# Patient Record
Sex: MALE | Race: WHITE | NOT HISPANIC OR LATINO | Employment: OTHER | URBAN - METROPOLITAN AREA
[De-identification: names, ages, dates, MRNs, and addresses within clinical notes are randomized per-mention and may not be internally consistent; named-entity substitution may affect disease eponyms.]

---

## 2018-10-03 ENCOUNTER — OFFICE VISIT (OUTPATIENT)
Dept: MEDICAL GROUP | Facility: MEDICAL CENTER | Age: 68
End: 2018-10-03
Payer: MEDICARE

## 2018-10-03 VITALS
HEIGHT: 73 IN | DIASTOLIC BLOOD PRESSURE: 64 MMHG | OXYGEN SATURATION: 99 % | RESPIRATION RATE: 14 BRPM | TEMPERATURE: 96.9 F | WEIGHT: 242.95 LBS | BODY MASS INDEX: 32.2 KG/M2 | HEART RATE: 77 BPM | SYSTOLIC BLOOD PRESSURE: 130 MMHG

## 2018-10-03 DIAGNOSIS — E78.5 DYSLIPIDEMIA: ICD-10-CM

## 2018-10-03 DIAGNOSIS — E66.9 OBESITY (BMI 30.0-34.9): ICD-10-CM

## 2018-10-03 DIAGNOSIS — M25.50 ARTHRALGIA, UNSPECIFIED JOINT: ICD-10-CM

## 2018-10-03 DIAGNOSIS — K21.9 GASTROESOPHAGEAL REFLUX DISEASE, ESOPHAGITIS PRESENCE NOT SPECIFIED: ICD-10-CM

## 2018-10-03 DIAGNOSIS — Z79.899 CONTROLLED SUBSTANCE AGREEMENT SIGNED: ICD-10-CM

## 2018-10-03 DIAGNOSIS — M15.9 PRIMARY OSTEOARTHRITIS INVOLVING MULTIPLE JOINTS: ICD-10-CM

## 2018-10-03 DIAGNOSIS — B00.9 HSV INFECTION: ICD-10-CM

## 2018-10-03 DIAGNOSIS — Z00.00 HEALTH CARE MAINTENANCE: ICD-10-CM

## 2018-10-03 DIAGNOSIS — G47.00 INSOMNIA, UNSPECIFIED TYPE: ICD-10-CM

## 2018-10-03 DIAGNOSIS — J30.2 SEASONAL ALLERGIC RHINITIS, UNSPECIFIED TRIGGER: ICD-10-CM

## 2018-10-03 DIAGNOSIS — H91.91 DECREASED HEARING, RIGHT: ICD-10-CM

## 2018-10-03 DIAGNOSIS — Z76.89 ENCOUNTER TO ESTABLISH CARE: ICD-10-CM

## 2018-10-03 PROBLEM — E66.811 OBESITY (BMI 30.0-34.9): Status: ACTIVE | Noted: 2018-10-03

## 2018-10-03 PROBLEM — M15.0 PRIMARY OSTEOARTHRITIS INVOLVING MULTIPLE JOINTS: Status: ACTIVE | Noted: 2018-10-03

## 2018-10-03 PROCEDURE — 99205 OFFICE O/P NEW HI 60 MIN: CPT | Performed by: INTERNAL MEDICINE

## 2018-10-03 RX ORDER — MONTELUKAST SODIUM 10 MG/1
TABLET ORAL
COMMUNITY
Start: 2018-08-30 | End: 2020-04-28

## 2018-10-03 RX ORDER — DICLOFENAC SODIUM 75 MG/1
75 TABLET, DELAYED RELEASE ORAL 2 TIMES DAILY
Qty: 180 TAB | Refills: 0 | Status: SHIPPED | OUTPATIENT
Start: 2018-10-03 | End: 2018-11-27 | Stop reason: SDUPTHER

## 2018-10-03 RX ORDER — FUROSEMIDE 20 MG/1
TABLET ORAL
COMMUNITY
Start: 2018-08-31 | End: 2020-04-28

## 2018-10-03 RX ORDER — INFLUENZA A VIRUS A/MICHIGAN/45/2015 X-275 (H1N1) ANTIGEN (FORMALDEHYDE INACTIVATED), INFLUENZA A VIRUS A/SINGAPORE/INFIMH-16-0019/2016 IVR-186 (H3N2) ANTIGEN (FORMALDEHYDE INACTIVATED), AND INFLUENZA B VIRUS B/MARYLAND/15/2016 BX-69A (A B/COLORADO/6/2017-LIKE VIRUS) ANTIGEN (FORMALDEHYDE INACTIVATED) 60; 60; 60 UG/.5ML; UG/.5ML; UG/.5ML
INJECTION, SUSPENSION INTRAMUSCULAR
Refills: 0 | COMMUNITY
Start: 2018-09-29 | End: 2019-04-08

## 2018-10-03 RX ORDER — ASPIRIN 81 MG/1
81 TABLET, CHEWABLE ORAL DAILY
COMMUNITY
End: 2020-08-17

## 2018-10-03 RX ORDER — VALACYCLOVIR HYDROCHLORIDE 500 MG/1
500 TABLET, FILM COATED ORAL DAILY
Qty: 90 TAB | Refills: 0 | Status: SHIPPED | OUTPATIENT
Start: 2018-10-03 | End: 2018-11-26 | Stop reason: SDUPTHER

## 2018-10-03 RX ORDER — DICLOFENAC SODIUM 75 MG/1
TABLET, DELAYED RELEASE ORAL
COMMUNITY
Start: 2018-08-30 | End: 2018-10-03 | Stop reason: SDUPTHER

## 2018-10-03 RX ORDER — ZOLPIDEM TARTRATE 10 MG/1
TABLET ORAL
COMMUNITY
Start: 2018-08-30 | End: 2018-11-26 | Stop reason: SDUPTHER

## 2018-10-03 RX ORDER — PANTOPRAZOLE SODIUM 20 MG/1
20 TABLET, DELAYED RELEASE ORAL DAILY
Qty: 90 TAB | Refills: 0 | Status: SHIPPED | OUTPATIENT
Start: 2018-10-03 | End: 2018-11-26 | Stop reason: SDUPTHER

## 2018-10-03 RX ORDER — VALACYCLOVIR HYDROCHLORIDE 500 MG/1
TABLET, FILM COATED ORAL
COMMUNITY
Start: 2018-08-29 | End: 2018-10-03 | Stop reason: SDUPTHER

## 2018-10-03 RX ORDER — ATORVASTATIN CALCIUM 40 MG/1
40 TABLET, FILM COATED ORAL DAILY
Qty: 90 TAB | Refills: 0 | Status: SHIPPED | OUTPATIENT
Start: 2018-10-03 | End: 2018-11-26 | Stop reason: SDUPTHER

## 2018-10-03 RX ORDER — ATORVASTATIN CALCIUM 40 MG/1
TABLET, FILM COATED ORAL
COMMUNITY
Start: 2018-08-30 | End: 2018-10-03 | Stop reason: SDUPTHER

## 2018-10-03 RX ORDER — ELECTROLYTES/DEXTROSE
1 SOLUTION, ORAL ORAL DAILY
COMMUNITY
End: 2022-07-20

## 2018-10-03 RX ORDER — FLUOROURACIL 50 MG/G
CREAM TOPICAL 2 TIMES DAILY
COMMUNITY
End: 2020-04-29

## 2018-10-03 RX ORDER — PANTOPRAZOLE SODIUM 40 MG/1
TABLET, DELAYED RELEASE ORAL
COMMUNITY
Start: 2018-08-30 | End: 2018-10-03 | Stop reason: SDUPTHER

## 2018-10-03 RX ORDER — SODIUM FLUORIDE 5 MG/G
GEL, DENTIFRICE DENTAL
COMMUNITY
End: 2020-04-28

## 2018-10-03 ASSESSMENT — PATIENT HEALTH QUESTIONNAIRE - PHQ9
5. POOR APPETITE OR OVEREATING: 2 - MORE THAN HALF THE DAYS
CLINICAL INTERPRETATION OF PHQ2 SCORE: 2
SUM OF ALL RESPONSES TO PHQ QUESTIONS 1-9: 9

## 2018-10-03 NOTE — PROGRESS NOTES
CHIEF COMPLAINT  Chief Complaint   Patient presents with   • Establish Care   Dyslipidemia    HPI  Patient is a 68 y.o. male patient who presents today for the following     DYSLIPIDEMIA  The patient is on atorvastatin 40 mg, taking daily, as prescribed. No myalgias, muscle cramps or pain.   Diet: Regular  Exercise: Limited  BMI: 32  FH:     GERD  On omeprazle, 20 mg QD; takes medication as prescribed, that controls sx.   Denies:   - heartburn, epigastric pain.   -  nausea, vomiting, or diarrhea  - dysphagia  - abnormal cough  - blood in the stool or dark tarry stools.  - early satiety  - tobacco use.    Insomnia  The patient has a trouble to go and stay asleep.   Used Ambien, 10 mg at bedtime.   Discussed sleep hygiene:   - Go to bed and wake up at consistent times whether work/school day or not.   - Keep room dark, quiet, and comfortable.   - Don't have naps.  - Increase exposure to sunlight during awake times and avoid bright lights before going to sleep.   - Avoid stimulant or caffeine use more than 4 hours after wake time.   - Avoid meal or exercise 2-3 hours prior to going to bed.  Handout provided.     Seasonal allergies  The patient has h/o seasonal allergies.   No current sx.  He used OTC antihistamine and Singulair as needed.    OA, multiple joints /arthralgia  The patient has history of osteoarthritis, arthralgia or multiple joints.  St post R shoulder replacement.    He is using Voltaren, 75 mg twice daily     OBESITY, Body mass index is 32.05 kg/m².  Diet: Regular  Exercise: Limited  No temperature intolerance. No change in hair/skin quality, BMs.   No HTN, buffalo hump, purple striae, flushing.  FH of obesity: neg    HSV infection  The patient has h/o HSV infection, using Valtrex 500 mg daily.  No current skin lesion.    Decreased hearing in the right ear  The patient was diagnosed with decreased hearing in the right ear, sensorineural loss.   He does not have hearing aid.      Reviewed/edited PMH,  PSH, FH, SH, ALL, HCM/IMM  Reviewed/edited MEDS    Patient Active Problem List    Diagnosis Date Noted   • Gastroesophageal reflux disease 10/03/2018   • Insomnia 10/03/2018   • Seasonal allergic rhinitis 10/03/2018   • Dyslipidemia 10/03/2018   • Primary osteoarthritis involving multiple joints 10/03/2018   • Encounter to establish care 10/03/2018   • Obesity (BMI 30.0-34.9) 10/03/2018     CURRENT MEDICATIONS  Current Outpatient Prescriptions   Medication Sig Dispense Refill   • Cholecalciferol (VITAMIN D3) 2000 UNIT Cap Take  by mouth 2 Times a Day.     • Multiple Vitamins-Minerals (MULTIVITAMIN ADULT) Tab Take  by mouth.     • atorvastatin (LIPITOR) 40 MG Tab      • diclofenac EC (VOLTAREN) 75 MG Tablet Delayed Response      • furosemide (LASIX) 20 MG Tab      • pantoprazole (PROTONIX) 40 MG Tablet Delayed Response      • valACYclovir (VALTREX) 500 MG Tab      • aspirin (ASA) 81 MG Chew Tab chewable tablet Take 81 mg by mouth every day.     • SODIUM FLUORIDE, DENTAL GEL, (DENTAGEL) 1.1 % Gel by dental route.     • fluocinonide (LIDEX) 0.05 % Cream Apply  to affected area(s) 2 times a day.     • fluorouracil (EFUDEX) 5 % cream Apply  to affected area(s) 2 times a day.     • FLUZONE HIGH-DOSE 0.5 ML Suspension Prefilled Syringe injection TO BE ADMINISTERED BY PHARMACIST FOR IMMUNIZATION  0   • montelukast (SINGULAIR) 10 MG Tab      • zolpidem (AMBIEN) 10 MG Tab        No current facility-administered medications for this visit.      ALLERGIES  Allergies: Patient has no allergy information on record.  PAST MEDICAL HISTORY  Past Medical History:   Diagnosis Date   • Dyslipidemia    • GERD (gastroesophageal reflux disease)    • Insomnia      SURGICAL HISTORY  He  has a past surgical history that includes appendectomy and tonsillectomy.  SOCIAL HISTORY  Social History   Substance Use Topics   • Smoking status: Never Smoker   • Smokeless tobacco: Never Used   • Alcohol use Yes     Social History     Social History  "Narrative   • No narrative on file     FAMILY HISTORY  Family History   Problem Relation Age of Onset   • Cancer Mother    • Diabetes Mother    • Heart Disease Father    • Hyperlipidemia Neg Hx      Family Status   Relation Status   • Mo    • Fa    • Neg Hx (Not Specified)     ROS   Constitutional: Negative for fever, chills.  HENT: Negative for congestion, sore throat. And per HPI.  Eyes: Negative for blurred vision.   Respiratory: Negative for cough, shortness of breath.  Cardiovascular: Negative for chest pain, palpitations.   Gastrointestinal: Negative for nausea, abdominal pain. And per HPI.  Genitourinary: Negative for dysuria.  Musculoskeletal: As above.    Skin: Negative for rash and itching.   Neuro: Negative for dizziness, weakness and headaches.   Endo/Heme/Allergies: Does not bruise/bleed easily.   Psychiatric/Behavioral: Negative for depression, anxiety. And per HPI.    PHYSICAL EXAM   Blood pressure 130/64, pulse 77, temperature 36.1 °C (96.9 °F), temperature source Temporal, resp. rate 14, height 1.854 m (6' 1\"), weight 110.2 kg (242 lb 15.2 oz), SpO2 99 %. Body mass index is 32.05 kg/m².  General:  NAD, well appearing  HEENT:   NC/AT, PERRLA, EOMI, TMs are clear. Oropharyngeal mucosa is pink,  without lesions;  no cervical / supraclavicular  lymphadenopathy, no thyromegaly.    Cardiovascular: RRR.   No m/r/g. No carotid bruits .      Lungs:   CTAB, no w/r/r, no respiratory distress.  Abdomen: Soft, NT/ND + BS; no suprapubic tenderness; no masses or hepatosplenomegaly.  Extremities:  2+ DP and radial pulses bilaterally.  No c/c/e.   Skin:  Warm, dry.  No erythema. No rash.   Neurologic: Alert & oriented x 3.  No focal deficits.  Psychiatric:  Affect normal, mood normal, judgment normal.    LABS     Pending    IMAGING     None    ASSESMENT AND PLAN        1. Dyslipidemia  Pending labs, continue current treatment  - atorvastatin (LIPITOR) 40 MG Tab; Take 1 Tab by mouth every day.  " Dispense: 90 Tab; Refill: 0  - COMP METABOLIC PANEL; Future  - LIPID PROFILE; Future    2. Gastroesophageal reflux disease, esophagitis presence not specified  Controlled, continue current treatment  - pantoprazole (PROTONIX) 20 MG tablet; Take 1 Tab by mouth every day.  Dispense: 90 Tab; Refill: 0    3. Insomnia, unspecified type  - Controlled Substance Treatment Agreement  4. Controlled substance agreement signed  - Controlled Substance Treatment Agreement  Controlled sleep problem, continue current treatment    5. Seasonal allergic rhinitis, unspecified trigger  No current symptoms, continue current treatment as needed    6. Primary osteoarthritis involving multiple joints  Controlled, refill  - diclofenac EC (VOLTAREN) 75 MG Tablet Delayed Response; Take 1 Tab by mouth 2 times a day.  Dispense: 180 Tab; Refill: 0  7. Arthralgia, unspecified joint  - CBC WITH DIFFERENTIAL; Future    8. Obesity (BMI 30.0-34.9)  - Patient identified as having weight management issue.  Appropriate orders and counseling given.    9. HSV infection  Refill:  - valACYclovir (VALTREX) 500 MG Tab; Take 1 Tab by mouth every day.  Dispense: 90 Tab; Refill: 0    10.  Decreased hearing, right  No hearing aid    11. Health care maintenance  Pending records    12. Encounter to establish care  Reviewed PMH, PSH, FH, SH, ALL, MEDS, HCM/IMM.   Advised healthy habits, diet, exercise.  Release form for old records: signed    Time spent 60 minutes face to face, with > 50% spent counseling and coordinating care.    Counseling:   - Smoking:  Nonsmoker    Followup: in 3 months    All questions are answered.    Please note that this dictation was created using voice recognition software, and that there might be errors of elisabeth and possibly content.

## 2018-10-03 NOTE — LETTER
[facility]  Lynette Payne M.D.  [address]  [place] [address]  Fax: [phone]   Authorization for Release/Disclosure of   Protected Health Information   Name: ABIEL PINTO : 1950 SSN: [id number]   Address: [facility] [address]  [place] [address] Phone:    [phone] (home)    I authorize the entity listed below to release/disclose the PHI below to:   [facility]/Lynette Payne M.D. and Lynette Payne M.D.   Provider or Entity Name:     Address   City, State, Zip   Phone:      Fax:     Reason for request: continuity of care   Information to be released:    [  ] LAST COLONOSCOPY,  including any PATH REPORT and follow-up  [  ] LAST FIT/COLOGUARD RESULT [  ] LAST DEXA  [  ] LAST MAMMOGRAM  [  ] LAST PAP  [  ] LAST LABS [  ] RETINA EXAM REPORT  [  ] IMMUNIZATION RECORDS  [  ] Release all info      [  ] Check here and initial the line next to each item to release ALL health information INCLUDING  _____ Care and treatment for drug and / or alcohol abuse  _____ HIV testing, infection status, or AIDS  _____ Genetic Testing    DATES OF SERVICE OR TIME PERIOD TO BE DISCLOSED: _____________  I understand and acknowledge that:  * This Authorization may be revoked at any time by you in writing, except if your health information has already been used or disclosed.  * Your health information that will be used or disclosed as a result of you signing this authorization could be re-disclosed by the recipient. If this occurs, your re-disclosed health information may no longer be protected by State or Federal laws.  * You may refuse to sign this Authorization. Your refusal will not affect your ability to obtain treatment.  * This Authorization becomes effective upon signing and will  on (date) __________.      If no date is indicated, this Authorization will  one (1) year from the signature date.    Name: Abiel Pinto    Signature:   Date:     10/3/2018          PLEASE FAX REQUESTED RECORDS BACK TO: [phone]

## 2018-10-29 ENCOUNTER — OFFICE VISIT (OUTPATIENT)
Dept: CARDIOLOGY | Facility: MEDICAL CENTER | Age: 68
End: 2018-10-29
Payer: MEDICARE

## 2018-10-29 VITALS
BODY MASS INDEX: 31.94 KG/M2 | HEIGHT: 73 IN | WEIGHT: 241 LBS | SYSTOLIC BLOOD PRESSURE: 112 MMHG | DIASTOLIC BLOOD PRESSURE: 68 MMHG

## 2018-10-29 DIAGNOSIS — E78.5 DYSLIPIDEMIA: ICD-10-CM

## 2018-10-29 DIAGNOSIS — I35.9 AORTIC VALVE DISORDER: ICD-10-CM

## 2018-10-29 PROCEDURE — 99203 OFFICE O/P NEW LOW 30 MIN: CPT | Performed by: INTERNAL MEDICINE

## 2018-10-29 RX ORDER — NAPROXEN SODIUM 220 MG
220 TABLET ORAL 2 TIMES DAILY WITH MEALS
COMMUNITY
End: 2019-01-31

## 2018-10-29 ASSESSMENT — ENCOUNTER SYMPTOMS
CARDIOVASCULAR NEGATIVE: 1
MUSCULOSKELETAL NEGATIVE: 1
GASTROINTESTINAL NEGATIVE: 1
RESPIRATORY NEGATIVE: 1
NEUROLOGICAL NEGATIVE: 1
CONSTITUTIONAL NEGATIVE: 1
DEPRESSION: 0
BLURRED VISION: 0

## 2018-10-29 NOTE — PROGRESS NOTES
"Chief Complaint   Patient presents with   • Aortic Stenosis       Subjective:   Abiel Vaughn is a 68 y.o. male who is self-referred for evaluation of aortic valve disease and hyperlipidemia.  He recently moved here from [place].  Patient has history of mild aortic insufficiency and aortic sclerosis.  His last echo was in , this revealed an EF of 65%, aortic sclerosis and mild aortic insufficiency.  The peak aortic velocity was 1.5 m/s.  He also has a history of hyperlipidemia for which he takes statins.  Recent lab from  of this year reveals LDL of 80, total cholesterol 151, and non-HDL cholesterol of 117.  The patient exercises moderately by walking up and down stairs.  He has had no chest pain, exertional dyspnea or edema.  Family history: Father  at 89 of \"cardiovascular disease\".  Mother  of endometrial cancer at age 83.    Surgeries: Bilateral knee arthroscopies: Tonsillectomy and appendectomy as a child.  Right shoulder replacement, lumbar surgery.  Past Medical History:   Diagnosis Date   • Dyslipidemia    • GERD (gastroesophageal reflux disease)    • Insomnia      Past Surgical History:   Procedure Laterality Date   • APPENDECTOMY     • TONSILLECTOMY       Family History   Problem Relation Age of Onset   • Cancer Mother    • Diabetes Mother    • Heart Disease Father    • Hyperlipidemia Neg Hx      Social History     Social History   • Marital status: Single     Spouse name: N/A   • Number of children: N/A   • Years of education: N/A     Occupational History   • Not on file.     Social History Main Topics   • Smoking status: Never Smoker   • Smokeless tobacco: Never Used   • Alcohol use Yes   • Drug use: No   • Sexual activity: Not on file     Other Topics Concern   • Not on file     Social History Narrative   • No narrative on file     No Known Allergies  Outpatient Encounter Prescriptions as of 10/29/2018   Medication Sig Dispense Refill   • naproxen (ALEVE) 220 MG tablet Take 220 mg " "by mouth 2 times a day, with meals.     • Pseudoephedrine HCl (SUDAFED PO) Take 10 mg by mouth as needed.     • PSYLLIUM PO Take  by mouth.     • Cholecalciferol (VITAMIN D3) 2000 UNIT Cap Take  by mouth 2 Times a Day.     • Multiple Vitamins-Minerals (MULTIVITAMIN ADULT) Tab Take  by mouth.     • furosemide (LASIX) 20 MG Tab      • montelukast (SINGULAIR) 10 MG Tab      • zolpidem (AMBIEN) 10 MG Tab      • aspirin (ASA) 81 MG Chew Tab chewable tablet Take 81 mg by mouth every day.     • SODIUM FLUORIDE, DENTAL GEL, (DENTAGEL) 1.1 % Gel by dental route.     • fluocinonide (LIDEX) 0.05 % Cream Apply  to affected area(s) 2 times a day.     • fluorouracil (EFUDEX) 5 % cream Apply  to affected area(s) 2 times a day.     • atorvastatin (LIPITOR) 40 MG Tab Take 1 Tab by mouth every day. 90 Tab 0   • diclofenac EC (VOLTAREN) 75 MG Tablet Delayed Response Take 1 Tab by mouth 2 times a day. 180 Tab 0   • pantoprazole (PROTONIX) 20 MG tablet Take 1 Tab by mouth every day. (Patient taking differently: Take 40 mg by mouth every day.) 90 Tab 0   • valACYclovir (VALTREX) 500 MG Tab Take 1 Tab by mouth every day. 90 Tab 0   • FLUZONE HIGH-DOSE 0.5 ML Suspension Prefilled Syringe injection TO BE ADMINISTERED BY PHARMACIST FOR IMMUNIZATION  0     No facility-administered encounter medications on file as of 10/29/2018.      Review of Systems   Constitutional: Negative.    HENT: Positive for hearing loss.    Eyes: Negative for blurred vision.   Respiratory: Negative.    Cardiovascular: Negative.    Gastrointestinal: Negative.    Musculoskeletal: Negative.    Skin: Negative.    Neurological: Negative.    Endo/Heme/Allergies: Negative.    Psychiatric/Behavioral: Negative for depression.        Objective:   /68 (BP Location: Left arm, Patient Position: Sitting, BP Cuff Size: Adult)   Ht 1.854 m (6' 1\")   Wt 109.3 kg (241 lb)   BMI 31.80 kg/m²     Physical Exam   Constitutional: He is oriented to person, place, and time. He " appears well-nourished. No distress.   HENT:   Head: Normocephalic and atraumatic.   Eyes: Pupils are equal, round, and reactive to light. No scleral icterus.   Neck: No JVD present. No tracheal deviation present.   Cardiovascular: Normal rate and regular rhythm.    Murmur heard.   Systolic murmur is present with a grade of 2/6   There is no delay in carotid upstroke.   Pulmonary/Chest: Breath sounds normal. No respiratory distress. He has no wheezes.   Abdominal: Soft. Bowel sounds are normal. He exhibits no distension. There is no tenderness.   Musculoskeletal: He exhibits no edema.   Neurological: He is alert and oriented to person, place, and time.   Skin: Skin is warm and dry.   Psychiatric: He has a normal mood and affect.       Assessment:     1. Dyslipidemia  EC-ECHOCARDIOGRAM COMPLETE W/O CONT   2. Aortic valve disorder         Medical Decision Making:  Today's Assessment / Status / Plan:   Aortic valve disease:  He has a murmur of aortic stenosis/sclerosis without in associated delay in carotid upstroke..  His last echo was over 2 years ago which showed no significant aortic valve gradient.  He was also found to have mild aortic insufficiency.  An echo be obtained to evaluate his LV function and the severity of his aortic valve disease.    Hyperlipidemia.  The patient is on statin for primary prevention.  We discussed the pluses and minuses of this.  He has been on statin therapy for many years and would like to continue it.  We will therefore keep on the same dose of statin.    He will be notified of the results of the echo.  Return in about 2 years for reevaluation of his valve.

## 2018-11-15 ENCOUNTER — HOSPITAL ENCOUNTER (OUTPATIENT)
Dept: LAB | Facility: MEDICAL CENTER | Age: 68
End: 2018-11-15
Attending: INTERNAL MEDICINE
Payer: MEDICARE

## 2018-11-15 DIAGNOSIS — M25.50 ARTHRALGIA, UNSPECIFIED JOINT: ICD-10-CM

## 2018-11-15 DIAGNOSIS — E78.5 DYSLIPIDEMIA: ICD-10-CM

## 2018-11-15 LAB
ALBUMIN SERPL BCP-MCNC: 4.3 G/DL (ref 3.2–4.9)
ALBUMIN/GLOB SERPL: 2 G/DL
ALP SERPL-CCNC: 63 U/L (ref 30–99)
ALT SERPL-CCNC: 37 U/L (ref 2–50)
ANION GAP SERPL CALC-SCNC: 7 MMOL/L (ref 0–11.9)
AST SERPL-CCNC: 31 U/L (ref 12–45)
BASOPHILS # BLD AUTO: 0.9 % (ref 0–1.8)
BASOPHILS # BLD: 0.07 K/UL (ref 0–0.12)
BILIRUB SERPL-MCNC: 0.5 MG/DL (ref 0.1–1.5)
BUN SERPL-MCNC: 17 MG/DL (ref 8–22)
CALCIUM SERPL-MCNC: 9.4 MG/DL (ref 8.5–10.5)
CHLORIDE SERPL-SCNC: 106 MMOL/L (ref 96–112)
CHOLEST SERPL-MCNC: 171 MG/DL (ref 100–199)
CO2 SERPL-SCNC: 24 MMOL/L (ref 20–33)
CREAT SERPL-MCNC: 0.91 MG/DL (ref 0.5–1.4)
EOSINOPHIL # BLD AUTO: 0.13 K/UL (ref 0–0.51)
EOSINOPHIL NFR BLD: 1.7 % (ref 0–6.9)
ERYTHROCYTE [DISTWIDTH] IN BLOOD BY AUTOMATED COUNT: 42 FL (ref 35.9–50)
FASTING STATUS PATIENT QL REPORTED: NORMAL
GLOBULIN SER CALC-MCNC: 2.2 G/DL (ref 1.9–3.5)
GLUCOSE SERPL-MCNC: 112 MG/DL (ref 65–99)
HCT VFR BLD AUTO: 42.1 % (ref 42–52)
HDLC SERPL-MCNC: 29 MG/DL
HGB BLD-MCNC: 14.1 G/DL (ref 14–18)
IMM GRANULOCYTES # BLD AUTO: 0.02 K/UL (ref 0–0.11)
IMM GRANULOCYTES NFR BLD AUTO: 0.3 % (ref 0–0.9)
LDLC SERPL CALC-MCNC: 101 MG/DL
LYMPHOCYTES # BLD AUTO: 2.31 K/UL (ref 1–4.8)
LYMPHOCYTES NFR BLD: 30.5 % (ref 22–41)
MCH RBC QN AUTO: 30.5 PG (ref 27–33)
MCHC RBC AUTO-ENTMCNC: 33.5 G/DL (ref 33.7–35.3)
MCV RBC AUTO: 91.1 FL (ref 81.4–97.8)
MONOCYTES # BLD AUTO: 0.6 K/UL (ref 0–0.85)
MONOCYTES NFR BLD AUTO: 7.9 % (ref 0–13.4)
NEUTROPHILS # BLD AUTO: 4.45 K/UL (ref 1.82–7.42)
NEUTROPHILS NFR BLD: 58.7 % (ref 44–72)
NRBC # BLD AUTO: 0 K/UL
NRBC BLD-RTO: 0 /100 WBC
PLATELET # BLD AUTO: 182 K/UL (ref 164–446)
PMV BLD AUTO: 11.6 FL (ref 9–12.9)
POTASSIUM SERPL-SCNC: 3.8 MMOL/L (ref 3.6–5.5)
PROT SERPL-MCNC: 6.5 G/DL (ref 6–8.2)
RBC # BLD AUTO: 4.62 M/UL (ref 4.7–6.1)
SODIUM SERPL-SCNC: 137 MMOL/L (ref 135–145)
TRIGL SERPL-MCNC: 204 MG/DL (ref 0–149)
WBC # BLD AUTO: 7.6 K/UL (ref 4.8–10.8)

## 2018-11-15 PROCEDURE — 80053 COMPREHEN METABOLIC PANEL: CPT

## 2018-11-15 PROCEDURE — 85025 COMPLETE CBC W/AUTO DIFF WBC: CPT

## 2018-11-15 PROCEDURE — 80061 LIPID PANEL: CPT

## 2018-11-15 PROCEDURE — 36415 COLL VENOUS BLD VENIPUNCTURE: CPT

## 2018-11-26 ENCOUNTER — OFFICE VISIT (OUTPATIENT)
Dept: MEDICAL GROUP | Facility: MEDICAL CENTER | Age: 68
End: 2018-11-26
Payer: MEDICARE

## 2018-11-26 VITALS
WEIGHT: 245.81 LBS | RESPIRATION RATE: 14 BRPM | OXYGEN SATURATION: 96 % | BODY MASS INDEX: 32.58 KG/M2 | DIASTOLIC BLOOD PRESSURE: 70 MMHG | HEIGHT: 73 IN | SYSTOLIC BLOOD PRESSURE: 132 MMHG | HEART RATE: 78 BPM | TEMPERATURE: 96.5 F

## 2018-11-26 DIAGNOSIS — G47.9 SLEEP DISORDER: ICD-10-CM

## 2018-11-26 DIAGNOSIS — K21.9 GASTROESOPHAGEAL REFLUX DISEASE, ESOPHAGITIS PRESENCE NOT SPECIFIED: ICD-10-CM

## 2018-11-26 DIAGNOSIS — E55.9 HYPOVITAMINOSIS D: ICD-10-CM

## 2018-11-26 DIAGNOSIS — E78.5 DYSLIPIDEMIA: ICD-10-CM

## 2018-11-26 DIAGNOSIS — R73.01 IFG (IMPAIRED FASTING GLUCOSE): ICD-10-CM

## 2018-11-26 DIAGNOSIS — Z23 NEED FOR TDAP VACCINATION: ICD-10-CM

## 2018-11-26 DIAGNOSIS — Z98.890 H/O LAMINECTOMY: ICD-10-CM

## 2018-11-26 DIAGNOSIS — M54.50 CHRONIC LUMBOSACRAL PAIN: ICD-10-CM

## 2018-11-26 DIAGNOSIS — B00.9 HSV INFECTION: ICD-10-CM

## 2018-11-26 DIAGNOSIS — Z98.890 H/O DISCECTOMY: ICD-10-CM

## 2018-11-26 DIAGNOSIS — G89.29 CHRONIC LUMBOSACRAL PAIN: ICD-10-CM

## 2018-11-26 PROCEDURE — 90715 TDAP VACCINE 7 YRS/> IM: CPT | Performed by: INTERNAL MEDICINE

## 2018-11-26 PROCEDURE — 90471 IMMUNIZATION ADMIN: CPT | Performed by: INTERNAL MEDICINE

## 2018-11-26 PROCEDURE — 99214 OFFICE O/P EST MOD 30 MIN: CPT | Mod: 25 | Performed by: INTERNAL MEDICINE

## 2018-11-26 RX ORDER — PANTOPRAZOLE SODIUM 20 MG/1
20 TABLET, DELAYED RELEASE ORAL DAILY
Qty: 90 TAB | Refills: 3 | Status: SHIPPED | OUTPATIENT
Start: 2018-11-26 | End: 2019-08-26 | Stop reason: SDUPTHER

## 2018-11-26 RX ORDER — ZOLPIDEM TARTRATE 10 MG/1
10 TABLET ORAL NIGHTLY PRN
Qty: 30 TAB | Refills: 2 | Status: SHIPPED
Start: 2018-11-26 | End: 2019-02-24

## 2018-11-26 RX ORDER — ATORVASTATIN CALCIUM 40 MG/1
40 TABLET, FILM COATED ORAL DAILY
Qty: 90 TAB | Refills: 3 | Status: SHIPPED | OUTPATIENT
Start: 2018-11-26 | End: 2019-02-25 | Stop reason: SDUPTHER

## 2018-11-26 RX ORDER — VALACYCLOVIR HYDROCHLORIDE 500 MG/1
500 TABLET, FILM COATED ORAL DAILY
Qty: 90 TAB | Refills: 3 | Status: SHIPPED | OUTPATIENT
Start: 2018-11-26 | End: 2020-04-29

## 2018-11-26 RX ORDER — PENICILLIN V POTASSIUM 500 MG/1
500 TABLET ORAL 4 TIMES DAILY
COMMUNITY
End: 2019-04-08

## 2018-11-26 NOTE — PROGRESS NOTES
CHIEF COMPLAINT  Chief Complaint   Patient presents with   • Follow-Up     Labs   • Medication Refill   • Immunizations     tdap   Insomnia    HPI  Patient is a 68 y.o. male patient who presents today for the following     Dyslipidemia  The patient is on atorvastatin 40 mg, taking daily, as prescribed. No myalgias, muscle cramps or pain.   Diet: Regular  Exercise: Limited  BMI: 32  FH: neg    Hypovitaminosis D  The patient had low vitamin D level.   He has been on Vitamin D supplement, OTC.      GERD  On omeprazle, 20 mg QD; takes medication as prescribed, that controls sx.   Denies:   - heartburn, epigastric pain.   - nausea, vomiting, or diarrhea  - dysphagia  - abnormal cough  - blood in the stool or dark tarry stools.  - early satiety  - tobacco use.     Insomnia  The patient has a trouble to go and stay asleep.   Used Ambien, 10 mg at bedtime.   Discussed sleep hygiene:   - Go to bed and wake up at consistent times whether work/school day or not.   - Keep room dark, quiet, and comfortable.   - Don't have naps.  - Increase exposure to sunlight during awake times and avoid bright lights before going to sleep.   - Avoid stimulant or caffeine use more than 4 hours after wake time.   - Avoid meal or exercise 2-3 hours prior to going to bed.  Handout provided.      Lower back pain, st post laminectomy/discectomy  - Onset: remote  - located in: lower back  - intensity:  mild to moderate  - quality:  dull and sharp  - radiation:  no  - alleviating factors are:  rest  -  exacerbating factors are:  activity  - accompanied:    - no numbness, weakness, tingling, fever, chills  - course: persistents  - therapy: as above    No reported history of:  - chronic immune suppression, alcoholism, IV drug abuse, indwelling catheter, diabetes.    - No history of recent spinal surgery or injection.    Denies:  - numbness/saddle anesthesia.  - bowel/bladder changes, fever.   - trauma  - nausea/vomiting  - chest pain, shortness of  breath, abdominal pain.    Requested PMR referral.     HSV infection  The patient has h/o HSV infection, using Valtrex 500 mg daily.  No current skin lesion.    Reviewed PMH, PSH, FH, SH, ALL, HCM/IMM, no changes  Reviewed MEDS, no changes    Patient Active Problem List    Diagnosis Date Noted   • Aortic valve disorder 10/29/2018   • Gastroesophageal reflux disease 10/03/2018   • Insomnia 10/03/2018   • Seasonal allergic rhinitis 10/03/2018   • Dyslipidemia 10/03/2018   • Primary osteoarthritis involving multiple joints 10/03/2018   • Encounter to establish care 10/03/2018   • Obesity (BMI 30.0-34.9) 10/03/2018   • Decreased hearing, right 10/03/2018     CURRENT MEDICATIONS  Current Outpatient Prescriptions   Medication Sig Dispense Refill   • penicillin v potassium (VEETID) 500 MG Tab Take 500 mg by mouth 4 times a day.     • zolpidem (AMBIEN) 10 MG Tab Take 1 Tab by mouth at bedtime as needed for Sleep for up to 90 days. 30 Tab 2   • atorvastatin (LIPITOR) 40 MG Tab Take 1 Tab by mouth every day. 90 Tab 3   • pantoprazole (PROTONIX) 20 MG tablet Take 1 Tab by mouth every day. 90 Tab 3   • valACYclovir (VALTREX) 500 MG Tab Take 1 Tab by mouth every day. 90 Tab 3   • naproxen (ALEVE) 220 MG tablet Take 220 mg by mouth 2 times a day, with meals.     • Pseudoephedrine HCl (SUDAFED PO) Take 10 mg by mouth as needed.     • PSYLLIUM PO Take  by mouth.     • Cholecalciferol (VITAMIN D3) 2000 UNIT Cap Take  by mouth 2 Times a Day.     • Multiple Vitamins-Minerals (MULTIVITAMIN ADULT) Tab Take  by mouth.     • furosemide (LASIX) 20 MG Tab      • FLUZONE HIGH-DOSE 0.5 ML Suspension Prefilled Syringe injection TO BE ADMINISTERED BY PHARMACIST FOR IMMUNIZATION  0   • montelukast (SINGULAIR) 10 MG Tab      • aspirin (ASA) 81 MG Chew Tab chewable tablet Take 81 mg by mouth every day.     • SODIUM FLUORIDE, DENTAL GEL, (DENTAGEL) 1.1 % Gel by dental route.     • fluocinonide (LIDEX) 0.05 % Cream Apply  to affected area(s) 2 times  "a day.     • fluorouracil (EFUDEX) 5 % cream Apply  to affected area(s) 2 times a day.     • diclofenac EC (VOLTAREN) 75 MG Tablet Delayed Response Take 1 Tab by mouth 2 times a day. 180 Tab 0     No current facility-administered medications for this visit.      ALLERGIES  Allergies: Patient has no known allergies.  PAST MEDICAL HISTORY  Past Medical History:   Diagnosis Date   • Dyslipidemia    • GERD (gastroesophageal reflux disease)    • Insomnia      SURGICAL HISTORY  He  has a past surgical history that includes appendectomy and tonsillectomy.  SOCIAL HISTORY  Social History   Substance Use Topics   • Smoking status: Never Smoker   • Smokeless tobacco: Never Used   • Alcohol use Yes     Social History     Social History Narrative   • No narrative on file     FAMILY HISTORY  Family History   Problem Relation Age of Onset   • Cancer Mother    • Diabetes Mother    • Heart Disease Father    • Hyperlipidemia Neg Hx      Family Status   Relation Status   • Mo    • Fa    • Neg Hx (Not Specified)     ROS   Constitutional: Negative for fever, chills.  HENT: Negative for congestion, sore throat.  Eyes: Negative for blurred vision.   Respiratory: Negative for cough, shortness of breath.  Cardiovascular: Negative for chest pain, palpitations.   Gastrointestinal: Negative for nausea, abdominal pain. And per HPI.  Musculoskeletal: as above.   Skin: Negative for rash and itching.   Neuro: Negative for dizziness, weakness and headaches.   Endo/Heme/Allergies: Does not bruise/bleed easily.   Psychiatric/Behavioral: Negative for depression. And per HPI.    PHYSICAL EXAM   Blood pressure 132/70, pulse 78, temperature 35.8 °C (96.5 °F), temperature source Temporal, resp. rate 14, height 1.854 m (6' 0.99\"), weight 111.5 kg (245 lb 13 oz), SpO2 96 %. Body mass index is 32.44 kg/m².  General:  NAD, well appearing  HEENT:   NC/AT, PERRLA, EOMI, TMs are clear. Oropharyngeal mucosa is pink,  without lesions;  no cervical " / supraclavicular  lymphadenopathy, no thyromegaly.    Cardiovascular: RRR.   No m/r/g. No carotid bruits .      Lungs:   CTAB, no w/r/r, no respiratory distress.  Abdomen: Soft, NT/ND.  Extremities:  2+ DP and radial pulses bilaterally.  No c/c/e.   Skin:  Warm, dry.  No erythema. No rash.   Neurologic: Alert & oriented x 3. No focal deficits.  Psychiatric:  Affect normal, mood normal, judgment normal.    LABS     Labs are reviewed and discussed with a patient  Lab Results   Component Value Date/Time    CHOLSTRLTOT 171 11/15/2018 12:54 PM     (H) 11/15/2018 12:54 PM    HDL 29 (A) 11/15/2018 12:54 PM    TRIGLYCERIDE 204 (H) 11/15/2018 12:54 PM       Lab Results   Component Value Date/Time    SODIUM 137 11/15/2018 12:54 PM    POTASSIUM 3.8 11/15/2018 12:54 PM    CHLORIDE 106 11/15/2018 12:54 PM    CO2 24 11/15/2018 12:54 PM    GLUCOSE 112 (H) 11/15/2018 12:54 PM    BUN 17 11/15/2018 12:54 PM    CREATININE 0.91 11/15/2018 12:54 PM     Lab Results   Component Value Date/Time    ALKPHOSPHAT 63 11/15/2018 12:54 PM    ASTSGOT 31 11/15/2018 12:54 PM    ALTSGPT 37 11/15/2018 12:54 PM    TBILIRUBIN 0.5 11/15/2018 12:54 PM      Lab Results   Component Value Date/Time    WBC 7.6 11/15/2018 12:54 PM    RBC 4.62 (L) 11/15/2018 12:54 PM    HEMOGLOBIN 14.1 11/15/2018 12:54 PM    HEMATOCRIT 42.1 11/15/2018 12:54 PM    MCV 91.1 11/15/2018 12:54 PM    MCH 30.5 11/15/2018 12:54 PM    MCHC 33.5 (L) 11/15/2018 12:54 PM    MPV 11.6 11/15/2018 12:54 PM    NEUTSPOLYS 58.70 11/15/2018 12:54 PM    LYMPHOCYTES 30.50 11/15/2018 12:54 PM    MONOCYTES 7.90 11/15/2018 12:54 PM    EOSINOPHILS 1.70 11/15/2018 12:54 PM    BASOPHILS 0.90 11/15/2018 12:54 PM      IMAGING     None    ASSESMENT AND PLAN        1. Dyslipidemia  Controlled, continue with current meds, f/u labs  - COMP METABOLIC PANEL; Future  - Lipid Profile; Future  - atorvastatin (LIPITOR) 40 MG Tab; Take 1 Tab by mouth every day.  Dispense: 90 Tab; Refill: 3    2. IFG (impaired  fasting glucose)  Discussed about risk to develop DM.   Advised low carb diet, exercise, watch for WT.   - COMP METABOLIC PANEL; Future  - HEMOGLOBIN A1C; Future    3. Hypovitaminosis D  Continue current supplement, pending labs  - VITAMIN D,25 HYDROXY; Future    4. Gastroesophageal reflux disease, esophagitis presence not specified  Had remote EGD, need f/u  - REFERRAL TO GASTROENTEROLOGY  - pantoprazole (PROTONIX) 20 MG tablet; Take 1 Tab by mouth every day.  Dispense: 90 Tab; Refill: 3    5. Sleep disorder  Refill:  - zolpidem (AMBIEN) 10 MG Tab; Take 1 Tab by mouth at bedtime as needed for Sleep for up to 90 days.  Dispense: 30 Tab; Refill: 2  Obtained and reviewed patient utilization report from [facility] pharmacy database on 11/26/2018 7:12 PM  prior to writing prescription for controlled substance II, III or IV per [place] bill . Based on assessment of the report, the prescription is medically necessary. No records.    review.    6. Chronic lumbosacral pain  Continue activity as toilerated  - REFERRAL TO PHYSIATRY (PMR)  7. H/O laminectomy  - REFERRAL TO PHYSIATRY (PMR)  8. H/O discectomy  - REFERRAL TO PHYSIATRY (PMR)    9. HSV infection  Refill:  - valACYclovir (VALTREX) 500 MG Tab; Take 1 Tab by mouth every day.  Dispense: 90 Tab; Refill: 3    10. Need for Tdap vaccination  - Tdap =>6yo IM  Information was provided to the patient regarding the vaccine, including side effects. Vaccine was given by my medical assistant under my supervision.    Counseling:   - Smoking:  Nonsmoker    Followup: Return in about 3 months (around 2/26/2019), or if symptoms worsen or fail to improve.    All questions are answered.    Please note that this dictation was created using voice recognition software, and that there might be errors of elisabeth and possibly content.

## 2018-11-27 DIAGNOSIS — M15.9 PRIMARY OSTEOARTHRITIS INVOLVING MULTIPLE JOINTS: ICD-10-CM

## 2018-11-27 RX ORDER — DICLOFENAC SODIUM 75 MG/1
75 TABLET, DELAYED RELEASE ORAL 2 TIMES DAILY
Qty: 180 TAB | Refills: 0 | Status: SHIPPED | OUTPATIENT
Start: 2018-11-27 | End: 2019-01-31

## 2018-11-27 NOTE — TELEPHONE ENCOUNTER
From: Abiel Vaughn  Sent: 11/27/2018 12:08 PM PST  Subject: Medication Renewal Request    Abiel Vaughn would like a refill of the following medications:     diclofenac EC (VOLTAREN) 75 MG Tablet Delayed Response [Lynette Payne M.D.]   Patient Comment: Edelmira, we left this med off the list yesterday for a new script. Thank you.    Preferred pharmacy: [facility] - [address]

## 2019-01-09 ENCOUNTER — HOSPITAL ENCOUNTER (OUTPATIENT)
Dept: CARDIOLOGY | Facility: MEDICAL CENTER | Age: 69
End: 2019-01-09
Attending: INTERNAL MEDICINE
Payer: MEDICARE

## 2019-01-09 DIAGNOSIS — E78.5 DYSLIPIDEMIA: ICD-10-CM

## 2019-01-09 PROCEDURE — 93306 TTE W/DOPPLER COMPLETE: CPT

## 2019-01-10 LAB
LV EJECT FRACT  99904: 60
LV EJECT FRACT MOD 2C 99903: 68.01
LV EJECT FRACT MOD 4C 99902: 64.74
LV EJECT FRACT MOD BP 99901: 65.94

## 2019-01-10 PROCEDURE — 93306 TTE W/DOPPLER COMPLETE: CPT | Mod: 26 | Performed by: INTERNAL MEDICINE

## 2019-01-31 ENCOUNTER — OFFICE VISIT (OUTPATIENT)
Dept: PHYSICAL MEDICINE AND REHAB | Facility: MEDICAL CENTER | Age: 69
End: 2019-01-31
Payer: MEDICARE

## 2019-01-31 VITALS
DIASTOLIC BLOOD PRESSURE: 64 MMHG | OXYGEN SATURATION: 94 % | WEIGHT: 244.93 LBS | HEIGHT: 74 IN | SYSTOLIC BLOOD PRESSURE: 108 MMHG | HEART RATE: 68 BPM | BODY MASS INDEX: 31.43 KG/M2 | TEMPERATURE: 96.8 F

## 2019-01-31 DIAGNOSIS — Z85.828 HISTORY OF SKIN CANCER: ICD-10-CM

## 2019-01-31 DIAGNOSIS — M54.41 CHRONIC BILATERAL LOW BACK PAIN WITH BILATERAL SCIATICA: ICD-10-CM

## 2019-01-31 DIAGNOSIS — R10.31 BILATERAL GROIN PAIN: ICD-10-CM

## 2019-01-31 DIAGNOSIS — R20.0 RIGHT LEG NUMBNESS: ICD-10-CM

## 2019-01-31 DIAGNOSIS — M25.652 DECREASED RANGE OF MOTION OF BOTH HIPS: ICD-10-CM

## 2019-01-31 DIAGNOSIS — M25.651 DECREASED RANGE OF MOTION OF BOTH HIPS: ICD-10-CM

## 2019-01-31 DIAGNOSIS — M54.42 CHRONIC BILATERAL LOW BACK PAIN WITH BILATERAL SCIATICA: ICD-10-CM

## 2019-01-31 DIAGNOSIS — K21.9 GASTROESOPHAGEAL REFLUX DISEASE, ESOPHAGITIS PRESENCE NOT SPECIFIED: ICD-10-CM

## 2019-01-31 DIAGNOSIS — M54.16 LUMBAR RADICULOPATHY: ICD-10-CM

## 2019-01-31 DIAGNOSIS — R10.32 BILATERAL GROIN PAIN: ICD-10-CM

## 2019-01-31 DIAGNOSIS — G89.29 CHRONIC BILATERAL LOW BACK PAIN WITH BILATERAL SCIATICA: ICD-10-CM

## 2019-01-31 PROCEDURE — 99215 OFFICE O/P EST HI 40 MIN: CPT | Mod: 25 | Performed by: PHYSICAL MEDICINE & REHABILITATION

## 2019-01-31 PROCEDURE — 96127 BRIEF EMOTIONAL/BEHAV ASSMT: CPT | Performed by: PHYSICAL MEDICINE & REHABILITATION

## 2019-01-31 RX ORDER — ACETAMINOPHEN 500 MG
1000 TABLET ORAL 3 TIMES DAILY PRN
Qty: 180 TAB | Refills: 6 | Status: SHIPPED | OUTPATIENT
Start: 2019-01-31 | End: 2020-08-17

## 2019-01-31 ASSESSMENT — PATIENT HEALTH QUESTIONNAIRE - PHQ9
5. POOR APPETITE OR OVEREATING: 3 - NEARLY EVERY DAY
CLINICAL INTERPRETATION OF PHQ2 SCORE: 2
SUM OF ALL RESPONSES TO PHQ QUESTIONS 1-9: 10

## 2019-01-31 ASSESSMENT — PAIN SCALES - GENERAL: PAINLEVEL: 4=SLIGHT-MODERATE PAIN

## 2019-01-31 NOTE — PROGRESS NOTES
New patient note    Physiatry (physical medicine and  Rehabilitation), interventional spine and sports medicine    Date of Service: 1/31/2019    Chief complaint:   Chief Complaint   Patient presents with   • New Patient     back pain        HISTORY    HPI: Abiel Vaughn 68 y.o. male who presents today with Diagnoses of Chronic bilateral low back pain with bilateral sciatica, Lumbar radiculopathy, Right leg numbness, Gastroesophageal reflux disease, esophagitis presence not specified.  Avoid chronic NSAIDs if possible, Bilateral groin pain, and Decreased range of motion of both hips were pertinent to this visit.       Chronic low back pain radiating down the bilateral posterior legs, also affecting the anterior right lower leg, aching in quality, moderate intensity 5/10 in intensity, worse with prolonged sitting, standing, walking. Worse with lumbar extension. Chronic burning and numbness in the right lower leg.     Had an epidural 20 years ago x 3. Tried physical therapy for greater than 6 weeks.       The patient notes a history of discectomies and laminectomy 2004, 2005, 2009, 2016.    Psychological testing for pain. 15 minutes    Opioid Risk Score: 0    Interpretation of Opioid Risk Score   Score 0-3 = Low risk of abuse. Do UDS at least once per year.  Score 4-7 = Moderate risk of abuse. Do UDS 1-4 times per year.  Score 8+ = High risk of abuse. Refer to specialist.    PHQ  Depression Screen (PHQ-2/PHQ-9) 10/3/2018 1/31/2019   PHQ-2 Total Score 2 2   PHQ-9 Total Score 9 10       Interpretation of PHQ-9 Total Score   Score Severity   1-4 No Depression   5-9 Mild Depression   10-14 Moderate Depression   15-19 Moderately Severe Depression   20-27 Severe Depression       ROS:   *Right here diminished hearing with tinnitus due to nerve damage.  Rarely gets palpitations none currently.  Red Flags ROS:   Fever, Chills, Sweats: Denies  Involuntary Weight Loss: Denies  Bladder Incontinence: Denies  Bowel  Incontinence: denies  Saddle Anesthesia: Denies    All other systems reviewed and negative.       PMHx:   Past Medical History:   Diagnosis Date   • Dyslipidemia    • GERD (gastroesophageal reflux disease)    • Insomnia        PSHx:   Past Surgical History:   Procedure Laterality Date   • APPENDECTOMY     • TONSILLECTOMY         Family history   Family History   Problem Relation Age of Onset   • Cancer Mother    • Diabetes Mother    • Heart Disease Father    • Hyperlipidemia Neg Hx          Medications: reviewed on epic.   Outpatient Prescriptions Marked as Taking for the 1/31/19 encounter (Office Visit) with Julito Dacosta M.D.   Medication Sig Dispense Refill   • acetaminophen (TYLENOL) 500 MG Tab Take 2 Tabs by mouth 3 times a day as needed (pain.  Do not exceed 3000 mg of total acetaminophen per day). 180 Tab 6   • zolpidem (AMBIEN) 10 MG Tab Take 1 Tab by mouth at bedtime as needed for Sleep for up to 90 days. 30 Tab 2   • atorvastatin (LIPITOR) 40 MG Tab Take 1 Tab by mouth every day. 90 Tab 3   • pantoprazole (PROTONIX) 20 MG tablet Take 1 Tab by mouth every day. 90 Tab 3   • valACYclovir (VALTREX) 500 MG Tab Take 1 Tab by mouth every day. 90 Tab 3   • Pseudoephedrine HCl (SUDAFED PO) Take 10 mg by mouth as needed.     • PSYLLIUM PO Take  by mouth.     • Cholecalciferol (VITAMIN D3) 2000 UNIT Cap Take  by mouth 2 Times a Day.     • Multiple Vitamins-Minerals (MULTIVITAMIN ADULT) Tab Take  by mouth.     • furosemide (LASIX) 20 MG Tab      • montelukast (SINGULAIR) 10 MG Tab      • aspirin (ASA) 81 MG Chew Tab chewable tablet Take 81 mg by mouth every day.     • fluocinonide (LIDEX) 0.05 % Cream Apply  to affected area(s) 2 times a day.          Allergies:   No Known Allergies    Social Hx:   Social History     Social History   • Marital status: Single     Spouse name: N/A   • Number of children: N/A   • Years of education: N/A     Occupational History   • Not on file.     Social History Main Topics   •  "Smoking status: Never Smoker   • Smokeless tobacco: Never Used   • Alcohol use Yes   • Drug use: No   • Sexual activity: Not on file     Other Topics Concern   •  Service Yes   • Blood Transfusions No   • Caffeine Concern No   • Occupational Exposure No   • Hobby Hazards No   • Sleep Concern Yes   • Stress Concern No   • Weight Concern Yes   • Special Diet No   • Back Care No   • Exercise Yes   • Bike Helmet No   • Seat Belt Yes   • Self-Exams Yes     Social History Narrative   • No narrative on file         EXAMINATION     Physical Exam:   Vitals: Blood pressure 108/64, pulse 68, temperature 36 °C (96.8 °F), temperature source Temporal, height 1.88 m (6' 2\"), weight 111.1 kg (244 lb 14.9 oz), SpO2 94 %.    Constitutional:   Body Habitus: Body mass index is 31.45 kg/m².  Cooperation: Fully cooperates with exam  Appearance: Well-groomed, well-nourished, not disheveled     Eyes: No scleral icterus to suggest severe liver disease, no proptosis to suggest severe hyperthyroid    ENT -no obvious auditory deficits, no obvious tongue lesions, tongue midline, no facial droop     Skin -no rashes or lesions noted     Respiratory-  breathing comfortable on room air, no audible wheezing    Cardiovascular- capillary refills less than 2 seconds. No lower extremity edema is noted.     Gastrointestinal - no obvious abdominal masses, No tenderness to palpation in the abdomen    Psychiatric- alert and oriented ×3. Normal affect.     Gait - normal gait, no use of ambulatory device, nonantalgic. the patient can toe walk with ease. the patient can heel walk with ease..     Musculoskeletal -     Thoracic/Lumbar Spine/Sacral Spine/Hips   Inspection: No evidence of atrophy in bilateral lower extremities throughout     ROM: full  AROM with flexion, extension, lateral flexion, and rotation bilaterally.  There is pain with lumbar extension extension rotation.    Palpation:   No tenderness to palpation in midline at T1-T12 levels. No " tenderness to palpation in the left and right of the midline T1-L5, NEGATIVE for tenderness to palpation to the para-midline region in the lower lumbar levels.  palpation over SI joint: negative bilaterally    palpation over buttock: negative bilaterally    palpation in hip or over the greater trochanters: negative bilaterally      Lumbar spine Special tests  Neuro tension  Straight leg test negative bilaterally    Slump test negative bilaterally      HIP  FAIR test positive bilaterally    Range of motion in the hips is within limited in the bilateral hips in flexion, internal rotation, external rotation    SI joint tests  Observation patient sits on one buttocks: Negative  Thigh thrust test negative bilaterally    DERRELL test negative bilaterally       Neuro       Key points for the international standards for neurological classification of spinal cord injury (ISNCSCI) to light touch.     Dermatome R L                                      L2 2 2   L3 2 2   L4 2 2   L5 2 2   S1 2 2   S2 2 2           Motor Exam Lower Extremities    ? Myotome R L   Hip flexion L2 5 5   Knee extension L3 5 5   Ankle dorsiflexion L4 5 5   Toe extension L5 5 5   Ankle plantarflexion S1 5 5         Fam’s sign negative bilaterally   Babinski sign negative bilaterally   Clonus of the ankle positive right, negative left     Reflexes  ?  R L               Patella  0 1+   Achilles   0 2+           MEDICAL DECISION MAKING    Medical records review: see under HPI section.     DATA    Labs:   Lab Results   Component Value Date/Time    SODIUM 137 11/15/2018 12:54 PM    POTASSIUM 3.8 11/15/2018 12:54 PM    CHLORIDE 106 11/15/2018 12:54 PM    CO2 24 11/15/2018 12:54 PM    ANION 7.0 11/15/2018 12:54 PM    GLUCOSE 112 (H) 11/15/2018 12:54 PM    BUN 17 11/15/2018 12:54 PM    CREATININE 0.91 11/15/2018 12:54 PM    CALCIUM 9.4 11/15/2018 12:54 PM    ASTSGOT 31 11/15/2018 12:54 PM    ALTSGPT 37 11/15/2018 12:54 PM    TBILIRUBIN 0.5 11/15/2018 12:54 PM     ALBUMIN 4.3 11/15/2018 12:54 PM    TOTPROTEIN 6.5 11/15/2018 12:54 PM    GLOBULIN 2.2 11/15/2018 12:54 PM    AGRATIO 2.0 11/15/2018 12:54 PM   ]    No results found for: PROTHROMBTM, INR     Lab Results   Component Value Date/Time    WBC 7.6 11/15/2018 12:54 PM    RBC 4.62 (L) 11/15/2018 12:54 PM    HEMOGLOBIN 14.1 11/15/2018 12:54 PM    HEMATOCRIT 42.1 11/15/2018 12:54 PM    MCV 91.1 11/15/2018 12:54 PM    MCH 30.5 11/15/2018 12:54 PM    MCHC 33.5 (L) 11/15/2018 12:54 PM    MPV 11.6 11/15/2018 12:54 PM    NEUTSPOLYS 58.70 11/15/2018 12:54 PM    LYMPHOCYTES 30.50 11/15/2018 12:54 PM    MONOCYTES 7.90 11/15/2018 12:54 PM    EOSINOPHILS 1.70 11/15/2018 12:54 PM    BASOPHILS 0.90 11/15/2018 12:54 PM        No results found for: HBA1C       Diagnosis   Visit Diagnoses     ICD-10-CM   1. Chronic bilateral low back pain with bilateral sciatica M54.42    M54.41    G89.29   2. Lumbar radiculopathy M54.16   3. Right leg numbness R20.0   4. Gastroesophageal reflux disease, esophagitis presence not specified.  Avoid chronic NSAIDs if possible K21.9   5. Bilateral groin pain R10.30   6. Decreased range of motion of both hips M25.651    M25.652           ASSESSMENT AND PLAN:  Abiel Mason Vaughn 68 y.o. male      Abiel was seen today for new patient.    Diagnoses and all orders for this visit:    Chronic bilateral low back pain with bilateral sciatica  -     acetaminophen (TYLENOL) 500 MG Tab; Take 2 Tabs by mouth 3 times a day as needed (pain.  Do not exceed 3000 mg of total acetaminophen per day).  -     MR-LUMBAR SPINE-W/O; Future  -     DX-LUMBAR SPINE-2 OR 3 VIEWS; Future    Lumbar radiculopathy  -     acetaminophen (TYLENOL) 500 MG Tab; Take 2 Tabs by mouth 3 times a day as needed (pain.  Do not exceed 3000 mg of total acetaminophen per day).  -     MR-LUMBAR SPINE-W/O; Future  -     DX-LUMBAR SPINE-2 OR 3 VIEWS; Future    Right leg numbness  -     acetaminophen (TYLENOL) 500 MG Tab; Take 2 Tabs by mouth 3 times a  day as needed (pain.  Do not exceed 3000 mg of total acetaminophen per day).  -     MR-LUMBAR SPINE-W/O; Future  -     DX-LUMBAR SPINE-2 OR 3 VIEWS; Future    Gastroesophageal reflux disease, esophagitis presence not specified.  Avoid chronic NSAIDs if possible  -     acetaminophen (TYLENOL) 500 MG Tab; Take 2 Tabs by mouth 3 times a day as needed (pain.  Do not exceed 3000 mg of total acetaminophen per day).    Bilateral groin pain  -     acetaminophen (TYLENOL) 500 MG Tab; Take 2 Tabs by mouth 3 times a day as needed (pain.  Do not exceed 3000 mg of total acetaminophen per day).  -     DX-HIP-BILATERAL-WITH PELVIS-3/4 VIEWS; Future    Decreased range of motion of both hips  -     acetaminophen (TYLENOL) 500 MG Tab; Take 2 Tabs by mouth 3 times a day as needed (pain.  Do not exceed 3000 mg of total acetaminophen per day).  -     DX-HIP-BILATERAL-WITH PELVIS-3/4 VIEWS; Future        -I recommend avoiding chronic NSAIDs, we discussed many of the side effects including gastroesophageal reflux disease and potential ulcers as well as renal failure.  I am titrating the patient off of his diclofenac with taking 1 pill/day for the next week and then off.  Start Tylenol 1000 mg 3 times daily.    -We will consider a referral to our chronic pain physical therapy or chronic pain behavioral group after I can discuss the results of the MRI with patient.    Outside records requested:  The patient signed outside records request form for his outside records including outside images.      Follow-up: After the above diagnostic imaging        Please note that this dictation was created using voice recognition software. I have made every reasonable attempt to correct obvious errors but there may be errors of grammar and content that I may have overlooked prior to finalization of this note.      Julito Dacosta MD  Physical Medicine and Rehabilitation  Interventional Spine and Sports Physiatry  [facility]  Kerry, Lynette QUIÑONES, *

## 2019-02-01 NOTE — PATIENT INSTRUCTIONS
Take diclofenac 1 tab per day for the next week.  Then do not take.  Take Tylenol 1000 mg 3 times daily.

## 2019-02-06 ENCOUNTER — HOSPITAL ENCOUNTER (OUTPATIENT)
Dept: RADIOLOGY | Facility: MEDICAL CENTER | Age: 69
End: 2019-02-06
Attending: PHYSICAL MEDICINE & REHABILITATION
Payer: MEDICARE

## 2019-02-06 DIAGNOSIS — M54.41 CHRONIC BILATERAL LOW BACK PAIN WITH BILATERAL SCIATICA: ICD-10-CM

## 2019-02-06 DIAGNOSIS — M54.16 LUMBAR RADICULOPATHY: ICD-10-CM

## 2019-02-06 DIAGNOSIS — G89.29 CHRONIC BILATERAL LOW BACK PAIN WITH BILATERAL SCIATICA: ICD-10-CM

## 2019-02-06 DIAGNOSIS — M54.42 CHRONIC BILATERAL LOW BACK PAIN WITH BILATERAL SCIATICA: ICD-10-CM

## 2019-02-06 DIAGNOSIS — R10.31 BILATERAL GROIN PAIN: ICD-10-CM

## 2019-02-06 DIAGNOSIS — R10.32 BILATERAL GROIN PAIN: ICD-10-CM

## 2019-02-06 DIAGNOSIS — R20.0 RIGHT LEG NUMBNESS: ICD-10-CM

## 2019-02-06 DIAGNOSIS — M25.651 DECREASED RANGE OF MOTION OF BOTH HIPS: ICD-10-CM

## 2019-02-06 DIAGNOSIS — M25.652 DECREASED RANGE OF MOTION OF BOTH HIPS: ICD-10-CM

## 2019-02-06 PROCEDURE — 72148 MRI LUMBAR SPINE W/O DYE: CPT

## 2019-02-06 PROCEDURE — 73522 X-RAY EXAM HIPS BI 3-4 VIEWS: CPT

## 2019-02-06 PROCEDURE — 72100 X-RAY EXAM L-S SPINE 2/3 VWS: CPT

## 2019-02-14 ENCOUNTER — OFFICE VISIT (OUTPATIENT)
Dept: PHYSICAL MEDICINE AND REHAB | Facility: MEDICAL CENTER | Age: 69
End: 2019-02-14
Payer: MEDICARE

## 2019-02-14 VITALS
HEIGHT: 74 IN | WEIGHT: 246.69 LBS | SYSTOLIC BLOOD PRESSURE: 128 MMHG | BODY MASS INDEX: 31.66 KG/M2 | TEMPERATURE: 97.8 F | OXYGEN SATURATION: 96 % | DIASTOLIC BLOOD PRESSURE: 72 MMHG | HEART RATE: 75 BPM

## 2019-02-14 DIAGNOSIS — Z85.828 HISTORY OF SKIN CANCER: ICD-10-CM

## 2019-02-14 DIAGNOSIS — R20.0 RIGHT LEG NUMBNESS: ICD-10-CM

## 2019-02-14 DIAGNOSIS — M25.652 DECREASED RANGE OF MOTION OF BOTH HIPS: ICD-10-CM

## 2019-02-14 DIAGNOSIS — K21.9 GASTROESOPHAGEAL REFLUX DISEASE, ESOPHAGITIS PRESENCE NOT SPECIFIED: ICD-10-CM

## 2019-02-14 DIAGNOSIS — M16.0 BILATERAL HIP JOINT ARTHRITIS: ICD-10-CM

## 2019-02-14 DIAGNOSIS — M54.42 CHRONIC BILATERAL LOW BACK PAIN WITH BILATERAL SCIATICA: ICD-10-CM

## 2019-02-14 DIAGNOSIS — M25.852 FEMOROACETABULAR IMPINGEMENT OF BOTH HIPS: ICD-10-CM

## 2019-02-14 DIAGNOSIS — M54.16 LUMBAR RADICULOPATHY: ICD-10-CM

## 2019-02-14 DIAGNOSIS — M25.851 FEMOROACETABULAR IMPINGEMENT OF BOTH HIPS: ICD-10-CM

## 2019-02-14 DIAGNOSIS — R10.32 BILATERAL GROIN PAIN: ICD-10-CM

## 2019-02-14 DIAGNOSIS — M25.651 DECREASED RANGE OF MOTION OF BOTH HIPS: ICD-10-CM

## 2019-02-14 DIAGNOSIS — G89.29 CHRONIC BILATERAL LOW BACK PAIN WITH BILATERAL SCIATICA: ICD-10-CM

## 2019-02-14 DIAGNOSIS — R10.31 BILATERAL GROIN PAIN: ICD-10-CM

## 2019-02-14 DIAGNOSIS — M54.41 CHRONIC BILATERAL LOW BACK PAIN WITH BILATERAL SCIATICA: ICD-10-CM

## 2019-02-14 PROCEDURE — 99214 OFFICE O/P EST MOD 30 MIN: CPT | Performed by: PHYSICAL MEDICINE & REHABILITATION

## 2019-02-15 NOTE — PATIENT INSTRUCTIONS
Your procedure will be at the [facility] special procedure suite.    [facility] [place] [address]       PRE-PROCEDURE INSTRUCTIONS  You may take your regular medications except:   · No Anti-inflammatories 5 days prior to your procedure. Anti-inflammatories include medicines such as  ibuprofen (Motrin, Advil), Excedrin, Naproxen (Aleve, Anaprox, Naprelan, Naprosyn), Celecoxib (Celebrex), Diclofenac (Voltaren-XR tab), and Meloxicam (Mobic).   · No Glucophage or Metformin 24 hours before your procedure. You may resume next day after your procedure.  · Call the physiatry office if you are taking or prescribed anti-biotics within five days of procedure.  · Please ask provider if you are taking any new diabetes medication.  · Pain medications will not be prescribed on the procedure day. Procedural pain medication may be used by your provider   · Call your doctor's office performing the procedure if you have a fever, chills, rash or new illness prior to your procedure    Anticoagulation/antiplatelet medications  No Blood thinning medications such as Coumadin or Plavix 5 days prior to procedure unless your doctor said to continue these medications. Call your doctor if a new medication is prescribed in this class.     Restrictions for eating before procedure:   · If you are getting procedural sedation, then do not eat to for 8 hours prior to procedure appointment time. Do not drink fluids for four hours prior to your procedure time.   · If you are not having procedural sedation, then Skip the meal prior to your procedure. If you have a morning procedure then skip breakfast. If you have an afternoon procedure then skip lunch.   · You may drink clear liquids up to 2 hours prior to your procedure  · You must have a  the day of procedure to accompany you home.      POST PROCEDURE INSTRUCTIONS   · No heavy lifting, strenuous bending or strenuous exercise for 3 days after your procedure.  · No hot tubs,  baths, swimming for 3 days after your procedure  · You can remove the bandage the day after the procedure.  · If you received a steroid injection  · please note that there may be a delay for the injection to start working, the delay may be up to two weeks.   · If you experience severe pain or prolonged weakness longer than one day, bowel or bladder incontinence then please call the physiatry office.   · If you have diabetes, please note that your sugar levels may be elevated for 1-2 days after a steroid injection.   · Your leg may feel heavy, weak and numb for up to 1-2 days. Be very careful walking.   ·  You may resume normal activities 3 days after procedure.

## 2019-02-15 NOTE — PROGRESS NOTES
Follow up patient note  Interventional spine and sports physiatry, Physical medicine rehabilitation      Chief complaint:   Chief Complaint   Patient presents with   • Follow-Up     Chronic bilateral low back pain with bilateral sciatica           HISTORY    Please see new patient note dated  by Dr Dacosta,  for more details.     HPI  Patient identification: Abiel Vaughn 68 y.o. male  With Diagnoses of Lumbar radiculopathy, Chronic bilateral low back pain with bilateral sciatica, Right leg numbness, Gastroesophageal reflux disease, esophagitis presence not specified.  Avoid chronic NSAIDs if possible, Bilateral groin pain, Decreased range of motion of both hips, History of skin cancer, Femoroacetabular impingement of both hips, and Bilateral hip joint arthritis Right worse than left were pertinent to this visit.       -Chronic pain in the bilateral hips and anterior groin, moderate in intensity, worse with hip movements.  Nonradiating.  The patient is able to get through his daily workouts including stairs.  He does get pain with stairs.    Chronic bilateral low back pain right worse than left with pain down the bilateral legs right worse than left with associated right calf pain, shooting in quality, intermittent flares.       ROS Red Flags :   -Fever, Chills, Sweats: Denies  Involuntary Weight Loss: Denies  Bowel/Bladder Incontinence: Denies  Saddle Anesthesia: Denies        PMHx:   Past Medical History:   Diagnosis Date   • Dyslipidemia    • GERD (gastroesophageal reflux disease)    • Insomnia        PSHx:   Past Surgical History:   Procedure Laterality Date   • APPENDECTOMY     • TONSILLECTOMY         Family history   Family History   Problem Relation Age of Onset   • Cancer Mother    • Diabetes Mother    • Heart Disease Father    • Hyperlipidemia Neg Hx          Medications:   Outpatient Prescriptions Marked as Taking for the 2/14/19 encounter (Office Visit) with Julito Dacosta M.D.   Medication Sig  Dispense Refill   • acetaminophen (TYLENOL) 500 MG Tab Take 2 Tabs by mouth 3 times a day as needed (pain.  Do not exceed 3000 mg of total acetaminophen per day). 180 Tab 6   • penicillin v potassium (VEETID) 500 MG Tab Take 500 mg by mouth 4 times a day.     • zolpidem (AMBIEN) 10 MG Tab Take 1 Tab by mouth at bedtime as needed for Sleep for up to 90 days. 30 Tab 2   • atorvastatin (LIPITOR) 40 MG Tab Take 1 Tab by mouth every day. 90 Tab 3   • pantoprazole (PROTONIX) 20 MG tablet Take 1 Tab by mouth every day. 90 Tab 3   • valACYclovir (VALTREX) 500 MG Tab Take 1 Tab by mouth every day. 90 Tab 3   • Pseudoephedrine HCl (SUDAFED PO) Take 10 mg by mouth as needed.     • PSYLLIUM PO Take  by mouth.     • Cholecalciferol (VITAMIN D3) 2000 UNIT Cap Take  by mouth 2 Times a Day.     • Multiple Vitamins-Minerals (MULTIVITAMIN ADULT) Tab Take  by mouth.     • furosemide (LASIX) 20 MG Tab      • FLUZONE HIGH-DOSE 0.5 ML Suspension Prefilled Syringe injection TO BE ADMINISTERED BY PHARMACIST FOR IMMUNIZATION  0   • montelukast (SINGULAIR) 10 MG Tab      • aspirin (ASA) 81 MG Chew Tab chewable tablet Take 81 mg by mouth every day.     • SODIUM FLUORIDE, DENTAL GEL, (DENTAGEL) 1.1 % Gel by dental route.     • fluocinonide (LIDEX) 0.05 % Cream Apply  to affected area(s) 2 times a day.     • fluorouracil (EFUDEX) 5 % cream Apply  to affected area(s) 2 times a day.          Current Outpatient Prescriptions on File Prior to Visit   Medication Sig Dispense Refill   • acetaminophen (TYLENOL) 500 MG Tab Take 2 Tabs by mouth 3 times a day as needed (pain.  Do not exceed 3000 mg of total acetaminophen per day). 180 Tab 6   • penicillin v potassium (VEETID) 500 MG Tab Take 500 mg by mouth 4 times a day.     • zolpidem (AMBIEN) 10 MG Tab Take 1 Tab by mouth at bedtime as needed for Sleep for up to 90 days. 30 Tab 2   • atorvastatin (LIPITOR) 40 MG Tab Take 1 Tab by mouth every day. 90 Tab 3   • pantoprazole (PROTONIX) 20 MG tablet Take  "1 Tab by mouth every day. 90 Tab 3   • valACYclovir (VALTREX) 500 MG Tab Take 1 Tab by mouth every day. 90 Tab 3   • Pseudoephedrine HCl (SUDAFED PO) Take 10 mg by mouth as needed.     • PSYLLIUM PO Take  by mouth.     • Cholecalciferol (VITAMIN D3) 2000 UNIT Cap Take  by mouth 2 Times a Day.     • Multiple Vitamins-Minerals (MULTIVITAMIN ADULT) Tab Take  by mouth.     • furosemide (LASIX) 20 MG Tab      • FLUZONE HIGH-DOSE 0.5 ML Suspension Prefilled Syringe injection TO BE ADMINISTERED BY PHARMACIST FOR IMMUNIZATION  0   • montelukast (SINGULAIR) 10 MG Tab      • aspirin (ASA) 81 MG Chew Tab chewable tablet Take 81 mg by mouth every day.     • SODIUM FLUORIDE, DENTAL GEL, (DENTAGEL) 1.1 % Gel by dental route.     • fluocinonide (LIDEX) 0.05 % Cream Apply  to affected area(s) 2 times a day.     • fluorouracil (EFUDEX) 5 % cream Apply  to affected area(s) 2 times a day.       No current facility-administered medications on file prior to visit.          Allergies:   No Known Allergies    Social Hx:   Social History     Social History   • Marital status: Single     Spouse name: N/A   • Number of children: N/A   • Years of education: N/A     Occupational History   • Not on file.     Social History Main Topics   • Smoking status: Never Smoker   • Smokeless tobacco: Never Used   • Alcohol use Yes   • Drug use: No   • Sexual activity: Not on file     Other Topics Concern   •  Service Yes   • Blood Transfusions No   • Caffeine Concern No   • Occupational Exposure No   • Hobby Hazards No   • Sleep Concern Yes   • Stress Concern No   • Weight Concern Yes   • Special Diet No   • Back Care No   • Exercise Yes   • Bike Helmet No   • Seat Belt Yes   • Self-Exams Yes     Social History Narrative   • No narrative on file         EXAMINATION     Physical Exam:   Vitals: Blood pressure 128/72, pulse 75, temperature 36.6 °C (97.8 °F), temperature source Temporal, height 1.88 m (6' 2\"), weight 111.9 kg (246 lb 11.1 oz), SpO2 " 96 %.    Constitutional:   Body Habitus: Body mass index is 31.67 kg/m².  Cooperation: Fully cooperates with exam  Appearance: Well-groomed no disheveled    Respiratory-  breathing comfortable on room air, no audible wheezing  Cardiovascular- capillary refills less than 2 seconds. No lower extremity edema is noted.   Psychiatric- alert and oriented ×3. Normal affect.          MEDICAL DECISION MAKING    DATA    Labs:   Lab Results   Component Value Date/Time    SODIUM 137 11/15/2018 12:54 PM    POTASSIUM 3.8 11/15/2018 12:54 PM    CHLORIDE 106 11/15/2018 12:54 PM    CO2 24 11/15/2018 12:54 PM    GLUCOSE 112 (H) 11/15/2018 12:54 PM    BUN 17 11/15/2018 12:54 PM    CREATININE 0.91 11/15/2018 12:54 PM        No results found for: PROTHROMBTM, INR     Lab Results   Component Value Date/Time    WBC 7.6 11/15/2018 12:54 PM    RBC 4.62 (L) 11/15/2018 12:54 PM    HEMOGLOBIN 14.1 11/15/2018 12:54 PM    HEMATOCRIT 42.1 11/15/2018 12:54 PM    MCV 91.1 11/15/2018 12:54 PM    MCH 30.5 11/15/2018 12:54 PM    MCHC 33.5 (L) 11/15/2018 12:54 PM    MPV 11.6 11/15/2018 12:54 PM    NEUTSPOLYS 58.70 11/15/2018 12:54 PM    LYMPHOCYTES 30.50 11/15/2018 12:54 PM    MONOCYTES 7.90 11/15/2018 12:54 PM    EOSINOPHILS 1.70 11/15/2018 12:54 PM    BASOPHILS 0.90 11/15/2018 12:54 PM        No results found for: HBA1C       Imaging:   I personally reviewed following images    X-ray bilateral hips 2/6/2019.  Cam deformity of the bilateral femoral heads, arthritis present the bilateral hips.  This is consistent with hip impingement syndrome bilaterally.    MRI lumbar spine 2/6/2019  History of laminectomies.  Bilateral facet arthropathy L3-4, L4-5, L5-S1.  Worst L5-S1.  No significant central canal stenosis.  Moderate neuroforaminal stenosis L4-5, mild neuroforaminal stenosis L5-S1, mild to moderate left L5-S1 neuroforaminal stenosis, mild to moderate left L2-3 neuroforaminal stenosis.    I reviewed the following radiology reports                                         Results for orders placed during the hospital encounter of 02/06/19   MR-LUMBAR SPINE-W/O    Impression Multilevel degenerative disc disease, facet arthropathy and ligamentum flavum redundancy resulting in at most moderate foraminal and mild lateral recess stenoses.    Left hemilaminotomies                                X-ray hips 2/6/2019  Impression       1.  Convexity bilaterally at the femoral head/neck junction which can be associated with the clinical syndrome of femoroacetabular impingement    2.  Lumbar spine facet arthropathy and dextroconvex scoliosis                                                             Results for orders placed during the hospital encounter of 02/06/19   DX-LUMBAR SPINE-2 OR 3 VIEWS    Impression 1.  Dextroconvex scoliosis    2.  Multilevel facet arthropathy                                           DIAGNOSIS   Visit Diagnoses     ICD-10-CM   1. Lumbar radiculopathy M54.16   2. Chronic bilateral low back pain with bilateral sciatica M54.42    M54.41    G89.29   3. Right leg numbness R20.0   4. Gastroesophageal reflux disease, esophagitis presence not specified.  Avoid chronic NSAIDs if possible K21.9   5. Bilateral groin pain R10.30   6. Decreased range of motion of both hips M25.651    M25.652   7. History of skin cancer Z85.828   8. Femoroacetabular impingement of both hips M25.851    M25.852   9. Bilateral hip joint arthritis Right worse than left M16.0         ASSESSMENT and PLAN:     Abiel Cuevas Roderick 68 y.o. male      Abiel was seen today for follow-up.    Diagnoses and all orders for this visit:    Lumbar radiculopathy  Comments:  Failed conservative measures.  Avoid chronic NSAIDs if possible.  Scheduled for right L4-5 and L5-S1 transforaminal epidural steroid injection.  Orders:  -     REFERRAL TO PHYSICIAL MEDICINE REHAB    Chronic bilateral low back pain with bilateral sciatica    Right leg numbness    Gastroesophageal reflux disease, esophagitis  presence not specified.  Avoid chronic NSAIDs if possible    Bilateral groin pain    Decreased range of motion of both hips    History of skin cancer    Femoroacetabular impingement of both hips    Bilateral hip joint arthritis Right worse than left  Comments:  Consider intra-articular right hip injection        -The patient states that his overall pain control is better with diclofenac instead of Tylenol however he has been on diclofenac for the past 13 years and is being followed by gastroenterology for GERD.  He states he has a scope upcoming.    Follow up: 2 weeks after the procedure    Thank you for allowing me to participate in the care of this patient. If you have any questions please not hesitate to contact me.          Please note that this dictation was created using voice recognition software. I have made every reasonable attempt to correct obvious errors but there may be errors of grammar and content that I may have overlooked prior to finalization of this note.      Julito Dacosta MD  Interventional Spine and Sports Physiatry  Physical Medicine and Rehabilitation  [facility]  2/14/2019  5:24 PM

## 2019-02-18 ENCOUNTER — HOSPITAL ENCOUNTER (OUTPATIENT)
Dept: LAB | Facility: MEDICAL CENTER | Age: 69
End: 2019-02-18
Attending: INTERNAL MEDICINE
Payer: MEDICARE

## 2019-02-18 DIAGNOSIS — E55.9 HYPOVITAMINOSIS D: ICD-10-CM

## 2019-02-18 DIAGNOSIS — R73.01 IFG (IMPAIRED FASTING GLUCOSE): ICD-10-CM

## 2019-02-18 DIAGNOSIS — E78.5 DYSLIPIDEMIA: ICD-10-CM

## 2019-02-18 LAB
25(OH)D3 SERPL-MCNC: 37 NG/ML (ref 30–100)
ALBUMIN SERPL BCP-MCNC: 4.3 G/DL (ref 3.2–4.9)
ALBUMIN/GLOB SERPL: 1.9 G/DL
ALP SERPL-CCNC: 62 U/L (ref 30–99)
ALT SERPL-CCNC: 29 U/L (ref 2–50)
ANION GAP SERPL CALC-SCNC: 8 MMOL/L (ref 0–11.9)
AST SERPL-CCNC: 29 U/L (ref 12–45)
BILIRUB SERPL-MCNC: 0.6 MG/DL (ref 0.1–1.5)
BUN SERPL-MCNC: 19 MG/DL (ref 8–22)
CALCIUM SERPL-MCNC: 9.7 MG/DL (ref 8.5–10.5)
CHLORIDE SERPL-SCNC: 107 MMOL/L (ref 96–112)
CHOLEST SERPL-MCNC: 146 MG/DL (ref 100–199)
CO2 SERPL-SCNC: 25 MMOL/L (ref 20–33)
CREAT SERPL-MCNC: 0.93 MG/DL (ref 0.5–1.4)
EST. AVERAGE GLUCOSE BLD GHB EST-MCNC: 137 MG/DL
FASTING STATUS PATIENT QL REPORTED: NORMAL
FOLATE SERPL-MCNC: >23.6 NG/ML
GLOBULIN SER CALC-MCNC: 2.3 G/DL (ref 1.9–3.5)
GLUCOSE SERPL-MCNC: 127 MG/DL (ref 65–99)
HBA1C MFR BLD: 6.4 % (ref 0–5.6)
HCV AB SER QL: NEGATIVE
HDLC SERPL-MCNC: 31 MG/DL
LDLC SERPL CALC-MCNC: 81 MG/DL
MAGNESIUM SERPL-MCNC: 2.1 MG/DL (ref 1.5–2.5)
POTASSIUM SERPL-SCNC: 3.9 MMOL/L (ref 3.6–5.5)
PROT SERPL-MCNC: 6.6 G/DL (ref 6–8.2)
SODIUM SERPL-SCNC: 140 MMOL/L (ref 135–145)
TRIGL SERPL-MCNC: 171 MG/DL (ref 0–149)
VIT B12 SERPL-MCNC: 983 PG/ML (ref 211–911)

## 2019-02-18 PROCEDURE — 82306 VITAMIN D 25 HYDROXY: CPT

## 2019-02-18 PROCEDURE — 82746 ASSAY OF FOLIC ACID SERUM: CPT

## 2019-02-18 PROCEDURE — 80053 COMPREHEN METABOLIC PANEL: CPT

## 2019-02-18 PROCEDURE — 83735 ASSAY OF MAGNESIUM: CPT

## 2019-02-18 PROCEDURE — 86803 HEPATITIS C AB TEST: CPT

## 2019-02-18 PROCEDURE — 82607 VITAMIN B-12: CPT

## 2019-02-18 PROCEDURE — 36415 COLL VENOUS BLD VENIPUNCTURE: CPT

## 2019-02-18 PROCEDURE — 83036 HEMOGLOBIN GLYCOSYLATED A1C: CPT | Mod: GZ

## 2019-02-18 PROCEDURE — 80061 LIPID PANEL: CPT

## 2019-02-25 ENCOUNTER — OFFICE VISIT (OUTPATIENT)
Dept: MEDICAL GROUP | Facility: MEDICAL CENTER | Age: 69
End: 2019-02-25
Payer: MEDICARE

## 2019-02-25 VITALS
DIASTOLIC BLOOD PRESSURE: 78 MMHG | HEART RATE: 74 BPM | OXYGEN SATURATION: 95 % | TEMPERATURE: 97.7 F | WEIGHT: 243.17 LBS | BODY MASS INDEX: 31.21 KG/M2 | HEIGHT: 74 IN | SYSTOLIC BLOOD PRESSURE: 124 MMHG

## 2019-02-25 DIAGNOSIS — M54.16 LUMBAR RADICULOPATHY: ICD-10-CM

## 2019-02-25 DIAGNOSIS — E78.5 DYSLIPIDEMIA: ICD-10-CM

## 2019-02-25 DIAGNOSIS — R73.01 IFG (IMPAIRED FASTING GLUCOSE): ICD-10-CM

## 2019-02-25 DIAGNOSIS — E66.9 OBESITY (BMI 30.0-34.9): ICD-10-CM

## 2019-02-25 DIAGNOSIS — K21.9 GASTROESOPHAGEAL REFLUX DISEASE, ESOPHAGITIS PRESENCE NOT SPECIFIED: ICD-10-CM

## 2019-02-25 DIAGNOSIS — Z12.5 SCREENING FOR MALIGNANT NEOPLASM OF PROSTATE: ICD-10-CM

## 2019-02-25 DIAGNOSIS — F51.01 PRIMARY INSOMNIA: ICD-10-CM

## 2019-02-25 DIAGNOSIS — M15.9 PRIMARY OSTEOARTHRITIS INVOLVING MULTIPLE JOINTS: ICD-10-CM

## 2019-02-25 PROBLEM — Z76.89 ENCOUNTER TO ESTABLISH CARE: Status: RESOLVED | Noted: 2018-10-03 | Resolved: 2019-02-25

## 2019-02-25 PROCEDURE — 99214 OFFICE O/P EST MOD 30 MIN: CPT | Performed by: INTERNAL MEDICINE

## 2019-02-25 RX ORDER — ZOLPIDEM TARTRATE 10 MG/1
10 TABLET ORAL NIGHTLY PRN
Qty: 30 TAB | Refills: 2 | Status: SHIPPED
Start: 2019-02-25 | End: 2019-06-28 | Stop reason: SDUPTHER

## 2019-02-25 RX ORDER — ATORVASTATIN CALCIUM 40 MG/1
60 TABLET, FILM COATED ORAL DAILY
Qty: 135 TAB | Refills: 3 | Status: SHIPPED | OUTPATIENT
Start: 2019-02-25 | End: 2019-08-26

## 2019-02-26 NOTE — PROGRESS NOTES
CHIEF COMPLAINT  Chief Complaint   Patient presents with   • Results   Dyslipidemia    HPI  Patient is a 68 y.o. male patient who presents today for the following     Dyslipidemia  On atorvastatin 40 mg, taking daily, as prescribed. No myalgias, muscle cramps or pain.   Diet: Regular  Exercise: Limited  BMI: 31  FH: neg    IFG  The patient had elevated FBG, and A1c.  No polydipsia, polyphagia, polyuria.  No abdominal pain, weight loss, fatigue.  Diet/exercise/BMI: As above  FH of DM:     GERD  On omeprazle, 20 mg QD; takes medication as prescribed, that controls sx.   Denies:   - heartburn, epigastric pain.   - nausea, vomiting, or diarrhea  - dysphagia  - abnormal cough  - blood in the stool or dark tarry stools.  - early satiety  - tobacco use.     Insomnia  The patient has a trouble to go and stay asleep.   Used Ambien, 10 mg at bedtime.   Discussed sleep hygiene:   - Go to bed and wake up at consistent times whether work/school day or not.   - Keep room dark, quiet, and comfortable.   - Don't have naps.  - Increase exposure to sunlight during awake times and avoid bright lights before going to sleep.   - Avoid stimulant or caffeine use more than 4 hours after wake time.   - Avoid meal or exercise 2-3 hours prior to going to bed.  Handout provided.      Lumbar radiculopathy, OA multiple joints  (St post laminectomy/discectomy)  - Onset: remote  - located in: lower back  - intensity: mild to moderate  - quality: dull and sharp  - radiation: no  - alleviating factors are: rest  - exacerbating factors are: activity  - accompanied:               - no numbness, weakness, tingling, fever, chills  - course: persistents  - therapy: as above   - evaluated by PMR, advised epidural local injections.     No reported history of:  - chronic immune suppression, alcoholism, IV drug abuse, indwelling catheter, diabetes.   - No history of recent spinal surgery or injection.     Denies:  - numbness/saddle anesthesia.  -  bowel/bladder changes, fever.   - trauma  - nausea/vomiting  - chest pain, shortness of breath, abdominal pain.       Reviewed PMH, PSH, FH, SH, ALL, HCM/IMM, no changes  Reviewed MEDS, no changes    Patient Active Problem List    Diagnosis Date Noted   • IFG (impaired fasting glucose) 02/25/2019   • Aortic valve disorder 10/29/2018   • Gastroesophageal reflux disease 10/03/2018   • Primary insomnia 10/03/2018   • Seasonal allergic rhinitis 10/03/2018   • Dyslipidemia 10/03/2018   • Primary osteoarthritis involving multiple joints 10/03/2018   • Obesity (BMI 30.0-34.9) 10/03/2018   • Decreased hearing, right 10/03/2018     CURRENT MEDICATIONS  Current Outpatient Prescriptions   Medication Sig Dispense Refill   • atorvastatin (LIPITOR) 40 MG Tab Take 1.5 Tabs by mouth every day. 135 Tab 3   • zolpidem (AMBIEN) 10 MG Tab Take 1 Tab by mouth at bedtime as needed for Sleep for up to 90 days. 30 Tab 2   • acetaminophen (TYLENOL) 500 MG Tab Take 2 Tabs by mouth 3 times a day as needed (pain.  Do not exceed 3000 mg of total acetaminophen per day). 180 Tab 6   • penicillin v potassium (VEETID) 500 MG Tab Take 500 mg by mouth 4 times a day.     • pantoprazole (PROTONIX) 20 MG tablet Take 1 Tab by mouth every day. 90 Tab 3   • valACYclovir (VALTREX) 500 MG Tab Take 1 Tab by mouth every day. 90 Tab 3   • Pseudoephedrine HCl (SUDAFED PO) Take 10 mg by mouth as needed.     • PSYLLIUM PO Take  by mouth.     • Cholecalciferol (VITAMIN D3) 2000 UNIT Cap Take  by mouth 2 Times a Day.     • Multiple Vitamins-Minerals (MULTIVITAMIN ADULT) Tab Take  by mouth.     • furosemide (LASIX) 20 MG Tab      • FLUZONE HIGH-DOSE 0.5 ML Suspension Prefilled Syringe injection TO BE ADMINISTERED BY PHARMACIST FOR IMMUNIZATION  0   • montelukast (SINGULAIR) 10 MG Tab      • aspirin (ASA) 81 MG Chew Tab chewable tablet Take 81 mg by mouth every day.     • SODIUM FLUORIDE, DENTAL GEL, (DENTAGEL) 1.1 % Gel by dental route.     • fluocinonide (LIDEX)  "0.05 % Cream Apply  to affected area(s) 2 times a day.     • fluorouracil (EFUDEX) 5 % cream Apply  to affected area(s) 2 times a day.       No current facility-administered medications for this visit.      ALLERGIES  Allergies: Patient has no known allergies.  PAST MEDICAL HISTORY  Past Medical History:   Diagnosis Date   • Dyslipidemia    • GERD (gastroesophageal reflux disease)    • Insomnia      SURGICAL HISTORY  He  has a past surgical history that includes appendectomy and tonsillectomy.  SOCIAL HISTORY  Social History   Substance Use Topics   • Smoking status: Never Smoker   • Smokeless tobacco: Never Used   • Alcohol use Yes     Social History     Social History Narrative   • No narrative on file     FAMILY HISTORY  Family History   Problem Relation Age of Onset   • Cancer Mother    • Diabetes Mother    • Heart Disease Father    • Hyperlipidemia Neg Hx      Family Status   Relation Status   • Mo    • Fa    • Neg Hx (Not Specified)     ROS   Constitutional: Negative for fever, chills.  HENT: Negative for congestion, sore throat.  Eyes: Negative for blurred vision.   Respiratory: Negative for cough, shortness of breath.  Cardiovascular: Negative for chest pain, palpitations.   Gastrointestinal: Negative for abdominal pain. And per HPI.  Genitourinary: Negative for dysuria.  Musculoskeletal: as above.   Skin: Negative for rash and itching.   Neuro: Negative for dizziness, weakness and headaches.   Endo/Heme/Allergies: Does not bruise/bleed easily. And per HPI.  Psychiatric/Behavioral: Negative for depression. And per HPI.    PHYSICAL EXAM   Blood pressure 124/78, pulse 74, temperature 36.5 °C (97.7 °F), temperature source Temporal, height 1.88 m (6' 2\"), weight 110.3 kg (243 lb 2.7 oz), SpO2 95 %. Body mass index is 31.22 kg/m².  General:  NAD, well appearing  HEENT:   NC/AT, PERRLA, EOMI, TMs are clear. Oropharyngeal mucosa is pink,  without lesions;  no cervical / supraclavicular  " lymphadenopathy, no thyromegaly.    Cardiovascular: RRR.   No m/r/g. No carotid bruits .      Lungs:   CTAB, no w/r/r, no respiratory distress.  Abdomen: Soft, NT/ND + BS; no suprapubic tenderness; no masses or hepatosplenomegaly.  Extremities:  2+ DP and radial pulses bilaterally.  No c/c/e.   Skin:  Warm, dry.  No erythema. No rash.   Neurologic: Alert & oriented x 3. CN II-XII grossly intact..  No focal deficits.  Psychiatric:  Affect normal, mood normal, judgment normal.    LABS     Labs are reviewed and discussed with a patient  Lab Results   Component Value Date/Time    CHOLSTRLTOT 146 02/18/2019 10:00 AM    LDL 81 02/18/2019 10:00 AM    HDL 31 (A) 02/18/2019 10:00 AM    TRIGLYCERIDE 171 (H) 02/18/2019 10:00 AM       Lab Results   Component Value Date/Time    SODIUM 140 02/18/2019 10:00 AM    POTASSIUM 3.9 02/18/2019 10:00 AM    CHLORIDE 107 02/18/2019 10:00 AM    CO2 25 02/18/2019 10:00 AM    GLUCOSE 127 (H) 02/18/2019 10:00 AM    BUN 19 02/18/2019 10:00 AM    CREATININE 0.93 02/18/2019 10:00 AM     Lab Results   Component Value Date/Time    ALKPHOSPHAT 62 02/18/2019 10:00 AM    ASTSGOT 29 02/18/2019 10:00 AM    ALTSGPT 29 02/18/2019 10:00 AM    TBILIRUBIN 0.6 02/18/2019 10:00 AM      Lab Results   Component Value Date/Time    HBA1C 6.4 (H) 02/18/2019 10:00 AM     Lab Results   Component Value Date/Time    WBC 7.6 11/15/2018 12:54 PM    RBC 4.62 (L) 11/15/2018 12:54 PM    HEMOGLOBIN 14.1 11/15/2018 12:54 PM    HEMATOCRIT 42.1 11/15/2018 12:54 PM    MCV 91.1 11/15/2018 12:54 PM    MCH 30.5 11/15/2018 12:54 PM    MCHC 33.5 (L) 11/15/2018 12:54 PM    MPV 11.6 11/15/2018 12:54 PM    NEUTSPOLYS 58.70 11/15/2018 12:54 PM    LYMPHOCYTES 30.50 11/15/2018 12:54 PM    MONOCYTES 7.90 11/15/2018 12:54 PM    EOSINOPHILS 1.70 11/15/2018 12:54 PM    BASOPHILS 0.90 11/15/2018 12:54 PM      IMAGING     None    ASSESMENT AND PLAN     1. Dyslipidemia  Controlled, continue current treatment  - atorvastatin (LIPITOR) 40 MG Tab;  Take 1.5 Tabs by mouth every day.  Dispense: 135 Tab; Refill: 3  - Lipid Profile; Future  - Comp Metabolic Panel; Future    2. IFG (impaired fasting glucose)  Discussed about risk to develop DM.   Advised low carb diet, exercise, watch for WT.   - Comp Metabolic Panel; Future  - HEMOGLOBIN A1C; Future    3. Gastroesophageal reflux disease, esophagitis presence not specified  Controlled, continue current treatment    4. Primary insomnia  Controlled,  continue current treatment  - zolpidem (AMBIEN) 10 MG Tab; Take 1 Tab by mouth at bedtime as needed for Sleep for up to 90 days.  Dispense: 30 Tab; Refill: 2    Obtained and reviewed patient utilization report from [facility] pharmacy database on 2/25/2019 7:29 PM  prior to writing prescription for controlled substance II, III or IV per [place] bill . Based on assessment of the report, the prescription is medically necessary.     5. Lumbar radiculopathy  6. Primary osteoarthritis involving multiple joints  Continue PMR follow-up in their recommendations    7. Obesity (BMI 30.0-34.9)  - OBESITY COUNSELING (No Charge): Patient identified as having weight management issue.  Appropriate orders and counseling given.    8. Screening for malignant neoplasm of prostate  - PROSTATE SPECIFIC AG SCREENING; Future    Counseling:   - Smoking:  Nonsmoker    Followup: in 4 months    All questions are answered.    Please note that this dictation was created using voice recognition software, and that there might be errors of elisabeth and possibly content.

## 2019-02-27 ENCOUNTER — HOSPITAL ENCOUNTER (OUTPATIENT)
Dept: PAIN MANAGEMENT | Facility: REHABILITATION | Age: 69
End: 2019-02-27
Attending: PHYSICAL MEDICINE & REHABILITATION
Payer: MEDICARE

## 2019-02-27 ENCOUNTER — HOSPITAL ENCOUNTER (OUTPATIENT)
Dept: RADIOLOGY | Facility: REHABILITATION | Age: 69
End: 2019-02-27
Attending: PHYSICAL MEDICINE & REHABILITATION

## 2019-02-27 VITALS
BODY MASS INDEX: 31.15 KG/M2 | OXYGEN SATURATION: 98 % | TEMPERATURE: 96.4 F | WEIGHT: 242.73 LBS | RESPIRATION RATE: 16 BRPM | SYSTOLIC BLOOD PRESSURE: 151 MMHG | HEIGHT: 74 IN | DIASTOLIC BLOOD PRESSURE: 62 MMHG | HEART RATE: 56 BPM

## 2019-02-27 PROCEDURE — 700111 HCHG RX REV CODE 636 W/ 250 OVERRIDE (IP)

## 2019-02-27 PROCEDURE — 64484 NJX AA&/STRD TFRM EPI L/S EA: CPT

## 2019-02-27 PROCEDURE — 64483 NJX AA&/STRD TFRM EPI L/S 1: CPT

## 2019-02-27 PROCEDURE — 700117 HCHG RX CONTRAST REV CODE 255

## 2019-02-27 RX ORDER — LIDOCAINE HYDROCHLORIDE 10 MG/ML
INJECTION, SOLUTION EPIDURAL; INFILTRATION; INTRACAUDAL; PERINEURAL
Status: COMPLETED
Start: 2019-02-27 | End: 2019-02-27

## 2019-02-27 RX ORDER — DEXAMETHASONE SODIUM PHOSPHATE 10 MG/ML
INJECTION, SOLUTION INTRAMUSCULAR; INTRAVENOUS
Status: COMPLETED
Start: 2019-02-27 | End: 2019-02-27

## 2019-02-27 RX ADMIN — DEXAMETHASONE SODIUM PHOSPHATE 20 MG: 10 INJECTION, SOLUTION INTRAMUSCULAR; INTRAVENOUS at 15:01

## 2019-02-27 RX ADMIN — LIDOCAINE HYDROCHLORIDE 10 ML: 10 INJECTION, SOLUTION EPIDURAL; INFILTRATION; INTRACAUDAL; PERINEURAL at 14:58

## 2019-02-27 RX ADMIN — IOHEXOL 2 ML: 240 INJECTION, SOLUTION INTRATHECAL; INTRAVASCULAR; INTRAVENOUS; ORAL at 15:01

## 2019-02-27 NOTE — NON-PROVIDER
Med reconciliation completed. Pt has . Patient denies taking any blood thinners, antibiotics. Last dose ASA 81 mg 1 week ago. Discharge instructions reviewed & copy given to patient.

## 2019-02-27 NOTE — PROCEDURES
Date of Service: 2/27/2019     Patient: Abiel Vaughn 68 y.o. male     MRN: [id number]     Physician/s: Julito Dacosta MD    Pre-operative Diagnosis: Lumbar radiculopathy    Post-operative Diagnosis: Lumbar radiculopathy    Procedure: right Lumbar Transforaminal Epidural Steroid  at the L4-5 and L5-S1 levels.     Description of procedure:    The risks, benefits, and alternatives of the procedure were reviewed and discussed with the patient.  Written informed consent was freely obtained. A pre-procedural time-out was conducted by the physician verifying patient’s identity, procedure to be performed, procedure site and side, and allergy verification. Appropriate equipment was determined to be in place for the procedure.       The patient's vital signs were carefully monitored before, throughout, and after the procedure.     In the fluoroscopy suite the patient was placed in a prone position, a pillow placed underneath their umbilicus. The skin was prepped and draped in the usual sterile fashion. The fluoroscope was placed over the lumbar spine and adjusted into the proper AP/Oblique view to enter the transforaminal space at the levels below. The targets for injection were then marked at the right L4-5 and L5-S1. A 27g 1.5 inch needle was placed into the marked site, and approx 2cc of 1% Lidocaine was injected subcutaneously into the epidermal and dermal layers. The needle was removed. } A 25g 3.5 inch spinal needle was then placed and advanced under fluoroscopic guidance in an oblique view towards the subpedicular epidural space of the levels noted below. The needle position was confirmed to not be past the 6 o'clock position in the AP view and it was in the neural foramen and the lateral view. Under live fluoroscopic guidance in the AP view, contrast dye was used to highlight the epidural space spread.  Following negative aspiration, approx 1mL of 1% lidocaine preservative free with 10 mg of dexamethasone was  then injected at each level, and the needles were removed intact after restyleted. The patient's back was covered with a 4x4 gauze, the area was cleansed with sterile normal saline, and a dressing was applied. There were no complications noted.     The patient was then evaluated post-procedure, and was hemodynamically stable prior to leaving the post-operative care unit.     A follow-up visit in clinic as scheduled for 3/13/2019     Julito Dacosta MD  Physical Medicine and Rehabilitation  Interventional Spine and Sports Physiatry  [facility]          CPT codes  Transforaminal epidural injection- lumbar or sacral (first level):  28445  Transforaminal epidural injection- lumbar or sacral (each additional level):  61358

## 2019-02-27 NOTE — NON-PROVIDER
Patient positioned pre-procedure by RN,CST andxray tech. Pillow placed under lower legs and feet for support.

## 2019-03-04 ENCOUNTER — PATIENT MESSAGE (OUTPATIENT)
Dept: MEDICAL GROUP | Facility: MEDICAL CENTER | Age: 69
End: 2019-03-04

## 2019-03-05 NOTE — PATIENT COMMUNICATION
DOCUMENTATION OF PAR STATUS:    1. Name of Medication & Dose: Atorvastatin 40 mg     2. Name of Prescription Coverage Company & phone #: [employer]    3. Date Prior Auth Submitted: 03/04/2019    4. What information was given to obtain insurance decision? CPT codes, previous mediation tried, diagnosis etc.     5. Prior Auth Status? Pending    6. Patient Notified: yes

## 2019-03-08 NOTE — PATIENT COMMUNICATION
FINAL PRIOR AUTHORIZATION STATUS:    1.  Name of Medication & Dose: Atorvastatin Calcium 40mg      2. Prior Auth Status: Approved through 03/06/2020     3. Action Taken: Pharmacy Notified: yes Patient Notified: yes

## 2019-03-12 ENCOUNTER — APPOINTMENT (OUTPATIENT)
Dept: PHYSICAL MEDICINE AND REHAB | Facility: MEDICAL CENTER | Age: 69
End: 2019-03-12
Payer: MEDICARE

## 2019-03-21 ENCOUNTER — OFFICE VISIT (OUTPATIENT)
Dept: PHYSICAL MEDICINE AND REHAB | Facility: MEDICAL CENTER | Age: 69
End: 2019-03-21
Payer: MEDICARE

## 2019-03-21 VITALS
HEART RATE: 65 BPM | HEIGHT: 74 IN | SYSTOLIC BLOOD PRESSURE: 126 MMHG | OXYGEN SATURATION: 96 % | WEIGHT: 242.29 LBS | TEMPERATURE: 97.7 F | DIASTOLIC BLOOD PRESSURE: 64 MMHG | BODY MASS INDEX: 31.09 KG/M2

## 2019-03-21 DIAGNOSIS — R10.31 BILATERAL GROIN PAIN: ICD-10-CM

## 2019-03-21 DIAGNOSIS — K21.9 GASTROESOPHAGEAL REFLUX DISEASE, ESOPHAGITIS PRESENCE NOT SPECIFIED: ICD-10-CM

## 2019-03-21 DIAGNOSIS — G89.29 CHRONIC BILATERAL LOW BACK PAIN WITH BILATERAL SCIATICA: ICD-10-CM

## 2019-03-21 DIAGNOSIS — M25.652 DECREASED RANGE OF MOTION OF BOTH HIPS: ICD-10-CM

## 2019-03-21 DIAGNOSIS — M16.0 BILATERAL HIP JOINT ARTHRITIS: ICD-10-CM

## 2019-03-21 DIAGNOSIS — M54.41 CHRONIC BILATERAL LOW BACK PAIN WITH BILATERAL SCIATICA: ICD-10-CM

## 2019-03-21 DIAGNOSIS — R10.32 BILATERAL GROIN PAIN: ICD-10-CM

## 2019-03-21 DIAGNOSIS — M54.42 CHRONIC BILATERAL LOW BACK PAIN WITH BILATERAL SCIATICA: ICD-10-CM

## 2019-03-21 DIAGNOSIS — M54.16 LUMBAR RADICULOPATHY: ICD-10-CM

## 2019-03-21 DIAGNOSIS — M25.851 FEMOROACETABULAR IMPINGEMENT OF BOTH HIPS: ICD-10-CM

## 2019-03-21 DIAGNOSIS — M47.816 LUMBAR SPONDYLOSIS: ICD-10-CM

## 2019-03-21 DIAGNOSIS — M25.651 DECREASED RANGE OF MOTION OF BOTH HIPS: ICD-10-CM

## 2019-03-21 DIAGNOSIS — M25.852 FEMOROACETABULAR IMPINGEMENT OF BOTH HIPS: ICD-10-CM

## 2019-03-21 DIAGNOSIS — R20.0 RIGHT LEG NUMBNESS: ICD-10-CM

## 2019-03-21 PROCEDURE — 99214 OFFICE O/P EST MOD 30 MIN: CPT | Performed by: PHYSICAL MEDICINE & REHABILITATION

## 2019-03-21 NOTE — PATIENT INSTRUCTIONS
Your procedure will be at the [facility] special procedure suite.    [facility] [place] [address]       PRE-PROCEDURE INSTRUCTIONS  You may take your regular medications except:   · No Anti-inflammatories 5 days prior to your procedure. Anti-inflammatories include medicines such as  ibuprofen (Motrin, Advil), Excedrin, Naproxen (Aleve, Anaprox, Naprelan, Naprosyn), Celecoxib (Celebrex), Diclofenac (Voltaren-XR tab), and Meloxicam (Mobic).   · No Glucophage or Metformin 24 hours before your procedure. You may resume next day after your procedure.  · Call the physiatry office if you are taking or prescribed anti-biotics within five days of procedure.  · Please ask provider if you are taking any new diabetes medication.  · CONTINUE TAKING BLOOD PRESSURE MEDICATIONS AS PRESCRIBED.  · Pain medications will not be prescribed on the procedure day. Procedural pain medication may be used by your provider   · Call your doctor's office performing the procedure if you have a fever, chills, rash or new illness prior to your procedure    Anticoagulation/antiplatelet medications  No Blood thinning medications such as Coumadin or Plavix 5 days prior to procedure unless your doctor said to continue these medications. Call your doctor if a new medication is prescribed in this class.     Restrictions for eating before procedure:   · If you are getting procedural sedation, then do not eat to for 8 hours prior to procedure appointment time. Do not drink fluids for four hours prior to your procedure time.   · If you are not having procedural sedation, then Skip the meal prior to your procedure. If you have a morning procedure then skip breakfast. If you have an afternoon procedure then skip lunch.   · You may drink clear liquids up to 2 hours prior to your procedure  · You must have a  the day of procedure to accompany you home.      POST PROCEDURE INSTRUCTIONS   · No heavy lifting, strenuous bending or  strenuous exercise for 3 days after your procedure.  · No hot tubs, baths, swimming for 3 days after your procedure  · You can remove the bandage the day after the procedure.  · If you received a steroid injection  · please note that there may be a delay for the injection to start working, the delay may be up to two weeks.   · If you experience severe pain or prolonged weakness longer than one day, bowel or bladder incontinence then please call the physiatry office.   · If you have diabetes, please note that your sugar levels may be elevated for 1-2 days after a steroid injection.   · Your leg may feel heavy, weak and numb for up to 1-2 days. Be very careful walking.   ·  You may resume normal activities 3 days after procedure.

## 2019-03-23 NOTE — PROGRESS NOTES
Follow up patient note  Interventional spine and sports physiatry, Physical medicine rehabilitation      Chief complaint:   Chief Complaint   Patient presents with   • Follow-Up     Lumbar radiculopathy           HISTORY    Please see new patient note dated  by Dr Dacosta,  for more details.     HPI  Patient identification: Abiel Vaughn 68 y.o. male  With Diagnoses of Lumbar radiculopathy, Chronic bilateral low back pain with bilateral sciatica, Right leg numbness, Gastroesophageal reflux disease, esophagitis presence not specified.  Avoid chronic NSAIDs if possible, Bilateral groin pain, Decreased range of motion of both hips, Femoroacetabular impingement of both hips, Bilateral hip joint arthritis Right worse than left, and Lumbar spondylosis were pertinent to this visit.       Chronic pain in the right side radiating down the leg with associated numbness in the right lower leg, 70% improved when compared to preprocedure.  Patient rates his pain is 5/10 in intensity currently.  Functional improvements as well.  He still however is to get significant amounts of pain and this is interrupting his ability to do his full exercise program.    Hip pain stable.  Groin pain stable.     ROS Red Flags :   -Fever, Chills, Sweats: Denies  Involuntary Weight Loss: Denies  Bowel/Bladder Incontinence: Denies  Saddle Anesthesia: Denies        PMHx:   Past Medical History:   Diagnosis Date   • Dyslipidemia    • GERD (gastroesophageal reflux disease)    • Insomnia        PSHx:   Past Surgical History:   Procedure Laterality Date   • APPENDECTOMY     • TONSILLECTOMY         Family history   Family History   Problem Relation Age of Onset   • Cancer Mother    • Diabetes Mother    • Heart Disease Father    • Hyperlipidemia Neg Hx          Medications:   Outpatient Prescriptions Marked as Taking for the 3/21/19 encounter (Office Visit) with Julito Dacosta M.D.   Medication Sig Dispense Refill   • atorvastatin (LIPITOR) 40 MG  Tab Take 1.5 Tabs by mouth every day. 135 Tab 3   • zolpidem (AMBIEN) 10 MG Tab Take 1 Tab by mouth at bedtime as needed for Sleep for up to 90 days. 30 Tab 2   • acetaminophen (TYLENOL) 500 MG Tab Take 2 Tabs by mouth 3 times a day as needed (pain.  Do not exceed 3000 mg of total acetaminophen per day). 180 Tab 6   • penicillin v potassium (VEETID) 500 MG Tab Take 500 mg by mouth 4 times a day.     • pantoprazole (PROTONIX) 20 MG tablet Take 1 Tab by mouth every day. 90 Tab 3   • valACYclovir (VALTREX) 500 MG Tab Take 1 Tab by mouth every day. 90 Tab 3   • Pseudoephedrine HCl (SUDAFED PO) Take 10 mg by mouth as needed.     • PSYLLIUM PO Take  by mouth.     • Cholecalciferol (VITAMIN D3) 2000 UNIT Cap Take  by mouth 2 Times a Day.     • Multiple Vitamins-Minerals (MULTIVITAMIN ADULT) Tab Take  by mouth.     • furosemide (LASIX) 20 MG Tab      • FLUZONE HIGH-DOSE 0.5 ML Suspension Prefilled Syringe injection TO BE ADMINISTERED BY PHARMACIST FOR IMMUNIZATION  0   • montelukast (SINGULAIR) 10 MG Tab      • aspirin (ASA) 81 MG Chew Tab chewable tablet Take 81 mg by mouth every day.     • SODIUM FLUORIDE, DENTAL GEL, (DENTAGEL) 1.1 % Gel by dental route.          Current Outpatient Prescriptions on File Prior to Visit   Medication Sig Dispense Refill   • atorvastatin (LIPITOR) 40 MG Tab Take 1.5 Tabs by mouth every day. 135 Tab 3   • zolpidem (AMBIEN) 10 MG Tab Take 1 Tab by mouth at bedtime as needed for Sleep for up to 90 days. 30 Tab 2   • acetaminophen (TYLENOL) 500 MG Tab Take 2 Tabs by mouth 3 times a day as needed (pain.  Do not exceed 3000 mg of total acetaminophen per day). 180 Tab 6   • penicillin v potassium (VEETID) 500 MG Tab Take 500 mg by mouth 4 times a day.     • pantoprazole (PROTONIX) 20 MG tablet Take 1 Tab by mouth every day. 90 Tab 3   • valACYclovir (VALTREX) 500 MG Tab Take 1 Tab by mouth every day. 90 Tab 3   • Pseudoephedrine HCl (SUDAFED PO) Take 10 mg by mouth as needed.     • PSYLLIUM PO Take  " by mouth.     • Cholecalciferol (VITAMIN D3) 2000 UNIT Cap Take  by mouth 2 Times a Day.     • Multiple Vitamins-Minerals (MULTIVITAMIN ADULT) Tab Take  by mouth.     • furosemide (LASIX) 20 MG Tab      • FLUZONE HIGH-DOSE 0.5 ML Suspension Prefilled Syringe injection TO BE ADMINISTERED BY PHARMACIST FOR IMMUNIZATION  0   • montelukast (SINGULAIR) 10 MG Tab      • aspirin (ASA) 81 MG Chew Tab chewable tablet Take 81 mg by mouth every day.     • SODIUM FLUORIDE, DENTAL GEL, (DENTAGEL) 1.1 % Gel by dental route.     • fluocinonide (LIDEX) 0.05 % Cream Apply  to affected area(s) 2 times a day.     • fluorouracil (EFUDEX) 5 % cream Apply  to affected area(s) 2 times a day.       No current facility-administered medications on file prior to visit.          Allergies:   No Known Allergies    Social Hx:   Social History     Social History   • Marital status: Single     Spouse name: N/A   • Number of children: N/A   • Years of education: N/A     Occupational History   • Not on file.     Social History Main Topics   • Smoking status: Never Smoker   • Smokeless tobacco: Never Used   • Alcohol use Yes   • Drug use: No   • Sexual activity: Not on file     Other Topics Concern   •  Service Yes   • Blood Transfusions No   • Caffeine Concern No   • Occupational Exposure No   • Hobby Hazards No   • Sleep Concern Yes   • Stress Concern No   • Weight Concern Yes   • Special Diet No   • Back Care No   • Exercise Yes   • Bike Helmet No   • Seat Belt Yes   • Self-Exams Yes     Social History Narrative   • No narrative on file         EXAMINATION     Physical Exam:   Vitals: Blood pressure 126/64, pulse 65, temperature 36.5 °C (97.7 °F), temperature source Temporal, height 1.88 m (6' 2\"), weight 109.9 kg (242 lb 4.6 oz), SpO2 96 %.    Constitutional:   Body Habitus: Body mass index is 31.11 kg/m².  Cooperation: Fully cooperates with exam  Appearance: Well-groomed no disheveled    Respiratory-  breathing comfortable on room air, " no audible wheezing  Cardiovascular- capillary refills less than 2 seconds. No lower extremity edema is noted.   Psychiatric- alert and oriented ×3. Normal affect.    Strength is 5/5 in the bilateral lower extremities.  There are no signs of infection over the injection sites.      MEDICAL DECISION MAKING    DATA    Labs:   Lab Results   Component Value Date/Time    SODIUM 140 02/18/2019 10:00 AM    POTASSIUM 3.9 02/18/2019 10:00 AM    CHLORIDE 107 02/18/2019 10:00 AM    CO2 25 02/18/2019 10:00 AM    GLUCOSE 127 (H) 02/18/2019 10:00 AM    BUN 19 02/18/2019 10:00 AM    CREATININE 0.93 02/18/2019 10:00 AM        No results found for: PROTHROMBTM, INR     Lab Results   Component Value Date/Time    WBC 7.6 11/15/2018 12:54 PM    RBC 4.62 (L) 11/15/2018 12:54 PM    HEMOGLOBIN 14.1 11/15/2018 12:54 PM    HEMATOCRIT 42.1 11/15/2018 12:54 PM    MCV 91.1 11/15/2018 12:54 PM    MCH 30.5 11/15/2018 12:54 PM    MCHC 33.5 (L) 11/15/2018 12:54 PM    MPV 11.6 11/15/2018 12:54 PM    NEUTSPOLYS 58.70 11/15/2018 12:54 PM    LYMPHOCYTES 30.50 11/15/2018 12:54 PM    MONOCYTES 7.90 11/15/2018 12:54 PM    EOSINOPHILS 1.70 11/15/2018 12:54 PM    BASOPHILS 0.90 11/15/2018 12:54 PM        Lab Results   Component Value Date/Time    HBA1C 6.4 (H) 02/18/2019 10:00 AM          Imaging:   I personally reviewed following images    X-ray bilateral hips 2/6/2019.  Cam deformity of the bilateral femoral heads, arthritis present the bilateral hips.  This is consistent with hip impingement syndrome bilaterally.    MRI lumbar spine 2/6/2019  History of laminectomies.  Bilateral facet arthropathy L3-4, L4-5, L5-S1.  Worst L5-S1.  No significant central canal stenosis.  Moderate neuroforaminal stenosis L4-5, mild neuroforaminal stenosis L5-S1, mild to moderate left L5-S1 neuroforaminal stenosis, mild to moderate left L2-3 neuroforaminal stenosis.    I reviewed the following radiology reports                                        Results for orders  placed during the hospital encounter of 02/06/19   MR-LUMBAR SPINE-W/O    Impression Multilevel degenerative disc disease, facet arthropathy and ligamentum flavum redundancy resulting in at most moderate foraminal and mild lateral recess stenoses.    Left hemilaminotomies                                X-ray hips 2/6/2019  Impression       1.  Convexity bilaterally at the femoral head/neck junction which can be associated with the clinical syndrome of femoroacetabular impingement    2.  Lumbar spine facet arthropathy and dextroconvex scoliosis                                                             Results for orders placed during the hospital encounter of 02/06/19   DX-LUMBAR SPINE-2 OR 3 VIEWS    Impression 1.  Dextroconvex scoliosis    2.  Multilevel facet arthropathy                                           DIAGNOSIS   Visit Diagnoses     ICD-10-CM   1. Lumbar radiculopathy M54.16   2. Chronic bilateral low back pain with bilateral sciatica M54.42    M54.41    G89.29   3. Right leg numbness R20.0   4. Gastroesophageal reflux disease, esophagitis presence not specified.  Avoid chronic NSAIDs if possible K21.9   5. Bilateral groin pain R10.30   6. Decreased range of motion of both hips M25.651    M25.652   7. Femoroacetabular impingement of both hips M25.851    M25.852   8. Bilateral hip joint arthritis Right worse than left M16.0   9. Lumbar spondylosis M47.816         ASSESSMENT and PLAN:     Abiel Vaughn 68 y.o. male      Abiel was seen today for follow-up.    Diagnoses and all orders for this visit:    Lumbar radiculopathy  -     REFERRAL TO PHYSICIAL MEDICINE REHAB    Chronic bilateral low back pain with bilateral sciatica    Right leg numbness    Gastroesophageal reflux disease, esophagitis presence not specified.  Avoid chronic NSAIDs if possible    Bilateral groin pain    Decreased range of motion of both hips    Femoroacetabular impingement of both hips    Bilateral hip joint arthritis  Right worse than left    Lumbar spondylosis      The patient had a partial response to the last epidural with greater than 70% pain relief but he still significantly symptomatic.  I scheduled the patient for a repeat right L4-5 and L5-S1 transfemoral epidural steroid injection with fluoroscopic guidance.      We have also successfully titrated the patient off of diclofenac which she has been taking high-dose NSAIDs for over 10 years.  We discussed this is not optimal for his kidney function, stomach and esophagus.  He also has esophagitis.  Follows up with gastroenterology for this.  Continue Tylenol as needed 1000 mg 3 times daily not to exceed 3000 mg daily.      Bilateral hip pain is stable.    Lumbar spondylosis is stable.        Follow up: 2 weeks after the procedure    Thank you for allowing me to participate in the care of this patient. If you have any questions please not hesitate to contact me.          Please note that this dictation was created using voice recognition software. I have made every reasonable attempt to correct obvious errors but there may be errors of grammar and content that I may have overlooked prior to finalization of this note.      Julito Dacosta MD  Interventional Spine and Sports Physiatry  Physical Medicine and Rehabilitation  [facility]  3/23/2019  4:11 PM

## 2019-04-08 ENCOUNTER — HOSPITAL ENCOUNTER (OUTPATIENT)
Dept: PAIN MANAGEMENT | Facility: REHABILITATION | Age: 69
End: 2019-04-08
Attending: PHYSICAL MEDICINE & REHABILITATION
Payer: MEDICARE

## 2019-04-08 ENCOUNTER — HOSPITAL ENCOUNTER (OUTPATIENT)
Dept: RADIOLOGY | Facility: REHABILITATION | Age: 69
End: 2019-04-08
Attending: PHYSICAL MEDICINE & REHABILITATION

## 2019-04-08 VITALS
HEART RATE: 68 BPM | HEIGHT: 74 IN | BODY MASS INDEX: 30.42 KG/M2 | WEIGHT: 236.99 LBS | TEMPERATURE: 98 F | DIASTOLIC BLOOD PRESSURE: 105 MMHG | SYSTOLIC BLOOD PRESSURE: 139 MMHG | RESPIRATION RATE: 16 BRPM | OXYGEN SATURATION: 97 %

## 2019-04-08 PROCEDURE — 64483 NJX AA&/STRD TFRM EPI L/S 1: CPT

## 2019-04-08 PROCEDURE — 700117 HCHG RX CONTRAST REV CODE 255

## 2019-04-08 PROCEDURE — 700111 HCHG RX REV CODE 636 W/ 250 OVERRIDE (IP)

## 2019-04-08 PROCEDURE — 64484 NJX AA&/STRD TFRM EPI L/S EA: CPT

## 2019-04-08 RX ORDER — DEXAMETHASONE SODIUM PHOSPHATE 10 MG/ML
INJECTION, SOLUTION INTRAMUSCULAR; INTRAVENOUS
Status: COMPLETED
Start: 2019-04-08 | End: 2019-04-08

## 2019-04-08 RX ORDER — LIDOCAINE HYDROCHLORIDE 10 MG/ML
INJECTION, SOLUTION EPIDURAL; INFILTRATION; INTRACAUDAL; PERINEURAL
Status: COMPLETED
Start: 2019-04-08 | End: 2019-04-08

## 2019-04-08 RX ADMIN — IOHEXOL 8 ML: 240 INJECTION, SOLUTION INTRATHECAL; INTRAVASCULAR; INTRAVENOUS; ORAL at 13:31

## 2019-04-08 RX ADMIN — LIDOCAINE HYDROCHLORIDE 10 ML: 10 INJECTION, SOLUTION EPIDURAL; INFILTRATION; INTRACAUDAL; PERINEURAL at 13:31

## 2019-04-08 RX ADMIN — DEXAMETHASONE SODIUM PHOSPHATE 20 MG: 10 INJECTION, SOLUTION INTRAMUSCULAR; INTRAVENOUS at 13:32

## 2019-04-08 NOTE — PROCEDURES
Date of Service: 4/8/2019     Patient: Abiel Vaughn 68 y.o. male     MRN: [id number]     Physician/s: Julito Dacosta MD    Pre-operative Diagnosis: Lumbar radiculopathy    Post-operative Diagnosis: Lumbar radiculopathy    Procedure: right Lumbar Transforaminal Epidural Steroid  at the L4-5 and L5-S1 levels.     Description of procedure:    The risks, benefits, and alternatives of the procedure were reviewed and discussed with the patient.  Written informed consent was freely obtained. A pre-procedural time-out was conducted by the physician verifying patient’s identity, procedure to be performed, procedure site and side, and allergy verification. Appropriate equipment was determined to be in place for the procedure.     The patient's vital signs were carefully monitored before, throughout, and after the procedure.     In the fluoroscopy suite the patient was placed in a prone position, a pillow placed underneath their umbilicus. The skin was prepped and draped in the usual sterile fashion. The fluoroscope was placed over the lumbar spine and adjusted into the proper AP/Oblique view to enter the transforaminal space at the levels below. The targets for injection were then marked at the right L4-5 and L5-S1. A 27g 1.5 inch needle was placed into the marked site, and approx 2cc of 1% Lidocaine was injected subcutaneously into the epidermal and dermal layers. The needle was removed. } A 25g 3.5 inch spinal needle was then placed and advanced under fluoroscopic guidance in an oblique view towards the subpedicular epidural space of the levels noted below. The needle position was confirmed to not be past the 6 o'clock position in the AP view and it was in the neural foramen and the lateral view. Under live fluoroscopic guidance in the AP view, contrast dye was used at both the L4-5 and L5-S1 levels needles were initially not in the epidural space, they were adjusted and contrast was used to to highlight the epidural  space spread.  Following negative aspiration, approx 1mL of 1% lidocaine preservative free with 10 mg of dexamethasone was then injected at each level, and the needles were removed intact after restyleted. The patient's back was covered with a 4x4 gauze, the area was cleansed with sterile normal saline, and a dressing was applied. There were no complications noted.     The patient was then evaluated post-procedure, and was hemodynamically stable prior to leaving the post-operative care unit.     A follow-up visit in clinic as scheduled for 4/18/2019     Julito Dacosta MD  Physical Medicine and Rehabilitation  Interventional Spine and Sports Physiatry  [facility]          CPT codes  Transforaminal epidural injection- lumbar or sacral (first level):  52386  Transforaminal epidural injection- lumbar or sacral (each additional level):  30393

## 2019-04-08 NOTE — NON-PROVIDER
Pt given verbal and written d/c instructions and verbalizes understanding. denies nsaid use in last 3 days, denies blood thinners use in last 5 days, denies current infection or abx use. Med rec complete. Pt has post procedural ride home with friend Ting.

## 2019-04-08 NOTE — NON-PROVIDER
Patient assisted onto procedure table and helped into position with adequate padding to protect joints;connected to necessary monitoring equipment, all with assistance from Patient Care Tech and Radiology Tech and RN.

## 2019-04-18 ENCOUNTER — OFFICE VISIT (OUTPATIENT)
Dept: PHYSICAL MEDICINE AND REHAB | Facility: MEDICAL CENTER | Age: 69
End: 2019-04-18
Payer: MEDICARE

## 2019-04-18 VITALS
DIASTOLIC BLOOD PRESSURE: 64 MMHG | HEART RATE: 72 BPM | SYSTOLIC BLOOD PRESSURE: 116 MMHG | TEMPERATURE: 97.8 F | BODY MASS INDEX: 30.13 KG/M2 | OXYGEN SATURATION: 95 % | HEIGHT: 74 IN | WEIGHT: 234.79 LBS

## 2019-04-18 DIAGNOSIS — M54.42 CHRONIC BILATERAL LOW BACK PAIN WITH BILATERAL SCIATICA: ICD-10-CM

## 2019-04-18 DIAGNOSIS — M47.816 LUMBAR SPONDYLOSIS: ICD-10-CM

## 2019-04-18 DIAGNOSIS — R10.31 BILATERAL GROIN PAIN: ICD-10-CM

## 2019-04-18 DIAGNOSIS — M54.41 CHRONIC BILATERAL LOW BACK PAIN WITH BILATERAL SCIATICA: ICD-10-CM

## 2019-04-18 DIAGNOSIS — M16.0 BILATERAL HIP JOINT ARTHRITIS: ICD-10-CM

## 2019-04-18 DIAGNOSIS — G89.29 CHRONIC BILATERAL LOW BACK PAIN WITH BILATERAL SCIATICA: ICD-10-CM

## 2019-04-18 DIAGNOSIS — M25.851 FEMOROACETABULAR IMPINGEMENT OF BOTH HIPS: ICD-10-CM

## 2019-04-18 DIAGNOSIS — R20.0 RIGHT LEG NUMBNESS: ICD-10-CM

## 2019-04-18 DIAGNOSIS — M25.651 DECREASED RANGE OF MOTION OF BOTH HIPS: ICD-10-CM

## 2019-04-18 DIAGNOSIS — Z85.828 HISTORY OF SKIN CANCER: ICD-10-CM

## 2019-04-18 DIAGNOSIS — M25.852 FEMOROACETABULAR IMPINGEMENT OF BOTH HIPS: ICD-10-CM

## 2019-04-18 DIAGNOSIS — M25.652 DECREASED RANGE OF MOTION OF BOTH HIPS: ICD-10-CM

## 2019-04-18 DIAGNOSIS — K21.9 GASTROESOPHAGEAL REFLUX DISEASE, ESOPHAGITIS PRESENCE NOT SPECIFIED: ICD-10-CM

## 2019-04-18 DIAGNOSIS — M54.16 LUMBAR RADICULOPATHY: ICD-10-CM

## 2019-04-18 DIAGNOSIS — R10.32 BILATERAL GROIN PAIN: ICD-10-CM

## 2019-04-18 PROCEDURE — 99214 OFFICE O/P EST MOD 30 MIN: CPT | Performed by: PHYSICAL MEDICINE & REHABILITATION

## 2019-04-18 ASSESSMENT — PAIN SCALES - GENERAL: PAINLEVEL: 1=MINIMAL PAIN

## 2019-04-18 NOTE — PROGRESS NOTES
Follow up patient note  Interventional spine and sports physiatry, Physical medicine rehabilitation      Chief complaint:   Chief Complaint   Patient presents with   • Follow-Up     Post SP/Lumbar radiculopathy           HISTORY    Please see new patient note dated  by Dr Dacosta,  for more details.     HPI  Patient identification: Abiel Vaughn 68 y.o. male  With Diagnoses of Lumbar radiculopathy, Chronic bilateral low back pain with bilateral sciatica, Right leg numbness, Gastroesophageal reflux disease, esophagitis presence not specified.  Avoid chronic NSAIDs if possible, Bilateral groin pain, Decreased range of motion of both hips, Femoroacetabular impingement of both hips, Bilateral hip joint arthritis Right worse than left, Lumbar spondylosis, and History of skin cancer were pertinent to this visit.     Right lumbar radiculopathy significantly improved. His right low back and leg pain significantly improved. Now his pain is nearly resolved and he has no pain at rest. He has also stopped taking his pain meds. Pain gets up to 2/10 intensity when he was lifting furniture. He was able to walk for several miles. Still with numbness in the right leg but no pain.     His pain in his hips and groin is stable.      ROS Red Flags :   -Fever, Chills, Sweats: Denies  Involuntary Weight Loss: Denies  Bowel/Bladder Incontinence: Denies  Saddle Anesthesia: Denies        PMHx:   Past Medical History:   Diagnosis Date   • Dyslipidemia    • GERD (gastroesophageal reflux disease)    • Insomnia        PSHx:   Past Surgical History:   Procedure Laterality Date   • APPENDECTOMY     • TONSILLECTOMY         Family history   Family History   Problem Relation Age of Onset   • Cancer Mother    • Diabetes Mother    • Heart Disease Father    • Hyperlipidemia Neg Hx          Medications:   Outpatient Prescriptions Marked as Taking for the 4/18/19 encounter (Office Visit) with Julito Dacosta M.D.   Medication Sig Dispense Refill    • atorvastatin (LIPITOR) 40 MG Tab Take 1.5 Tabs by mouth every day. 135 Tab 3   • zolpidem (AMBIEN) 10 MG Tab Take 1 Tab by mouth at bedtime as needed for Sleep for up to 90 days. 30 Tab 2   • acetaminophen (TYLENOL) 500 MG Tab Take 2 Tabs by mouth 3 times a day as needed (pain.  Do not exceed 3000 mg of total acetaminophen per day). 180 Tab 6   • pantoprazole (PROTONIX) 20 MG tablet Take 1 Tab by mouth every day. (Patient taking differently: Take 40 mg by mouth every day.) 90 Tab 3   • valACYclovir (VALTREX) 500 MG Tab Take 1 Tab by mouth every day. 90 Tab 3   • Pseudoephedrine HCl (SUDAFED PO) Take 10 mg by mouth as needed.     • PSYLLIUM PO Take  by mouth.     • Cholecalciferol (VITAMIN D3) 2000 UNIT Cap Take  by mouth 2 Times a Day.     • Multiple Vitamins-Minerals (MULTIVITAMIN ADULT) Tab Take  by mouth.     • montelukast (SINGULAIR) 10 MG Tab      • aspirin (ASA) 81 MG Chew Tab chewable tablet Take 81 mg by mouth every day.     • SODIUM FLUORIDE, DENTAL GEL, (DENTAGEL) 1.1 % Gel by dental route.     • fluorouracil (EFUDEX) 5 % cream Apply  to affected area(s) 2 times a day.          Current Outpatient Prescriptions on File Prior to Visit   Medication Sig Dispense Refill   • atorvastatin (LIPITOR) 40 MG Tab Take 1.5 Tabs by mouth every day. 135 Tab 3   • zolpidem (AMBIEN) 10 MG Tab Take 1 Tab by mouth at bedtime as needed for Sleep for up to 90 days. 30 Tab 2   • acetaminophen (TYLENOL) 500 MG Tab Take 2 Tabs by mouth 3 times a day as needed (pain.  Do not exceed 3000 mg of total acetaminophen per day). 180 Tab 6   • pantoprazole (PROTONIX) 20 MG tablet Take 1 Tab by mouth every day. (Patient taking differently: Take 40 mg by mouth every day.) 90 Tab 3   • valACYclovir (VALTREX) 500 MG Tab Take 1 Tab by mouth every day. 90 Tab 3   • Pseudoephedrine HCl (SUDAFED PO) Take 10 mg by mouth as needed.     • PSYLLIUM PO Take  by mouth.     • Cholecalciferol (VITAMIN D3) 2000 UNIT Cap Take  by mouth 2 Times a Day.    "  • Multiple Vitamins-Minerals (MULTIVITAMIN ADULT) Tab Take  by mouth.     • montelukast (SINGULAIR) 10 MG Tab      • aspirin (ASA) 81 MG Chew Tab chewable tablet Take 81 mg by mouth every day.     • SODIUM FLUORIDE, DENTAL GEL, (DENTAGEL) 1.1 % Gel by dental route.     • fluorouracil (EFUDEX) 5 % cream Apply  to affected area(s) 2 times a day.     • furosemide (LASIX) 20 MG Tab      • fluocinonide (LIDEX) 0.05 % Cream Apply  to affected area(s) 2 times a day.       No current facility-administered medications on file prior to visit.          Allergies:   No Known Allergies    Social Hx:   Social History     Social History   • Marital status: Single     Spouse name: N/A   • Number of children: N/A   • Years of education: N/A     Occupational History   • Not on file.     Social History Main Topics   • Smoking status: Never Smoker   • Smokeless tobacco: Never Used   • Alcohol use Yes   • Drug use: No   • Sexual activity: Not on file     Other Topics Concern   •  Service Yes   • Blood Transfusions No   • Caffeine Concern No   • Occupational Exposure No   • Hobby Hazards No   • Sleep Concern Yes   • Stress Concern No   • Weight Concern Yes   • Special Diet No   • Back Care No   • Exercise Yes   • Bike Helmet No   • Seat Belt Yes   • Self-Exams Yes     Social History Narrative   • No narrative on file         EXAMINATION     Physical Exam:   Vitals: /64 (BP Location: Right arm, Patient Position: Sitting, BP Cuff Size: Adult)   Pulse 72   Temp 36.6 °C (97.8 °F) (Temporal)   Ht 1.88 m (6' 2\")   Wt 106.5 kg (234 lb 12.6 oz)   SpO2 95%     Constitutional:   Body Habitus: Body mass index is 30.15 kg/m².  Cooperation: Fully cooperates with exam  Appearance: Well-groomed no disheveled    Respiratory-  breathing comfortable on room air, no audible wheezing  Cardiovascular- capillary refills less than 2 seconds. No lower extremity edema is noted.   Psychiatric- alert and oriented ×3. Normal affect.      MSK "   Strength is 5/5 in the bilateral lower extremities.  Reflexes 2+ in the left patella and Achilles, 0 in the right patella, 1+ in the right Achilles.  No clonus is seen on today's exam.  There are no signs of infection over the injection site    MEDICAL DECISION MAKING    DATA    Labs:   Lab Results   Component Value Date/Time    SODIUM 140 02/18/2019 10:00 AM    POTASSIUM 3.9 02/18/2019 10:00 AM    CHLORIDE 107 02/18/2019 10:00 AM    CO2 25 02/18/2019 10:00 AM    GLUCOSE 127 (H) 02/18/2019 10:00 AM    BUN 19 02/18/2019 10:00 AM    CREATININE 0.93 02/18/2019 10:00 AM        No results found for: PROTHROMBTM, INR     Lab Results   Component Value Date/Time    WBC 7.6 11/15/2018 12:54 PM    RBC 4.62 (L) 11/15/2018 12:54 PM    HEMOGLOBIN 14.1 11/15/2018 12:54 PM    HEMATOCRIT 42.1 11/15/2018 12:54 PM    MCV 91.1 11/15/2018 12:54 PM    MCH 30.5 11/15/2018 12:54 PM    MCHC 33.5 (L) 11/15/2018 12:54 PM    MPV 11.6 11/15/2018 12:54 PM    NEUTSPOLYS 58.70 11/15/2018 12:54 PM    LYMPHOCYTES 30.50 11/15/2018 12:54 PM    MONOCYTES 7.90 11/15/2018 12:54 PM    EOSINOPHILS 1.70 11/15/2018 12:54 PM    BASOPHILS 0.90 11/15/2018 12:54 PM        Lab Results   Component Value Date/Time    HBA1C 6.4 (H) 02/18/2019 10:00 AM          Imaging:   I personally reviewed following images    X-ray bilateral hips 2/6/2019.  Cam deformity of the bilateral femoral heads, arthritis present the bilateral hips.  This is consistent with hip impingement syndrome bilaterally.    MRI lumbar spine 2/6/2019  History of laminectomies.  Bilateral facet arthropathy L3-4, L4-5, L5-S1.  Worst L5-S1.  No significant central canal stenosis.  Moderate neuroforaminal stenosis L4-5, mild neuroforaminal stenosis L5-S1, mild to moderate left L5-S1 neuroforaminal stenosis, mild to moderate left L2-3 neuroforaminal stenosis.    I reviewed the following radiology reports                                        Results for orders placed during the hospital encounter of  02/06/19   MR-LUMBAR SPINE-W/O    Impression Multilevel degenerative disc disease, facet arthropathy and ligamentum flavum redundancy resulting in at most moderate foraminal and mild lateral recess stenoses.    Left hemilaminotomies                                X-ray hips 2/6/2019  Impression       1.  Convexity bilaterally at the femoral head/neck junction which can be associated with the clinical syndrome of femoroacetabular impingement    2.  Lumbar spine facet arthropathy and dextroconvex scoliosis                                                             Results for orders placed during the hospital encounter of 02/06/19   DX-LUMBAR SPINE-2 OR 3 VIEWS    Impression 1.  Dextroconvex scoliosis    2.  Multilevel facet arthropathy                                           DIAGNOSIS   Visit Diagnoses     ICD-10-CM   1. Lumbar radiculopathy M54.16   2. Chronic bilateral low back pain with bilateral sciatica M54.42    M54.41    G89.29   3. Right leg numbness R20.0   4. Gastroesophageal reflux disease, esophagitis presence not specified.  Avoid chronic NSAIDs if possible K21.9   5. Bilateral groin pain R10.30   6. Decreased range of motion of both hips M25.651    M25.652   7. Femoroacetabular impingement of both hips M25.851    M25.852   8. Bilateral hip joint arthritis Right worse than left M16.0   9. Lumbar spondylosis M47.816   10. History of skin cancer Z85.828         ASSESSMENT and PLAN:     Abiel Vaughn 68 y.o. male      Abiel was seen today for follow-up.    Diagnoses and all orders for this visit:    Lumbar radiculopathy  -     REFERRAL TO PHYSICAL THERAPY Reason for Therapy: Eval/Treat/Report    Chronic bilateral low back pain with bilateral sciatica  -     REFERRAL TO PHYSICAL THERAPY Reason for Therapy: Eval/Treat/Report    Right leg numbness  -     REFERRAL TO PHYSICAL THERAPY Reason for Therapy: Eval/Treat/Report    Gastroesophageal reflux disease, esophagitis presence not specified.  Avoid  chronic NSAIDs if possible  -     REFERRAL TO PHYSICAL THERAPY Reason for Therapy: Eval/Treat/Report    Bilateral groin pain  Comments:  stable now  Orders:  -     REFERRAL TO PHYSICAL THERAPY Reason for Therapy: Eval/Treat/Report    Decreased range of motion of both hips  -     REFERRAL TO PHYSICAL THERAPY Reason for Therapy: Eval/Treat/Report    Femoroacetabular impingement of both hips  -     REFERRAL TO PHYSICAL THERAPY Reason for Therapy: Eval/Treat/Report    Bilateral hip joint arthritis Right worse than left  Comments:  stable symptomatically  Orders:  -     REFERRAL TO PHYSICAL THERAPY Reason for Therapy: Eval/Treat/Report    Lumbar spondylosis  Comments:  symptomatic but relatively well controlled. leans forward with posture because of pain with extension.   Orders:  -     REFERRAL TO PHYSICAL THERAPY Reason for Therapy: Eval/Treat/Report    History of skin cancer       Near complete resolution of radicular pain after the epidural. We discussed improving exercise. Should the patient have a worsening of axial low back pain then consider MBB.      Take tylenol 1000mg TID PRN      Follow up: after PT    Thank you for allowing me to participate in the care of this patient. If you have any questions please not hesitate to contact me.          Please note that this dictation was created using voice recognition software. I have made every reasonable attempt to correct obvious errors but there may be errors of grammar and content that I may have overlooked prior to finalization of this note.      Julito Dacosta MD  Interventional Spine and Sports Physiatry  Physical Medicine and Rehabilitation  [facility]  4/18/2019  3:48 PM

## 2019-05-13 ENCOUNTER — PHYSICAL THERAPY (OUTPATIENT)
Dept: PHYSICAL THERAPY | Facility: MEDICAL CENTER | Age: 69
End: 2019-05-13
Attending: PHYSICAL MEDICINE & REHABILITATION
Payer: MEDICARE

## 2019-05-13 DIAGNOSIS — M25.852 FEMOROACETABULAR IMPINGEMENT OF BOTH HIPS: ICD-10-CM

## 2019-05-13 DIAGNOSIS — G89.29 CHRONIC BILATERAL LOW BACK PAIN WITH BILATERAL SCIATICA: ICD-10-CM

## 2019-05-13 DIAGNOSIS — M25.651 DECREASED RANGE OF MOTION OF BOTH HIPS: ICD-10-CM

## 2019-05-13 DIAGNOSIS — M25.652 DECREASED RANGE OF MOTION OF BOTH HIPS: ICD-10-CM

## 2019-05-13 DIAGNOSIS — M54.42 CHRONIC BILATERAL LOW BACK PAIN WITH BILATERAL SCIATICA: ICD-10-CM

## 2019-05-13 DIAGNOSIS — R10.32 BILATERAL GROIN PAIN: ICD-10-CM

## 2019-05-13 DIAGNOSIS — M54.16 LUMBAR RADICULOPATHY: ICD-10-CM

## 2019-05-13 DIAGNOSIS — R10.31 BILATERAL GROIN PAIN: ICD-10-CM

## 2019-05-13 DIAGNOSIS — M47.816 LUMBAR SPONDYLOSIS: ICD-10-CM

## 2019-05-13 DIAGNOSIS — R20.0 RIGHT LEG NUMBNESS: ICD-10-CM

## 2019-05-13 DIAGNOSIS — M25.851 FEMOROACETABULAR IMPINGEMENT OF BOTH HIPS: ICD-10-CM

## 2019-05-13 DIAGNOSIS — M54.41 CHRONIC BILATERAL LOW BACK PAIN WITH BILATERAL SCIATICA: ICD-10-CM

## 2019-05-13 DIAGNOSIS — M16.0 BILATERAL HIP JOINT ARTHRITIS: ICD-10-CM

## 2019-05-13 PROCEDURE — 97162 PT EVAL MOD COMPLEX 30 MIN: CPT

## 2019-05-13 NOTE — OP THERAPY EVALUATION
Outpatient Physical Therapy  INITIAL EVALUATION    [facility]  [address]  [place] [address]  Phone:  [phone]  Fax:  [phone]    Date of Evaluation: 05/13/2019    Patient: Abiel Vaughn  YOB: 1950  MRN: [id number]     Referring Provider: Julito Dacosta M.D.  [address]  [address]  [place] [address]   Referring Diagnosis Lumbar radiculopathy [M54.16];Chronic bilateral low back pain with bilateral sciatica [M54.42, M54.41, G89.29];Right leg numbness [R20.0];Gastroesophageal reflux disease, esophagitis presence not specified [K21.9];Bilateral groin pain [R10.30];Decreased range of motion of both hips [M25.651, M25.652];Femoroacetabular impingement of both hips [M25.851, M25.852];Bilateral hip joint arthritis [M16.0];Lumbar spondylosis [M47.816]     Time Calculation  Start time: 0300  Stop time: 0400 Time Calculation (min): 60 minutes     Physical Therapy Occurrence Codes    Date of onset of impairment:  5/13/18   Date physical therapy care plan established or reviewed:  5/13/19   Date physical therapy treatment started:  5/13/19          Chief Complaint: Back Problem    Visit Diagnoses     ICD-10-CM   1. Lumbar radiculopathy M54.16   2. Chronic bilateral low back pain with bilateral sciatica M54.42    M54.41    G89.29   3. Right leg numbness R20.0   4. Bilateral groin pain R10.30   5. Decreased range of motion of both hips M25.651    M25.652   6. Femoroacetabular impingement of both hips M25.851    M25.852   7. Bilateral hip joint arthritis M16.0   8. Lumbar spondylosis M47.816         Subjective  68 year old male with a chronic history of lumbar pain going back 20 years  Pt has had numerous epidurals,discectomys and laminectomys  Pt is now c/o pain across lumbar spine and R leg pain  Pain increased with standing >10 mins  Walking > 2mins  Pt takes ambien for sleep  Pt is retired and does not have a regular exc  program  Goals To decrease pain and be more flexable  Past Medical History:   Diagnosis Date   • Dyslipidemia    • GERD (gastroesophageal reflux disease)    • Insomnia      Past Surgical History:   Procedure Laterality Date   • APPENDECTOMY     • TONSILLECTOMY       Social History   Substance Use Topics   • Smoking status: Never Smoker   • Smokeless tobacco: Never Used   • Alcohol use Yes     Family and Occupational History     Social History   • Marital status: Single     Spouse name: N/A   • Number of children: N/A   • Years of education: N/A       Objective    Exercises/Treatment  Time-based treatments/modalities:   Poor standing posture L pelvis elevated 3 cm  Pt is forward bent at the hips with a R side bend  Lumbar range is significantly decreased in all directions  Bi lat hips lack 20% range in all directions  Bi lat knees lack 20* of extension,flexion to 115   5/5 muscle strength all groups  On palpation tightness noted paraspinals and gluteals  Poor stabilisation  Assessment, Response and Plan:   Prognosis: fair    Goals:   Short Term Goals:   Pt to be I with HEP and able to demonstrate  Short term goal time span:  1-2 weeks      Long Term Goals:    Pt to be able to stand > 30 mins  Pt to be able to ambulate > 30 mins  Long term goal time span:  4-6 weeks    Plan:   Planned therapy interventions:  Manual Therapy (CPT 30964), Therapeutic Activities (CPT 70349) and Therapeutic Exercise (CPT 49339)  Frequency:  2x week  Duration in weeks:  6  Duration in visits:  12      Functional Limitations and Severity Modifiers      Current:     Goal:       Referring provider co-signature:  I have reviewed this plan of care and my co-signature certifies the need for services.  Certification Dates:   From 5/13/19    To 6/30/19    Physician Signature: ________________________________ Date: ______________

## 2019-05-14 ENCOUNTER — HOSPITAL ENCOUNTER (OUTPATIENT)
Dept: LAB | Facility: MEDICAL CENTER | Age: 69
End: 2019-05-14
Attending: INTERNAL MEDICINE
Payer: MEDICARE

## 2019-05-14 DIAGNOSIS — E78.5 DYSLIPIDEMIA: ICD-10-CM

## 2019-05-14 DIAGNOSIS — Z12.5 SCREENING FOR MALIGNANT NEOPLASM OF PROSTATE: ICD-10-CM

## 2019-05-14 DIAGNOSIS — R73.01 IFG (IMPAIRED FASTING GLUCOSE): ICD-10-CM

## 2019-05-14 LAB
ALBUMIN SERPL BCP-MCNC: 4 G/DL (ref 3.2–4.9)
ALBUMIN/GLOB SERPL: 1.7 G/DL
ALP SERPL-CCNC: 71 U/L (ref 30–99)
ALT SERPL-CCNC: 25 U/L (ref 2–50)
ANION GAP SERPL CALC-SCNC: 6 MMOL/L (ref 0–11.9)
AST SERPL-CCNC: 26 U/L (ref 12–45)
BILIRUB SERPL-MCNC: 0.8 MG/DL (ref 0.1–1.5)
BUN SERPL-MCNC: 15 MG/DL (ref 8–22)
CALCIUM SERPL-MCNC: 9.2 MG/DL (ref 8.5–10.5)
CHLORIDE SERPL-SCNC: 106 MMOL/L (ref 96–112)
CHOLEST SERPL-MCNC: 133 MG/DL (ref 100–199)
CO2 SERPL-SCNC: 26 MMOL/L (ref 20–33)
CREAT SERPL-MCNC: 0.87 MG/DL (ref 0.5–1.4)
EST. AVERAGE GLUCOSE BLD GHB EST-MCNC: 146 MG/DL
FASTING STATUS PATIENT QL REPORTED: NORMAL
GLOBULIN SER CALC-MCNC: 2.4 G/DL (ref 1.9–3.5)
GLUCOSE SERPL-MCNC: 127 MG/DL (ref 65–99)
HBA1C MFR BLD: 6.7 % (ref 0–5.6)
HDLC SERPL-MCNC: 31 MG/DL
LDLC SERPL CALC-MCNC: 80 MG/DL
POTASSIUM SERPL-SCNC: 4 MMOL/L (ref 3.6–5.5)
PROT SERPL-MCNC: 6.4 G/DL (ref 6–8.2)
PSA SERPL-MCNC: 2.48 NG/ML (ref 0–4)
SODIUM SERPL-SCNC: 138 MMOL/L (ref 135–145)
TRIGL SERPL-MCNC: 109 MG/DL (ref 0–149)

## 2019-05-14 PROCEDURE — 84153 ASSAY OF PSA TOTAL: CPT

## 2019-05-14 PROCEDURE — 80061 LIPID PANEL: CPT

## 2019-05-14 PROCEDURE — 80053 COMPREHEN METABOLIC PANEL: CPT

## 2019-05-14 PROCEDURE — 36415 COLL VENOUS BLD VENIPUNCTURE: CPT

## 2019-05-14 PROCEDURE — 83036 HEMOGLOBIN GLYCOSYLATED A1C: CPT

## 2019-05-15 ENCOUNTER — PHYSICAL THERAPY (OUTPATIENT)
Dept: PHYSICAL THERAPY | Facility: MEDICAL CENTER | Age: 69
End: 2019-05-15
Attending: PHYSICAL MEDICINE & REHABILITATION
Payer: MEDICARE

## 2019-05-15 DIAGNOSIS — M54.16 LUMBAR RADICULOPATHY: ICD-10-CM

## 2019-05-15 PROCEDURE — 97110 THERAPEUTIC EXERCISES: CPT

## 2019-05-15 PROCEDURE — 97140 MANUAL THERAPY 1/> REGIONS: CPT

## 2019-05-15 NOTE — OP THERAPY DAILY TREATMENT
Outpatient Physical Therapy  DAILY TREATMENT     [facility] Outpatient Physical Therapy  [address]  [place] [address]  Phone:  [phone]  Fax:  [phone]    Date: 05/15/2019    Patient: Abiel Vaughn  YOB: 1950  MRN: [id number]     Time Calculation  Start time: 0330  Stop time: 0400 Time Calculation (min): 30 minutes     Chief Complaint: No chief complaint on file.    Visit #: 2    SUBJECTIVE:  Moderate soreness following last treatment    OBJECTIVE:  Current objective measures:     alignment is improved L pelvis still higher    Exercises/Treatment  Time-based treatments/modalities:   Joint mobs bi lat knee ext  Joint mobs bi lat hips prone and supine  stabilisation exc     ASSESSMENT:   Response to treatment:   Range improving in knees and hips.Alignment improving    PLAN/RECOMMENDATIONS:   Plan for treatment: therapy treatment to continue next visit.  Planned interventions for next visit: continue with current treatment.

## 2019-05-20 ENCOUNTER — PHYSICAL THERAPY (OUTPATIENT)
Dept: PHYSICAL THERAPY | Facility: MEDICAL CENTER | Age: 69
End: 2019-05-20
Attending: PHYSICAL MEDICINE & REHABILITATION
Payer: MEDICARE

## 2019-05-20 ENCOUNTER — OFFICE VISIT (OUTPATIENT)
Dept: MEDICAL GROUP | Facility: MEDICAL CENTER | Age: 69
End: 2019-05-20
Payer: MEDICARE

## 2019-05-20 VITALS
DIASTOLIC BLOOD PRESSURE: 74 MMHG | WEIGHT: 236.99 LBS | BODY MASS INDEX: 30.42 KG/M2 | HEIGHT: 74 IN | TEMPERATURE: 98.4 F | HEART RATE: 70 BPM | OXYGEN SATURATION: 96 % | SYSTOLIC BLOOD PRESSURE: 118 MMHG

## 2019-05-20 DIAGNOSIS — Z00.00 MEDICARE ANNUAL WELLNESS VISIT, INITIAL: ICD-10-CM

## 2019-05-20 DIAGNOSIS — Z00.00 HEALTH CARE MAINTENANCE: ICD-10-CM

## 2019-05-20 DIAGNOSIS — M15.9 PRIMARY OSTEOARTHRITIS INVOLVING MULTIPLE JOINTS: ICD-10-CM

## 2019-05-20 DIAGNOSIS — E11.9 NEW ONSET TYPE 2 DIABETES MELLITUS (HCC): ICD-10-CM

## 2019-05-20 DIAGNOSIS — M54.50 CHRONIC LUMBOSACRAL PAIN: ICD-10-CM

## 2019-05-20 DIAGNOSIS — I35.9 AORTIC VALVE DISORDER: ICD-10-CM

## 2019-05-20 DIAGNOSIS — G89.29 CHRONIC LUMBOSACRAL PAIN: ICD-10-CM

## 2019-05-20 DIAGNOSIS — K21.9 GASTROESOPHAGEAL REFLUX DISEASE, ESOPHAGITIS PRESENCE NOT SPECIFIED: ICD-10-CM

## 2019-05-20 DIAGNOSIS — F51.01 PRIMARY INSOMNIA: ICD-10-CM

## 2019-05-20 DIAGNOSIS — E78.5 DYSLIPIDEMIA: ICD-10-CM

## 2019-05-20 DIAGNOSIS — M54.16 LUMBAR RADICULOPATHY: ICD-10-CM

## 2019-05-20 DIAGNOSIS — H91.91 DECREASED HEARING, RIGHT: ICD-10-CM

## 2019-05-20 DIAGNOSIS — J30.2 SEASONAL ALLERGIC RHINITIS, UNSPECIFIED TRIGGER: ICD-10-CM

## 2019-05-20 DIAGNOSIS — E66.9 OBESITY (BMI 30.0-34.9): ICD-10-CM

## 2019-05-20 PROBLEM — R73.01 IFG (IMPAIRED FASTING GLUCOSE): Status: RESOLVED | Noted: 2019-02-25 | Resolved: 2019-05-20

## 2019-05-20 PROCEDURE — 99213 OFFICE O/P EST LOW 20 MIN: CPT | Mod: 25 | Performed by: INTERNAL MEDICINE

## 2019-05-20 PROCEDURE — 97140 MANUAL THERAPY 1/> REGIONS: CPT

## 2019-05-20 PROCEDURE — G0439 PPPS, SUBSEQ VISIT: HCPCS | Performed by: INTERNAL MEDICINE

## 2019-05-20 PROCEDURE — 99999 INITIAL ANNUAL WELLNESS VISIT-INCLUDES PPPS (G0438): CPT | Performed by: INTERNAL MEDICINE

## 2019-05-20 RX ORDER — PANTOPRAZOLE SODIUM 40 MG/1
TABLET, DELAYED RELEASE ORAL
COMMUNITY
Start: 2019-05-18 | End: 2019-05-20

## 2019-05-20 RX ORDER — METFORMIN HYDROCHLORIDE 750 MG/1
750 TABLET, EXTENDED RELEASE ORAL DAILY
Qty: 90 TAB | Refills: 0 | Status: SHIPPED | OUTPATIENT
Start: 2019-05-20 | End: 2019-05-20 | Stop reason: SDUPTHER

## 2019-05-20 RX ORDER — DICLOFENAC SODIUM 75 MG/1
75 TABLET, DELAYED RELEASE ORAL 2 TIMES DAILY
Qty: 180 TAB | Refills: 0 | Status: SHIPPED | OUTPATIENT
Start: 2019-05-20 | End: 2020-04-29

## 2019-05-20 RX ORDER — METFORMIN HYDROCHLORIDE 750 MG/1
750 TABLET, EXTENDED RELEASE ORAL DAILY
Qty: 90 TAB | Refills: 1 | Status: SHIPPED | OUTPATIENT
Start: 2019-05-20 | End: 2019-08-26 | Stop reason: SDUPTHER

## 2019-05-20 RX ORDER — DICLOFENAC SODIUM 75 MG/1
75 TABLET, DELAYED RELEASE ORAL 2 TIMES DAILY
COMMUNITY
End: 2019-05-20 | Stop reason: SDUPTHER

## 2019-05-20 ASSESSMENT — ENCOUNTER SYMPTOMS: GENERAL WELL-BEING: GOOD

## 2019-05-20 ASSESSMENT — ACTIVITIES OF DAILY LIVING (ADL): BATHING_REQUIRES_ASSISTANCE: 0

## 2019-05-20 ASSESSMENT — PATIENT HEALTH QUESTIONNAIRE - PHQ9: CLINICAL INTERPRETATION OF PHQ2 SCORE: 0

## 2019-05-20 NOTE — PROGRESS NOTES
CC:   had concerns including Results. and Health Risk Assessment Exam  Labs showing new DM2    HPI:  Abiel is a 68 y.o. here for Health Risk Assessment and Results    New onset DM2  Onset/  Diabetes education:  ordered    Medications:   · Metformin: 750 mg daily  · Discussed about effects and side effects of medication.   · ACE/ARB: no  · Statin: atorvstatin  · ASA: yes  Compliant with medications: yes  Checking feet daily/wear soft socks/shoes: advised    Diabetes ABCDE TARGETS  · A1c, last:   6.7  · Blood Pressure, goal < 140/90: yes  · Cholesterol-Lipid Panel: Controlled  · Dysalbuminuria: Pending    Diet: Regular  Exercise: Insufficient  @BMI@: 30.43    DM complications:  · Peripheral neuropathy: No numbness or tingling in feet.  · Retinopathy:      Last eye exam: . No retinopathy.    · Nephropathy:     No  · CVS:     No CAD.  · GI:     No gastropathy.    FH of DM: mother, ended up with insulin    Blood pressure  Controlled, no medications    Dyslipidemia  Statin: Atorvastatin, 60 mg daily, taking as prescribed.   - No muscle weakness, cramps, nausea,abdominal discomfort.   Diet / exercise / BMI: as above.   FH: neg    PCP: Lynette Payne M.D.  Other Care Team Members: PCP  Patient Care Team:  Lynette Payne M.D. as PCP - General (Family Medicine)  Anthony Reid, PT as Physical Therapy (Physical Therapy)    Patient Active Problem List    Diagnosis Date Noted   • Aortic valve disorder 10/29/2018   • Gastroesophageal reflux disease 10/03/2018   • Primary insomnia 10/03/2018   • Seasonal allergic rhinitis 10/03/2018   • Dyslipidemia 10/03/2018   • Primary osteoarthritis involving multiple joints 10/03/2018   • Obesity (BMI 30.0-34.9) 10/03/2018   • Decreased hearing, right 10/03/2018     Current Outpatient Prescriptions   Medication Sig Dispense Refill   • diclofenac EC (VOLTAREN) 75 MG Tablet Delayed Response Take 75 mg by mouth 2 times a day.     • atorvastatin (LIPITOR) 40 MG Tab  Take 1.5 Tabs by mouth every day. 135 Tab 3   • zolpidem (AMBIEN) 10 MG Tab Take 1 Tab by mouth at bedtime as needed for Sleep for up to 90 days. 30 Tab 2   • acetaminophen (TYLENOL) 500 MG Tab Take 2 Tabs by mouth 3 times a day as needed (pain.  Do not exceed 3000 mg of total acetaminophen per day). 180 Tab 6   • pantoprazole (PROTONIX) 20 MG tablet Take 1 Tab by mouth every day. (Patient taking differently: Take 40 mg by mouth every day.) 90 Tab 3   • valACYclovir (VALTREX) 500 MG Tab Take 1 Tab by mouth every day. 90 Tab 3   • Cholecalciferol (VITAMIN D3) 2000 UNIT Cap Take  by mouth 2 Times a Day.     • Multiple Vitamins-Minerals (MULTIVITAMIN ADULT) Tab Take  by mouth.     • aspirin (ASA) 81 MG Chew Tab chewable tablet Take 81 mg by mouth every day.     • Pseudoephedrine HCl (SUDAFED PO) Take 10 mg by mouth as needed.     • PSYLLIUM PO Take  by mouth.     • furosemide (LASIX) 20 MG Tab      • montelukast (SINGULAIR) 10 MG Tab      • SODIUM FLUORIDE, DENTAL GEL, (DENTAGEL) 1.1 % Gel by dental route.     • fluocinonide (LIDEX) 0.05 % Cream Apply  to affected area(s) 2 times a day.     • fluorouracil (EFUDEX) 5 % cream Apply  to affected area(s) 2 times a day.       No current facility-administered medications for this visit.       Current supplements: as above.  Chronic narcotic pain medicines: no.  Allergies: Patient has no known allergies.  Current social contact/activities: family, friends    Screening:  Depression Screening  Little interest or pleasure in doing things?  0 - not at all  Feeling down, depressed , or hopeless? 0 - not at all  Patient Health Questionnaire Score: 0     Screening for Cognitive Impairment  Three Minute Recall (village, kitchen, baby) 3/3    Mo clock face with all 12 numbers and set the hands to show 10 past 10.  Yes    Cognitive concerns identified deferred for follow up unless specifically addressed in assessment and plan.    Fall Risk Assessment  Has the patient had two or more  falls in the last year or any fall with injury in the last year?       Safety Assessment  Throw rugs on floor.  No  Handrails on all stairs.  No  Good lighting in all hallways.  Yes  Difficulty hearing.  Yes  Patient counseled about all safety risks that were identified.    Functional Assessment ADLs    Are there any barriers preventing you from cooking for yourself or meeting nutritional needs?  No.    Are there any barriers preventing you from driving safely or obtaining transportation?  No.    Are there any barriers preventing you from using a telephone or calling for help?  No.    Are there any barriers preventing you from shopping?  No.    Are there any barriers preventing you from taking care of your own finances?  No.    Are there any barriers preventing you from managing your medications?  No.    Are there any barriers preventing you from showering, bathing or dressing yourself?  No.    Are you currently engaging in any exercise or physical activity?  Yes.     What is your perception of your health?  Good.    Health Maintenance Summary                IMM PNEUMOCOCCAL 65+ (ADULT) LOW/MEDIUM RISK SERIES Overdue 7/23/2013      Done 9/21/2011 Imm Admin: Pneumococcal polysaccharide vaccine (PPSV-23)    IMM ZOSTER VACCINES Overdue 8/15/2018      Done 6/20/2018 Imm Admin: Recombinant Zoster Vaccine (RZV) (Shingrix)    COLONOSCOPY Next Due 3/9/2026      Done 3/9/2016 REFERRAL TO GI FOR COLONOSCOPY    IMM DTaP/Tdap/Td Vaccine Next Due 11/26/2028      Done 11/26/2018 Imm Admin: Tdap Vaccine        Patient Care Team:  Lynette Payne M.D. as PCP - General (Family Medicine)  Anthony Reid PT as Physical Therapy (Physical Therapy)    Allergies: Patient has no known allergies.  Current Outpatient Prescriptions Ordered in [facility]   Medication Sig Dispense Refill   • diclofenac EC (VOLTAREN) 75 MG Tablet Delayed Response Take 75 mg by mouth 2 times a day.     • atorvastatin (LIPITOR) 40 MG Tab Take 1.5 Tabs by mouth  every day. 135 Tab 3   • zolpidem (AMBIEN) 10 MG Tab Take 1 Tab by mouth at bedtime as needed for Sleep for up to 90 days. 30 Tab 2   • acetaminophen (TYLENOL) 500 MG Tab Take 2 Tabs by mouth 3 times a day as needed (pain.  Do not exceed 3000 mg of total acetaminophen per day). 180 Tab 6   • pantoprazole (PROTONIX) 20 MG tablet Take 1 Tab by mouth every day. (Patient taking differently: Take 40 mg by mouth every day.) 90 Tab 3   • valACYclovir (VALTREX) 500 MG Tab Take 1 Tab by mouth every day. 90 Tab 3   • Cholecalciferol (VITAMIN D3) 2000 UNIT Cap Take  by mouth 2 Times a Day.     • Multiple Vitamins-Minerals (MULTIVITAMIN ADULT) Tab Take  by mouth.     • aspirin (ASA) 81 MG Chew Tab chewable tablet Take 81 mg by mouth every day.     • Pseudoephedrine HCl (SUDAFED PO) Take 10 mg by mouth as needed.     • PSYLLIUM PO Take  by mouth.     • furosemide (LASIX) 20 MG Tab      • montelukast (SINGULAIR) 10 MG Tab      • SODIUM FLUORIDE, DENTAL GEL, (DENTAGEL) 1.1 % Gel by dental route.     • fluocinonide (LIDEX) 0.05 % Cream Apply  to affected area(s) 2 times a day.     • fluorouracil (EFUDEX) 5 % cream Apply  to affected area(s) 2 times a day.       No current [facility]-ordered facility-administered medications on file.      Past Medical History:   Diagnosis Date   • Dyslipidemia    • GERD (gastroesophageal reflux disease)    • Insomnia      Past Surgical History:   Procedure Laterality Date   • APPENDECTOMY     • TONSILLECTOMY       Social History   Substance Use Topics   • Smoking status: Never Smoker   • Smokeless tobacco: Never Used   • Alcohol use Yes     Family History   Problem Relation Age of Onset   • Cancer Mother    • Diabetes Mother    • Heart Disease Father    • Hyperlipidemia Neg Hx      ROS:    Ostomy or other tubes or amputations: no  Chronic oxygen use no  Last eye exam 2018  : Denies incontinence.  Gait: Uses no assistive device   Hearing good.    Dentition fair  Constitutional: Negative for fever,  "chills/sweats   Respiratory: Negative for shortness of breath, TURNER  Cardiovascular: Negative for chest pain or pressure  GI/: Negative for diarrhea/constipation / urinary difficulty  All other systems reviewed and are negative.     Exam: /74 (BP Location: Left arm, Patient Position: Sitting, BP Cuff Size: Adult)   Pulse 70   Temp 36.9 °C (98.4 °F) (Temporal)   Ht 1.88 m (6' 2\")   Wt 107.5 kg (236 lb 15.9 oz)   SpO2 96%  Body mass index is 30.43 kg/m².  Alert, oriented in no acute distress.  Eye contact is good, speech goal directed, affect calm  HEENT: conjunctiva non-injected, sclera non-icteric.  Pinna normal. No concerning lesions.   Oral mucous membranes pink and moist with no lesions.  Neck supple with no cervical lymphadenopathy  Lungs: clear to auscultation bilaterally with good excursion.  CV: regular rate and rhythm.  Abdomen No CVAT  Ext: no edema.     Labs  Lab Results   Component Value Date/Time    CHOLSTRLTOT 133 05/14/2019 11:59 AM    LDL 80 05/14/2019 11:59 AM    HDL 31 (A) 05/14/2019 11:59 AM    TRIGLYCERIDE 109 05/14/2019 11:59 AM       Lab Results   Component Value Date/Time    SODIUM 138 05/14/2019 11:59 AM    POTASSIUM 4.0 05/14/2019 11:59 AM    CHLORIDE 106 05/14/2019 11:59 AM    CO2 26 05/14/2019 11:59 AM    GLUCOSE 127 (H) 05/14/2019 11:59 AM    BUN 15 05/14/2019 11:59 AM    CREATININE 0.87 05/14/2019 11:59 AM     Lab Results   Component Value Date/Time    ALKPHOSPHAT 71 05/14/2019 11:59 AM    ASTSGOT 26 05/14/2019 11:59 AM    ALTSGPT 25 05/14/2019 11:59 AM    TBILIRUBIN 0.8 05/14/2019 11:59 AM      Lab Results   Component Value Date/Time    HBA1C 6.7 (H) 05/14/2019 11:59 AM    HBA1C 6.4 (H) 02/18/2019 10:00 AM     No results found for: TSH  No results found for: FREET4  Lab Results   Component Value Date/Time    WBC 7.6 11/15/2018 12:54 PM    RBC 4.62 (L) 11/15/2018 12:54 PM    HEMOGLOBIN 14.1 11/15/2018 12:54 PM    HEMATOCRIT 42.1 11/15/2018 12:54 PM    MCV 91.1 11/15/2018 12:54 " PM    MCH 30.5 11/15/2018 12:54 PM    MCHC 33.5 (L) 11/15/2018 12:54 PM    MPV 11.6 11/15/2018 12:54 PM    NEUTSPOLYS 58.70 11/15/2018 12:54 PM    LYMPHOCYTES 30.50 11/15/2018 12:54 PM    MONOCYTES 7.90 11/15/2018 12:54 PM    EOSINOPHILS 1.70 11/15/2018 12:54 PM    BASOPHILS 0.90 11/15/2018 12:54 PM      Assessment and Plan    1. Medicare annual wellness visit, initial  - Initial Annual Wellness Visit - Includes PPPS ()    2. Health care maintenance  - Initial Annual Wellness Visit - Includes PPPS ()    3. New onset type 2 diabetes mellitus (HCC)  Start Metformin, 750 mg once daily    - Discussed about effects and side effects of medication.     Discussed about:    - importance of appropriate diet, exercise, WT control.   - hypoglycemic symptoms and precautions.    - long-term complications of uncontrolled DM, (increased risk of CAD, strokes, retinopathy, nephropathy, /can lead to kidney failure, dialysis/, peripheral neuropathy, (can lead to amputation)   - Good DM control can prevent / delay many of these complications.    - Recommended appropriate foot care   - Initial Annual Wellness Visit - Includes PPPS ()  - REFERRAL TO DIABETIC EDUCATION Diabetes Self Management Education / Training (DSME/T) and Medical Nutrition Therapy (MNT): Initial Group DSME/MNT as authorized by payor, Follow-Up DSME/MNT as authorized by payor; DSME/T Content: Monitoring Diabete...  - REFERRAL TO NUTRITION SERVICES  - metFORMIN ER (GLUCOPHAGE XR) 750 MG TABLET SR 24 HR; Take 1 Tab by mouth every day.  Dispense: 90 Tab; Refill: 1  - Comp Metabolic Panel; Future  - HEMOGLOBIN A1C; Future  - MICROALBUMIN CREAT RATIO URINE; Future  - Lipid Profile; Future    4. Dyslipidemia  Controlled, continue current treatment  - Initial Annual Wellness Visit - Includes PPPS ()  - Comp Metabolic Panel; Future  - Lipid Profile; Future    5. Aortic valve disorder  Stable, asymptomatic, continue cardiology follow-up  - Initial Annual  Wellness Visit - Includes PPPS ()    6. Gastroesophageal reflux disease, esophagitis presence not specified  Controlled, continue current treatment, Protonix 40 mg daily recommended by GI  - Initial Annual Wellness Visit - Includes PPPS ()    7. Primary insomnia  Controlled, continue Ambien as needed  - Initial Annual Wellness Visit - Includes PPPS ()    8. Seasonal allergic rhinitis, unspecified trigger  No current symptoms, continue Singulair, OTC antihistamine as needed  - Initial Annual Wellness Visit - Includes PPPS ()    9. Primary osteoarthritis involving multiple joints  - Initial Annual Wellness Visit - Includes PPPS ()  10. Chronic lumbosacral pain  - Initial Annual Wellness Visit - Includes PPPS ()  Improved with local injections, continue current treatment and pain management follow-up    11. Decreased hearing, right  Advised right hearing aid  - Initial Annual Wellness Visit - Includes PPPS ()    12. Obesity (BMI 30.0-34.9)  Appropriate counseling given  - Initial Annual Wellness Visit - Includes PPPS ()    Health Care Screening recommendations reviewed with patient today:    - Depression: no issues   - ADL/IADL: independent   - Feeding/nutrition: advised healthy diet.    - Hearing: as above    Referrals for PT/OT/Nutrition counseling/Behavioral Health/Smoking cessation as per orders.  Discussion today about general wellness and lifestyle habits:    · Prevent falls and reduce trip hazards;   · Have a working fire alarm and carbon monoxide detector;   · Engage in regular physical activity and social activities.    Next office visit is due in 3 months with labs

## 2019-05-20 NOTE — OP THERAPY DAILY TREATMENT
Outpatient Physical Therapy  DAILY TREATMENT     [facility] Outpatient Physical Therapy  [address]  [place] [address]  Phone:  [phone]  Fax:  [phone]    Date: 05/20/2019    Patient: Abiel Vaughn  YOB: 1950  MRN: [id number]     Time Calculation  Start time: 0330  Stop time: 0400 Time Calculation (min): 30 minutes     Chief Complaint: No chief complaint on file.    Visit #: 3    SUBJECTIVE:  Has not noticed decrease in back pain so far    OBJECTIVE:  Current objective measures:     Pelvic alignment looks good  Side bend R     Exercises/Treatment  Time-based treatments/modalities:   Bi lat knee ext mobs  Hip mobs bi lat  STM paraspinals   stabilisation exc       ASSESSMENT:   Response to treatment:   flexability is slowly improving  PLAN/RECOMMENDATIONS:   Plan for treatment: therapy treatment to continue next visit.  Planned interventions for next visit: continue with current treatment.

## 2019-05-22 ENCOUNTER — PHYSICAL THERAPY (OUTPATIENT)
Dept: PHYSICAL THERAPY | Facility: MEDICAL CENTER | Age: 69
End: 2019-05-22
Attending: PHYSICAL MEDICINE & REHABILITATION
Payer: MEDICARE

## 2019-05-22 DIAGNOSIS — M54.16 LUMBAR RADICULOPATHY: ICD-10-CM

## 2019-05-22 PROCEDURE — 97140 MANUAL THERAPY 1/> REGIONS: CPT

## 2019-05-22 NOTE — OP THERAPY DAILY TREATMENT
Outpatient Physical Therapy  DAILY TREATMENT     [facility] Outpatient Physical Therapy  [address]  [place] [address]  Phone:  [phone]  Fax:  [phone]    Date: 05/22/2019    Patient: Abiel Vaughn  YOB: 1950  MRN: [id number]     Time Calculation  Start time: 0330  Stop time: 0400 Time Calculation (min): 30 minutes     Chief Complaint: No chief complaint on file.    Visit #: 4    SUBJECTIVE:  Mod soreness following last treatment    OBJECTIVE:  Current objective measures:         Exercises/Treatment  Time-based treatments/modalities:   Ext mobs bi lat knees  Hip mobs ext/flexion  Bi lat psoas stretch  Sacral release   mechanical traction 100 lbs /80 with heat x 15 mins      ASSESSMENT:   Response to treatment:   improvemnet in knee and hip ROM  PLAN/RECOMMENDATIONS:   Plan for treatment: therapy treatment to continue next visit.  Planned interventions for next visit: continue with current treatment.

## 2019-05-29 ENCOUNTER — PHYSICAL THERAPY (OUTPATIENT)
Dept: PHYSICAL THERAPY | Facility: MEDICAL CENTER | Age: 69
End: 2019-05-29
Attending: PHYSICAL MEDICINE & REHABILITATION
Payer: MEDICARE

## 2019-05-29 ENCOUNTER — NON-PROVIDER VISIT (OUTPATIENT)
Dept: HEALTH INFORMATION MANAGEMENT | Facility: MEDICAL CENTER | Age: 69
End: 2019-05-29
Payer: MEDICARE

## 2019-05-29 DIAGNOSIS — E11.9 NEW ONSET TYPE 2 DIABETES MELLITUS (HCC): ICD-10-CM

## 2019-05-29 DIAGNOSIS — M54.16 LUMBAR RADICULOPATHY: ICD-10-CM

## 2019-05-29 PROCEDURE — 97802 MEDICAL NUTRITION INDIV IN: CPT | Performed by: DIETITIAN, REGISTERED

## 2019-05-29 PROCEDURE — 97012 MECHANICAL TRACTION THERAPY: CPT

## 2019-05-29 NOTE — OP THERAPY DAILY TREATMENT
Outpatient Physical Therapy  DAILY TREATMENT     [facility] Outpatient Physical Therapy  [address]  [place] [address]  Phone:  [phone]  Fax:  [phone]    Date: 05/29/2019    Patient: Abiel Vaughn  YOB: 1950  MRN: [id number]     Time Calculation  Start time: 0330  Stop time: 0415 Time Calculation (min): 45 minutes     Chief Complaint: No chief complaint on file.    Visit #: 5    SUBJECTIVE:  Good and bad days does feel looser    OBJECTIVE:  Current objective measures:         Exercises/Treatment  Time-based treatments/modalities:   Bi lat knee ext mobs  Bi lat hip mobs  Quads and gluteal stretch  P/A L 1-5  Mechanical traction 120 lbs/80 with heat x 15 mins       ASSESSMENT:   Response to treatment:   Improved knee and hip ROM bi lat    PLAN/RECOMMENDATIONS:   Plan for treatment: therapy treatment to continue next visit.  Planned interventions for next visit: continue with current treatment.

## 2019-05-29 NOTE — PROGRESS NOTES
"5/29/2019    Lynette Payne M.D.  68 y.o.   Time in/out: 2:35-3:25 pm    Anthropometrics/Objective  There were no vitals filed for this visit.    There is no height or weight on file to calculate BMI.    Stated Goal Weight: 210 lbs  Estimated Caloric needs 1900 Kcals based on MSJ no activity factor using stated weight and height of 6'2\"   See comprehensive patient history form for further information     Subjective:  - recently diagnosed with T2DM, started on Metformin  - actively trying to lose weight to help as he has heard that this can improve blood sugar control  - eating low fat  - roommate is vegetarian, sometimes eats with her   - difficulty with avoiding snacking, especially after dinner in the evenings and feeling hungry at this time   - cut out juice, having tea with Splenda and water    Diet Recall:  B - cereal (Life, raisin bran, or wheaties) + soy milk, raisin box, + 1 banana   S - peanuts, oatmeal cookies  L - salad with tomatoes , 1 slice cheese, 1-2 slices deli meat   S - 1-2 cookies, peanuts   D - 1 can Cambell's soup (beef or chicken veg) or TVP with spaghetti & sauce   S - 1 bowl of cereal with skim milk, \"light\" ice cream       Nutrition Diagnosis (PES Statement)  · Altered nutrition related lab values related to endocrine dysfunction as evidenced by HgbA1c of 6.7%    Client history:  Condition(s) associated with a diagnosis or treatment (specify) T2DM, dyslipidemia     Biochemical data, medical test and procedures  Lab Results   Component Value Date/Time    HBA1C 6.7 (H) 05/14/2019 11:59 AM   @  No results found for: POCGLUCOSE  Lab Results   Component Value Date/Time    CHOLSTRLTOT 133 05/14/2019 11:59 AM    LDL 80 05/14/2019 11:59 AM    HDL 31 (A) 05/14/2019 11:59 AM    TRIGLYCERIDE 109 05/14/2019 11:59 AM         Nutrition Intervention  Nutrition Prescription  Carb choices/grams: 45-60g per meal, </= 15 grams at snacks      Meal and Snack  Recommend a general/healthful diet, carb " controlled     Comprehensive Nutrition education Instruction or training leading to in-depth nutrition related knowledge about:  Combine carb, protein and fat at each meal, Meal timing and spacing, Menu Planning, Metabolism of carb, protein, fat, Physical activity/exercise, Portion control, Sweets and alcohol in moderation, Heart-healthy guidelines, Label Reading, Handouts provided regarding topics discussed and Theraputic diet for T2DM   - Nutrition Basics   - Plate Planner   - Carb exchange list     Monitoring & Evaluation Plan    Behavioral-Environmental:  Behavior: consistent carb intake   Physical activity: as tolerated, suggested after meals for better BS control    Food / Nutrient Intake:  Food intake: Follow the plate method for meal balance and portion control   Macronutrients intake: 45-60g per meal </=15g snacks     Physical Signs / Symptoms:  HbA1c profiles within ADA guidelines and Weight change 0.5-2 lbs a week to goal     Assessment Notes:  Discussed with the pt that weight loss may help with blood sugar control, but not the end all be all for improving diabetes. Encouraged weight loss, but also improvement in his food choices which we discussed in detail and exercise as medicine for controlling DM (as tolerated). Also informed pt that there are other factors such as genetics and aging that may affect and individuals risk of developing diabetes, but best to focus on those factors he can control. Began with instruction of nutrition basics and taught Abiel the differences between CHO/protein/fat and their effects on BG’s. This information was related to the plate method to help with meal planning/portions at meals.  He was also taught to use his hands as measuring tools (fist for starch, palm for protein) to help when eating out or on a large plate. Non-starchy vegetables were emphasized as foods that will help satisfy without raising BG’s at meals and snacks. If having a CHO based snack to limit <15  grams and include protein. Showed Abiel how to read the food label to identify CHO content and limit to 45-60g per meal.     Abiel's meals lack balance. He realized that he was cutting out all fat from his diet to lose weight, but would benefit from adding some in as fat with CHO can help better control his blood sugars and keep him feeling full longer. Recommended increasing NS veggies, including appropriate portions of heart healthy fats, and lean protein sources for better rounded meals. This should also help him to feel more satisfied and avoid excess hunger/snacking at night. For breakfast he is going to limit oatmeal to 1 cup and make swaps to utilize almond milk and berries instead of bananas and add protein. He will go home and read the label on his cereal as well. Reviewed other meal swaps he can make such as 1 cup pasta, add turkey meatballs and fill 1/2 his plate with NS veggies. Will review education discussed today and see how he is doing with the recommended changes.     Follow up: 1 month

## 2019-06-03 ENCOUNTER — PHYSICAL THERAPY (OUTPATIENT)
Dept: PHYSICAL THERAPY | Facility: MEDICAL CENTER | Age: 69
End: 2019-06-03
Attending: PHYSICAL MEDICINE & REHABILITATION
Payer: MEDICARE

## 2019-06-03 DIAGNOSIS — M54.16 LUMBAR RADICULOPATHY: ICD-10-CM

## 2019-06-03 PROCEDURE — 97012 MECHANICAL TRACTION THERAPY: CPT

## 2019-06-03 NOTE — OP THERAPY DAILY TREATMENT
Outpatient Physical Therapy  DAILY TREATMENT     [facility] Outpatient Physical Therapy  [address]  [place] [address]  Phone:  [phone]  Fax:  [phone]    Date: 06/03/2019    Patient: Abiel Vaughn  YOB: 1950  MRN: [id number]     Time Calculation  Start time: 0330  Stop time: 0400 Time Calculation (min): 30 minutes     Chief Complaint: No chief complaint on file.    Visit #: 6    SUBJECTIVE:  On and off pain lumbar    OBJECTIVE:  Current objective measures:     much improved knee ext    Exercises/Treatment  Time-based treatments/modalities:   Knee and hip mobs bi lat  Stretches quads,psoas  STM paraspinals       ASSESSMENT:   Response to treatment:   Knee and hip range improving  PLAN/RECOMMENDATIONS:   Plan for treatment: therapy treatment to continue next visit.  Planned interventions for next visit: continue with current treatment.

## 2019-06-05 ENCOUNTER — PHYSICAL THERAPY (OUTPATIENT)
Dept: PHYSICAL THERAPY | Facility: MEDICAL CENTER | Age: 69
End: 2019-06-05
Attending: PHYSICAL MEDICINE & REHABILITATION
Payer: MEDICARE

## 2019-06-05 DIAGNOSIS — M54.16 LUMBAR RADICULOPATHY: ICD-10-CM

## 2019-06-05 PROCEDURE — 97140 MANUAL THERAPY 1/> REGIONS: CPT

## 2019-06-05 NOTE — OP THERAPY DAILY TREATMENT
Outpatient Physical Therapy  DAILY TREATMENT     [facility] Outpatient Physical Therapy  [address]  [place] [address]  Phone:  [phone]  Fax:  [phone]    Date: 06/05/2019    Patient: Abiel Vaughn  YOB: 1950  MRN: [id number]     Time Calculation  Start time: 0330  Stop time: 0400 Time Calculation (min): 30 minutes     Chief Complaint: No chief complaint on file.    Visit #: 7    SUBJECTIVE:  On and off pain feels looser    OBJECTIVE:  Current objective measures:      Exercises/Treatment  Time-based treatments/modalities:        Joint mobs bi lat knees  Joint mobs bi lat hips  Stabilisation exc  Psoas stretch  Gluteal stretch      ASSESSMENT:   Response to treatment:   Knee and hip range slowly improving Pt to try small heal raise R f  foot  PLAN/RECOMMENDATIONS:   Plan for treatment: therapy treatment to continue next visit.  Planned interventions for next visit: continue with current treatment.

## 2019-06-12 ENCOUNTER — PHYSICAL THERAPY (OUTPATIENT)
Dept: PHYSICAL THERAPY | Facility: MEDICAL CENTER | Age: 69
End: 2019-06-12
Attending: PHYSICAL MEDICINE & REHABILITATION
Payer: MEDICARE

## 2019-06-12 DIAGNOSIS — M54.16 LUMBAR RADICULOPATHY: ICD-10-CM

## 2019-06-12 PROCEDURE — 97140 MANUAL THERAPY 1/> REGIONS: CPT

## 2019-06-12 NOTE — OP THERAPY DAILY TREATMENT
Outpatient Physical Therapy  DAILY TREATMENT     [facility] Outpatient Physical Therapy  [address]  [place] [address]  Phone:  [phone]  Fax:  [phone]    Date: 06/12/2019    Patient: Abiel Vaughn  YOB: 1950  MRN: [id number]     Time Calculation  Start time: 0400  Stop time: 0430 Time Calculation (min): 30 minutes     Chief Complaint: No chief complaint on file.    Visit #: 8    SUBJECTIVE:  No radicular pain,back feels ache    OBJECTIVE:  Current objective measures:     alignment looks better with small heal raise on R    Exercises/Treatment  Time-based treatments/modalities:   Bi lat knee and hip mobs  stabilisation exc  STM bi lat paraspinals with tool   Quads and psoas stretch      ASSESSMENT:   Response to treatment:   Improved alignment    PLAN/RECOMMENDATIONS:   Plan for treatment: therapy treatment to continue next visit.  Planned interventions for next visit: continue with current treatment.

## 2019-06-26 ENCOUNTER — PHYSICAL THERAPY (OUTPATIENT)
Dept: PHYSICAL THERAPY | Facility: MEDICAL CENTER | Age: 69
End: 2019-06-26
Attending: PHYSICAL MEDICINE & REHABILITATION
Payer: MEDICARE

## 2019-06-26 DIAGNOSIS — M54.16 LUMBAR RADICULOPATHY: ICD-10-CM

## 2019-06-26 PROCEDURE — 97140 MANUAL THERAPY 1/> REGIONS: CPT

## 2019-06-26 NOTE — OP THERAPY DAILY TREATMENT
Outpatient Physical Therapy  DAILY TREATMENT     [facility] Outpatient Physical Therapy  [address]  [place] [address]  Phone:  [phone]  Fax:  [phone]    Date: 06/26/2019    Patient: Abiel Vaughn  YOB: 1950  MRN: [id number]     Time Calculation  Start time: 0330  Stop time: 0400 Time Calculation (min): 30 minutes     Chief Complaint: No chief complaint on file.    Visit #: 9    SUBJECTIVE:  Good and bad days but overall better    OBJECTIVE:  Current objective measures:     Pt wearing heal lift R pelvis is better aligned    Exercises/Treatment  Time-based treatments/modalities:   Bi lat knee and hip mobs all directions  Psoas stretch L  STM paraspinals  Lumbar rotation mobs ll      ASSESSMENT:   Response to treatment:   LE movement slowly improving  PLAN/RECOMMENDATIONS:   Plan for treatment: therapy treatment to continue next visit.  Planned interventions for next visit: continue with current treatment.

## 2019-06-28 ENCOUNTER — PATIENT MESSAGE (OUTPATIENT)
Dept: MEDICAL GROUP | Facility: MEDICAL CENTER | Age: 69
End: 2019-06-28

## 2019-06-28 DIAGNOSIS — F51.01 PRIMARY INSOMNIA: ICD-10-CM

## 2019-06-28 RX ORDER — ZOLPIDEM TARTRATE 10 MG/1
10 TABLET ORAL NIGHTLY PRN
Qty: 30 TAB | Refills: 1 | Status: SHIPPED | OUTPATIENT
Start: 2019-06-28 | End: 2019-08-26 | Stop reason: SDUPTHER

## 2019-06-28 NOTE — PATIENT COMMUNICATION
Was the patient seen in the last year in this department? Yes    Does patient have an active prescription for medications requested? No     Received Request Via: Patient       Please advise,     Patient was seen last month in clinic.     Thank you    Cinda Boyer  Medical Assistant

## 2019-07-01 NOTE — OP THERAPY PROGRESS SUMMARY
Outpatient Physical Therapy  PROGRESS SUMMARY NOTE      [facility] Outpatient Physical Therapy  [address]  [place] [address]  Phone:  [phone]  Fax:  [phone]    Date of Visit: 06/26/2019    Patient: Abiel Vaughn  YOB: 1950  MRN: [id number]     Referring Provider: Julito Dacosta M.D.  [address]  [address]  [place] [address]   Referring Diagnosis Radiculopathy, lumbar region [M54.16];Lumbago with sciatica, left side [M54.42];Lumbago with sciatica, right side [M54.41];Other chronic pain [G89.29];Bilateral primary osteoarthritis of hip [M16.0]     Visit Diagnoses     ICD-10-CM   1. Lumbar radiculopathy M54.16       Rehab Potential: good    Physical Therapy Occurrence Codes    Date of onset of impairment:  5/13/18   Date physical therapy care plan established or reviewed:  5/13/19   Date physical therapy treatment started:  5/13/19          Cert Period: 6/30/19 to 8/14/19  Progress Report Period:     Functional Limitations and Severity Modifiers      Current status:     Goal status:             Plan:   Frequency:  1x week  Duration in weeks:  6  Duration in visits:  6    Abiel is making good progress He reports some decrease in lower back pain  He has significant improvement in bi lateral knee and hip ROM  Soft tissue mobility in paraspinals also improving  His standing posture is also showing improvement  Continue PT to further improve LE ROM and mobility and to improve lumbar stabilisation    Referring provider co-signature:  I have reviewed this plan of care and my co-signature certifies the need for services.    Physician Signature: ________________________________ Date: ______________

## 2019-07-03 ENCOUNTER — PHYSICAL THERAPY (OUTPATIENT)
Dept: PHYSICAL THERAPY | Facility: MEDICAL CENTER | Age: 69
End: 2019-07-03
Attending: PHYSICAL MEDICINE & REHABILITATION
Payer: MEDICARE

## 2019-07-03 DIAGNOSIS — M54.16 LUMBAR RADICULOPATHY: ICD-10-CM

## 2019-07-03 PROCEDURE — 97140 MANUAL THERAPY 1/> REGIONS: CPT

## 2019-07-03 NOTE — OP THERAPY DAILY TREATMENT
Outpatient Physical Therapy  DAILY TREATMENT     [facility] Outpatient Physical Therapy  [address]  [place] [address]  Phone:  [phone]  Fax:  [phone]    Date: 07/03/2019    Patient: Abiel Vaughn  YOB: 1950  MRN: [id number]     Time Calculation  Start time: 0330  Stop time: 0400 Time Calculation (min): 30 minutes     Chief Complaint: No chief complaint on file.    Visit #: 10    SUBJECTIVE:  Overall better less pain and improved mobility    OBJECTIVE:  Current objective measures:         Exercises/Treatment  Time-based treatments/modalities:   Bi lat hip and knee mobs  Correction of R sideways tilt  stabilisation exc  streches gluteals, psoas    lumbar rotation mobs  Instructed wife in stretches    ASSESSMENT:   Response to treatment:   Improved standing posture   PLAN/RECOMMENDATIONS:   Plan for treatment: therapy treatment to continue next visit.  Planned interventions for next visit: continue with current treatment.

## 2019-07-18 ENCOUNTER — OFFICE VISIT (OUTPATIENT)
Dept: PHYSICAL MEDICINE AND REHAB | Facility: MEDICAL CENTER | Age: 69
End: 2019-07-18
Payer: MEDICARE

## 2019-07-18 VITALS
HEIGHT: 74 IN | HEART RATE: 82 BPM | DIASTOLIC BLOOD PRESSURE: 58 MMHG | SYSTOLIC BLOOD PRESSURE: 102 MMHG | OXYGEN SATURATION: 95 % | BODY MASS INDEX: 29.96 KG/M2 | WEIGHT: 233.47 LBS

## 2019-07-18 DIAGNOSIS — R20.0 RIGHT LEG NUMBNESS: ICD-10-CM

## 2019-07-18 DIAGNOSIS — M16.0 BILATERAL HIP JOINT ARTHRITIS: ICD-10-CM

## 2019-07-18 DIAGNOSIS — M25.852 FEMOROACETABULAR IMPINGEMENT OF BOTH HIPS: ICD-10-CM

## 2019-07-18 DIAGNOSIS — M54.16 LUMBAR RADICULOPATHY: ICD-10-CM

## 2019-07-18 DIAGNOSIS — R10.32 BILATERAL GROIN PAIN: ICD-10-CM

## 2019-07-18 DIAGNOSIS — M54.42 CHRONIC BILATERAL LOW BACK PAIN WITH BILATERAL SCIATICA: ICD-10-CM

## 2019-07-18 DIAGNOSIS — M47.816 LUMBAR SPONDYLOSIS: ICD-10-CM

## 2019-07-18 DIAGNOSIS — R10.31 BILATERAL GROIN PAIN: ICD-10-CM

## 2019-07-18 DIAGNOSIS — M54.41 CHRONIC BILATERAL LOW BACK PAIN WITH BILATERAL SCIATICA: ICD-10-CM

## 2019-07-18 DIAGNOSIS — G89.29 CHRONIC BILATERAL LOW BACK PAIN WITH BILATERAL SCIATICA: ICD-10-CM

## 2019-07-18 DIAGNOSIS — M25.851 FEMOROACETABULAR IMPINGEMENT OF BOTH HIPS: ICD-10-CM

## 2019-07-18 PROCEDURE — 99214 OFFICE O/P EST MOD 30 MIN: CPT | Performed by: PHYSICAL MEDICINE & REHABILITATION

## 2019-07-18 ASSESSMENT — PAIN SCALES - GENERAL: PAINLEVEL: 4=SLIGHT-MODERATE PAIN

## 2019-07-18 ASSESSMENT — PATIENT HEALTH QUESTIONNAIRE - PHQ9: CLINICAL INTERPRETATION OF PHQ2 SCORE: 0

## 2019-07-18 NOTE — PROGRESS NOTES
Follow up patient note  Interventional spine and sports physiatry, Physical medicine rehabilitation      Chief complaint:   Chief Complaint   Patient presents with   • Follow-Up     Back pain          HISTORY    Please see new patient note dated  by Dr Dacosta,  for more details.     HPI  Patient identification: Abiel Vaughn 68 y.o. male  With Diagnoses of Lumbar radiculopathy, Chronic bilateral low back pain with bilateral sciatica, Right leg numbness, Bilateral groin pain, Femoroacetabular impingement of both hips, Bilateral hip joint arthritis Right worse than left, and Lumbar spondylosis were pertinent to this visit.     I reviewed several notes from the PT Anthony Reid. Patient has an exercise program and progressed in his stretching program throughout his PT.     Low back pain has resolved. He does get intermittent bilateral leg pains which are mild in intensity and not interrupting exercise, radiating down the legs. Not associated with his previous back pain.   Now the patient is doing his home exercise program from PT and he is doing this daily. This includes log rolls, hamstring stretching, quad stretching, strengthening exercises for the hips, core and legs. He is also working on his posture which is overall helping with his pain.          ROS Red Flags :   -Fever, Chills, Sweats: Denies  Involuntary Weight Loss: Denies  Bowel/Bladder Incontinence: Denies  Saddle Anesthesia: Denies        PMHx:   Past Medical History:   Diagnosis Date   • Dyslipidemia    • GERD (gastroesophageal reflux disease)    • Insomnia        PSHx:   Past Surgical History:   Procedure Laterality Date   • APPENDECTOMY     • TONSILLECTOMY         Family history   Family History   Problem Relation Age of Onset   • Cancer Mother    • Diabetes Mother    • Heart Disease Father    • Hyperlipidemia Neg Hx          Medications:   Outpatient Prescriptions Marked as Taking for the 7/18/19 encounter (Office Visit) with Julito Dacosta,  M.D.   Medication Sig Dispense Refill   • zolpidem (AMBIEN) 10 MG Tab Take 1 Tab by mouth at bedtime as needed for Sleep for up to 90 days. 30 Tab 1   • metFORMIN ER (GLUCOPHAGE XR) 750 MG TABLET SR 24 HR Take 1 Tab by mouth every day. 90 Tab 1   • diclofenac EC (VOLTAREN) 75 MG Tablet Delayed Response Take 1 Tab by mouth 2 times a day. 180 Tab 0   • atorvastatin (LIPITOR) 40 MG Tab Take 1.5 Tabs by mouth every day. 135 Tab 3   • pantoprazole (PROTONIX) 20 MG tablet Take 1 Tab by mouth every day. (Patient taking differently: Take 40 mg by mouth every day.) 90 Tab 3   • valACYclovir (VALTREX) 500 MG Tab Take 1 Tab by mouth every day. 90 Tab 3   • Pseudoephedrine HCl (SUDAFED PO) Take 10 mg by mouth as needed.     • PSYLLIUM PO Take  by mouth.     • Cholecalciferol (VITAMIN D3) 2000 UNIT Cap Take  by mouth 2 Times a Day.     • Multiple Vitamins-Minerals (MULTIVITAMIN ADULT) Tab Take  by mouth.     • furosemide (LASIX) 20 MG Tab      • montelukast (SINGULAIR) 10 MG Tab      • aspirin (ASA) 81 MG Chew Tab chewable tablet Take 81 mg by mouth every day.     • SODIUM FLUORIDE, DENTAL GEL, (DENTAGEL) 1.1 % Gel by dental route.     • fluocinonide (LIDEX) 0.05 % Cream Apply  to affected area(s) 2 times a day.     • fluorouracil (EFUDEX) 5 % cream Apply  to affected area(s) 2 times a day.          Current Outpatient Prescriptions on File Prior to Visit   Medication Sig Dispense Refill   • zolpidem (AMBIEN) 10 MG Tab Take 1 Tab by mouth at bedtime as needed for Sleep for up to 90 days. 30 Tab 1   • metFORMIN ER (GLUCOPHAGE XR) 750 MG TABLET SR 24 HR Take 1 Tab by mouth every day. 90 Tab 1   • diclofenac EC (VOLTAREN) 75 MG Tablet Delayed Response Take 1 Tab by mouth 2 times a day. 180 Tab 0   • atorvastatin (LIPITOR) 40 MG Tab Take 1.5 Tabs by mouth every day. 135 Tab 3   • pantoprazole (PROTONIX) 20 MG tablet Take 1 Tab by mouth every day. (Patient taking differently: Take 40 mg by mouth every day.) 90 Tab 3   • valACYclovir  "(VALTREX) 500 MG Tab Take 1 Tab by mouth every day. 90 Tab 3   • Pseudoephedrine HCl (SUDAFED PO) Take 10 mg by mouth as needed.     • PSYLLIUM PO Take  by mouth.     • Cholecalciferol (VITAMIN D3) 2000 UNIT Cap Take  by mouth 2 Times a Day.     • Multiple Vitamins-Minerals (MULTIVITAMIN ADULT) Tab Take  by mouth.     • furosemide (LASIX) 20 MG Tab      • montelukast (SINGULAIR) 10 MG Tab      • aspirin (ASA) 81 MG Chew Tab chewable tablet Take 81 mg by mouth every day.     • SODIUM FLUORIDE, DENTAL GEL, (DENTAGEL) 1.1 % Gel by dental route.     • fluocinonide (LIDEX) 0.05 % Cream Apply  to affected area(s) 2 times a day.     • fluorouracil (EFUDEX) 5 % cream Apply  to affected area(s) 2 times a day.     • acetaminophen (TYLENOL) 500 MG Tab Take 2 Tabs by mouth 3 times a day as needed (pain.  Do not exceed 3000 mg of total acetaminophen per day). 180 Tab 6     No current facility-administered medications on file prior to visit.          Allergies:   No Known Allergies    Social Hx:   Social History     Social History   • Marital status: Single     Spouse name: N/A   • Number of children: N/A   • Years of education: N/A     Occupational History   • Not on file.     Social History Main Topics   • Smoking status: Never Smoker   • Smokeless tobacco: Never Used   • Alcohol use Yes   • Drug use: No   • Sexual activity: Not on file     Other Topics Concern   •  Service Yes   • Blood Transfusions No   • Caffeine Concern No   • Occupational Exposure No   • Hobby Hazards No   • Sleep Concern Yes   • Stress Concern No   • Weight Concern Yes   • Special Diet No   • Back Care No   • Exercise Yes   • Bike Helmet No   • Seat Belt Yes   • Self-Exams Yes     Social History Narrative   • No narrative on file         EXAMINATION     Physical Exam:   Vitals: /58 (BP Location: Right arm, Patient Position: Sitting, BP Cuff Size: Adult)   Pulse 82   Ht 1.88 m (6' 2\")   Wt 105.9 kg (233 lb 7.5 oz)   SpO2 95% "     Constitutional:   Body Habitus: Body mass index is 29.98 kg/m².  Cooperation: Fully cooperates with exam  Appearance: Well-groomed no disheveled    Respiratory-  breathing comfortable on room air, no audible wheezing  Cardiovascular- capillary refills less than 2 seconds. No lower extremity edema is noted.   Psychiatric- alert and oriented ×3. Normal affect.      MSK   Strength is 5/5 in the bilateral lower extremities.  Sensation is intact in the bilateral lower extremities.  Straight leg raise and slump test are negative bilaterally.    Significantly limited range of motion in the bilateral hips which is improved when compared to previous visit however these are not eliciting pain at this time.  Fairs and Ramona's are negative.    MEDICAL DECISION MAKING    DATA    Labs:   Lab Results   Component Value Date/Time    SODIUM 138 05/14/2019 11:59 AM    POTASSIUM 4.0 05/14/2019 11:59 AM    CHLORIDE 106 05/14/2019 11:59 AM    CO2 26 05/14/2019 11:59 AM    GLUCOSE 127 (H) 05/14/2019 11:59 AM    BUN 15 05/14/2019 11:59 AM    CREATININE 0.87 05/14/2019 11:59 AM        No results found for: PROTHROMBTM, INR     Lab Results   Component Value Date/Time    WBC 7.6 11/15/2018 12:54 PM    RBC 4.62 (L) 11/15/2018 12:54 PM    HEMOGLOBIN 14.1 11/15/2018 12:54 PM    HEMATOCRIT 42.1 11/15/2018 12:54 PM    MCV 91.1 11/15/2018 12:54 PM    MCH 30.5 11/15/2018 12:54 PM    MCHC 33.5 (L) 11/15/2018 12:54 PM    MPV 11.6 11/15/2018 12:54 PM    NEUTSPOLYS 58.70 11/15/2018 12:54 PM    LYMPHOCYTES 30.50 11/15/2018 12:54 PM    MONOCYTES 7.90 11/15/2018 12:54 PM    EOSINOPHILS 1.70 11/15/2018 12:54 PM    BASOPHILS 0.90 11/15/2018 12:54 PM        Lab Results   Component Value Date/Time    HBA1C 6.7 (H) 05/14/2019 11:59 AM          Imaging:   I personally reviewed following images    X-ray bilateral hips 2/6/2019.  Cam deformity of the bilateral femoral heads, arthritis present the bilateral hips.  This is consistent with hip impingement  syndrome bilaterally.    MRI lumbar spine 2/6/2019  History of laminectomies.  Bilateral facet arthropathy L3-4, L4-5, L5-S1.  Worst L5-S1.  No significant central canal stenosis.  Moderate neuroforaminal stenosis L4-5, mild neuroforaminal stenosis L5-S1, mild to moderate left L5-S1 neuroforaminal stenosis, mild to moderate left L2-3 neuroforaminal stenosis.    I reviewed the following radiology reports                                        Results for orders placed during the hospital encounter of 02/06/19   MR-LUMBAR SPINE-W/O    Impression Multilevel degenerative disc disease, facet arthropathy and ligamentum flavum redundancy resulting in at most moderate foraminal and mild lateral recess stenoses.    Left hemilaminotomies                                X-ray hips 2/6/2019  Impression       1.  Convexity bilaterally at the femoral head/neck junction which can be associated with the clinical syndrome of femoroacetabular impingement    2.  Lumbar spine facet arthropathy and dextroconvex scoliosis                                                             Results for orders placed during the hospital encounter of 02/06/19   DX-LUMBAR SPINE-2 OR 3 VIEWS    Impression 1.  Dextroconvex scoliosis    2.  Multilevel facet arthropathy                                           DIAGNOSIS   Visit Diagnoses     ICD-10-CM   1. Lumbar radiculopathy M54.16   2. Chronic bilateral low back pain with bilateral sciatica M54.42    M54.41    G89.29   3. Right leg numbness R20.0   4. Bilateral groin pain R10.30   5. Femoroacetabular impingement of both hips M25.851    M25.852   6. Bilateral hip joint arthritis Right worse than left M16.0   7. Lumbar spondylosis M47.816         ASSESSMENT and PLAN:     Abiel Vaughn 68 y.o. male      Abiel was seen today for follow-up.    Diagnoses and all orders for this visit:    Lumbar radiculopathy  Comments:  Stable, significantly improved after epidural and physical therapy    Chronic  bilateral low back pain with bilateral sciatica  Comments:  stable    Right leg numbness  Comments:  Still present but significantly improved and tolerable    Bilateral groin pain    Femoroacetabular impingement of both hips    Bilateral hip joint arthritis Right worse than left  Comments:  Significant imaging findings of arthritis with decreased range of motion however there is no pain.  We discussed not pushing past his limits and his stretches    Lumbar spondylosis  Comments:  Decreased range but no significant pain.  Monitor for now.  Continue home exercise program       Discontinue scheduled Tylenol and he can take 1000 mg as needed up to 3 times daily.  Avoid NSAIDs if possible.      Follow up: 1 year    Thank you for allowing me to participate in the care of this patient. If you have any questions please not hesitate to contact me.          Please note that this dictation was created using voice recognition software. I have made every reasonable attempt to correct obvious errors but there may be errors of grammar and content that I may have overlooked prior to finalization of this note.      Julito Dacosta MD  Interventional Spine and Sports Physiatry  Physical Medicine and Rehabilitation  [facility]  7/18/2019  3:52 PM

## 2019-07-18 NOTE — Clinical Note
Dear Lynette Payne M.D. ,   Thank you for the referral of Abiel Vaughn. He is doing fantastic after the epidural and PT. Now pain is minimal.  Please see my note for more details    Should you have any questions or concerns please do not hesitate to contact me.  Julito Dacosta M.D.

## 2019-08-19 ENCOUNTER — HOSPITAL ENCOUNTER (OUTPATIENT)
Dept: LAB | Facility: MEDICAL CENTER | Age: 69
End: 2019-08-19
Attending: INTERNAL MEDICINE
Payer: MEDICARE

## 2019-08-19 DIAGNOSIS — E11.9 NEW ONSET TYPE 2 DIABETES MELLITUS (HCC): ICD-10-CM

## 2019-08-19 DIAGNOSIS — E78.5 DYSLIPIDEMIA: ICD-10-CM

## 2019-08-19 LAB
ALBUMIN SERPL BCP-MCNC: 4.2 G/DL (ref 3.2–4.9)
ALBUMIN/GLOB SERPL: 2 G/DL
ALP SERPL-CCNC: 69 U/L (ref 30–99)
ALT SERPL-CCNC: 25 U/L (ref 2–50)
ANION GAP SERPL CALC-SCNC: 10 MMOL/L (ref 0–11.9)
AST SERPL-CCNC: 28 U/L (ref 12–45)
BILIRUB SERPL-MCNC: 0.8 MG/DL (ref 0.1–1.5)
BUN SERPL-MCNC: 16 MG/DL (ref 8–22)
CALCIUM SERPL-MCNC: 9.7 MG/DL (ref 8.5–10.5)
CHLORIDE SERPL-SCNC: 107 MMOL/L (ref 96–112)
CHOLEST SERPL-MCNC: 123 MG/DL (ref 100–199)
CO2 SERPL-SCNC: 24 MMOL/L (ref 20–33)
CREAT SERPL-MCNC: 0.9 MG/DL (ref 0.5–1.4)
CREAT UR-MCNC: 275.3 MG/DL
EST. AVERAGE GLUCOSE BLD GHB EST-MCNC: 137 MG/DL
FASTING STATUS PATIENT QL REPORTED: NORMAL
GLOBULIN SER CALC-MCNC: 2.1 G/DL (ref 1.9–3.5)
GLUCOSE SERPL-MCNC: 127 MG/DL (ref 65–99)
HBA1C MFR BLD: 6.4 % (ref 0–5.6)
HDLC SERPL-MCNC: 29 MG/DL
LDLC SERPL CALC-MCNC: 73 MG/DL
MICROALBUMIN UR-MCNC: 0.8 MG/DL
MICROALBUMIN/CREAT UR: 3 MG/G (ref 0–30)
POTASSIUM SERPL-SCNC: 4 MMOL/L (ref 3.6–5.5)
PROT SERPL-MCNC: 6.3 G/DL (ref 6–8.2)
SODIUM SERPL-SCNC: 141 MMOL/L (ref 135–145)
TRIGL SERPL-MCNC: 107 MG/DL (ref 0–149)

## 2019-08-19 PROCEDURE — 36415 COLL VENOUS BLD VENIPUNCTURE: CPT

## 2019-08-19 PROCEDURE — 82570 ASSAY OF URINE CREATININE: CPT

## 2019-08-19 PROCEDURE — 82043 UR ALBUMIN QUANTITATIVE: CPT

## 2019-08-19 PROCEDURE — 80053 COMPREHEN METABOLIC PANEL: CPT

## 2019-08-19 PROCEDURE — 80061 LIPID PANEL: CPT

## 2019-08-19 PROCEDURE — 83036 HEMOGLOBIN GLYCOSYLATED A1C: CPT

## 2019-08-26 ENCOUNTER — OFFICE VISIT (OUTPATIENT)
Dept: MEDICAL GROUP | Facility: MEDICAL CENTER | Age: 69
End: 2019-08-26
Payer: MEDICARE

## 2019-08-26 VITALS
HEART RATE: 74 BPM | WEIGHT: 232.14 LBS | TEMPERATURE: 97.4 F | DIASTOLIC BLOOD PRESSURE: 60 MMHG | HEIGHT: 74 IN | BODY MASS INDEX: 29.79 KG/M2 | OXYGEN SATURATION: 95 % | SYSTOLIC BLOOD PRESSURE: 106 MMHG

## 2019-08-26 DIAGNOSIS — D64.9 ANEMIA, UNSPECIFIED TYPE: ICD-10-CM

## 2019-08-26 DIAGNOSIS — F51.01 PRIMARY INSOMNIA: ICD-10-CM

## 2019-08-26 DIAGNOSIS — E78.5 DYSLIPIDEMIA: ICD-10-CM

## 2019-08-26 DIAGNOSIS — E11.9 CONTROLLED TYPE 2 DIABETES MELLITUS WITHOUT COMPLICATION, WITHOUT LONG-TERM CURRENT USE OF INSULIN (HCC): ICD-10-CM

## 2019-08-26 DIAGNOSIS — K21.9 GASTROESOPHAGEAL REFLUX DISEASE, ESOPHAGITIS PRESENCE NOT SPECIFIED: ICD-10-CM

## 2019-08-26 PROBLEM — E66.9 OBESITY (BMI 30.0-34.9): Status: RESOLVED | Noted: 2018-10-03 | Resolved: 2019-08-26

## 2019-08-26 PROBLEM — B00.9 HSV INFECTION: Status: ACTIVE | Noted: 2019-08-26

## 2019-08-26 PROBLEM — J30.2 SEASONAL ALLERGIES: Status: ACTIVE | Noted: 2019-08-26

## 2019-08-26 PROBLEM — E66.811 OBESITY (BMI 30.0-34.9): Status: RESOLVED | Noted: 2018-10-03 | Resolved: 2019-08-26

## 2019-08-26 PROCEDURE — 99214 OFFICE O/P EST MOD 30 MIN: CPT | Performed by: INTERNAL MEDICINE

## 2019-08-26 RX ORDER — ZOLPIDEM TARTRATE 10 MG/1
10 TABLET ORAL NIGHTLY PRN
Qty: 30 TAB | Refills: 2 | Status: SHIPPED | OUTPATIENT
Start: 2019-08-26 | End: 2019-12-23 | Stop reason: SDUPTHER

## 2019-08-26 RX ORDER — METFORMIN HYDROCHLORIDE 750 MG/1
750 TABLET, EXTENDED RELEASE ORAL DAILY
Qty: 90 TAB | Refills: 1 | Status: SHIPPED | OUTPATIENT
Start: 2019-08-26 | End: 2020-04-29 | Stop reason: SDUPTHER

## 2019-08-26 RX ORDER — PANTOPRAZOLE SODIUM 40 MG/1
40 TABLET, DELAYED RELEASE ORAL DAILY
Qty: 90 TAB | Refills: 1 | Status: SHIPPED | OUTPATIENT
Start: 2019-08-26 | End: 2019-12-23 | Stop reason: SDUPTHER

## 2019-08-26 RX ORDER — ROSUVASTATIN CALCIUM 20 MG/1
20 TABLET, COATED ORAL EVERY EVENING
Qty: 90 TAB | Refills: 1 | Status: SHIPPED | OUTPATIENT
Start: 2019-08-26 | End: 2020-02-12

## 2019-08-26 NOTE — PROGRESS NOTES
CHIEF COMPLAINT  Chief Complaint   Patient presents with   • Follow-Up     3 month   • Results     labs  19   DM    HPI  Abiel Vaughn is a 69 y.o. male who presents today for the following     DM2, controlled  Onset/  Diabetes education: ordered     Medications:   • Metformin: 750 mg daily  • ACE/ARB: no  • Statin: atorvstatin  • ASA: yes  Compliant with medications: yes  Checking feet daily/wear soft socks/shoes: advised     Diabetes ABCDE TARGETS  • A1c, last: 6.4, was 6.7  • Blood Pressure, goal < 140/90: yes  • Cholesterol-Lipid Panel: Controlled  • Dysalbuminuria: Pending     Diet: Regular  Exercise: Insufficient  BMI: 29     DM complications:  • Peripheral neuropathy:           No numbness or tingling in feet.  • Retinopathy:                          Last eye exam: . No retinopathy.   • Nephropathy:                         No  • CVS:                                        No CAD.  • GI:                                           No gastropathy.     FH of DM: mother, ended up with insulin     Blood pressure  Controlled, no medications     Dyslipidemia  Statin: Atorvastatin, 60 mg daily, taking as prescribed.   - No muscle weakness, cramps, nausea,abdominal discomfort.   Diet / exercise / BMI: as above.   FH: neg    GERD/anemia  On omeprazole, 40 mg QD; takes medication as prescribed, that controls sx.   St post recent EGD.  Denies:   - heartburn, epigastric pain.   -  nausea, vomiting, or diarrhea  - dysphagia  - abnormal cough  - blood in the stool or dark tarry stools.  - early satiety  - tobacco use.  Labs showed anemia.    Insomnia  The patient has a trouble to go and stay asleep.   Used Ambien, 10 mg at bedtime.   Discussed sleep hygiene:   - Go to bed and wake up at consistent times whether work/school day or not.   - Keep room dark, quiet, and comfortable.   - Don't have naps.  - Increase exposure to sunlight during awake times and avoid bright lights before going to sleep.   -  Avoid stimulant or caffeine use more than 4 hours after wake time.   - Avoid meal or exercise 2-3 hours prior to going to bed.    Reviewed PMH, PSH, FH, SH, ALL, HCM/IMM, no changes  Reviewed MEDS, no changes    Patient Active Problem List    Diagnosis Date Noted   • Controlled type 2 diabetes mellitus without complication, without long-term current use of insulin ([facility]) 08/26/2019   • Aortic valve disorder 10/29/2018   • Gastroesophageal reflux disease 10/03/2018   • Primary insomnia 10/03/2018   • Seasonal allergic rhinitis 10/03/2018   • Dyslipidemia 10/03/2018   • Primary osteoarthritis involving multiple joints 10/03/2018   • Decreased hearing, right 10/03/2018     CURRENT MEDICATIONS  Current Outpatient Medications   Medication Sig Dispense Refill   • zolpidem (AMBIEN) 10 MG Tab Take 1 Tab by mouth at bedtime as needed for Sleep for up to 90 days. 30 Tab 2   • metFORMIN ER (GLUCOPHAGE XR) 750 MG TABLET SR 24 HR Take 1 Tab by mouth every day. 90 Tab 1   • rosuvastatin (CRESTOR) 20 MG Tab Take 1 Tab by mouth every evening. 90 Tab 1   • pantoprazole (PROTONIX) 40 MG Tablet Delayed Response Take 1 Tab by mouth every day. 90 Tab 1   • diclofenac EC (VOLTAREN) 75 MG Tablet Delayed Response Take 1 Tab by mouth 2 times a day. 180 Tab 0   • acetaminophen (TYLENOL) 500 MG Tab Take 2 Tabs by mouth 3 times a day as needed (pain.  Do not exceed 3000 mg of total acetaminophen per day). 180 Tab 6   • valACYclovir (VALTREX) 500 MG Tab Take 1 Tab by mouth every day. 90 Tab 3   • Pseudoephedrine HCl (SUDAFED PO) Take 10 mg by mouth as needed.     • PSYLLIUM PO Take  by mouth.     • Cholecalciferol (VITAMIN D3) 2000 UNIT Cap Take  by mouth 2 Times a Day.     • Multiple Vitamins-Minerals (MULTIVITAMIN ADULT) Tab Take  by mouth.     • montelukast (SINGULAIR) 10 MG Tab      • aspirin (ASA) 81 MG Chew Tab chewable tablet Take 81 mg by mouth every day.     • SODIUM FLUORIDE, DENTAL GEL, (DENTAGEL) 1.1 % Gel by dental route.     •  "fluocinonide (LIDEX) 0.05 % Cream Apply  to affected area(s) 2 times a day.     • fluorouracil (EFUDEX) 5 % cream Apply  to affected area(s) 2 times a day.     • furosemide (LASIX) 20 MG Tab        No current facility-administered medications for this visit.      ALLERGIES  Allergies: Patient has no known allergies.  PAST MEDICAL HISTORY  Past Medical History:   Diagnosis Date   • Dyslipidemia    • GERD (gastroesophageal reflux disease)    • Insomnia      SURGICAL HISTORY  He  has a past surgical history that includes appendectomy and tonsillectomy.  SOCIAL HISTORY  Social History     Tobacco Use   • Smoking status: Never Smoker   • Smokeless tobacco: Never Used   Substance Use Topics   • Alcohol use: Yes   • Drug use: No     Social History     Social History Narrative   • Not on file     FAMILY HISTORY  Family History   Problem Relation Age of Onset   • Cancer Mother    • Diabetes Mother    • Heart Disease Father    • Hyperlipidemia Neg Hx      Family Status   Relation Name Status   • Mo     • Fa     • Neg Hx  (Not Specified)     ROS   Constitutional: Negative for fever, chills, fatigue.  HENT: Negative for congestion, sore throat.  Eyes: Negative for vision problems.   Respiratory: Negative for cough, shortness of breath.  Cardiovascular: Negative for chest pain, palpitations.   Gastrointestinal: Negative for heartburn, nausea, abdominal pain.   Genitourinary: Negative for dysuria.  Musculoskeletal: Negative for significant myalgia, back and joint pain.   Skin: Negative for rash.   Neuro: Negative for dizziness, weakness and headaches.  And as above.  Endo/Heme/Allergies: Does not bruise/bleed easily.   Psychiatric/Behavioral: Negative for depression.    PHYSICAL EXAM   /60   Pulse 74   Temp 36.3 °C (97.4 °F) (Temporal)   Ht 1.88 m (6' 2\")   Wt 105.3 kg (232 lb 2.3 oz)   SpO2 95%  Body mass index is 29.81 kg/m².  General:  NAD, well appearing  HEENT:   NC/AT, PERRLA, EOMI, TMs are clear. " Oropharyngeal mucosa is pink,  without lesions;  no cervical / supraclavicular  lymphadenopathy, no thyromegaly.    Cardiovascular: RRR.   No m/r/g.       Lungs:   CTAB, no w/r/r, no respiratory distress.  Abdomen: Soft, NT/ND; no hepatosplenomegaly.  Extremities:  2+ DP and radial pulses bilaterally.  No c/c/e.   Skin:  Warm, dry.  No erythema. No rash.   Neurologic: Alert & oriented x 3. CN II-XII grossly intact. No focal deficits.  Psychiatric:  Affect normal, mood normal, judgment normal.    Labs     Labs are reviewed and discussed with a patient  Lab Results   Component Value Date/Time    CHOLSTRLTOT 123 08/19/2019 11:17 AM    LDL 73 08/19/2019 11:17 AM    HDL 29 (A) 08/19/2019 11:17 AM    TRIGLYCERIDE 107 08/19/2019 11:17 AM       Lab Results   Component Value Date/Time    SODIUM 141 08/19/2019 11:17 AM    POTASSIUM 4.0 08/19/2019 11:17 AM    CHLORIDE 107 08/19/2019 11:17 AM    CO2 24 08/19/2019 11:17 AM    GLUCOSE 127 (H) 08/19/2019 11:17 AM    BUN 16 08/19/2019 11:17 AM    CREATININE 0.90 08/19/2019 11:17 AM     Lab Results   Component Value Date/Time    ALKPHOSPHAT 69 08/19/2019 11:17 AM    ASTSGOT 28 08/19/2019 11:17 AM    ALTSGPT 25 08/19/2019 11:17 AM    TBILIRUBIN 0.8 08/19/2019 11:17 AM      Lab Results   Component Value Date/Time    HBA1C 6.4 (H) 08/19/2019 11:17 AM    HBA1C 6.7 (H) 05/14/2019 11:59 AM    HBA1C 6.4 (H) 02/18/2019 10:00 AM     Lab Results   Component Value Date/Time    WBC 7.6 11/15/2018 12:54 PM    RBC 4.62 (L) 11/15/2018 12:54 PM    HEMOGLOBIN 14.1 11/15/2018 12:54 PM    HEMATOCRIT 42.1 11/15/2018 12:54 PM    MCV 91.1 11/15/2018 12:54 PM    MCH 30.5 11/15/2018 12:54 PM    MCHC 33.5 (L) 11/15/2018 12:54 PM    MPV 11.6 11/15/2018 12:54 PM    NEUTSPOLYS 58.70 11/15/2018 12:54 PM    LYMPHOCYTES 30.50 11/15/2018 12:54 PM    MONOCYTES 7.90 11/15/2018 12:54 PM    EOSINOPHILS 1.70 11/15/2018 12:54 PM    BASOPHILS 0.90 11/15/2018 12:54 PM      Imaging     None    Assessment and Plan      Abiel Vaughn is a 69 y.o. male    1. Controlled type 2 diabetes mellitus without complication, without long-term current use of insulin (HCC)  Controlled, continue current treatment  - POCT Retinal Eye Exam  - Comp Metabolic Panel; Future  - HEMOGLOBIN A1C; Future    2. Dyslipidemia  Controlled continue current treatment  - Lipid Profile; Future    3. Gastroesophageal reflux disease, esophagitis presence not specified  Controlled, continue GI follow-up  - pantoprazole (PROTONIX) 40 MG Tablet Delayed Response; Take 1 Tab by mouth every day.  Dispense: 90 Tab; Refill: 1    4. Anemia, unspecified type  Follow-up labs  - CBC WITH DIFFERENTIAL; Future  - VIT B12,  FOLIC ACID  - IRON/TOTAL IRON BIND; Future  - OCCULT BLOOD FECES IMMUNOASSAY; Future    5. Primary insomnia  Controlled, continue current treatment  - zolpidem (AMBIEN) 10 MG Tab; Take 1 Tab by mouth at bedtime as needed for Sleep for up to 90 days.  Dispense: 30 Tab; Refill: 2    Obtained and reviewed patient utilization report from [facility] pharmacy database on 8/26/2019 8:14 PM  prior to writing prescription for controlled substance II, III or IV per [place] bill . Based on assessment of the report, the prescription is medically necessary.     Counseling:   - Smoking:  Nonsmoker    Followup: Return in about 4 months (around 12/26/2019).    All questions are answered.    Please note that this dictation was created using voice recognition software, and that there might be errors of elisabeth and possibly content.

## 2019-08-30 ENCOUNTER — APPOINTMENT (OUTPATIENT)
Dept: MEDICAL GROUP | Facility: MEDICAL CENTER | Age: 69
End: 2019-08-30
Payer: MEDICARE

## 2019-12-16 ENCOUNTER — HOSPITAL ENCOUNTER (OUTPATIENT)
Dept: LAB | Facility: MEDICAL CENTER | Age: 69
End: 2019-12-16
Attending: INTERNAL MEDICINE
Payer: MEDICARE

## 2019-12-16 DIAGNOSIS — E78.5 DYSLIPIDEMIA: ICD-10-CM

## 2019-12-16 DIAGNOSIS — D64.9 ANEMIA, UNSPECIFIED TYPE: ICD-10-CM

## 2019-12-16 DIAGNOSIS — E11.9 CONTROLLED TYPE 2 DIABETES MELLITUS WITHOUT COMPLICATION, WITHOUT LONG-TERM CURRENT USE OF INSULIN (HCC): ICD-10-CM

## 2019-12-16 LAB
ALBUMIN SERPL BCP-MCNC: 4.2 G/DL (ref 3.2–4.9)
ALBUMIN/GLOB SERPL: 2.1 G/DL
ALP SERPL-CCNC: 70 U/L (ref 30–99)
ALT SERPL-CCNC: 24 U/L (ref 2–50)
ANION GAP SERPL CALC-SCNC: 8 MMOL/L (ref 0–11.9)
AST SERPL-CCNC: 26 U/L (ref 12–45)
BASOPHILS # BLD AUTO: 0.8 % (ref 0–1.8)
BASOPHILS # BLD: 0.06 K/UL (ref 0–0.12)
BILIRUB SERPL-MCNC: 0.6 MG/DL (ref 0.1–1.5)
BUN SERPL-MCNC: 18 MG/DL (ref 8–22)
CALCIUM SERPL-MCNC: 8.9 MG/DL (ref 8.5–10.5)
CHLORIDE SERPL-SCNC: 108 MMOL/L (ref 96–112)
CHOLEST SERPL-MCNC: 124 MG/DL (ref 100–199)
CO2 SERPL-SCNC: 25 MMOL/L (ref 20–33)
CREAT SERPL-MCNC: 0.85 MG/DL (ref 0.5–1.4)
EOSINOPHIL # BLD AUTO: 0.29 K/UL (ref 0–0.51)
EOSINOPHIL NFR BLD: 3.8 % (ref 0–6.9)
ERYTHROCYTE [DISTWIDTH] IN BLOOD BY AUTOMATED COUNT: 42.3 FL (ref 35.9–50)
FASTING STATUS PATIENT QL REPORTED: NORMAL
GLOBULIN SER CALC-MCNC: 2 G/DL (ref 1.9–3.5)
GLUCOSE SERPL-MCNC: 123 MG/DL (ref 65–99)
HCT VFR BLD AUTO: 41.8 % (ref 42–52)
HDLC SERPL-MCNC: 34 MG/DL
HGB BLD-MCNC: 14 G/DL (ref 14–18)
IMM GRANULOCYTES # BLD AUTO: 0.02 K/UL (ref 0–0.11)
IMM GRANULOCYTES NFR BLD AUTO: 0.3 % (ref 0–0.9)
IRON SATN MFR SERPL: 26 % (ref 15–55)
IRON SERPL-MCNC: 107 UG/DL (ref 50–180)
LDLC SERPL CALC-MCNC: 65 MG/DL
LYMPHOCYTES # BLD AUTO: 2.67 K/UL (ref 1–4.8)
LYMPHOCYTES NFR BLD: 34.7 % (ref 22–41)
MCH RBC QN AUTO: 30.8 PG (ref 27–33)
MCHC RBC AUTO-ENTMCNC: 33.5 G/DL (ref 33.7–35.3)
MCV RBC AUTO: 92.1 FL (ref 81.4–97.8)
MONOCYTES # BLD AUTO: 0.71 K/UL (ref 0–0.85)
MONOCYTES NFR BLD AUTO: 9.2 % (ref 0–13.4)
NEUTROPHILS # BLD AUTO: 3.94 K/UL (ref 1.82–7.42)
NEUTROPHILS NFR BLD: 51.2 % (ref 44–72)
NRBC # BLD AUTO: 0 K/UL
NRBC BLD-RTO: 0 /100 WBC
PLATELET # BLD AUTO: 179 K/UL (ref 164–446)
PMV BLD AUTO: 11.3 FL (ref 9–12.9)
POTASSIUM SERPL-SCNC: 3.8 MMOL/L (ref 3.6–5.5)
PROT SERPL-MCNC: 6.2 G/DL (ref 6–8.2)
RBC # BLD AUTO: 4.54 M/UL (ref 4.7–6.1)
SODIUM SERPL-SCNC: 141 MMOL/L (ref 135–145)
TIBC SERPL-MCNC: 407 UG/DL (ref 250–450)
TRIGL SERPL-MCNC: 124 MG/DL (ref 0–149)
VIT B12 SERPL-MCNC: 814 PG/ML (ref 211–911)
WBC # BLD AUTO: 7.7 K/UL (ref 4.8–10.8)

## 2019-12-16 PROCEDURE — 85025 COMPLETE CBC W/AUTO DIFF WBC: CPT

## 2019-12-16 PROCEDURE — 83036 HEMOGLOBIN GLYCOSYLATED A1C: CPT | Mod: GA

## 2019-12-16 PROCEDURE — 82607 VITAMIN B-12: CPT

## 2019-12-16 PROCEDURE — 83540 ASSAY OF IRON: CPT

## 2019-12-16 PROCEDURE — 82746 ASSAY OF FOLIC ACID SERUM: CPT

## 2019-12-16 PROCEDURE — 80053 COMPREHEN METABOLIC PANEL: CPT

## 2019-12-16 PROCEDURE — 83550 IRON BINDING TEST: CPT

## 2019-12-16 PROCEDURE — 36415 COLL VENOUS BLD VENIPUNCTURE: CPT

## 2019-12-16 PROCEDURE — 80061 LIPID PANEL: CPT

## 2019-12-17 ENCOUNTER — HOSPITAL ENCOUNTER (OUTPATIENT)
Facility: MEDICAL CENTER | Age: 69
End: 2019-12-17
Attending: INTERNAL MEDICINE
Payer: MEDICARE

## 2019-12-17 LAB
EST. AVERAGE GLUCOSE BLD GHB EST-MCNC: 131 MG/DL
FOLATE SERPL-MCNC: >24 NG/ML
HBA1C MFR BLD: 6.2 % (ref 0–5.6)

## 2019-12-17 PROCEDURE — 82274 ASSAY TEST FOR BLOOD FECAL: CPT

## 2019-12-21 DIAGNOSIS — D64.9 ANEMIA, UNSPECIFIED TYPE: ICD-10-CM

## 2019-12-23 ENCOUNTER — OFFICE VISIT (OUTPATIENT)
Dept: MEDICAL GROUP | Facility: MEDICAL CENTER | Age: 69
End: 2019-12-23
Payer: MEDICARE

## 2019-12-23 VITALS
OXYGEN SATURATION: 94 % | BODY MASS INDEX: 30.42 KG/M2 | WEIGHT: 237 LBS | RESPIRATION RATE: 12 BRPM | SYSTOLIC BLOOD PRESSURE: 132 MMHG | HEIGHT: 74 IN | HEART RATE: 82 BPM | DIASTOLIC BLOOD PRESSURE: 66 MMHG | TEMPERATURE: 98.2 F

## 2019-12-23 DIAGNOSIS — E66.9 OBESITY (BMI 30.0-34.9): ICD-10-CM

## 2019-12-23 DIAGNOSIS — E78.5 DYSLIPIDEMIA: ICD-10-CM

## 2019-12-23 DIAGNOSIS — K21.9 GASTROESOPHAGEAL REFLUX DISEASE, ESOPHAGITIS PRESENCE NOT SPECIFIED: ICD-10-CM

## 2019-12-23 DIAGNOSIS — D64.9 ANEMIA, UNSPECIFIED TYPE: ICD-10-CM

## 2019-12-23 DIAGNOSIS — M79.89 LEG SWELLING: ICD-10-CM

## 2019-12-23 DIAGNOSIS — E11.9 CONTROLLED TYPE 2 DIABETES MELLITUS WITHOUT COMPLICATION, WITHOUT LONG-TERM CURRENT USE OF INSULIN (HCC): ICD-10-CM

## 2019-12-23 DIAGNOSIS — F51.01 PRIMARY INSOMNIA: ICD-10-CM

## 2019-12-23 PROCEDURE — 99214 OFFICE O/P EST MOD 30 MIN: CPT | Performed by: INTERNAL MEDICINE

## 2019-12-23 RX ORDER — PANTOPRAZOLE SODIUM 40 MG/1
40 TABLET, DELAYED RELEASE ORAL DAILY
Qty: 90 TAB | Refills: 3 | Status: SHIPPED | OUTPATIENT
Start: 2019-12-23 | End: 2019-12-27 | Stop reason: SDUPTHER

## 2019-12-23 RX ORDER — ZOLPIDEM TARTRATE 10 MG/1
10 TABLET ORAL NIGHTLY PRN
Qty: 30 TAB | Refills: 2 | Status: SHIPPED | OUTPATIENT
Start: 2019-12-23 | End: 2019-12-27 | Stop reason: SDUPTHER

## 2019-12-23 RX ORDER — HYDROCHLOROTHIAZIDE 12.5 MG/1
12.5 CAPSULE, GELATIN COATED ORAL DAILY
Qty: 90 CAP | Refills: 0 | Status: SHIPPED | OUTPATIENT
Start: 2019-12-23 | End: 2020-03-12

## 2019-12-24 NOTE — PROGRESS NOTES
CHIEF COMPLAINT  Chief Complaint   Patient presents with   • Follow-Up   DM, insomnia    HPI  Abiel Vaughn is a 69 y.o. male who presents today for the following     DM2, controlled  Onset/  Diabetes education: ordered     Medications:   • Metformin: 750 mg daily  • ACE/ARB: no  • Statin: atorvstatin  • ASA: yes  Compliant with medications: yes  Checking feet daily/wear soft socks/shoes: advised     Diabetes ABCDE TARGETS  • A1c, last: 6.2, was 6.4  • Blood Pressure, goal < 140/90: yes  • Cholesterol-Lipid Panel: Controlled  • Dysalbuminuria: Pending     Diet: Regular  Exercise: Insufficient  BMI: 29     DM complications:  • Peripheral neuropathy: No numbness or tingling in feet.  • Retinopathy:   Last eye exam: . No retinopathy.   • Nephropathy:   No  • CVS: No CAD.  • GI: No gastropathy.     FH of DM: mother, ended up with insulin     Blood pressure  Controlled, no medications     Dyslipidemia  Statin: Atorvastatin, 60 mg daily, taking as prescribed.   - No muscle weakness, cramps, nausea,abdominal discomfort.   Diet / exercise / BMI: as above.   FH: neg     GERD/anemia  On omeprazole, 40 mg QD; takes medication as prescribed, that controls sx.   He had EGD within the last 6 months, that showed possible mild Britt's and for stomach polyps were removed.    Denies:   - heartburn, epigastric pain.   - nausea, vomiting, or diarrhea  - dysphagia  - abnormal cough  - blood in the stool or dark tarry stools.  - early satiety  - tobacco use.  Labs showed anemia, with normal iron on studies, B12/folate.  Pending FIT.     Insomnia  The patient has a trouble to go and stay asleep.   Used Ambien, 10 mg at bedtime.   Discussed sleep hygiene:   - Go to bed and wake up at consistent times whether work/school day or not.   - Keep room dark, quiet, and comfortable.   - Don't have naps.  - Increase exposure to sunlight during awake times and avoid bright lights before going to sleep.   - Avoid stimulant or  caffeine use more than 4 hours after wake time.   - Avoid meal or exercise 2-3 hours prior to going to bed.    Body mass index is 30.43 kg/m².   LE swelling  Gained WT in the last few months  Diet: regular  Exercise: insufficient  No temperature intolerance. No change in hair/skin quality, BMs.   No HTN, buffalo hump, purple striae, flushing.  FH of obesity: unknown    Reviewed PMH, PSH, FH, SH, ALL, HCM/IMM, no changes  Reviewed MEDS, no changes    Patient Active Problem List    Diagnosis Date Noted   • Controlled type 2 diabetes mellitus without complication, without long-term current use of insulin ([facility]) 08/26/2019   • Seasonal allergies 08/26/2019   • HSV infection 08/26/2019   • Aortic valve disorder 10/29/2018   • Gastroesophageal reflux disease 10/03/2018   • Primary insomnia 10/03/2018   • Seasonal allergic rhinitis 10/03/2018   • Dyslipidemia 10/03/2018   • Primary osteoarthritis involving multiple joints 10/03/2018   • Decreased hearing, right 10/03/2018     CURRENT MEDICATIONS  Current Outpatient Medications   Medication Sig Dispense Refill   • pantoprazole (PROTONIX) 40 MG Tablet Delayed Response Take 1 Tab by mouth every day. 90 Tab 3   • zolpidem (AMBIEN) 10 MG Tab Take 1 Tab by mouth at bedtime as needed for Sleep for up to 90 days. 30 Tab 2   • hydrochlorothiazide (MICROZIDE) 12.5 MG capsule Take 1 Cap by mouth every day. 90 Cap 0   • metFORMIN ER (GLUCOPHAGE XR) 750 MG TABLET SR 24 HR Take 1 Tab by mouth every day. 90 Tab 1   • rosuvastatin (CRESTOR) 20 MG Tab Take 1 Tab by mouth every evening. 90 Tab 1   • diclofenac EC (VOLTAREN) 75 MG Tablet Delayed Response Take 1 Tab by mouth 2 times a day. 180 Tab 0   • acetaminophen (TYLENOL) 500 MG Tab Take 2 Tabs by mouth 3 times a day as needed (pain.  Do not exceed 3000 mg of total acetaminophen per day). 180 Tab 6   • valACYclovir (VALTREX) 500 MG Tab Take 1 Tab by mouth every day. 90 Tab 3   • Pseudoephedrine HCl (SUDAFED PO) Take 10 mg by mouth as  needed.     • PSYLLIUM PO Take  by mouth.     • Cholecalciferol (VITAMIN D3) 2000 UNIT Cap Take  by mouth 2 Times a Day.     • Multiple Vitamins-Minerals (MULTIVITAMIN ADULT) Tab Take  by mouth.     • furosemide (LASIX) 20 MG Tab      • montelukast (SINGULAIR) 10 MG Tab      • aspirin (ASA) 81 MG Chew Tab chewable tablet Take 81 mg by mouth every day.     • SODIUM FLUORIDE, DENTAL GEL, (DENTAGEL) 1.1 % Gel by dental route.     • fluocinonide (LIDEX) 0.05 % Cream Apply  to affected area(s) 2 times a day.     • fluorouracil (EFUDEX) 5 % cream Apply  to affected area(s) 2 times a day.       No current facility-administered medications for this visit.      ALLERGIES  Allergies: Patient has no known allergies.  PAST MEDICAL HISTORY  Past Medical History:   Diagnosis Date   • Dyslipidemia    • GERD (gastroesophageal reflux disease)    • Insomnia      SURGICAL HISTORY  He  has a past surgical history that includes appendectomy and tonsillectomy.  SOCIAL HISTORY  Social History     Tobacco Use   • Smoking status: Never Smoker   • Smokeless tobacco: Never Used   Substance Use Topics   • Alcohol use: Yes   • Drug use: No     Social History     Patient does not qualify to have social determinant information on file (likely too young).   Social History Narrative   • Not on file     FAMILY HISTORY  Family History   Problem Relation Age of Onset   • Cancer Mother    • Diabetes Mother    • Heart Disease Father    • Hyperlipidemia Neg Hx      Family Status   Relation Name Status   • Mo     • Fa     • Neg Hx  (Not Specified)     ROS   Constitutional: Negative for fever, chills, fatigue.  HENT: Negative for congestion, sore throat.  Eyes: Negative for vision problems.   Respiratory: Negative for cough, shortness of breath.  Cardiovascular: Negative for chest pain, palpitations.   Gastrointestinal: Negative for heartburn, nausea, abdominal pain.   Genitourinary: Negative for dysuria.  Musculoskeletal: Negative for  "significant myalgia, back and joint pain.   Skin: Negative for rash.   Neuro: Negative for dizziness, weakness and headaches.   Endo/Heme/Allergies: Does not bruise/bleed easily.   Psychiatric/Behavioral: Negative for depression.    PHYSICAL EXAM   Blood Pressure 132/66   Pulse 82   Temperature 36.8 °C (98.2 °F)   Respiration 12   Height 1.88 m (6' 2\")   Weight 110 kg (242 lb 8.1 oz)   Oxygen Saturation 94%  Body mass index is 31.14 kg/m².  General:  NAD, well appearing  HEENT:   NC/AT, PERRLA, EOMI, TMs are clear. Oropharyngeal mucosa is pink,  without lesions;  no cervical / supraclavicular  lymphadenopathy.    Cardiovascular: RRR.   No m/r/g.       Lungs:   CTAB, no w/r/r, no respiratory distress.  Abdomen: Soft, NT/ND; no hepatosplenomegaly.  Extremities:  2+ DP and radial pulses bilaterally.  Mild feet swelling.   Skin:  Warm, dry.  No erythema. No rash.   Neurologic: Alert & oriented x 3. CN II-XII grossly intact. No focal deficits.  Psychiatric:  Affect normal, mood normal, judgment normal.    Labs     Labs are reviewed and discussed with a patient  Lab Results   Component Value Date/Time    CHOLSTRLTOT 124 12/16/2019 11:28 AM    LDL 65 12/16/2019 11:28 AM    HDL 34 (A) 12/16/2019 11:28 AM    TRIGLYCERIDE 124 12/16/2019 11:28 AM       Lab Results   Component Value Date/Time    SODIUM 141 12/16/2019 11:28 AM    POTASSIUM 3.8 12/16/2019 11:28 AM    CHLORIDE 108 12/16/2019 11:28 AM    CO2 25 12/16/2019 11:28 AM    GLUCOSE 123 (H) 12/16/2019 11:28 AM    BUN 18 12/16/2019 11:28 AM    CREATININE 0.85 12/16/2019 11:28 AM     Lab Results   Component Value Date/Time    ALKPHOSPHAT 70 12/16/2019 11:28 AM    ASTSGOT 26 12/16/2019 11:28 AM    ALTSGPT 24 12/16/2019 11:28 AM    TBILIRUBIN 0.6 12/16/2019 11:28 AM      Lab Results   Component Value Date/Time    HBA1C 6.2 (H) 12/16/2019 11:28 AM    HBA1C 6.4 (H) 08/19/2019 11:17 AM    HBA1C 6.7 (H) 05/14/2019 11:59 AM     Lab Results   Component Value Date/Time    WBC " 7.7 12/16/2019 11:28 AM    RBC 4.54 (L) 12/16/2019 11:28 AM    HEMOGLOBIN 14.0 12/16/2019 11:28 AM    HEMATOCRIT 41.8 (L) 12/16/2019 11:28 AM    MCV 92.1 12/16/2019 11:28 AM    MCH 30.8 12/16/2019 11:28 AM    MCHC 33.5 (L) 12/16/2019 11:28 AM    MPV 11.3 12/16/2019 11:28 AM    NEUTSPOLYS 51.20 12/16/2019 11:28 AM    LYMPHOCYTES 34.70 12/16/2019 11:28 AM    MONOCYTES 9.20 12/16/2019 11:28 AM    EOSINOPHILS 3.80 12/16/2019 11:28 AM    BASOPHILS 0.80 12/16/2019 11:28 AM      Imaging     None    Assessment and Plan     Abiel Vaughn is a 69 y.o. male    1. Controlled type 2 diabetes mellitus without complication, without long-term current use of insulin (HCC)  Controlled, continue current treatment  - Comp Metabolic Panel; Future  - HEMOGLOBIN A1C; Future  - MICROALBUMIN CREAT RATIO URINE; Future    2. Dyslipidemia  Controlled, continue current treatment  - Comp Metabolic Panel; Future  - Lipid Profile; Future    3. Gastroesophageal reflux disease, esophagitis presence not specified  Controlled, continue current treatment  - pantoprazole (PROTONIX) 40 MG Tablet Delayed Response; Take 1 Tab by mouth every day.  Dispense: 90 Tab; Refill: 3    4. Anemia, unspecified type  He had a negative recent studies, EGD/colonoscopy.  Pending FIT,  - CBC WITH DIFFERENTIAL; Future    5. Primary insomnia  Controlled, continue current treatment  - zolpidem (AMBIEN) 10 MG Tab; Take 1 Tab by mouth at bedtime as needed for Sleep for up to 90 days.  Dispense: 30 Tab; Refill: 2    6. Leg swelling  Advised compression stockings, if unsuccessful, may use as needed:  - hydrochlorothiazide (MICROZIDE) 12.5 MG capsule; Take 1 Cap by mouth every day.  Dispense: 90 Cap; Refill: 0    7. Obesity (BMI 30.0-34.9)  - OBESITY COUNSELING (No Charge): Patient identified as having weight management issue.  Appropriate orders and counseling given.    Counseling:   - Smoking:  Nonsmoker    Followup: Return in about 4 months (around 4/23/2020) for DM,  Labs.    All questions are answered.    Please note that this dictation was created using voice recognition software, and that there might be errors of elisabeth and possibly content.

## 2019-12-25 LAB — TEST NAME 95000: NORMAL

## 2019-12-27 DIAGNOSIS — F51.01 PRIMARY INSOMNIA: ICD-10-CM

## 2019-12-27 RX ORDER — ZOLPIDEM TARTRATE 10 MG/1
10 TABLET ORAL NIGHTLY PRN
Qty: 30 TAB | Refills: 2 | Status: SHIPPED | OUTPATIENT
Start: 2019-12-27 | End: 2020-03-26

## 2020-01-14 ENCOUNTER — TELEPHONE (OUTPATIENT)
Dept: MEDICAL GROUP | Facility: MEDICAL CENTER | Age: 70
End: 2020-01-14

## 2020-01-14 NOTE — TELEPHONE ENCOUNTER
Received a voicemail from [employer]. They state that we need to start the process on a Prior Authorization for his Zolpidem. He contacted them, but we need to start it. Thank you!

## 2020-01-20 ENCOUNTER — TELEPHONE (OUTPATIENT)
Dept: MEDICAL GROUP | Facility: MEDICAL CENTER | Age: 70
End: 2020-01-20

## 2020-01-20 NOTE — TELEPHONE ENCOUNTER
Jennifer with [employer] called to advise that pts Zolpidem needs PA. Spoke with [employer] and was able to obtain a 36 mo PRIOR AUTH for this medication.

## 2020-01-21 ENCOUNTER — TELEPHONE (OUTPATIENT)
Dept: MEDICAL GROUP | Facility: MEDICAL CENTER | Age: 70
End: 2020-01-21

## 2020-01-21 NOTE — TELEPHONE ENCOUNTER
Rec'd a call from pt's mail order pharmacy. They just needed to verify the sig on his Ambien Rx. Verified with pharmacist.

## 2020-02-11 DIAGNOSIS — E78.5 DYSLIPIDEMIA: ICD-10-CM

## 2020-02-12 RX ORDER — ROSUVASTATIN CALCIUM 20 MG/1
TABLET, COATED ORAL
Qty: 90 TAB | Refills: 1 | Status: SHIPPED | OUTPATIENT
Start: 2020-02-12 | End: 2020-04-29 | Stop reason: SDUPTHER

## 2020-03-11 DIAGNOSIS — M79.89 LEG SWELLING: ICD-10-CM

## 2020-03-12 RX ORDER — HYDROCHLOROTHIAZIDE 12.5 MG/1
CAPSULE, GELATIN COATED ORAL
Qty: 90 CAP | Refills: 0 | Status: SHIPPED | OUTPATIENT
Start: 2020-03-12 | End: 2020-04-28 | Stop reason: SDUPTHER

## 2020-04-20 ENCOUNTER — HOSPITAL ENCOUNTER (OUTPATIENT)
Dept: LAB | Facility: MEDICAL CENTER | Age: 70
End: 2020-04-20
Attending: INTERNAL MEDICINE
Payer: MEDICARE

## 2020-04-20 DIAGNOSIS — D64.9 ANEMIA, UNSPECIFIED TYPE: ICD-10-CM

## 2020-04-20 DIAGNOSIS — E11.9 CONTROLLED TYPE 2 DIABETES MELLITUS WITHOUT COMPLICATION, WITHOUT LONG-TERM CURRENT USE OF INSULIN (HCC): ICD-10-CM

## 2020-04-20 DIAGNOSIS — E78.5 DYSLIPIDEMIA: ICD-10-CM

## 2020-04-20 LAB
ALBUMIN SERPL BCP-MCNC: 4.2 G/DL (ref 3.2–4.9)
ALBUMIN/GLOB SERPL: 1.8 G/DL
ALP SERPL-CCNC: 63 U/L (ref 30–99)
ALT SERPL-CCNC: 25 U/L (ref 2–50)
ANION GAP SERPL CALC-SCNC: 13 MMOL/L (ref 7–16)
AST SERPL-CCNC: 32 U/L (ref 12–45)
BASOPHILS # BLD AUTO: 0.9 % (ref 0–1.8)
BASOPHILS # BLD: 0.07 K/UL (ref 0–0.12)
BILIRUB SERPL-MCNC: 0.5 MG/DL (ref 0.1–1.5)
BUN SERPL-MCNC: 19 MG/DL (ref 8–22)
CALCIUM SERPL-MCNC: 9.2 MG/DL (ref 8.5–10.5)
CHLORIDE SERPL-SCNC: 102 MMOL/L (ref 96–112)
CHOLEST SERPL-MCNC: 146 MG/DL (ref 100–199)
CO2 SERPL-SCNC: 25 MMOL/L (ref 20–33)
CREAT SERPL-MCNC: 0.87 MG/DL (ref 0.5–1.4)
CREAT UR-MCNC: 135.02 MG/DL
EOSINOPHIL # BLD AUTO: 0.22 K/UL (ref 0–0.51)
EOSINOPHIL NFR BLD: 2.9 % (ref 0–6.9)
ERYTHROCYTE [DISTWIDTH] IN BLOOD BY AUTOMATED COUNT: 41.9 FL (ref 35.9–50)
FASTING STATUS PATIENT QL REPORTED: NORMAL
GLOBULIN SER CALC-MCNC: 2.4 G/DL (ref 1.9–3.5)
GLUCOSE SERPL-MCNC: 129 MG/DL (ref 65–99)
HCT VFR BLD AUTO: 41.8 % (ref 42–52)
HDLC SERPL-MCNC: 37 MG/DL
HGB BLD-MCNC: 14 G/DL (ref 14–18)
IMM GRANULOCYTES # BLD AUTO: 0.01 K/UL (ref 0–0.11)
IMM GRANULOCYTES NFR BLD AUTO: 0.1 % (ref 0–0.9)
LDLC SERPL CALC-MCNC: 79 MG/DL
LYMPHOCYTES # BLD AUTO: 2.64 K/UL (ref 1–4.8)
LYMPHOCYTES NFR BLD: 34.7 % (ref 22–41)
MCH RBC QN AUTO: 30.7 PG (ref 27–33)
MCHC RBC AUTO-ENTMCNC: 33.5 G/DL (ref 33.7–35.3)
MCV RBC AUTO: 91.7 FL (ref 81.4–97.8)
MICROALBUMIN UR-MCNC: <1.2 MG/DL
MICROALBUMIN/CREAT UR: NORMAL MG/G (ref 0–30)
MONOCYTES # BLD AUTO: 0.66 K/UL (ref 0–0.85)
MONOCYTES NFR BLD AUTO: 8.7 % (ref 0–13.4)
NEUTROPHILS # BLD AUTO: 4 K/UL (ref 1.82–7.42)
NEUTROPHILS NFR BLD: 52.7 % (ref 44–72)
NRBC # BLD AUTO: 0 K/UL
NRBC BLD-RTO: 0 /100 WBC
PLATELET # BLD AUTO: 196 K/UL (ref 164–446)
PMV BLD AUTO: 11.7 FL (ref 9–12.9)
POTASSIUM SERPL-SCNC: 3.6 MMOL/L (ref 3.6–5.5)
PROT SERPL-MCNC: 6.6 G/DL (ref 6–8.2)
RBC # BLD AUTO: 4.56 M/UL (ref 4.7–6.1)
SODIUM SERPL-SCNC: 140 MMOL/L (ref 135–145)
TRIGL SERPL-MCNC: 150 MG/DL (ref 0–149)
WBC # BLD AUTO: 7.6 K/UL (ref 4.8–10.8)

## 2020-04-20 PROCEDURE — 80053 COMPREHEN METABOLIC PANEL: CPT

## 2020-04-20 PROCEDURE — 83036 HEMOGLOBIN GLYCOSYLATED A1C: CPT | Mod: GA

## 2020-04-20 PROCEDURE — 85025 COMPLETE CBC W/AUTO DIFF WBC: CPT

## 2020-04-20 PROCEDURE — 36415 COLL VENOUS BLD VENIPUNCTURE: CPT

## 2020-04-20 PROCEDURE — 80061 LIPID PANEL: CPT

## 2020-04-20 PROCEDURE — 82570 ASSAY OF URINE CREATININE: CPT

## 2020-04-20 PROCEDURE — 82043 UR ALBUMIN QUANTITATIVE: CPT

## 2020-04-21 LAB
EST. AVERAGE GLUCOSE BLD GHB EST-MCNC: 143 MG/DL
HBA1C MFR BLD: 6.6 % (ref 0–5.6)

## 2020-04-28 NOTE — PROGRESS NOTES
CHIEF COMPLAINT  DM2, labs    HPI  Abiel Vaughn is a 69 y.o. male who presents today for the following     DM2, controlled  Internal course  HBA1C 6.6 (H) 2020 11:13 AM   HBA1C 6.2 (H) 2019 11:28 AM   HBA1C 6.4 (H) 2019 11:17 AM     Onset/  Diabetes education: ordered     Medications:   · Metformin: 750 mg daily  · ACE/ARB: no  · Statin: atorvstatin  · ASA: yes  Compliant with medications: yes  Checking feet daily/wear soft socks/shoes: advised     Diabetes ABCDE TARGETS  · Blood Pressure, goal < 140/90: yes  · Cholesterol-Lipid Panel: Controlled  · Dysalbuminuria: Pending     Diet: Regular  Exercise: Insufficient  BMI: 30     DM complications:  · Peripheral neuropathy: No numbness or tingling in feet.  · Retinopathy:   Last eye exam: . No retinopathy.   · Nephropathy:   No  · CVS: No CAD.  · GI: No gastropathy.     FH of DM: mother, ended up with insulin     Blood pressure  Controlled, no medications     Dyslipidemia  Statin: Atorvastatin, 60 mg daily, taking as prescribed.   - No muscle weakness, cramps, nausea,abdominal discomfort.   Diet / exercise / BMI: as above.   FH: neg     GERD/anemia  On omeprazole, 40 mg QD; takes medication as prescribed, that controls sx.   B12/folate were normal.  EGD from 2019 showed possible mild Britt's and stomach polyps were removed.     Denies:   - heartburn, epigastric pain.   - nausea, vomiting, or diarrhea  - dysphagia  - abnormal cough  - blood in the stool or dark tarry stools.  - early satiety  - tobacco use.  Labs showed anemia, with normal iron on studies, B12/folate.  Pending FIT.      Hypovitaminosis D  The patient had low vitamin D level.  Vitamin D supplement: 2000 U QD OTC    Insomnia  The patient has a trouble to go and stay asleep.   Used Ambien, 10 mg at bedtime.   Discussed sleep hygiene:   - Go to bed and wake up at consistent times whether work/school day or not.   - Keep room dark, quiet, and comfortable.   - Don't  have naps.  - Increase exposure to sunlight during awake times and avoid bright lights before going to sleep.   - Avoid stimulant or caffeine use more than 4 hours after wake time.   - Avoid meal or exercise 2-3 hours prior to going to bed.     Body mass index is 30, LE swelling  Diet: regular  Exercise: insufficient  No temperature intolerance. No change in hair/skin quality, BMs.   No HTN, buffalo hump, purple striae, flushing.  FH of obesity: unknown  Complains of intermittent LE swelling, used HCTZ occasionally, as needed, not recently.    Reviewed PMH, PSH, FH, SH, ALL, HCM/IMM, no changes  Reviewed MEDS, no changes    Patient Active Problem List    Diagnosis Date Noted   • Controlled type 2 diabetes mellitus without complication, without long-term current use of insulin ([facility]) 08/26/2019   • Seasonal allergies 08/26/2019   • HSV infection 08/26/2019   • Aortic valve disorder 10/29/2018   • Gastroesophageal reflux disease 10/03/2018   • Primary insomnia 10/03/2018   • Seasonal allergic rhinitis 10/03/2018   • Dyslipidemia 10/03/2018   • Primary osteoarthritis involving multiple joints 10/03/2018   • Decreased hearing, right 10/03/2018     CURRENT MEDICATIONS  Current Outpatient Medications   Medication Sig Dispense Refill   • hydrochlorothiazide (MICROZIDE) 12.5 MG capsule TAKE 1 CAPSULE DAILY 90 Cap 0   • rosuvastatin (CRESTOR) 20 MG Tab TAKE 1 TABLET EVERY        EVENING. REPLACES          ATORVASTATIN 90 Tab 1   • pantoprazole (PROTONIX) 40 MG Tablet Delayed Response Take 1 Tab by mouth every day. 90 Tab 3   • metFORMIN ER (GLUCOPHAGE XR) 750 MG TABLET SR 24 HR Take 1 Tab by mouth every day. 90 Tab 1   • diclofenac EC (VOLTAREN) 75 MG Tablet Delayed Response Take 1 Tab by mouth 2 times a day. 180 Tab 0   • acetaminophen (TYLENOL) 500 MG Tab Take 2 Tabs by mouth 3 times a day as needed (pain.  Do not exceed 3000 mg of total acetaminophen per day). 180 Tab 6   • valACYclovir (VALTREX) 500 MG Tab Take 1 Tab by  mouth every day. 90 Tab 3   • Pseudoephedrine HCl (SUDAFED PO) Take 10 mg by mouth as needed.     • PSYLLIUM PO Take  by mouth.     • Cholecalciferol (VITAMIN D3) 2000 UNIT Cap Take  by mouth 2 Times a Day.     • Multiple Vitamins-Minerals (MULTIVITAMIN ADULT) Tab Take  by mouth.     • furosemide (LASIX) 20 MG Tab      • montelukast (SINGULAIR) 10 MG Tab      • aspirin (ASA) 81 MG Chew Tab chewable tablet Take 81 mg by mouth every day.     • SODIUM FLUORIDE, DENTAL GEL, (DENTAGEL) 1.1 % Gel by dental route.     • fluocinonide (LIDEX) 0.05 % Cream Apply  to affected area(s) 2 times a day.     • fluorouracil (EFUDEX) 5 % cream Apply  to affected area(s) 2 times a day.       No current facility-administered medications for this visit.      ALLERGIES  Allergies: Patient has no known allergies.  PAST MEDICAL HISTORY  Past Medical History:   Diagnosis Date   • Dyslipidemia    • GERD (gastroesophageal reflux disease)    • Insomnia      SURGICAL HISTORY  He  has a past surgical history that includes appendectomy and tonsillectomy.  SOCIAL HISTORY  Social History     Tobacco Use   • Smoking status: Never Smoker   • Smokeless tobacco: Never Used   Substance Use Topics   • Alcohol use: Yes   • Drug use: No     Social History     Social History Narrative   • Not on file     FAMILY HISTORY  Family History   Problem Relation Age of Onset   • Cancer Mother    • Diabetes Mother    • Heart Disease Father    • Hyperlipidemia Neg Hx      Family Status   Relation Name Status   • Mo     • Fa     • Neg Hx  (Not Specified)       ROS   Constitutional: Negative for fever, chills, fatigue.  HENT: Negative for congestion, sore throat.  Eyes: Negative for vision problems.   Respiratory: Negative for cough, shortness of breath.  Cardiovascular: Negative for chest pain, palpitations.   Gastrointestinal: Negative for heartburn, nausea, abdominal pain.   Genitourinary: Negative for dysuria.  Musculoskeletal: Negative for  significant myalgia, back and joint pain.   Skin: Negative for rash.   Neuro: Negative for dizziness, weakness and headaches.   Endo/Heme/Allergies: Does not bruise/bleed easily.   Psychiatric/Behavioral: Negative for depression.    PHYSICAL EXAM   There were no vitals taken for this visit. There is no height or weight on file to calculate BMI.  General:  NAD, well appearing  HEENT:   NC/AT, PERRLA, EOMI, TMs are clear. Oropharyngeal mucosa is pink,  without lesions;  no cervical / supraclavicular  lymphadenopathy, no thyromegaly.    Cardiovascular: RRR.   No m/r/g.       Lungs:   CTAB, no w/r/r, no respiratory distress.  Abdomen: Soft, NT/ND; no hepatosplenomegaly.  Extremities:  2+ DP and radial pulses bilaterally.  No c/c/e.   Skin:  Warm, dry.  No erythema. No rash.   Neurologic: Alert & oriented x 3. CN II-XII grossly intact. No focal deficits.  Psychiatric:  Affect normal, mood normal, judgment normal.    Labs     Labs are reviewed and discussed with a patient  Lab Results   Component Value Date/Time    CHOLSTRLTOT 146 04/20/2020 11:13 AM    LDL 79 04/20/2020 11:13 AM    HDL 37 (A) 04/20/2020 11:13 AM    TRIGLYCERIDE 150 (H) 04/20/2020 11:13 AM       Lab Results   Component Value Date/Time    SODIUM 140 04/20/2020 11:13 AM    POTASSIUM 3.6 04/20/2020 11:13 AM    CHLORIDE 102 04/20/2020 11:13 AM    CO2 25 04/20/2020 11:13 AM    GLUCOSE 129 (H) 04/20/2020 11:13 AM    BUN 19 04/20/2020 11:13 AM    CREATININE 0.87 04/20/2020 11:13 AM     Lab Results   Component Value Date/Time    ALKPHOSPHAT 63 04/20/2020 11:13 AM    ASTSGOT 32 04/20/2020 11:13 AM    ALTSGPT 25 04/20/2020 11:13 AM    TBILIRUBIN 0.5 04/20/2020 11:13 AM      Lab Results   Component Value Date/Time    HBA1C 6.6 (H) 04/20/2020 11:13 AM    HBA1C 6.2 (H) 12/16/2019 11:28 AM    HBA1C 6.4 (H) 08/19/2019 11:17 AM     No results found for: TSH  No results found for: FREET4    Lab Results   Component Value Date/Time    WBC 7.6 04/20/2020 11:13 AM    RBC 4.56  (L) 04/20/2020 11:13 AM    HEMOGLOBIN 14.0 04/20/2020 11:13 AM    HEMATOCRIT 41.8 (L) 04/20/2020 11:13 AM    MCV 91.7 04/20/2020 11:13 AM    MCH 30.7 04/20/2020 11:13 AM    MCHC 33.5 (L) 04/20/2020 11:13 AM    MPV 11.7 04/20/2020 11:13 AM    NEUTSPOLYS 52.70 04/20/2020 11:13 AM    LYMPHOCYTES 34.70 04/20/2020 11:13 AM    MONOCYTES 8.70 04/20/2020 11:13 AM    EOSINOPHILS 2.90 04/20/2020 11:13 AM    BASOPHILS 0.90 04/20/2020 11:13 AM      Imaging     None    Assessment and Plan     Abiel Vaughn is a 69 y.o. male    1. Controlled type 2 diabetes mellitus without complication, without long-term current use of insulin ([facility])  Controlled, continue current treatment  - Comp Metabolic Panel; Future  - HEMOGLOBIN A1C; Future  - MICROALBUMIN CREAT RATIO URINE; Future  - metFORMIN ER (GLUCOPHAGE XR) 750 MG TABLET SR 24 HR; Take 1 Tab by mouth every day.  Dispense: 90 Tab; Refill: 1    2. Dyslipidemia  Controlled, continue current treatment  - Comp Metabolic Panel; Future  - Lipid Profile; Future  - rosuvastatin (CRESTOR) 20 MG Tab; TAKE 1 TABLET EVERY EVENING. REPLACES  ATORVASTATIN  Dispense: 90 Tab; Refill: 1    3. Gastroesophageal reflux disease, esophagitis presence not specified  Controlled, continue current treatment    4. Anemia, unspecified type  Stable, follow-up labs  - CBC WITH DIFFERENTIAL; Future  - OCCULT BLOOD FECES IMMUNOASSAY; Future    5. Hypovitaminosis D  Advised to continue current supplement, follow-up labs  - VITAMIN D,25 HYDROXY; Future    6. Primary insomnia  Controlled, continue current treatment  - zolpidem (AMBIEN) 10 MG Tab; Take 1 Tab by mouth at bedtime as needed for Sleep for up to 90 days.  Dispense: 30 Tab; Refill: 2    7. Leg swelling  No current symptoms, continue HCTZ as needed  - hydrochlorothiazide (MICROZIDE) 12.5 MG capsule; TAKE 1 CAPSULE DAILY  Dispense: 90 Cap; Refill: 0    8. Obesity (BMI 30.0-34.9)  - OBESITY COUNSELING (No Charge): Patient identified as having weight  management issue.  Appropriate orders and counseling given.    9. Screening for malignant neoplasm of prostate  - PROSTATE SPECIFIC AG SCREENING; Future    Counseling:   - Smoking:  Nonsmoker    Followup: In 4 months, annual and labs    All questions are answered.    Please note that this dictation was created using voice recognition software, and that there might be errors of elisabeth and possibly content.

## 2020-04-29 ENCOUNTER — OFFICE VISIT (OUTPATIENT)
Dept: MEDICAL GROUP | Age: 70
End: 2020-04-29
Payer: MEDICARE

## 2020-04-29 VITALS
HEIGHT: 75 IN | WEIGHT: 243.4 LBS | SYSTOLIC BLOOD PRESSURE: 110 MMHG | HEART RATE: 68 BPM | BODY MASS INDEX: 30.26 KG/M2 | OXYGEN SATURATION: 96 % | DIASTOLIC BLOOD PRESSURE: 58 MMHG | TEMPERATURE: 97.9 F

## 2020-04-29 DIAGNOSIS — E55.9 HYPOVITAMINOSIS D: ICD-10-CM

## 2020-04-29 DIAGNOSIS — K21.9 GASTROESOPHAGEAL REFLUX DISEASE, ESOPHAGITIS PRESENCE NOT SPECIFIED: ICD-10-CM

## 2020-04-29 DIAGNOSIS — E78.5 DYSLIPIDEMIA: ICD-10-CM

## 2020-04-29 DIAGNOSIS — Z12.5 SCREENING FOR MALIGNANT NEOPLASM OF PROSTATE: ICD-10-CM

## 2020-04-29 DIAGNOSIS — E11.9 CONTROLLED TYPE 2 DIABETES MELLITUS WITHOUT COMPLICATION, WITHOUT LONG-TERM CURRENT USE OF INSULIN (HCC): ICD-10-CM

## 2020-04-29 DIAGNOSIS — E66.9 OBESITY (BMI 30.0-34.9): ICD-10-CM

## 2020-04-29 DIAGNOSIS — D64.9 ANEMIA, UNSPECIFIED TYPE: ICD-10-CM

## 2020-04-29 DIAGNOSIS — F51.01 PRIMARY INSOMNIA: ICD-10-CM

## 2020-04-29 DIAGNOSIS — M79.89 LEG SWELLING: ICD-10-CM

## 2020-04-29 PROCEDURE — 99214 OFFICE O/P EST MOD 30 MIN: CPT | Performed by: INTERNAL MEDICINE

## 2020-04-29 RX ORDER — METFORMIN HYDROCHLORIDE 750 MG/1
750 TABLET, EXTENDED RELEASE ORAL DAILY
Qty: 90 TAB | Refills: 1 | Status: SHIPPED | OUTPATIENT
Start: 2020-04-29 | End: 2020-08-17 | Stop reason: SDUPTHER

## 2020-04-29 RX ORDER — HYDROCHLOROTHIAZIDE 12.5 MG/1
CAPSULE, GELATIN COATED ORAL
Qty: 90 CAP | Refills: 0 | Status: SHIPPED | OUTPATIENT
Start: 2020-04-29 | End: 2020-04-29

## 2020-04-29 RX ORDER — ROSUVASTATIN CALCIUM 20 MG/1
TABLET, COATED ORAL
Qty: 90 TAB | Refills: 1 | Status: SHIPPED | OUTPATIENT
Start: 2020-04-29 | End: 2020-08-17 | Stop reason: SDUPTHER

## 2020-04-29 RX ORDER — ZOLPIDEM TARTRATE 10 MG/1
10 TABLET ORAL NIGHTLY PRN
Qty: 30 TAB | Refills: 2 | Status: SHIPPED
Start: 2020-04-29 | End: 2020-08-17 | Stop reason: SDUPTHER

## 2020-04-29 ASSESSMENT — FIBROSIS 4 INDEX: FIB4 SCORE: 2.32

## 2020-04-29 ASSESSMENT — PATIENT HEALTH QUESTIONNAIRE - PHQ9: CLINICAL INTERPRETATION OF PHQ2 SCORE: 0

## 2020-05-05 ENCOUNTER — HOSPITAL ENCOUNTER (OUTPATIENT)
Facility: MEDICAL CENTER | Age: 70
End: 2020-05-05
Attending: INTERNAL MEDICINE
Payer: MEDICARE

## 2020-05-05 PROCEDURE — 82274 ASSAY TEST FOR BLOOD FECAL: CPT

## 2020-05-12 DIAGNOSIS — D64.9 ANEMIA, UNSPECIFIED TYPE: ICD-10-CM

## 2020-05-14 LAB — HEMOCCULT STL QL IA: NEGATIVE

## 2020-07-23 ENCOUNTER — OFFICE VISIT (OUTPATIENT)
Dept: PHYSICAL MEDICINE AND REHAB | Facility: MEDICAL CENTER | Age: 70
End: 2020-07-23
Payer: MEDICARE

## 2020-07-23 VITALS
HEIGHT: 74 IN | WEIGHT: 240 LBS | HEART RATE: 62 BPM | SYSTOLIC BLOOD PRESSURE: 110 MMHG | BODY MASS INDEX: 30.8 KG/M2 | DIASTOLIC BLOOD PRESSURE: 58 MMHG | TEMPERATURE: 97.6 F | OXYGEN SATURATION: 97 %

## 2020-07-23 DIAGNOSIS — M54.50 CHRONIC BILATERAL LOW BACK PAIN, UNSPECIFIED WHETHER SCIATICA PRESENT: ICD-10-CM

## 2020-07-23 DIAGNOSIS — M47.816 LUMBAR SPONDYLOSIS: ICD-10-CM

## 2020-07-23 DIAGNOSIS — M16.0 BILATERAL HIP JOINT ARTHRITIS: ICD-10-CM

## 2020-07-23 DIAGNOSIS — G89.29 CHRONIC BILATERAL LOW BACK PAIN, UNSPECIFIED WHETHER SCIATICA PRESENT: ICD-10-CM

## 2020-07-23 DIAGNOSIS — M54.16 LUMBAR RADICULOPATHY: ICD-10-CM

## 2020-07-23 PROCEDURE — 99214 OFFICE O/P EST MOD 30 MIN: CPT | Performed by: PHYSICAL MEDICINE & REHABILITATION

## 2020-07-23 ASSESSMENT — PATIENT HEALTH QUESTIONNAIRE - PHQ9
CLINICAL INTERPRETATION OF PHQ2 SCORE: 2
5. POOR APPETITE OR OVEREATING: 0 - NOT AT ALL
SUM OF ALL RESPONSES TO PHQ QUESTIONS 1-9: 8

## 2020-07-23 ASSESSMENT — FIBROSIS 4 INDEX: FIB4 SCORE: 2.35

## 2020-07-23 ASSESSMENT — PAIN SCALES - GENERAL: PAINLEVEL: 6=MODERATE PAIN

## 2020-07-23 NOTE — PATIENT INSTRUCTIONS
Your procedure will be at the [facility] special procedure suite.    [facility] [place] [address]       PRE-PROCEDURE INSTRUCTIONS  You may take your regular medications except:   · No Anti-inflammatories 5 days prior to your procedure. Anti-inflammatories include medicines such as  ibuprofen (Motrin, Advil), Excedrin, Naproxen (Aleve, Anaprox, Naprelan, Naprosyn), Celecoxib (Celebrex), Diclofenac (Voltaren-XR tab), and Meloxicam (Mobic).   · No Glucophage or Metformin 24 hours before your procedure. You may resume next day after your procedure.  · Call the physiatry office if you are taking or prescribed anti-biotics within five days of procedure.  · Please ask provider if you are taking any new diabetes medication.  · CONTINUE TAKING BLOOD PRESSURE MEDICATIONS AS PRESCRIBED.  · Pain medications will not be prescribed on the procedure day. Procedural pain medication may be used by your provider   · Call your doctor's office performing the procedure if you have a fever, chills, rash or new illness prior to your procedure    Anticoagulation/antiplatelet medications  No Blood thinning medications such as Coumadin or Plavix 5 days prior to procedure unless your doctor said to continue these medications. Call your doctor if a new medication is prescribed in this class.     Restrictions for eating before procedure:   · If you are getting procedural sedation, then do not eat to for 8 hours prior to procedure appointment time. Do not drink fluids for four hours prior to your procedure time.   · If you are not having procedural sedation, then Skip the meal prior to your procedure. If you have a morning procedure then skip breakfast. If you have an afternoon procedure then skip lunch.   · You may drink clear liquids up to 2 hours prior to your procedure  · You must have a  the day of procedure to accompany you home.      POST PROCEDURE INSTRUCTIONS   · No heavy lifting, strenuous bending or  strenuous exercise for 3 days after your procedure.  · No hot tubs, baths, swimming for 3 days after your procedure  · You can remove the bandage the day after the procedure.  · IF YOU RECEIVED A DIAGNOSTIC PROCEDURE (SUCH AS A MEDIAL BRANCH BLOCK), PLEASE NOTE THAT WE DO EXPECT THIS INJECTION TO WEAR OFF.  IT IS IMPORTANT TO COMPLETE THE PAIN DIARY AND LIST THE PAIN SCORE ONLY FOR THE REGION WHERE THE PROCEDURE WAS AND BRING THIS TO YOUR FOLLOW UP VISIT.  · IF YOU EXPERIENCE PROLONGED WEAKNESS LONGER THAN ONE DAY, BOWEL OR BLADDER INCONTINENCE THEN PLEASE CALL THE PHYSIATRY OFFICE.  · Your leg may feel heavy, weak and numb for up to 1-2 days. Be very careful walking.   ·  You may resume normal activities 3 days after procedure.

## 2020-07-23 NOTE — PROGRESS NOTES
Follow up patient note  Interventional spine and sports physiatry, Physical medicine rehabilitation      Chief complaint:   Chief Complaint   Patient presents with   • Follow-Up     Back Pain          HISTORY    Please see new patient note dated  by Dr Dacosta,  for more details.     HPI  Patient identification: Abiel Vaughn 70 y.o. male  With Diagnoses of Lumbar spondylosis symptomatically right worse than left, Bilateral hip joint arthritis Right worse than left, Lumbar radiculopathy, previously treated with epidural and no signs of lumbar radiculopathy at this point, and Chronic bilateral low back pain, unspecified whether sciatica present were pertinent to this visit.     Bicycles for about one hour per day on a stationary bike.     He is now taking diclofenac 75mg BID. He understands the risks but this was more effective for his pain.     Pain is worse with lumbar extension and improves with lumbar flexion. Axial low back pain. Non radiating. 6/10 in intensity. Denies numbness. He can only walk for 2-3 aisles in a grocery store. If he has a grocery cart then he has unlimited walking.          ROS Red Flags :   -Fever, Chills, Sweats: Denies  Involuntary Weight Loss: Denies  Bowel/Bladder Incontinence: Denies  Saddle Anesthesia: Denies        PMHx:   Past Medical History:   Diagnosis Date   • Dyslipidemia    • GERD (gastroesophageal reflux disease)    • Insomnia        PSHx:   Past Surgical History:   Procedure Laterality Date   • APPENDECTOMY     • TONSILLECTOMY         Family history   Family History   Problem Relation Age of Onset   • Cancer Mother    • Diabetes Mother    • Heart Disease Father    • Hyperlipidemia Neg Hx          Medications:   Outpatient Medications Marked as Taking for the 7/23/20 encounter (Office Visit) with Julito Dacosta M.D.   Medication Sig Dispense Refill   • DICLOFENAC SODIUM PO Take 75 mg by mouth 2 times a day.     • rosuvastatin (CRESTOR) 20 MG Tab TAKE 1 TABLET EVERY  EVENING. REPLACES  ATORVASTATIN 90 Tab 1   • metFORMIN ER (GLUCOPHAGE XR) 750 MG TABLET SR 24 HR Take 1 Tab by mouth every day. 90 Tab 1   • zolpidem (AMBIEN) 10 MG Tab Take 1 Tab by mouth at bedtime as needed for Sleep for up to 90 days. 30 Tab 2   • pantoprazole (PROTONIX) 40 MG Tablet Delayed Response Take 1 Tab by mouth every day. 90 Tab 3   • Cholecalciferol (VITAMIN D3) 2000 UNIT Cap Take  by mouth 2 Times a Day.     • Multiple Vitamins-Minerals (MULTIVITAMIN ADULT) Tab Take  by mouth.          Current Outpatient Medications on File Prior to Visit   Medication Sig Dispense Refill   • DICLOFENAC SODIUM PO Take 75 mg by mouth 2 times a day.     • rosuvastatin (CRESTOR) 20 MG Tab TAKE 1 TABLET EVERY EVENING. REPLACES  ATORVASTATIN 90 Tab 1   • metFORMIN ER (GLUCOPHAGE XR) 750 MG TABLET SR 24 HR Take 1 Tab by mouth every day. 90 Tab 1   • zolpidem (AMBIEN) 10 MG Tab Take 1 Tab by mouth at bedtime as needed for Sleep for up to 90 days. 30 Tab 2   • pantoprazole (PROTONIX) 40 MG Tablet Delayed Response Take 1 Tab by mouth every day. 90 Tab 3   • Cholecalciferol (VITAMIN D3) 2000 UNIT Cap Take  by mouth 2 Times a Day.     • Multiple Vitamins-Minerals (MULTIVITAMIN ADULT) Tab Take  by mouth.     • acetaminophen (TYLENOL) 500 MG Tab Take 2 Tabs by mouth 3 times a day as needed (pain.  Do not exceed 3000 mg of total acetaminophen per day). 180 Tab 6   • aspirin (ASA) 81 MG Chew Tab chewable tablet Take 81 mg by mouth every day.       No current facility-administered medications on file prior to visit.          Allergies:   No Known Allergies    Social Hx:   Social History     Socioeconomic History   • Marital status: Single     Spouse name: Not on file   • Number of children: Not on file   • Years of education: Not on file   • Highest education level: Not on file   Occupational History   • Not on file   Social Needs   • Financial resource strain: Not on file   • Food insecurity     Worry: Not on file     Inability: Not  "on file   • Transportation needs     Medical: Not on file     Non-medical: Not on file   Tobacco Use   • Smoking status: Never Smoker   • Smokeless tobacco: Never Used   Substance and Sexual Activity   • Alcohol use: Yes   • Drug use: No   • Sexual activity: Not on file   Lifestyle   • Physical activity     Days per week: Not on file     Minutes per session: Not on file   • Stress: Not on file   Relationships   • Social connections     Talks on phone: Not on file     Gets together: Not on file     Attends Holiness service: Not on file     Active member of club or organization: Not on file     Attends meetings of clubs or organizations: Not on file     Relationship status: Not on file   • Intimate partner violence     Fear of current or ex partner: Not on file     Emotionally abused: Not on file     Physically abused: Not on file     Forced sexual activity: Not on file   Other Topics Concern   •  Service Yes   • Blood Transfusions No   • Caffeine Concern No   • Occupational Exposure No   • Hobby Hazards No   • Sleep Concern Yes   • Stress Concern No   • Weight Concern Yes   • Special Diet No   • Back Care No   • Exercise Yes   • Bike Helmet No   • Seat Belt Yes   • Self-Exams Yes   Social History Narrative   • Not on file         EXAMINATION     Physical Exam:   Vitals: /58 (BP Location: Left arm, Patient Position: Sitting, BP Cuff Size: Adult)   Pulse 62   Temp 36.4 °C (97.6 °F) (Temporal)   Ht 1.88 m (6' 2\")   Wt 108.9 kg (240 lb)   SpO2 97%     Constitutional:   Body Habitus: Body mass index is 30.81 kg/m².  Cooperation: Fully cooperates with exam  Appearance: Well-groomed no disheveled    Respiratory-  breathing comfortable on room air, no audible wheezing  Cardiovascular- capillary refills less than 2 seconds. No lower extremity edema is noted.   Psychiatric- alert and oriented ×3. Normal affect.      MSK/NEURO    There are no signs of infection around the injection sites.   full  active " range of motion with flexion, lateral flexion, and rotation bilaterally.   There is decreased active range of motion with lumbar extension.    There is  pain with lumbar extension.   There is pain with facet loading maneuver (extension rotation) with axial low back pain on the BILATERAL side(s), right worse than left    Palpation:   No tenderness to palpation in midline at T1-T12 levels. No tenderness to palpation in the left and right of the midline T1-L5, NEGATIVE for tenderness to palpation to the para-midline region in the lower lumbar levels.  palpation over SI joint: negative bilaterally    palpation in hip or over the greater trochanters: negative bilaterally      Lumbar spine Special tests  Neuro tension  Straight leg test negative bilaterally    Slump test negative bilaterally      Key points for the international standards for neurological classification of spinal cord injury (ISNCSCI) to light touch.     Dermatome R L                                      L2 2 2   L3 2 2   L4 2 2   L5 2 2   S1 2 2   S2 2 2         Motor Exam Lower Extremities    ? Myotome R L   Hip flexion L2 5 5   Knee extension L3 5 5   Ankle dorsiflexion L4 5 5   Toe extension L5 5 5   Ankle plantarflexion S1 5 5           MEDICAL DECISION MAKING    DATA    Labs:   Lab Results   Component Value Date/Time    SODIUM 140 04/20/2020 11:13 AM    POTASSIUM 3.6 04/20/2020 11:13 AM    CHLORIDE 102 04/20/2020 11:13 AM    CO2 25 04/20/2020 11:13 AM    GLUCOSE 129 (H) 04/20/2020 11:13 AM    BUN 19 04/20/2020 11:13 AM    CREATININE 0.87 04/20/2020 11:13 AM        No results found for: PROTHROMBTM, INR     Lab Results   Component Value Date/Time    WBC 7.6 04/20/2020 11:13 AM    RBC 4.56 (L) 04/20/2020 11:13 AM    HEMOGLOBIN 14.0 04/20/2020 11:13 AM    HEMATOCRIT 41.8 (L) 04/20/2020 11:13 AM    MCV 91.7 04/20/2020 11:13 AM    MCH 30.7 04/20/2020 11:13 AM    MCHC 33.5 (L) 04/20/2020 11:13 AM    MPV 11.7 04/20/2020 11:13 AM    NEUTSPOLYS 52.70  04/20/2020 11:13 AM    LYMPHOCYTES 34.70 04/20/2020 11:13 AM    MONOCYTES 8.70 04/20/2020 11:13 AM    EOSINOPHILS 2.90 04/20/2020 11:13 AM    BASOPHILS 0.90 04/20/2020 11:13 AM        Lab Results   Component Value Date/Time    HBA1C 6.6 (H) 04/20/2020 11:13 AM          Imaging:   I personally reviewed following images    X-ray bilateral hips 2/6/2019.  Cam deformity of the bilateral femoral heads, arthritis present the bilateral hips.  This is consistent with hip impingement syndrome bilaterally.    MRI lumbar spine 2/6/2019  History of laminectomies.  Bilateral facet arthropathy L3-4, L4-5, L5-S1.  Worst L5-S1.  No significant central canal stenosis.  Moderate neuroforaminal stenosis L4-5, mild neuroforaminal stenosis L5-S1, mild to moderate left L5-S1 neuroforaminal stenosis, mild to moderate left L2-3 neuroforaminal stenosis.    I reviewed the following radiology reports                                        Results for orders placed during the hospital encounter of 02/06/19   MR-LUMBAR SPINE-W/O    Impression Multilevel degenerative disc disease, facet arthropathy and ligamentum flavum redundancy resulting in at most moderate foraminal and mild lateral recess stenoses.    Left hemilaminotomies                                X-ray hips 2/6/2019  Impression       1.  Convexity bilaterally at the femoral head/neck junction which can be associated with the clinical syndrome of femoroacetabular impingement    2.  Lumbar spine facet arthropathy and dextroconvex scoliosis                                                             Results for orders placed during the hospital encounter of 02/06/19   DX-LUMBAR SPINE-2 OR 3 VIEWS    Impression 1.  Dextroconvex scoliosis    2.  Multilevel facet arthropathy                                           DIAGNOSIS   Visit Diagnoses     ICD-10-CM   1. Lumbar spondylosis symptomatically right worse than left  M47.816   2. Bilateral hip joint arthritis Right worse than left  M16.0    3. Lumbar radiculopathy, previously treated with epidural and no signs of lumbar radiculopathy at this point  M54.16   4. Chronic bilateral low back pain, unspecified whether sciatica present  M54.5    G89.29         ASSESSMENT and PLAN:     Abiel Vaughn 70 y.o. male      Abiel was seen today for follow-up.    Diagnoses and all orders for this visit:    Lumbar spondylosis symptomatically right worse than left  Comments:  Not controlled.  Failed conservative treatments with medication management, home exercise program, physical therapy.  Orders:  -     REFERRAL TO PHYSICAL MEDICINE REHAB  -     REFERRAL TO PHYSICAL MEDICINE REHAB    Bilateral hip joint arthritis Right worse than left  Comments:  Stable    Lumbar radiculopathy, previously treated with epidural and no signs of lumbar radiculopathy at this point  Comments:  Stable    Chronic bilateral low back pain, unspecified whether sciatica present       We had a discussion about the patient's diclofenac.  He tried going off of this medication but his pain worsened significantly.  He did get improvement on diclofenac which is using 75 mg twice daily.  We discussed the risks of GI and renal side effects and he understands these risks and wished to continue on diclofenac which is reasonable for him.    Stop diclofenac 5 days prior to the procedure below and okay to resume the day after.     The patient is failed conservative treatments with medication management, home exercise program from the direction of physical therapy/physician.  I have ordered a RIGHT diagnostic medial branch block targeting the L3-4, L4-5 and L5-S1 facet joints #1 and #2.  Procedure #2 is only to be done if #1 is a positive block.    The risks benefits and alternatives to this procedure were discussed and the patient wishes to proceed with the procedure. Risks include but are not limited to damage to surrounding structures, infection, bleeding, worsening of pain which can be  permanent, weakness which can be permanent. Benefits include pain relief, improved function. Alternatives includes not doing the procedure.   If there are 2 positive blocks I would consider the patient for radiofrequency neurotomy of the previously blocked nerves.        Follow up: After the above diagnostic procedure    Thank you for allowing me to participate in the care of this patient. If you have any questions please not hesitate to contact me.          Please note that this dictation was created using voice recognition software. I have made every reasonable attempt to correct obvious errors but there may be errors of grammar and content that I may have overlooked prior to finalization of this note.      Julito Dacosta MD  Interventional Spine and Sports Physiatry  Physical Medicine and Rehabilitation  [facility]

## 2020-07-29 ENCOUNTER — PATIENT MESSAGE (OUTPATIENT)
Dept: MEDICAL GROUP | Age: 70
End: 2020-07-29

## 2020-07-29 DIAGNOSIS — B00.9 HSV INFECTION: ICD-10-CM

## 2020-07-29 RX ORDER — VALACYCLOVIR HYDROCHLORIDE 500 MG/1
500 TABLET, FILM COATED ORAL DAILY
Qty: 90 TAB | Refills: 0 | Status: SHIPPED | OUTPATIENT
Start: 2020-07-29 | End: 2020-10-15

## 2020-07-29 RX ORDER — DICLOFENAC SODIUM 75 MG/1
75 TABLET, DELAYED RELEASE ORAL 2 TIMES DAILY
Qty: 60 TAB | Refills: 0 | Status: ON HOLD | OUTPATIENT
Start: 2020-07-29 | End: 2020-10-12

## 2020-08-11 ENCOUNTER — HOSPITAL ENCOUNTER (OUTPATIENT)
Dept: LAB | Facility: MEDICAL CENTER | Age: 70
End: 2020-08-11
Attending: INTERNAL MEDICINE
Payer: MEDICARE

## 2020-08-11 DIAGNOSIS — Z12.5 SCREENING FOR MALIGNANT NEOPLASM OF PROSTATE: ICD-10-CM

## 2020-08-11 DIAGNOSIS — E78.5 DYSLIPIDEMIA: ICD-10-CM

## 2020-08-11 DIAGNOSIS — E55.9 HYPOVITAMINOSIS D: ICD-10-CM

## 2020-08-11 DIAGNOSIS — D64.9 ANEMIA, UNSPECIFIED TYPE: ICD-10-CM

## 2020-08-11 DIAGNOSIS — E11.9 CONTROLLED TYPE 2 DIABETES MELLITUS WITHOUT COMPLICATION, WITHOUT LONG-TERM CURRENT USE OF INSULIN (HCC): ICD-10-CM

## 2020-08-11 LAB
25(OH)D3 SERPL-MCNC: 52 NG/ML (ref 30–100)
ALBUMIN SERPL BCP-MCNC: 4.1 G/DL (ref 3.2–4.9)
ALBUMIN/GLOB SERPL: 1.8 G/DL
ALP SERPL-CCNC: 60 U/L (ref 30–99)
ALT SERPL-CCNC: 33 U/L (ref 2–50)
ANION GAP SERPL CALC-SCNC: 9 MMOL/L (ref 7–16)
AST SERPL-CCNC: 31 U/L (ref 12–45)
BASOPHILS # BLD AUTO: 1 % (ref 0–1.8)
BASOPHILS # BLD: 0.06 K/UL (ref 0–0.12)
BILIRUB SERPL-MCNC: 0.4 MG/DL (ref 0.1–1.5)
BUN SERPL-MCNC: 18 MG/DL (ref 8–22)
CALCIUM SERPL-MCNC: 9.1 MG/DL (ref 8.4–10.2)
CHLORIDE SERPL-SCNC: 106 MMOL/L (ref 96–112)
CHOLEST SERPL-MCNC: 139 MG/DL (ref 100–199)
CO2 SERPL-SCNC: 23 MMOL/L (ref 20–33)
CREAT SERPL-MCNC: 0.88 MG/DL (ref 0.5–1.4)
CREAT UR-MCNC: 207.29 MG/DL
EOSINOPHIL # BLD AUTO: 0.17 K/UL (ref 0–0.51)
EOSINOPHIL NFR BLD: 2.7 % (ref 0–6.9)
ERYTHROCYTE [DISTWIDTH] IN BLOOD BY AUTOMATED COUNT: 42.5 FL (ref 35.9–50)
EST. AVERAGE GLUCOSE BLD GHB EST-MCNC: 128 MG/DL
FASTING STATUS PATIENT QL REPORTED: NORMAL
GLOBULIN SER CALC-MCNC: 2.3 G/DL (ref 1.9–3.5)
GLUCOSE SERPL-MCNC: 122 MG/DL (ref 65–99)
HBA1C MFR BLD: 6.1 % (ref 0–5.6)
HCT VFR BLD AUTO: 40.2 % (ref 42–52)
HDLC SERPL-MCNC: 33 MG/DL
HGB BLD-MCNC: 13.7 G/DL (ref 14–18)
IMM GRANULOCYTES # BLD AUTO: 0.01 K/UL (ref 0–0.11)
IMM GRANULOCYTES NFR BLD AUTO: 0.2 % (ref 0–0.9)
LDLC SERPL CALC-MCNC: 84 MG/DL
LYMPHOCYTES # BLD AUTO: 2.2 K/UL (ref 1–4.8)
LYMPHOCYTES NFR BLD: 35.1 % (ref 22–41)
MCH RBC QN AUTO: 30.9 PG (ref 27–33)
MCHC RBC AUTO-ENTMCNC: 34.1 G/DL (ref 33.7–35.3)
MCV RBC AUTO: 90.7 FL (ref 81.4–97.8)
MICROALBUMIN UR-MCNC: <1.2 MG/DL
MICROALBUMIN/CREAT UR: NORMAL MG/G (ref 0–30)
MONOCYTES # BLD AUTO: 0.52 K/UL (ref 0–0.85)
MONOCYTES NFR BLD AUTO: 8.3 % (ref 0–13.4)
NEUTROPHILS # BLD AUTO: 3.31 K/UL (ref 1.82–7.42)
NEUTROPHILS NFR BLD: 52.7 % (ref 44–72)
NRBC # BLD AUTO: 0 K/UL
NRBC BLD-RTO: 0 /100 WBC
PLATELET # BLD AUTO: 162 K/UL (ref 164–446)
PMV BLD AUTO: 11.4 FL (ref 9–12.9)
POTASSIUM SERPL-SCNC: 3.9 MMOL/L (ref 3.6–5.5)
PROT SERPL-MCNC: 6.4 G/DL (ref 6–8.2)
PSA SERPL-MCNC: 2.95 NG/ML (ref 0–4)
RBC # BLD AUTO: 4.43 M/UL (ref 4.7–6.1)
SODIUM SERPL-SCNC: 138 MMOL/L (ref 135–145)
TRIGL SERPL-MCNC: 108 MG/DL (ref 0–149)
WBC # BLD AUTO: 6.3 K/UL (ref 4.8–10.8)

## 2020-08-11 PROCEDURE — 80053 COMPREHEN METABOLIC PANEL: CPT

## 2020-08-11 PROCEDURE — 85025 COMPLETE CBC W/AUTO DIFF WBC: CPT

## 2020-08-11 PROCEDURE — 80061 LIPID PANEL: CPT

## 2020-08-11 PROCEDURE — 83036 HEMOGLOBIN GLYCOSYLATED A1C: CPT | Mod: GA

## 2020-08-11 PROCEDURE — 82306 VITAMIN D 25 HYDROXY: CPT

## 2020-08-11 PROCEDURE — 82043 UR ALBUMIN QUANTITATIVE: CPT

## 2020-08-11 PROCEDURE — 82570 ASSAY OF URINE CREATININE: CPT

## 2020-08-11 PROCEDURE — 36415 COLL VENOUS BLD VENIPUNCTURE: CPT | Mod: GA

## 2020-08-11 PROCEDURE — 84153 ASSAY OF PSA TOTAL: CPT | Mod: GA

## 2020-08-13 ENCOUNTER — HOSPITAL ENCOUNTER (OUTPATIENT)
Facility: REHABILITATION | Age: 70
End: 2020-08-13
Attending: PHYSICAL MEDICINE & REHABILITATION | Admitting: PHYSICAL MEDICINE & REHABILITATION
Payer: MEDICARE

## 2020-08-17 ENCOUNTER — OFFICE VISIT (OUTPATIENT)
Dept: MEDICAL GROUP | Age: 70
End: 2020-08-17
Payer: MEDICARE

## 2020-08-17 VITALS
WEIGHT: 240.4 LBS | TEMPERATURE: 97.2 F | BODY MASS INDEX: 29.89 KG/M2 | OXYGEN SATURATION: 95 % | SYSTOLIC BLOOD PRESSURE: 110 MMHG | HEART RATE: 65 BPM | DIASTOLIC BLOOD PRESSURE: 50 MMHG | HEIGHT: 75 IN

## 2020-08-17 DIAGNOSIS — Z23 NEED FOR VACCINATION: ICD-10-CM

## 2020-08-17 DIAGNOSIS — M15.9 PRIMARY OSTEOARTHRITIS INVOLVING MULTIPLE JOINTS: ICD-10-CM

## 2020-08-17 DIAGNOSIS — J30.2 SEASONAL ALLERGIES: ICD-10-CM

## 2020-08-17 DIAGNOSIS — F51.01 PRIMARY INSOMNIA: ICD-10-CM

## 2020-08-17 DIAGNOSIS — H91.91 DECREASED HEARING, RIGHT: ICD-10-CM

## 2020-08-17 DIAGNOSIS — D64.9 ANEMIA, UNSPECIFIED TYPE: ICD-10-CM

## 2020-08-17 DIAGNOSIS — K21.9 GASTROESOPHAGEAL REFLUX DISEASE, ESOPHAGITIS PRESENCE NOT SPECIFIED: ICD-10-CM

## 2020-08-17 DIAGNOSIS — D69.6 THROMBOCYTOPENIA (HCC): ICD-10-CM

## 2020-08-17 DIAGNOSIS — Z00.00 HEALTH CARE MAINTENANCE: ICD-10-CM

## 2020-08-17 DIAGNOSIS — I35.9 AORTIC VALVE DISORDER: ICD-10-CM

## 2020-08-17 DIAGNOSIS — E78.5 DYSLIPIDEMIA: ICD-10-CM

## 2020-08-17 DIAGNOSIS — M79.89 LEG SWELLING: ICD-10-CM

## 2020-08-17 DIAGNOSIS — E66.9 OBESITY (BMI 30.0-34.9): ICD-10-CM

## 2020-08-17 DIAGNOSIS — B00.9 HSV INFECTION: ICD-10-CM

## 2020-08-17 DIAGNOSIS — E11.9 CONTROLLED TYPE 2 DIABETES MELLITUS WITHOUT COMPLICATION, WITHOUT LONG-TERM CURRENT USE OF INSULIN (HCC): ICD-10-CM

## 2020-08-17 DIAGNOSIS — Z00.00 MEDICARE ANNUAL WELLNESS VISIT, SUBSEQUENT: ICD-10-CM

## 2020-08-17 PROCEDURE — G0439 PPPS, SUBSEQ VISIT: HCPCS | Performed by: INTERNAL MEDICINE

## 2020-08-17 PROCEDURE — 99213 OFFICE O/P EST LOW 20 MIN: CPT | Mod: 25 | Performed by: INTERNAL MEDICINE

## 2020-08-17 PROCEDURE — G0009 ADMIN PNEUMOCOCCAL VACCINE: HCPCS | Performed by: INTERNAL MEDICINE

## 2020-08-17 PROCEDURE — 90732 PPSV23 VACC 2 YRS+ SUBQ/IM: CPT | Performed by: INTERNAL MEDICINE

## 2020-08-17 RX ORDER — METFORMIN HYDROCHLORIDE 750 MG/1
750 TABLET, EXTENDED RELEASE ORAL DAILY
Qty: 90 TAB | Refills: 1 | Status: SHIPPED | OUTPATIENT
Start: 2020-08-17 | End: 2020-10-01

## 2020-08-17 RX ORDER — ZOLPIDEM TARTRATE 10 MG/1
10 TABLET ORAL NIGHTLY PRN
Qty: 30 TAB | Refills: 2 | Status: SHIPPED | OUTPATIENT
Start: 2020-08-17 | End: 2020-11-17 | Stop reason: SDUPTHER

## 2020-08-17 RX ORDER — AMOXICILLIN 500 MG/1
CAPSULE ORAL
COMMUNITY
Start: 2020-05-28 | End: 2020-08-17

## 2020-08-17 RX ORDER — ZOLPIDEM TARTRATE 10 MG/1
TABLET ORAL
COMMUNITY
Start: 2002-01-01 | End: 2020-11-16

## 2020-08-17 RX ORDER — ROSUVASTATIN CALCIUM 20 MG/1
TABLET, COATED ORAL
Qty: 90 TAB | Refills: 1 | Status: SHIPPED | OUTPATIENT
Start: 2020-08-17 | End: 2020-11-16 | Stop reason: SDUPTHER

## 2020-08-17 SDOH — HEALTH STABILITY: MENTAL HEALTH: HOW OFTEN DO YOU HAVE A DRINK CONTAINING ALCOHOL?: MONTHLY OR LESS

## 2020-08-17 SDOH — HEALTH STABILITY: MENTAL HEALTH: HOW OFTEN DO YOU HAVE 6 OR MORE DRINKS ON ONE OCCASION?: NEVER

## 2020-08-17 SDOH — HEALTH STABILITY: MENTAL HEALTH: HOW MANY STANDARD DRINKS CONTAINING ALCOHOL DO YOU HAVE ON A TYPICAL DAY?: 1 OR 2

## 2020-08-17 ASSESSMENT — PATIENT HEALTH QUESTIONNAIRE - PHQ9
5. POOR APPETITE OR OVEREATING: 3 - NEARLY EVERY DAY
SUM OF ALL RESPONSES TO PHQ QUESTIONS 1-9: 7
CLINICAL INTERPRETATION OF PHQ2 SCORE: 1

## 2020-08-17 ASSESSMENT — FIBROSIS 4 INDEX: FIB4 SCORE: 2.4

## 2020-08-17 ASSESSMENT — PAIN SCALES - GENERAL: PAINLEVEL: NO PAIN

## 2020-08-17 ASSESSMENT — ACTIVITIES OF DAILY LIVING (ADL): BATHING_REQUIRES_ASSISTANCE: 0

## 2020-08-17 ASSESSMENT — ENCOUNTER SYMPTOMS: GENERAL WELL-BEING: EXCELLENT

## 2020-08-17 NOTE — PROGRESS NOTES
Chief Complaint   Patient presents with   • Annual Wellness Visit   DM, labs    HPI:  Abiel is a 70 y.o. here for Medicare Annual Wellness Visit    CHIEF COMPLAINT  DM2, labs     HPI  Abiel Vaughn is a 69 y.o. male who presents today for the following      DM2, controlled  Internval course  HBA1C 6.1 (H) 2020 11:38 AM   HBA1C 6.6 (H) 2020 11:13 AM   HBA1C 6.2 (H) 2019 11:28 AM     Onset/  Diabetes education: ordered     Medications:   · Metformin: 750 mg daily  · ACE/ARB: no  · Statin: atorvstatin  · ASA: yes  Compliant with medications: yes  Checking feet daily/wear soft socks/shoes: advised     Diabetes ABCDE TARGETS  · Blood Pressure, goal < 140/90: yes  · Cholesterol-Lipid Panel: Controlled  · Dysalbuminuria: Pending     Diet: Regular  Exercise: Insufficient  BMI: 30     DM complications:  · Peripheral neuropathy: No numbness or tingling in feet.  · Retinopathy:   Last eye exam: . No retinopathy.   · Nephropathy:   No  · CVS: No CAD.  · GI: No gastropathy.     FH of DM: mother, ended up with insulin     Blood pressure/Aortic valve disorder  Controlled, no medications.  He has been evaluated/followed up by cardiology.    Echocardiography: 2019  Left ventricular ejection fraction is visually estimated to be 60%.  Mild concentric left ventricular hypertrophy.  Normal inferior vena cava size and inspiratory collapse.  Mild central aortic insufficiency.  Ascending aorta is dilated with a   diameter of  4.3 cm.     Dyslipidemia  Statin: Atorvastatin, 60 mg daily, taking as prescribed.   - No muscle weakness, cramps, nausea,abdominal discomfort.   Diet / exercise / BMI: as above.   FH: neg     GERD/anemia, thrombocytopenia  On omeprazole, 40 mg QD; takes medication as prescribed, that controls sx.   B12/folate were normal, PLT borderline low.  EGD from 2019 showed possible mild Britt's and stomach polyps were removed.     Denies:   - heartburn, epigastric pain.   -  nausea, vomiting, or diarrhea  - dysphagia  - abnormal cough  - blood in the stool or dark tarry stools.  - early satiety  - tobacco use.  Labs showed anemia, with normal iron on studies, B12/folate.  Pending FIT.     Insomnia  The patient has a trouble to go and stay asleep.   Used Ambien, 10 mg at bedtime.   Discussed sleep hygiene:   - Go to bed and wake up at consistent times whether work/school day or not.   - Keep room dark, quiet, and comfortable.   - Don't have naps.  - Increase exposure to sunlight during awake times and avoid bright lights before going to sleep.   - Avoid stimulant or caffeine use more than 4 hours after wake time.   - Avoid meal or exercise 2-3 hours prior to going to bed.     Body mass index is 30, LE swelling  Diet: regular  Exercise: insufficient  No temperature intolerance. No change in hair/skin quality, BMs.   No HTN, buffalo hump, purple striae, flushing.  FH of obesity: unknown  Complains of intermittent LE swelling, used HCTZ occasionally, did not help.    Patient Active Problem List    Diagnosis Date Noted   • Controlled type 2 diabetes mellitus without complication, without long-term current use of insulin ([facility]) 08/26/2019   • Seasonal allergies 08/26/2019   • HSV infection 08/26/2019   • Aortic valve disorder 10/29/2018   • Gastroesophageal reflux disease 10/03/2018   • Primary insomnia 10/03/2018   • Seasonal allergic rhinitis 10/03/2018   • Dyslipidemia 10/03/2018   • Primary osteoarthritis involving multiple joints 10/03/2018   • Decreased hearing, right 10/03/2018       Current Outpatient Medications   Medication Sig Dispense Refill   • zolpidem (AMBIEN) 10 MG Tab      • CALCIUM PO      • valACYclovir (VALTREX) 500 MG Tab Take 1 Tab by mouth every day. 90 Tab 0   • diclofenac DR (VOLTAREN) 75 MG Tablet Delayed Response Take 1 Tab by mouth 2 times a day. 60 Tab 0   • rosuvastatin (CRESTOR) 20 MG Tab TAKE 1 TABLET EVERY EVENING. REPLACES  ATORVASTATIN 90 Tab 1   • metFORMIN  ER (GLUCOPHAGE XR) 750 MG TABLET SR 24 HR Take 1 Tab by mouth every day. 90 Tab 1   • pantoprazole (PROTONIX) 40 MG Tablet Delayed Response Take 1 Tab by mouth every day. 90 Tab 3   • Cholecalciferol (VITAMIN D3) 2000 UNIT Cap Take  by mouth 2 Times a Day.     • Multiple Vitamins-Minerals (MULTIVITAMIN ADULT) Tab Take  by mouth.     • amoxicillin (AMOXIL) 500 MG Cap TAKE 1 CAPSULE BY MOUTH 3 TIMES A DAY UNTIL FINISHED     • acetaminophen (TYLENOL) 500 MG Tab Take 2 Tabs by mouth 3 times a day as needed (pain.  Do not exceed 3000 mg of total acetaminophen per day). 180 Tab 6   • aspirin (ASA) 81 MG Chew Tab chewable tablet Take 81 mg by mouth every day.       No current facility-administered medications for this visit.         Patient is taking medications as noted in medication list.  Current supplements as per medication list.     Allergies: Seasonal    Current social contact/activities: exercise with resistantance bands, pushups, stationary bike, lives with friend, email/texting, reading articles on the internet.     Is patient current with immunizations? No, due for HEPATITIS B, PNEUMOVAX (PPSV23) and SHINGRIX (Shingles). Patient is interested in receiving PNEUMOVAX (PPSV23) today.    He  reports that he has never smoked. He has never used smokeless tobacco. He reports current alcohol use of about 0.6 oz of alcohol per week. He reports that he does not use drugs.  Counseling given: Not Answered        DPA/Advanced directive: Patient has Living Will and Durable Power of  on file.     ROS:    Gait: Uses no assistive device   Ostomy: No   Other tubes: No   Amputations: No   Chronic oxygen use No   Last eye exam: October 2019  Wears hearing aids: Yes   : Denies any urinary leakage during the last 6 months    Screening:    Depression Screening  Little interest or pleasure in doing things?  0 - not at all  Feeling down, depressed, or hopeless? 1 - several days  Trouble falling or staying asleep, or sleeping  too much?  3 - nearly every day  Feeling tired or having little energy?  0 - not at all  Poor appetite or overeating?  3 - nearly every day  Feeling bad about yourself - or that you are a failure or have let yourself or your family down? 0 - not at all  Trouble concentrating on things, such as reading the newspaper or watching television? 0 - not at all  Moving or speaking so slowly that other people could have noticed.  Or the opposite - being so fidgety or restless that you have been moving around a lot more than usual?  0 - not at all  Thoughts that you would be better off dead, or of hurting yourself?  0 - not at all  Patient Health Questionnaire Score: 7    Screening for Cognitive Impairment  Three Minute Recall (river, nation, finger)  3/3 River, Nation, Finger  Draw clock face with all 12 numbers and set the hands to show 10 past 11.  Yes 5/5  If cognitive concerns identified, deferred for follow up unless specifically addressed in assessment and plan.    Fall Risk Assessment  Has the patient had two or more falls in the last year or any fall with injury in the last year?  No  If fall risk identified, deferred for follow up unless specifically addressed in assessment and plan.    Safety Assessment  Throw rugs on floor.  Yes  Handrails on all stairs.  Yes  Good lighting in all hallways.  Yes  Difficulty hearing.  Yes  Patient counseled about all safety risks that were identified.    Functional Assessment ADLs  Are there any barriers preventing you from cooking for yourself or meeting nutritional needs?  No.    Are there any barriers preventing you from driving safely or obtaining transportation?  No.    Are there any barriers preventing you from using a telephone or calling for help?  No.    Are there any barriers preventing you from shopping?  No.    Are there any barriers preventing you from taking care of your own finances?  No.    Are there any barriers preventing you from managing your medications?    No.     Are there any barriers preventing you from showering, bathing or dressing yourself?  No.    Are you currently engaging in any exercise or physical activity?  Yes.  Push up, resistance bands, stationary bike  What is your perception of your health?  Excellent.    Health Maintenance Summary                RETINAL SCREENING Overdue 7/23/1966     IMM HEP B VACCINE Overdue 7/23/1967     IMM PNEUMOCOCCAL VACCINE: 65+ Years Overdue 9/21/2016      Done 9/18/2019 Ext Imm: PCV-13 (Prevnar 13)     Patient has more history with this topic...    IMM ZOSTER VACCINES Overdue 8/15/2018      Done 6/20/2018 Imm Admin: Recombinant Zoster Vaccine (RZV) (Shingrix)    Annual Wellness Visit Overdue 5/20/2020      Done 5/20/2019      Patient has more history with this topic...    DIABETES MONOFILAMENT / LE EXAM Next Due 8/26/2020      Done 8/26/2019      Patient has more history with this topic...    IMM INFLUENZA Next Due 9/1/2020      Done 9/24/2019 Ext Imm: Influenza, High Dose     Patient has more history with this topic...    A1C SCREENING Next Due 2/11/2021      Done 8/11/2020 HEMOGLOBIN A1C     Patient has more history with this topic...    FASTING LIPID PROFILE Next Due 8/11/2021      Done 8/11/2020 LIPID PROFILE     Patient has more history with this topic...    URINE ACR / MICROALBUMIN Next Due 8/11/2021      Done 8/11/2020 MICROALBUMIN CREAT RATIO URINE     Patient has more history with this topic...    SERUM CREATININE Next Due 8/11/2021      Done 8/11/2020 COMP METABOLIC PANEL     Patient has more history with this topic...    COLONOSCOPY Next Due 3/9/2026      Done 3/9/2016 REFERRAL TO GI FOR COLONOSCOPY    IMM DTaP/Tdap/Td Vaccine Next Due 9/18/2029      Done 9/18/2019 Ext Imm: Tdap     Patient has more history with this topic...        Patient Care Team:  Lynette Payne M.D. as PCP - General (Family Medicine)  Anthony Reid, PT as Physical Therapy (Physical Therapy)    Social History     Tobacco Use   • Smoking  "status: Never Smoker   • Smokeless tobacco: Never Used   Substance Use Topics   • Alcohol use: Yes     Alcohol/week: 0.6 oz     Types: 1 Glasses of wine per week     Frequency: Monthly or less     Drinks per session: 1 or 2     Binge frequency: Never   • Drug use: No     Family History   Problem Relation Age of Onset   • Cancer Mother 83        endometrial   • Diabetes Mother    • Heart Disease Father    • No Known Problems Maternal Grandmother    • No Known Problems Maternal Grandfather    • No Known Problems Paternal Grandmother    • No Known Problems Paternal Grandfather    • Hyperlipidemia Neg Hx      He  has a past medical history of Dyslipidemia, GERD (gastroesophageal reflux disease), and Insomnia.   Past Surgical History:   Procedure Laterality Date   • APPENDECTOMY     • TONSILLECTOMY       Exam:   /50 (BP Location: Right arm, Patient Position: Sitting, BP Cuff Size: Adult long)   Pulse 65   Temp 36.2 °C (97.2 °F) (Temporal)   Ht 1.905 m (6' 3\")   Wt 109 kg (240 lb 6.4 oz)   SpO2 95%  Body mass index is 30.05 kg/m².  Hearing as above.  Dentition fair  Alert, oriented in no acute distress.  Eye contact is good, speech goal directed, affect calm    Labs  Reviewed and discussed  Lab Results   Component Value Date/Time    CHOLSTRLTOT 139 08/11/2020 11:38 AM    LDL 84 08/11/2020 11:38 AM    HDL 33 (A) 08/11/2020 11:38 AM    TRIGLYCERIDE 108 08/11/2020 11:38 AM       Lab Results   Component Value Date/Time    SODIUM 138 08/11/2020 11:38 AM    POTASSIUM 3.9 08/11/2020 11:38 AM    CHLORIDE 106 08/11/2020 11:38 AM    CO2 23 08/11/2020 11:38 AM    GLUCOSE 122 (H) 08/11/2020 11:38 AM    BUN 18 08/11/2020 11:38 AM    CREATININE 0.88 08/11/2020 11:38 AM     Lab Results   Component Value Date/Time    ALKPHOSPHAT 60 08/11/2020 11:38 AM    ASTSGOT 31 08/11/2020 11:38 AM    ALTSGPT 33 08/11/2020 11:38 AM    TBILIRUBIN 0.4 08/11/2020 11:38 AM      Lab Results   Component Value Date/Time    HBA1C 6.1 (H) 08/11/2020 " 11:38 AM    HBA1C 6.6 (H) 04/20/2020 11:13 AM    HBA1C 6.2 (H) 12/16/2019 11:28 AM     No results found for: TSH  No results found for: FREET4  Lab Results   Component Value Date/Time    WBC 6.3 08/11/2020 11:38 AM    RBC 4.43 (L) 08/11/2020 11:38 AM    HEMOGLOBIN 13.7 (L) 08/11/2020 11:38 AM    HEMATOCRIT 40.2 (L) 08/11/2020 11:38 AM    MCV 90.7 08/11/2020 11:38 AM    MCH 30.9 08/11/2020 11:38 AM    MCHC 34.1 08/11/2020 11:38 AM    MPV 11.4 08/11/2020 11:38 AM    NEUTSPOLYS 52.70 08/11/2020 11:38 AM    LYMPHOCYTES 35.10 08/11/2020 11:38 AM    MONOCYTES 8.30 08/11/2020 11:38 AM    EOSINOPHILS 2.70 08/11/2020 11:38 AM    BASOPHILS 1.00 08/11/2020 11:38 AM      Imaging  Reviewed and discussed as per HPI    Assessment and Plan.     1. Medicare annual wellness visit, subsequent  Reviewed PMH, PSH, FH, SH, ALL, MEDS, HCM/IMM.   - Subsequent Annual Wellness Visit - Includes PPPS ()    2. Health care maintenance  Per HPI  - Subsequent Annual Wellness Visit - Includes PPPS ()    3. Need for vaccination  Information was provided to the patient regarding the vaccine, including side effects. Vaccine was given by my medical assistant under my supervision.  - Pneumoccocal Polysaccharide Vaccine 23-Valent =>1yo SQ/IM  - Subsequent Annual Wellness Visit - Includes PPPS ()    4. Controlled type 2 diabetes mellitus without complication, without long-term current use of insulin (HCC)  Controlled, continue with current treatment.  - CBC WITH DIFFERENTIAL; Future  - Comp Metabolic Panel; Future  - HEMOGLOBIN A1C; Future  - metFORMIN ER (GLUCOPHAGE XR) 750 MG TABLET SR 24 HR; Take 1 Tab by mouth every day.  Dispense: 90 Tab; Refill: 1  - Subsequent Annual Wellness Visit - Includes PPPS ()    5. Dyslipidemia  Controlled, continue with current treatment.  - CBC WITH DIFFERENTIAL; Future  - rosuvastatin (CRESTOR) 20 MG Tab; TAKE 1 TABLET EVERY EVENING. REPLACES  ATORVASTATIN  Dispense: 90 Tab; Refill: 1  - Subsequent  Annual Wellness Visit - Includes PPPS ()    6. Aortic valve disorder  Mild, follow-up cardiology  - Subsequent Annual Wellness Visit - Includes PPPS ()    7. Gastroesophageal reflux disease, esophagitis presence not specified  Controlled, continue current treatment and GI follow-up  - Subsequent Annual Wellness Visit - Includes PPPS ()    8. Anemia, unspecified type  FIT negative, EGD negative, B12/folate wnl  - CBC WITH DIFFERENTIAL; Future  - Subsequent Annual Wellness Visit - Includes PPPS ()  9. Thrombocytopenia (HCC)  Borderline, follow-up labs  - CBC WITH DIFFERENTIAL; Future  - Subsequent Annual Wellness Visit - Includes PPPS ()    10. Primary insomnia  Controlled, continue current treatment  - zolpidem (AMBIEN) 10 MG Tab; Take 1 Tab by mouth at bedtime as needed for Sleep for up to 90 days.  Dispense: 30 Tab; Refill: 2  - Subsequent Annual Wellness Visit - Includes PPPS ()    Obtained and reviewed patient utilization report from [facility] pharmacy database on 8/17/2020 7:35 PM  prior to writing prescription for controlled substance II, III or IV per [place] bill . Based on assessment of the report, the prescription is medically necessary.     11. Obesity (BMI 30.0-34.9)  Appropriate counseling given  - Subsequent Annual Wellness Visit - Includes PPPS ()    12. Leg swelling  Advised compression stockings, Ace wraps, leg elevation at least 20 minutes daily  - Subsequent Annual Wellness Visit - Includes PPPS ()    13. Primary osteoarthritis involving multiple joints  He has been followed up by pain management, had nerve block in the past, pending in the future  - Subsequent Annual Wellness Visit - Includes PPPS ()    14. Seasonal allergies  No current symptoms may use OTC antihistamine as needed  - Subsequent Annual Wellness Visit - Includes PPPS ()    15. Decreased hearing, right  He was referred to ENT  - Subsequent Annual Wellness Visit - Includes PPPS  ()    16. HSV infection  No current symptoms, on Valtrex daily  - Subsequent Annual Wellness Visit - Includes PPPS ()    Services suggested: No services needed at this time  Health Care Screening recommendations as per orders if indicated.  Referrals offered: PT/OT/Nutrition counseling/Behavioral Health/Smoking cessation as per orders if indicated.    Discussion today about general wellness and lifestyle habits:    · Prevent falls and reduce trip hazards; Cautioned about securing or removing rugs.  · Have a working fire alarm and carbon monoxide detector;   · Engage in regular physical activity and social activities       Follow-up: in 3 months and prn    I attest that I saw this patient on this date and due to technical issues am not showing as the author of the note.

## 2020-08-20 NOTE — PREPROCEDURE INSTRUCTIONS
PAT call made 8/20/2020 at approx 0938, spoke with pt after confirming 2 pt identifiers. Health hx, medications, allergies  reviewed with pt, as well as pre-procedure/sedation instructions. Respiratory screen completed. Pt denies being sick/symptomatic (fever, cough, SOB, diarrhea) w/in last 72 hrs, or having close contact w/ sick individuals in the past 2 wks. Pt education to notify his MD should he become symptomatic prior to procedure. Pt verbalizes understanding. Pt told to bring mask and the he will be wearing it entire stay. Informed pt it is recommended pt self isolate for 72 hrs or from time of this call until procedure, also the their  needs to remain on site in vehicle until pt is discharged. Pt verbalizes understanding.

## 2020-08-24 ENCOUNTER — APPOINTMENT (OUTPATIENT)
Dept: RADIOLOGY | Facility: REHABILITATION | Age: 70
End: 2020-08-24
Attending: PHYSICAL MEDICINE & REHABILITATION
Payer: MEDICARE

## 2020-08-24 ENCOUNTER — HOSPITAL ENCOUNTER (OUTPATIENT)
Facility: REHABILITATION | Age: 70
End: 2020-08-24
Attending: PHYSICAL MEDICINE & REHABILITATION | Admitting: PHYSICAL MEDICINE & REHABILITATION
Payer: MEDICARE

## 2020-08-24 ENCOUNTER — APPOINTMENT (OUTPATIENT)
Dept: PAIN MANAGEMENT | Facility: REHABILITATION | Age: 70
End: 2020-08-24
Attending: PHYSICAL MEDICINE & REHABILITATION
Payer: MEDICARE

## 2020-08-24 VITALS
SYSTOLIC BLOOD PRESSURE: 143 MMHG | OXYGEN SATURATION: 97 % | TEMPERATURE: 97.1 F | HEIGHT: 75 IN | WEIGHT: 235.89 LBS | HEART RATE: 68 BPM | DIASTOLIC BLOOD PRESSURE: 70 MMHG | RESPIRATION RATE: 16 BRPM | BODY MASS INDEX: 29.33 KG/M2

## 2020-08-24 PROCEDURE — 700101 HCHG RX REV CODE 250

## 2020-08-24 PROCEDURE — 64494 INJ PARAVERT F JNT L/S 2 LEV: CPT

## 2020-08-24 PROCEDURE — 700111 HCHG RX REV CODE 636 W/ 250 OVERRIDE (IP)

## 2020-08-24 PROCEDURE — 700117 HCHG RX CONTRAST REV CODE 255

## 2020-08-24 PROCEDURE — 64495 INJ PARAVERT F JNT L/S 3 LEV: CPT

## 2020-08-24 PROCEDURE — 64493 INJ PARAVERT F JNT L/S 1 LEV: CPT

## 2020-08-24 RX ORDER — LIDOCAINE HYDROCHLORIDE 20 MG/ML
INJECTION, SOLUTION EPIDURAL; INFILTRATION; INTRACAUDAL; PERINEURAL
Status: COMPLETED
Start: 2020-08-24 | End: 2020-08-24

## 2020-08-24 RX ORDER — LIDOCAINE HYDROCHLORIDE 10 MG/ML
INJECTION, SOLUTION EPIDURAL; INFILTRATION; INTRACAUDAL; PERINEURAL
Status: COMPLETED
Start: 2020-08-24 | End: 2020-08-24

## 2020-08-24 RX ADMIN — LIDOCAINE HYDROCHLORIDE 10 ML: 10 INJECTION, SOLUTION EPIDURAL; INFILTRATION; INTRACAUDAL; PERINEURAL at 16:26

## 2020-08-24 RX ADMIN — IOHEXOL 5 ML: 240 INJECTION, SOLUTION INTRATHECAL; INTRAVASCULAR; INTRAVENOUS; ORAL at 16:27

## 2020-08-24 RX ADMIN — LIDOCAINE HYDROCHLORIDE 5 ML: 20 INJECTION, SOLUTION EPIDURAL; INFILTRATION; INTRACAUDAL; PERINEURAL at 16:27

## 2020-08-24 ASSESSMENT — FIBROSIS 4 INDEX: FIB4 SCORE: 2.4

## 2020-08-24 NOTE — NON-PROVIDER
Current meds. See medication reconciliation form. Reviewed with pt. Pt denies taking ASA,other blood thinners or anti-inflammatories. Pre-procedure assessment completed and information  communicated to procedure room RN during handoff (Hughes right ear, hx DM). Pt has a ride post-procedure (friend, Ting is ). Printed and verbal discharge instructions as well as education regarding infection prevention given to pt/pt's family who verbalized understanding.

## 2020-08-24 NOTE — H&P
Physical Medicine & Rehab History & Physical Note    Date  8/24/2020    Primary Care Physician  Lynette Payne M.D.    CC  Pre-Op Diagnosis Codes:     * Lumbar spondylosis [M47.816]    HPI  This is a 70 y.o. male who presented with axial right low back pain nonradiating moderate to severe in intensity constant worse with lumbar extension.  Failed conservative treatment exercise program, medication management continues to have pain here for diagnostic medial branch blocks    Past Medical History:   Diagnosis Date   • Dyslipidemia    • GERD (gastroesophageal reflux disease)    • Insomnia        Past Surgical History:   Procedure Laterality Date   • APPENDECTOMY     • TONSILLECTOMY         No current facility-administered medications for this encounter.        Social History     Socioeconomic History   • Marital status: Single     Spouse name: Not on file   • Number of children: Not on file   • Years of education: Not on file   • Highest education level: Not on file   Occupational History   • Not on file   Social Needs   • Financial resource strain: Not on file   • Food insecurity     Worry: Not on file     Inability: Not on file   • Transportation needs     Medical: Not on file     Non-medical: Not on file   Tobacco Use   • Smoking status: Never Smoker   • Smokeless tobacco: Never Used   Substance and Sexual Activity   • Alcohol use: Yes     Alcohol/week: 0.6 oz     Types: 1 Glasses of wine per week     Frequency: Monthly or less     Drinks per session: 1 or 2     Binge frequency: Never   • Drug use: No   • Sexual activity: Yes   Lifestyle   • Physical activity     Days per week: Not on file     Minutes per session: Not on file   • Stress: Not on file   Relationships   • Social connections     Talks on phone: Not on file     Gets together: Not on file     Attends Christianity service: Not on file     Active member of club or organization: Not on file     Attends meetings of clubs or organizations: Not on file      Relationship status: Not on file   • Intimate partner violence     Fear of current or ex partner: Not on file     Emotionally abused: Not on file     Physically abused: Not on file     Forced sexual activity: Not on file   Other Topics Concern   •  Service Yes   • Blood Transfusions No   • Caffeine Concern No   • Occupational Exposure No   • Hobby Hazards No   • Sleep Concern Yes   • Stress Concern No   • Weight Concern Yes   • Special Diet No   • Back Care No   • Exercise Yes   • Bike Helmet No   • Seat Belt Yes   • Self-Exams Yes   Social History Narrative   • Not on file       Family History   Problem Relation Age of Onset   • Cancer Mother 83        endometrial   • Diabetes Mother    • Heart Disease Father    • No Known Problems Maternal Grandmother    • No Known Problems Maternal Grandfather    • No Known Problems Paternal Grandmother    • No Known Problems Paternal Grandfather    • Hyperlipidemia Neg Hx        Allergies  Seasonal    Review of Systems  All systems reviewed and negative    Physical Exam  Constitutional:       General: He is not in acute distress.     Appearance: Normal appearance. He is well-developed. He is not diaphoretic.   Cardiovascular:      Rate and Rhythm: Normal rate.   Pulmonary:      Effort: Pulmonary effort is normal.      Breath sounds: Normal breath sounds.   Abdominal:      Palpations: Abdomen is soft.   Skin:     General: Skin is warm and dry.      Capillary Refill: Capillary refill takes less than 2 seconds.   Neurological:      Mental Status: He is alert and oriented to person, place, and time.   Psychiatric:         Behavior: Behavior normal.         Thought Content: Thought content normal.         Judgment: Judgment normal.         Vital Signs                          Labs:                    Radiology:  DX-PORTABLE FLUOROSCOPY < 1 HOUR    (Results Pending)         Assessment/Plan:  Pre-Op Diagnosis Codes:     * Lumbar spondylosis [M47.816]  Procedure(s):  BLOCK,  NERVE, SPINAL, LUMBAR, POSTERIOR RAMUS, MEDIAL BRANCH   Diagnostic medial branch blocks targeting the RIGHT L3-4, L4-5 and L5-S1 facet joints with fluoroscopic guidance

## 2020-08-24 NOTE — OP REPORT
Date of Service: 8/24/2020     Patient: Abiel Vaughn 70 y.o. male     MRN: [id number]     Physician/s: Julito Dacosta MD    Pre-operative Diagnosis: Lumbosacral spondylosis, facet arthropathy. The patient was NOT seen for lumbar radiculopathy today.     Post-operative Diagnosis: Lumbosacral spondylosis, facet arthropathy. The patient was NOT seen for lumbar radiculopathy today.     Procedure: Medial Branch Blocks targeting the right L3-4, L4-5 and L5-S1  facet joints     Description of procedure:    The risks, benefits, and alternatives of the procedure were reviewed and discussed with the patient.  Written informed consent was freely obtained. A pre-procedural time-out was conducted by the physician verifying patient’s identity, procedure to be performed, procedure site and side, and allergy verification. Appropriate equipment was determined to be in place for the procedure.     In the fluoroscopy suite the patient was placed in a prone position and the skin was prepped and draped in the usual sterile fashion. The fluoroscope was placed over the lower back at the appropriate angles, and the targets for injection were marked. A 27g needle was placed into each of the markings at the levels below, and approx 1cc of 1% Lidocaine was injected subcutaneously into the epidermal and dermal layers. The needle was removed intact.     Using an oblique view, A 25g 3.5 inch needle was then placed at the intersection of the transverse process and superior articular process at the S1 on the right side. The needle tips were then verified by AP, oblique, and lateral views.     Using an oblique view, A 25g 3.5 inch needle was then placed at the intersection of the transverse process and superior articular process at the L5-S1 on the right side. The needle tips were then verified by AP, oblique, and lateral views.     Using an oblique view, A 25g 3.5 inch needle was then placed at the intersection of the transverse process  and superior articular process at the L4-5 on the right side. The needle tips were then verified by AP, oblique, and lateral views.     Using an oblique view, A 25g 3.5 inch needle was then placed at the intersection of the transverse process and superior articular process at the L3-4 on the right side. The needle tips were then verified by AP, oblique, and lateral views.          In the AP view, less than 1 cc of contrast dye was used to highlight the medial branch while the fluoroscope was running live at the levels above. Following negative aspiration, 0.5cc of 2% lidocaine  was then injected at the above levels, and the needles were removed intact after restyleted. The patient's back was covered with a 4x4 gauze, the area was cleansed with sterile normal saline, and a dressing was applied.     There were no complications noted, the patient was hemodynamically stable, and tolerated the procedure well.     The patient had 100% pain relief immediately post procedure.  His range of motion was significantly improved post procedure when compared to preprocedure especially with lumbar extension.    Follow-up appointment is scheduled for Visit date not found       Julito Dacosta MD  Physical Medicine and Rehabilitation  Interventional Spine and Sports Physiatry  [facility]            CPT  Intraarticular joint or medial branch block (MBB) - lumbar or sacral (1st level):  59985  Intraarticular joint or medial branch block (MBB) - lumbar or sacral (2nd level):  14821  Intraarticular joint or medial branch block (MBB) - lumbar or sacral (3rd level):  82742

## 2020-08-25 ENCOUNTER — TELEPHONE (OUTPATIENT)
Dept: PHYSICAL MEDICINE AND REHAB | Facility: MEDICAL CENTER | Age: 70
End: 2020-08-25

## 2020-08-25 NOTE — TELEPHONE ENCOUNTER
Spoke to Pt in regards to his SP that was done with Dr. Dacosta dated 8/24/2020 for his Diagnostic medial branch blocks targeting the RIGHT L3-4, L4-5 and L5-S1 facet joints with fluoroscopic guidance #1 and he mentioned that when he walk the pain still the same.    Thank you  Mirna

## 2020-08-25 NOTE — PROGRESS NOTES
Preprocedure assessment completed.  Pertinent health information DM type 2, thrombocytopenia communicated to physician and staff during time out.  Patient positioned preprocedure by RN, CST, and XRAY.  Pillow under ankles for support.

## 2020-08-31 ENCOUNTER — APPOINTMENT (OUTPATIENT)
Dept: PAIN MANAGEMENT | Facility: REHABILITATION | Age: 70
End: 2020-08-31
Attending: PHYSICAL MEDICINE & REHABILITATION
Payer: MEDICARE

## 2020-09-16 ENCOUNTER — OFFICE VISIT (OUTPATIENT)
Dept: PHYSICAL MEDICINE AND REHAB | Facility: MEDICAL CENTER | Age: 70
End: 2020-09-16
Payer: MEDICARE

## 2020-09-16 DIAGNOSIS — M54.50 CHRONIC BILATERAL LOW BACK PAIN, UNSPECIFIED WHETHER SCIATICA PRESENT: ICD-10-CM

## 2020-09-16 DIAGNOSIS — M47.816 LUMBAR SPONDYLOSIS: ICD-10-CM

## 2020-09-16 DIAGNOSIS — G89.29 CHRONIC BILATERAL LOW BACK PAIN, UNSPECIFIED WHETHER SCIATICA PRESENT: ICD-10-CM

## 2020-09-16 DIAGNOSIS — M16.0 BILATERAL HIP JOINT ARTHRITIS: ICD-10-CM

## 2020-09-16 PROCEDURE — 99214 OFFICE O/P EST MOD 30 MIN: CPT | Performed by: PHYSICAL MEDICINE & REHABILITATION

## 2020-09-16 ASSESSMENT — PATIENT HEALTH QUESTIONNAIRE - PHQ9: CLINICAL INTERPRETATION OF PHQ2 SCORE: 0

## 2020-09-16 NOTE — PROGRESS NOTES
Follow up patient note  Interventional spine and sports physiatry, Physical medicine rehabilitation      Chief complaint:   Chief Complaint   Patient presents with   • Follow-Up     Back Pain          HISTORY    Please see new patient note dated  by Dr Dacosta,  for more details.     HPI  Patient identification: Abiel Vaughn  1950,   male  With Diagnoses of Lumbar spondylosis symptomatically right worse than left, Bilateral hip joint arthritis Right worse than left, and Chronic bilateral low back pain, unspecified whether sciatica present were pertinent to this visit.     Procedures:  2018 right L4-5 and L5-S1 transforaminal epidural steroid injection with 70% pain relief  2019 right L4-5 and L5-S1 transforaminal epidural steroid injection with greater than 80% pain relief  2020 diagnostic medial branch block starting the right L3-4, L4-5, L5-S1 facet joints with 100% pain relief      The patient decided to stop using diclofenac in an effort to avoid NSAIDs.  He is using Tylenol approximately thousand milligrams 2-3 times daily not exceeding this amount.    He continues to have pain with lumbar extension and with walking.  He can only walk approximately from the parking lot to the office until he has significant pain in his back.  He has no radiating pain.  This pain is 6 out of 10 in intensity.  Aching in quality.  Causes difficulty with lifting objects and rising from a chair.         ROS Red Flags :   -Fever, Chills, Sweats: Denies  Involuntary Weight Loss: Denies  Bowel/Bladder Incontinence: Denies  Saddle Anesthesia: Denies        PMHx:   Past Medical History:   Diagnosis Date   • Dyslipidemia    • GERD (gastroesophageal reflux disease)    • Insomnia        PSHx:   Past Surgical History:   Procedure Laterality Date   • LUMBAR MEDIAL BRANCH BLOCKS Right 2020    Procedure: BLOCK, NERVE, SPINAL, LUMBAR, POSTERIOR RAMUS, MEDIAL BRANCH;  Surgeon: Julito Dacosta M.D.;  [facility];  Service: Pain Management   • APPENDECTOMY     • TONSILLECTOMY         Family history   Family History   Problem Relation Age of Onset   • Cancer Mother 83        endometrial   • Diabetes Mother    • Heart Disease Father    • No Known Problems Maternal Grandmother    • No Known Problems Maternal Grandfather    • No Known Problems Paternal Grandmother    • No Known Problems Paternal Grandfather    • Hyperlipidemia Neg Hx          Medications:   Outpatient Medications Marked as Taking for the 9/16/20 encounter (Office Visit) with Julito Dacosta M.D.   Medication Sig Dispense Refill   • zolpidem (AMBIEN) 10 MG Tab      • CALCIUM PO      • zolpidem (AMBIEN) 10 MG Tab Take 1 Tab by mouth at bedtime as needed for Sleep for up to 90 days. 30 Tab 2   • metFORMIN ER (GLUCOPHAGE XR) 750 MG TABLET SR 24 HR Take 1 Tab by mouth every day. 90 Tab 1   • rosuvastatin (CRESTOR) 20 MG Tab TAKE 1 TABLET EVERY EVENING. REPLACES  ATORVASTATIN 90 Tab 1   • valACYclovir (VALTREX) 500 MG Tab Take 1 Tab by mouth every day. 90 Tab 0   • diclofenac DR (VOLTAREN) 75 MG Tablet Delayed Response Take 1 Tab by mouth 2 times a day. 60 Tab 0   • pantoprazole (PROTONIX) 40 MG Tablet Delayed Response Take 1 Tab by mouth every day. 90 Tab 3   • Cholecalciferol (VITAMIN D3) 2000 UNIT Cap Take  by mouth 2 Times a Day.     • Multiple Vitamins-Minerals (MULTIVITAMIN ADULT) Tab Take  by mouth.          Current Outpatient Medications on File Prior to Visit   Medication Sig Dispense Refill   • zolpidem (AMBIEN) 10 MG Tab      • CALCIUM PO      • zolpidem (AMBIEN) 10 MG Tab Take 1 Tab by mouth at bedtime as needed for Sleep for up to 90 days. 30 Tab 2   • metFORMIN ER (GLUCOPHAGE XR) 750 MG TABLET SR 24 HR Take 1 Tab by mouth every day. 90 Tab 1   • rosuvastatin (CRESTOR) 20 MG Tab TAKE 1 TABLET EVERY EVENING. REPLACES  ATORVASTATIN 90 Tab 1   • valACYclovir (VALTREX) 500 MG Tab Take 1 Tab by mouth every day. 90 Tab 0   •  diclofenac DR (VOLTAREN) 75 MG Tablet Delayed Response Take 1 Tab by mouth 2 times a day. 60 Tab 0   • pantoprazole (PROTONIX) 40 MG Tablet Delayed Response Take 1 Tab by mouth every day. 90 Tab 3   • Cholecalciferol (VITAMIN D3) 2000 UNIT Cap Take  by mouth 2 Times a Day.     • Multiple Vitamins-Minerals (MULTIVITAMIN ADULT) Tab Take  by mouth.       No current facility-administered medications on file prior to visit.          Allergies:   Allergies   Allergen Reactions   • Seasonal Itching and Runny Nose       Social Hx:   Social History     Socioeconomic History   • Marital status: Single     Spouse name: Not on file   • Number of children: Not on file   • Years of education: Not on file   • Highest education level: Not on file   Occupational History   • Not on file   Social Needs   • Financial resource strain: Not on file   • Food insecurity     Worry: Not on file     Inability: Not on file   • Transportation needs     Medical: Not on file     Non-medical: Not on file   Tobacco Use   • Smoking status: Never Smoker   • Smokeless tobacco: Never Used   Substance and Sexual Activity   • Alcohol use: Yes     Alcohol/week: 0.6 oz     Types: 1 Glasses of wine per week     Frequency: Monthly or less     Drinks per session: 1 or 2     Binge frequency: Never   • Drug use: No   • Sexual activity: Yes   Lifestyle   • Physical activity     Days per week: Not on file     Minutes per session: Not on file   • Stress: Not on file   Relationships   • Social connections     Talks on phone: Not on file     Gets together: Not on file     Attends Confucianism service: Not on file     Active member of club or organization: Not on file     Attends meetings of clubs or organizations: Not on file     Relationship status: Not on file   • Intimate partner violence     Fear of current or ex partner: Not on file     Emotionally abused: Not on file     Physically abused: Not on file     Forced sexual activity: Not on file   Other Topics Concern    •  Service Yes   • Blood Transfusions No   • Caffeine Concern No   • Occupational Exposure No   • Hobby Hazards No   • Sleep Concern Yes   • Stress Concern No   • Weight Concern Yes   • Special Diet No   • Back Care No   • Exercise Yes   • Bike Helmet No   • Seat Belt Yes   • Self-Exams Yes   Social History Narrative   • Not on file         EXAMINATION     Physical Exam:   Vitals: There were no vitals taken for this visit.    Constitutional:   Body Habitus: There is no height or weight on file to calculate BMI.  Cooperation: Fully cooperates with exam  Appearance: Well-groomed no disheveled    Respiratory-  breathing comfortable on room air, no audible wheezing  Cardiovascular- capillary refills less than 2 seconds. No lower extremity edema is noted.   Psychiatric- alert and oriented ×3. Normal affect.      MSK/NEURO      There are no signs of infection around the injection sites.   decreased active range of motion with flexion, lateral flexion, and rotation bilaterally.   There is decreased active range of motion with lumbar extension.    There is  pain with lumbar extension.   There is pain with facet loading maneuver (extension rotation) with axial low back pain on the RIGHT side(s)    Palpation:   No tenderness to palpation in midline at T1-T12 levels. No tenderness to palpation in the left and right of the midline T1-L5, NEGATIVE for tenderness to palpation to the para-midline region in the lower lumbar levels.  palpation over SI joint: negative bilaterally    palpation in hip or over the gluteus medius tendon insertion: negative bilaterally      Lumbar spine Special tests  Neuro tension  Straight leg test negative bilaterally    Slump test negative bilaterally      Key points for the international standards for neurological classification of spinal cord injury (ISNCSCI) to light touch.     Dermatome R L                                      L2 2 2   L3 2 2   L4 2 2   L5 2 2   S1 2 2   S2 2 2          Motor Exam Lower Extremities    ? Myotome R L   Hip flexion L2 5 5   Knee extension L3 5 5   Ankle dorsiflexion L4 5 5   Toe extension L5 5 5   Ankle plantarflexion S1 5 5             MEDICAL DECISION MAKING    DATA    Labs:   Lab Results   Component Value Date/Time    SODIUM 138 08/11/2020 11:38 AM    POTASSIUM 3.9 08/11/2020 11:38 AM    CHLORIDE 106 08/11/2020 11:38 AM    CO2 23 08/11/2020 11:38 AM    GLUCOSE 122 (H) 08/11/2020 11:38 AM    BUN 18 08/11/2020 11:38 AM    CREATININE 0.88 08/11/2020 11:38 AM        No results found for: PROTHROMBTM, INR     Lab Results   Component Value Date/Time    WBC 6.3 08/11/2020 11:38 AM    RBC 4.43 (L) 08/11/2020 11:38 AM    HEMOGLOBIN 13.7 (L) 08/11/2020 11:38 AM    HEMATOCRIT 40.2 (L) 08/11/2020 11:38 AM    MCV 90.7 08/11/2020 11:38 AM    MCH 30.9 08/11/2020 11:38 AM    MCHC 34.1 08/11/2020 11:38 AM    MPV 11.4 08/11/2020 11:38 AM    NEUTSPOLYS 52.70 08/11/2020 11:38 AM    LYMPHOCYTES 35.10 08/11/2020 11:38 AM    MONOCYTES 8.30 08/11/2020 11:38 AM    EOSINOPHILS 2.70 08/11/2020 11:38 AM    BASOPHILS 1.00 08/11/2020 11:38 AM        Lab Results   Component Value Date/Time    HBA1C 6.1 (H) 08/11/2020 11:38 AM          Imaging:   I personally reviewed following images    X-ray bilateral hips 2/6/2019.  Cam deformity of the bilateral femoral heads, arthritis present the bilateral hips.  This is consistent with hip impingement syndrome bilaterally.    MRI lumbar spine 2/6/2019  History of laminectomies.  Bilateral facet arthropathy L3-4, L4-5, L5-S1.  Worst L5-S1.  No significant central canal stenosis.  Moderate neuroforaminal stenosis L4-5, mild neuroforaminal stenosis L5-S1, mild to moderate left L5-S1 neuroforaminal stenosis, mild to moderate left L2-3 neuroforaminal stenosis.    I reviewed the following radiology reports                                        Results for orders placed during the hospital encounter of 02/06/19   MR-LUMBAR SPINE-W/O    Impression  Multilevel degenerative disc disease, facet arthropathy and ligamentum flavum redundancy resulting in at most moderate foraminal and mild lateral recess stenoses.    Left hemilaminotomies                                X-ray hips 2019  Impression       1.  Convexity bilaterally at the femoral head/neck junction which can be associated with the clinical syndrome of femoroacetabular impingement    2.  Lumbar spine facet arthropathy and dextroconvex scoliosis                                                             Results for orders placed during the hospital encounter of 19   DX-LUMBAR SPINE-2 OR 3 VIEWS    Impression 1.  Dextroconvex scoliosis    2.  Multilevel facet arthropathy                                           DIAGNOSIS   Visit Diagnoses     ICD-10-CM   1. Lumbar spondylosis symptomatically right worse than left  M47.816   2. Bilateral hip joint arthritis Right worse than left  M16.0   3. Chronic bilateral low back pain, unspecified whether sciatica present  M54.5    G89.29         ASSESSMENT and PLAN:     Abiel Vaughn  1950,   male      Abiel was seen today for follow-up.    Diagnoses and all orders for this visit:    Lumbar spondylosis symptomatically right worse than left  Comments:  not controlled    Bilateral hip joint arthritis Right worse than left  Comments:  stable    Chronic bilateral low back pain, unspecified whether sciatica present  Comments:  stable         The patient had a positive diagnostic medial branch block #1 targeting the right L3-4, 5, 5 S1 facet joints with 100% pain relief and functional improvements during the diagnostic phase.    I scheduled him for diagnostic medial branch block #2 targeting the right L3-4, L4-5, L5-S1 facet joints with fluoroscopic guidance.    The risks benefits and alternatives to this procedure were discussed and the patient wishes to proceed with the procedure. Risks include but are not limited to damage to surrounding  structures, infection, bleeding, worsening of pain which can be permanent, weakness which can be permanent. Benefits include pain relief, improved function. Alternatives includes not doing the procedure.      I advised patient to stop diclofenac but he can use this for acute flares.  Denies use 5 days prior to the procedure above.    Total time: 26 minutes face-to-face time. I spent greater than 50% of the time for patient care and coordination on this date, including with the patient as per assessment and plan above.          Follow up: After the above diagnostic procedure    Thank you for allowing me to participate in the care of this patient. If you have any questions please not hesitate to contact me.          Please note that this dictation was created using voice recognition software. I have made every reasonable attempt to correct obvious errors but there may be errors of grammar and content that I may have overlooked prior to finalization of this note.      Julito Dacosta MD  Interventional Spine and Sports Physiatry  Physical Medicine and Rehabilitation  [facility]

## 2020-09-16 NOTE — H&P (VIEW-ONLY)
Follow up patient note  Interventional spine and sports physiatry, Physical medicine rehabilitation      Chief complaint:   Chief Complaint   Patient presents with   • Follow-Up     Back Pain          HISTORY    Please see new patient note dated  by Dr Dacosta,  for more details.     HPI  Patient identification: Abiel Vaughn  1950,   male  With Diagnoses of Lumbar spondylosis symptomatically right worse than left, Bilateral hip joint arthritis Right worse than left, and Chronic bilateral low back pain, unspecified whether sciatica present were pertinent to this visit.     Procedures:  2018 right L4-5 and L5-S1 transforaminal epidural steroid injection with 70% pain relief  2019 right L4-5 and L5-S1 transforaminal epidural steroid injection with greater than 80% pain relief  2020 diagnostic medial branch block starting the right L3-4, L4-5, L5-S1 facet joints with 100% pain relief      The patient decided to stop using diclofenac in an effort to avoid NSAIDs.  He is using Tylenol approximately thousand milligrams 2-3 times daily not exceeding this amount.    He continues to have pain with lumbar extension and with walking.  He can only walk approximately from the parking lot to the office until he has significant pain in his back.  He has no radiating pain.  This pain is 6 out of 10 in intensity.  Aching in quality.  Causes difficulty with lifting objects and rising from a chair.         ROS Red Flags :   -Fever, Chills, Sweats: Denies  Involuntary Weight Loss: Denies  Bowel/Bladder Incontinence: Denies  Saddle Anesthesia: Denies        PMHx:   Past Medical History:   Diagnosis Date   • Dyslipidemia    • GERD (gastroesophageal reflux disease)    • Insomnia        PSHx:   Past Surgical History:   Procedure Laterality Date   • LUMBAR MEDIAL BRANCH BLOCKS Right 2020    Procedure: BLOCK, NERVE, SPINAL, LUMBAR, POSTERIOR RAMUS, MEDIAL BRANCH;  Surgeon: Julito Dacosta M.D.;  [facility];  Service: Pain Management   • APPENDECTOMY     • TONSILLECTOMY         Family history   Family History   Problem Relation Age of Onset   • Cancer Mother 83        endometrial   • Diabetes Mother    • Heart Disease Father    • No Known Problems Maternal Grandmother    • No Known Problems Maternal Grandfather    • No Known Problems Paternal Grandmother    • No Known Problems Paternal Grandfather    • Hyperlipidemia Neg Hx          Medications:   Outpatient Medications Marked as Taking for the 9/16/20 encounter (Office Visit) with Julito Dacosta M.D.   Medication Sig Dispense Refill   • zolpidem (AMBIEN) 10 MG Tab      • CALCIUM PO      • zolpidem (AMBIEN) 10 MG Tab Take 1 Tab by mouth at bedtime as needed for Sleep for up to 90 days. 30 Tab 2   • metFORMIN ER (GLUCOPHAGE XR) 750 MG TABLET SR 24 HR Take 1 Tab by mouth every day. 90 Tab 1   • rosuvastatin (CRESTOR) 20 MG Tab TAKE 1 TABLET EVERY EVENING. REPLACES  ATORVASTATIN 90 Tab 1   • valACYclovir (VALTREX) 500 MG Tab Take 1 Tab by mouth every day. 90 Tab 0   • diclofenac DR (VOLTAREN) 75 MG Tablet Delayed Response Take 1 Tab by mouth 2 times a day. 60 Tab 0   • pantoprazole (PROTONIX) 40 MG Tablet Delayed Response Take 1 Tab by mouth every day. 90 Tab 3   • Cholecalciferol (VITAMIN D3) 2000 UNIT Cap Take  by mouth 2 Times a Day.     • Multiple Vitamins-Minerals (MULTIVITAMIN ADULT) Tab Take  by mouth.          Current Outpatient Medications on File Prior to Visit   Medication Sig Dispense Refill   • zolpidem (AMBIEN) 10 MG Tab      • CALCIUM PO      • zolpidem (AMBIEN) 10 MG Tab Take 1 Tab by mouth at bedtime as needed for Sleep for up to 90 days. 30 Tab 2   • metFORMIN ER (GLUCOPHAGE XR) 750 MG TABLET SR 24 HR Take 1 Tab by mouth every day. 90 Tab 1   • rosuvastatin (CRESTOR) 20 MG Tab TAKE 1 TABLET EVERY EVENING. REPLACES  ATORVASTATIN 90 Tab 1   • valACYclovir (VALTREX) 500 MG Tab Take 1 Tab by mouth every day. 90 Tab 0   •  diclofenac DR (VOLTAREN) 75 MG Tablet Delayed Response Take 1 Tab by mouth 2 times a day. 60 Tab 0   • pantoprazole (PROTONIX) 40 MG Tablet Delayed Response Take 1 Tab by mouth every day. 90 Tab 3   • Cholecalciferol (VITAMIN D3) 2000 UNIT Cap Take  by mouth 2 Times a Day.     • Multiple Vitamins-Minerals (MULTIVITAMIN ADULT) Tab Take  by mouth.       No current facility-administered medications on file prior to visit.          Allergies:   Allergies   Allergen Reactions   • Seasonal Itching and Runny Nose       Social Hx:   Social History     Socioeconomic History   • Marital status: Single     Spouse name: Not on file   • Number of children: Not on file   • Years of education: Not on file   • Highest education level: Not on file   Occupational History   • Not on file   Social Needs   • Financial resource strain: Not on file   • Food insecurity     Worry: Not on file     Inability: Not on file   • Transportation needs     Medical: Not on file     Non-medical: Not on file   Tobacco Use   • Smoking status: Never Smoker   • Smokeless tobacco: Never Used   Substance and Sexual Activity   • Alcohol use: Yes     Alcohol/week: 0.6 oz     Types: 1 Glasses of wine per week     Frequency: Monthly or less     Drinks per session: 1 or 2     Binge frequency: Never   • Drug use: No   • Sexual activity: Yes   Lifestyle   • Physical activity     Days per week: Not on file     Minutes per session: Not on file   • Stress: Not on file   Relationships   • Social connections     Talks on phone: Not on file     Gets together: Not on file     Attends Jain service: Not on file     Active member of club or organization: Not on file     Attends meetings of clubs or organizations: Not on file     Relationship status: Not on file   • Intimate partner violence     Fear of current or ex partner: Not on file     Emotionally abused: Not on file     Physically abused: Not on file     Forced sexual activity: Not on file   Other Topics Concern    •  Service Yes   • Blood Transfusions No   • Caffeine Concern No   • Occupational Exposure No   • Hobby Hazards No   • Sleep Concern Yes   • Stress Concern No   • Weight Concern Yes   • Special Diet No   • Back Care No   • Exercise Yes   • Bike Helmet No   • Seat Belt Yes   • Self-Exams Yes   Social History Narrative   • Not on file         EXAMINATION     Physical Exam:   Vitals: There were no vitals taken for this visit.    Constitutional:   Body Habitus: There is no height or weight on file to calculate BMI.  Cooperation: Fully cooperates with exam  Appearance: Well-groomed no disheveled    Respiratory-  breathing comfortable on room air, no audible wheezing  Cardiovascular- capillary refills less than 2 seconds. No lower extremity edema is noted.   Psychiatric- alert and oriented ×3. Normal affect.      MSK/NEURO      There are no signs of infection around the injection sites.   decreased active range of motion with flexion, lateral flexion, and rotation bilaterally.   There is decreased active range of motion with lumbar extension.    There is  pain with lumbar extension.   There is pain with facet loading maneuver (extension rotation) with axial low back pain on the RIGHT side(s)    Palpation:   No tenderness to palpation in midline at T1-T12 levels. No tenderness to palpation in the left and right of the midline T1-L5, NEGATIVE for tenderness to palpation to the para-midline region in the lower lumbar levels.  palpation over SI joint: negative bilaterally    palpation in hip or over the gluteus medius tendon insertion: negative bilaterally      Lumbar spine Special tests  Neuro tension  Straight leg test negative bilaterally    Slump test negative bilaterally      Key points for the international standards for neurological classification of spinal cord injury (ISNCSCI) to light touch.     Dermatome R L                                      L2 2 2   L3 2 2   L4 2 2   L5 2 2   S1 2 2   S2 2 2          Motor Exam Lower Extremities    ? Myotome R L   Hip flexion L2 5 5   Knee extension L3 5 5   Ankle dorsiflexion L4 5 5   Toe extension L5 5 5   Ankle plantarflexion S1 5 5             MEDICAL DECISION MAKING    DATA    Labs:   Lab Results   Component Value Date/Time    SODIUM 138 08/11/2020 11:38 AM    POTASSIUM 3.9 08/11/2020 11:38 AM    CHLORIDE 106 08/11/2020 11:38 AM    CO2 23 08/11/2020 11:38 AM    GLUCOSE 122 (H) 08/11/2020 11:38 AM    BUN 18 08/11/2020 11:38 AM    CREATININE 0.88 08/11/2020 11:38 AM        No results found for: PROTHROMBTM, INR     Lab Results   Component Value Date/Time    WBC 6.3 08/11/2020 11:38 AM    RBC 4.43 (L) 08/11/2020 11:38 AM    HEMOGLOBIN 13.7 (L) 08/11/2020 11:38 AM    HEMATOCRIT 40.2 (L) 08/11/2020 11:38 AM    MCV 90.7 08/11/2020 11:38 AM    MCH 30.9 08/11/2020 11:38 AM    MCHC 34.1 08/11/2020 11:38 AM    MPV 11.4 08/11/2020 11:38 AM    NEUTSPOLYS 52.70 08/11/2020 11:38 AM    LYMPHOCYTES 35.10 08/11/2020 11:38 AM    MONOCYTES 8.30 08/11/2020 11:38 AM    EOSINOPHILS 2.70 08/11/2020 11:38 AM    BASOPHILS 1.00 08/11/2020 11:38 AM        Lab Results   Component Value Date/Time    HBA1C 6.1 (H) 08/11/2020 11:38 AM          Imaging:   I personally reviewed following images    X-ray bilateral hips 2/6/2019.  Cam deformity of the bilateral femoral heads, arthritis present the bilateral hips.  This is consistent with hip impingement syndrome bilaterally.    MRI lumbar spine 2/6/2019  History of laminectomies.  Bilateral facet arthropathy L3-4, L4-5, L5-S1.  Worst L5-S1.  No significant central canal stenosis.  Moderate neuroforaminal stenosis L4-5, mild neuroforaminal stenosis L5-S1, mild to moderate left L5-S1 neuroforaminal stenosis, mild to moderate left L2-3 neuroforaminal stenosis.    I reviewed the following radiology reports                                        Results for orders placed during the hospital encounter of 02/06/19   MR-LUMBAR SPINE-W/O    Impression  Multilevel degenerative disc disease, facet arthropathy and ligamentum flavum redundancy resulting in at most moderate foraminal and mild lateral recess stenoses.    Left hemilaminotomies                                X-ray hips 2019  Impression       1.  Convexity bilaterally at the femoral head/neck junction which can be associated with the clinical syndrome of femoroacetabular impingement    2.  Lumbar spine facet arthropathy and dextroconvex scoliosis                                                             Results for orders placed during the hospital encounter of 19   DX-LUMBAR SPINE-2 OR 3 VIEWS    Impression 1.  Dextroconvex scoliosis    2.  Multilevel facet arthropathy                                           DIAGNOSIS   Visit Diagnoses     ICD-10-CM   1. Lumbar spondylosis symptomatically right worse than left  M47.816   2. Bilateral hip joint arthritis Right worse than left  M16.0   3. Chronic bilateral low back pain, unspecified whether sciatica present  M54.5    G89.29         ASSESSMENT and PLAN:     Abiel Vaughn  1950,   male      Abiel was seen today for follow-up.    Diagnoses and all orders for this visit:    Lumbar spondylosis symptomatically right worse than left  Comments:  not controlled    Bilateral hip joint arthritis Right worse than left  Comments:  stable    Chronic bilateral low back pain, unspecified whether sciatica present  Comments:  stable         The patient had a positive diagnostic medial branch block #1 targeting the right L3-4, 5, 5 S1 facet joints with 100% pain relief and functional improvements during the diagnostic phase.    I scheduled him for diagnostic medial branch block #2 targeting the right L3-4, L4-5, L5-S1 facet joints with fluoroscopic guidance.    The risks benefits and alternatives to this procedure were discussed and the patient wishes to proceed with the procedure. Risks include but are not limited to damage to surrounding  structures, infection, bleeding, worsening of pain which can be permanent, weakness which can be permanent. Benefits include pain relief, improved function. Alternatives includes not doing the procedure.      I advised patient to stop diclofenac but he can use this for acute flares.  Denies use 5 days prior to the procedure above.    Total time: 26 minutes face-to-face time. I spent greater than 50% of the time for patient care and coordination on this date, including with the patient as per assessment and plan above.          Follow up: After the above diagnostic procedure    Thank you for allowing me to participate in the care of this patient. If you have any questions please not hesitate to contact me.          Please note that this dictation was created using voice recognition software. I have made every reasonable attempt to correct obvious errors but there may be errors of grammar and content that I may have overlooked prior to finalization of this note.      Julito Dacosta MD  Interventional Spine and Sports Physiatry  Physical Medicine and Rehabilitation  [facility]

## 2020-09-30 DIAGNOSIS — E11.9 CONTROLLED TYPE 2 DIABETES MELLITUS WITHOUT COMPLICATION, WITHOUT LONG-TERM CURRENT USE OF INSULIN (HCC): ICD-10-CM

## 2020-10-01 RX ORDER — METFORMIN HYDROCHLORIDE 750 MG/1
TABLET, EXTENDED RELEASE ORAL
Qty: 90 TAB | Refills: 1 | Status: SHIPPED | OUTPATIENT
Start: 2020-10-01 | End: 2020-11-16 | Stop reason: SDUPTHER

## 2020-10-06 ENCOUNTER — TELEPHONE (OUTPATIENT)
Dept: PHYSICAL MEDICINE AND REHAB | Facility: MEDICAL CENTER | Age: 70
End: 2020-10-06

## 2020-10-07 NOTE — PREPROCEDURE INSTRUCTIONS
PAT call made 10/7/2020 at 13:5, spoke with pt after confirming 2 pt identifiers. Health hx, medications, allergies  reviewed with pt, as well as pre-procedure/sedation instructions. Respiratory screen completed. Pt denies being sick/symptomatic (fever, cough, SOB, diarrhea) w/in last 72 hrs, or having close contact w/ sick individuals in the past 2 wks. Pt education to notify his MD should he become symptomatic prior to procedure. Pt verbalizes understanding. Pt told to bring a form fitting mask and the he will be wearing it entire stay. Informed pt it is recommended pt self isolate for 72 hrs or from time of this call until procedure, also that we request the  to remain on site until pt is discharged. Pt verbalizes understanding.

## 2020-10-12 ENCOUNTER — TELEPHONE (OUTPATIENT)
Dept: PHYSICAL THERAPY | Facility: REHABILITATION | Age: 70
End: 2020-10-12

## 2020-10-12 ENCOUNTER — HOSPITAL ENCOUNTER (OUTPATIENT)
Facility: REHABILITATION | Age: 70
End: 2020-10-12
Attending: PHYSICAL MEDICINE & REHABILITATION | Admitting: PHYSICAL MEDICINE & REHABILITATION
Payer: MEDICARE

## 2020-10-12 ENCOUNTER — APPOINTMENT (OUTPATIENT)
Dept: RADIOLOGY | Facility: REHABILITATION | Age: 70
End: 2020-10-12
Attending: PHYSICAL MEDICINE & REHABILITATION
Payer: MEDICARE

## 2020-10-12 VITALS
HEART RATE: 62 BPM | TEMPERATURE: 98.5 F | HEIGHT: 75 IN | RESPIRATION RATE: 15 BRPM | WEIGHT: 237.88 LBS | BODY MASS INDEX: 29.58 KG/M2 | OXYGEN SATURATION: 96 % | SYSTOLIC BLOOD PRESSURE: 133 MMHG | DIASTOLIC BLOOD PRESSURE: 74 MMHG

## 2020-10-12 PROCEDURE — 700111 HCHG RX REV CODE 636 W/ 250 OVERRIDE (IP)

## 2020-10-12 PROCEDURE — 64493 INJ PARAVERT F JNT L/S 1 LEV: CPT

## 2020-10-12 PROCEDURE — 700117 HCHG RX CONTRAST REV CODE 255

## 2020-10-12 PROCEDURE — 64495 INJ PARAVERT F JNT L/S 3 LEV: CPT

## 2020-10-12 PROCEDURE — 64494 INJ PARAVERT F JNT L/S 2 LEV: CPT

## 2020-10-12 PROCEDURE — 700101 HCHG RX REV CODE 250

## 2020-10-12 RX ORDER — LIDOCAINE HYDROCHLORIDE 10 MG/ML
INJECTION, SOLUTION EPIDURAL; INFILTRATION; INTRACAUDAL; PERINEURAL
Status: COMPLETED
Start: 2020-10-12 | End: 2020-10-12

## 2020-10-12 RX ORDER — LIDOCAINE HYDROCHLORIDE 20 MG/ML
INJECTION, SOLUTION EPIDURAL; INFILTRATION; INTRACAUDAL; PERINEURAL
Status: COMPLETED
Start: 2020-10-12 | End: 2020-10-12

## 2020-10-12 RX ADMIN — LIDOCAINE HYDROCHLORIDE 10 ML: 10 INJECTION, SOLUTION EPIDURAL; INFILTRATION; INTRACAUDAL; PERINEURAL at 15:39

## 2020-10-12 RX ADMIN — IOHEXOL 10 ML: 240 INJECTION, SOLUTION INTRATHECAL; INTRAVASCULAR; INTRAVENOUS; ORAL at 15:41

## 2020-10-12 RX ADMIN — LIDOCAINE HYDROCHLORIDE 5 ML: 20 INJECTION, SOLUTION EPIDURAL; INFILTRATION; INTRACAUDAL; PERINEURAL at 15:41

## 2020-10-12 ASSESSMENT — FIBROSIS 4 INDEX: FIB4 SCORE: 2.4

## 2020-10-12 ASSESSMENT — PAIN DESCRIPTION - PAIN TYPE: TYPE: CHRONIC PAIN

## 2020-10-12 NOTE — OP THERAPY DISCHARGE SUMMARY
Outpatient Physical Therapy  DISCHARGE SUMMARY NOTE      [facility] [address].  [address]  [place] [address]  Phone:  [phone]  Fax:  [phone]    Date of Visit: 10/12/2020    Patient: Abiel Vaughn  YOB: 1950  MRN: [id number]     Referring Provider: No referring provider defined for this encounter.   Referring Diagnosis No admission diagnoses are documented for this encounter.         Functional Assessment Used        Your patient is being discharged from Physical Therapy with the following comments:   · other    Comments:  This patient was seen by this clinic for therapy with Anthony Reid PT. The clinician is no longer with this office and the patient has not been seen in more than 30 days.  They will be discharged on his behalf.       Addie Tanner, PT, DPT    Date: 10/12/2020

## 2020-10-12 NOTE — PROGRESS NOTES
Preprocedure assessment completed.  Pertinent health information DM2, GERD, thrombocytopenia communicated to physician and staff during time out.  Patient positioned preprocedure by RN, CST, and XRAY.  Pillow under ankles for support.

## 2020-10-12 NOTE — OP REPORT
Date of Service: 10/12/2020     Patient: Abiel Vaughn 70 y.o. male     MRN: [id number]     Physician/s: Julito Dacosta MD    Pre-operative Diagnosis: Lumbosacral spondylosis, facet arthropathy. The patient was NOT seen for lumbar radiculopathy today.     Post-operative Diagnosis: Lumbosacral spondylosis, facet arthropathy. The patient was NOT seen for lumbar radiculopathy today.     Procedure: Medial Branch Blocks targeting the right L3-4, L4-5 and L5-S1  facet joints     Description of procedure:    The risks, benefits, and alternatives of the procedure were reviewed and discussed with the patient.  Written informed consent was freely obtained. A pre-procedural time-out was conducted by the physician verifying patient’s identity, procedure to be performed, procedure site and side, and allergy verification. Appropriate equipment was determined to be in place for the procedure.         In the fluoroscopy suite the patient was placed in a prone position and the skin was prepped and draped in the usual sterile fashion. The fluoroscope was placed over the lower back at the appropriate angles, and the targets for injection were marked. A 27g needle was placed into each of the markings at the levels below, and approx 1cc of 1% Lidocaine was injected subcutaneously into the epidermal and dermal layers. The needle was removed intact.     Using an oblique view, A 25g 3.5 inch needle was then placed at the intersection of the transverse process and superior articular process at the S1 on the right side. The needle tips were then verified by AP, oblique, and lateral views.     Using an oblique view, A 25g 3.5 inch needle was then placed at the intersection of the transverse process and superior articular process at the L5-S1 on the right side. The needle tips were then verified by AP, oblique, and lateral views.     Using an oblique view, A 25g 3.5 inch needle was then placed at the intersection of the transverse  process and superior articular process at the L4-5 on the right side. The needle tips were then verified by AP, oblique, and lateral views.     Using an oblique view, A 25g 3.5 inch needle was then placed at the intersection of the transverse process and superior articular process at the L3-4 on the right side. The needle tips were then verified by AP, oblique, and lateral views.       In the AP view, less than 1 cc of contrast dye was used to highlight the medial branch while the fluoroscope was running live at the levels above. Following negative aspiration, 0.5cc of 2% lidocaine  was then injected at the above levels, and the needles were removed intact after restyleted. The patient's back was covered with a 4x4 gauze, the area was cleansed with sterile normal saline, and a dressing was applied.     There were no complications noted, the patient was hemodynamically stable, and tolerated the procedure well.     The patient had 100% pain relief immediately post procedure.  Significant functional improvement with improved walking ability, improved range of motion with lumbar extension.  No pain with lumbar extension rotation maneuver minutes post procedure.    Follow-up as scheduled      Julito Dacosta MD  Physical Medicine and Rehabilitation  Interventional Spine and Sports Physiatry  [facility]            CPT  Intraarticular joint or medial branch block (MBB) - lumbar or sacral (1st level):  82380  Intraarticular joint or medial branch block (MBB) - lumbar or sacral (2nd level):  93098  Intraarticular joint or medial branch block (MBB) - lumbar or sacral (3rd level):  19211

## 2020-10-12 NOTE — NON-PROVIDER
Current meds. See medication reconciliation form. Reviewed with pt. Pt denies taking ASA,other blood thinners or anti-inflammatories. Pre-procedure assessment completed and information  communicated to procedure room RN during handoff. Pt has a ride post-procedure (Ting is ). Printed and verbal discharge instructions as well as education regarding infection prevention given to pt/pt's family who verbalized understanding.

## 2020-10-12 NOTE — INTERVAL H&P NOTE
H&P reviewed. The patient was examined and there are no changes to the H&P     Lungs clear to auscultation bilaterally.  No abdominal tenderness.  Heart regular rate and rhythm.  Vitals reviewed.    Positive diagnostic medial branch block #1 with 100% pain relief and functional improvements during the diagnostic phase    Proceed with the originally scheduled procedure with diagnostic medial branch block #2 targeting the right L3-4, L4-5, L5-S1 facet joints.

## 2020-10-13 ENCOUNTER — TELEPHONE (OUTPATIENT)
Dept: MEDICAL GROUP | Age: 70
End: 2020-10-13

## 2020-10-13 ENCOUNTER — TELEPHONE (OUTPATIENT)
Dept: PHYSICAL MEDICINE AND REHAB | Facility: MEDICAL CENTER | Age: 70
End: 2020-10-13

## 2020-10-13 NOTE — TELEPHONE ENCOUNTER
DOCUMENTATION OF PAR STATUS:    1. Name of Medication & Dose: SUMAtriptan Succinate 50MG tablets     2. Name of Prescription Coverage Company & phone #: [employer]    3. Date Prior Auth Submitted: 10/13/2020    4. What information was given to obtain insurance decision? N/A    5. Prior Auth Status? Sent to Plan    6. Patient Notified: no    KEY: [id number]

## 2020-10-13 NOTE — TELEPHONE ENCOUNTER
EVANM to call us back in regards to his SP that was done with Dr. Dacosta dated 10/12/2020 for his Diagnostic medial branch blocks targeting the RIGHT L3-4, L4-5 and L5-S1 facet joints with fluoroscopic guidance #2.    Thank you  Mirna

## 2020-10-13 NOTE — TELEPHONE ENCOUNTER
Pt called and he mentioned that he is doing well after the SP that was done with Dr. Dacosta yesterday.    Thank you  Mirna

## 2020-10-15 DIAGNOSIS — B00.9 HSV INFECTION: ICD-10-CM

## 2020-10-15 RX ORDER — VALACYCLOVIR HYDROCHLORIDE 500 MG/1
TABLET, FILM COATED ORAL
Qty: 90 TAB | Refills: 1 | Status: SHIPPED | OUTPATIENT
Start: 2020-10-15 | End: 2021-04-06 | Stop reason: SDUPTHER

## 2020-10-16 NOTE — TELEPHONE ENCOUNTER
FINAL PRIOR AUTHORIZATION STATUS:    1.  Name of Medication & Dose: Pantoprazole      2. Prior Auth Status: Approved through 10/15/2021     3. Action Taken: Pharmacy Notified: yes Patient Notified: yes

## 2020-11-02 ENCOUNTER — OFFICE VISIT (OUTPATIENT)
Dept: PHYSICAL MEDICINE AND REHAB | Facility: MEDICAL CENTER | Age: 70
End: 2020-11-02
Payer: MEDICARE

## 2020-11-02 VITALS
HEIGHT: 75 IN | BODY MASS INDEX: 30.15 KG/M2 | DIASTOLIC BLOOD PRESSURE: 78 MMHG | OXYGEN SATURATION: 96 % | SYSTOLIC BLOOD PRESSURE: 128 MMHG | HEART RATE: 78 BPM | WEIGHT: 242.51 LBS | TEMPERATURE: 97.8 F

## 2020-11-02 DIAGNOSIS — M54.16 LUMBAR RADICULOPATHY: ICD-10-CM

## 2020-11-02 DIAGNOSIS — M16.0 BILATERAL HIP JOINT ARTHRITIS: ICD-10-CM

## 2020-11-02 DIAGNOSIS — M47.816 LUMBAR SPONDYLOSIS: ICD-10-CM

## 2020-11-02 PROCEDURE — 99214 OFFICE O/P EST MOD 30 MIN: CPT | Performed by: PHYSICAL MEDICINE & REHABILITATION

## 2020-11-02 ASSESSMENT — PAIN SCALES - GENERAL: PAINLEVEL: 9=SEVERE PAIN

## 2020-11-02 ASSESSMENT — FIBROSIS 4 INDEX: FIB4 SCORE: 2.4

## 2020-11-02 NOTE — PATIENT INSTRUCTIONS
Your procedure will be at the [facility] special procedure suite.    [facility] [place] [address]       PRE-PROCEDURE INSTRUCTIONS  You may take your regular medications except:   · No Anti-inflammatories 5 days prior to your procedure. Anti-inflammatories include medicines such as  ibuprofen (Motrin, Advil), Excedrin, Naproxen (Aleve, Anaprox, Naprelan, Naprosyn), Celecoxib (Celebrex), Diclofenac (Voltaren-XR tab), and Meloxicam (Mobic).   · No Glucophage or Metformin 24 hours before your procedure. You may resume next day after your procedure.  · Call the physiatry office if you are taking or prescribed anti-biotics within five days of procedure.  · Please ask provider if you are taking any new diabetes medication.  · CONTINUE TAKING BLOOD PRESSURE MEDICATIONS AS PRESCRIBED.  · Pain medications will not be prescribed on the procedure day. Procedural pain medication may be used by your provider   · Call your doctor's office performing the procedure if you have a fever, chills, rash or new illness prior to your procedure    Anticoagulation/antiplatelet medications  No Blood thinning medications such as Coumadin or Plavix 5 days prior to procedure unless your doctor said to continue these medications. Call your doctor if a new medication is prescribed in this class.     Restrictions for eating before procedure:   · If you are getting procedural sedation, then do not eat to for 8 hours prior to procedure appointment time. Do not drink fluids for four hours prior to your procedure time.   · If you are not having procedural sedation, then Skip the meal prior to your procedure. If you have a morning procedure then skip breakfast. If you have an afternoon procedure then skip lunch.   · You may drink clear liquids up to 2 hours prior to your procedure  · You must have a  the day of procedure to accompany you home.      POST PROCEDURE INSTRUCTIONS   · No heavy lifting, strenuous bending or  strenuous exercise for 3 days after your procedure.  · No hot tubs, baths, swimming for 3 days after your procedure  · You can remove the bandage the day after the procedure.  · IF YOU RECEIVED A RADIOFREQUENCY PROCEDURE, THERE MAY BE SOME SORENESS AFTER THE PROCEDURE FOR A FEW WEEKS.  THIS IS NORMAL.  · IF YOU EXPERIENCE PROLONGED WEAKNESS LONGER THAN ONE DAY, BOWEL OR BLADDER INCONTINENCE THEN PLEASE CALL THE PHYSIATRY OFFICE.  · Your leg may feel heavy, weak and numb for up to 1-2 days. Be very careful walking.   ·  You may resume normal activities 3 days after procedure.

## 2020-11-02 NOTE — H&P (VIEW-ONLY)
Follow up patient note  Interventional spine and sports physiatry, Physical medicine rehabilitation      Chief complaint:   Chief Complaint   Patient presents with   • Follow-Up     Back pain          HISTORY    Please see new patient note dated  by Dr Dacosta,  for more details.     HPI  Patient identification: Abiel Vaughn  1950,   male  With Diagnoses of Lumbar spondylosis symptomatically right worse than left, Bilateral hip joint arthritis Right worse than left, and Lumbar radiculopathy, previously treated with epidural and no signs of lumbar radiculopathy at this point were pertinent to this visit.     Procedures:  2018 right L4-5 and L5-S1 transforaminal epidural steroid injection with 70% pain relief  2019 right L4-5 and L5-S1 transforaminal epidural steroid injection with greater than 80% pain relief  2020 diagnostic medial branch block starting the right L3-4, L4-5, L5-S1 facet joints with 100% pain relief  10/12/2020 Medial Branch Blocks targeting the right L3-4, L4-5 and L5-S1  facet joints 100% pain relief during the diagnostic phase    The patient did very well after the diagnostic medial branch block as above.  He states that he was able to walk all of the aisles of a store and go on a walk with no pain and no functional deficits.  After the first few hours the pain started to return back towards its baseline.  Now the pain is 8 out of 10 in intensity aching quality right low back nonradiating worse with walking and lumbar extension improves with sitting and lumbar flexion.  He denies trauma or injuries.       ROS Red Flags :   -Fever, Chills, Sweats: Denies  Involuntary Weight Loss: Denies  Bowel/Bladder Incontinence: Denies  Saddle Anesthesia: Denies        PMHx:   Past Medical History:   Diagnosis Date   • Dyslipidemia    • GERD (gastroesophageal reflux disease)    • Insomnia        PSHx:   Past Surgical History:   Procedure Laterality Date   • LUMBAR MEDIAL BRANCH  BLOCKS Right 10/12/2020    Procedure: BLOCK, NERVE, SPINAL, LUMBAR, POSTERIOR RAMUS, MEDIAL BRANCH;  Surgeon: Julito Dacosta M.D.;  [facility];  Service: Pain Management   • LUMBAR MEDIAL BRANCH BLOCKS Right 8/24/2020    Procedure: BLOCK, NERVE, SPINAL, LUMBAR, POSTERIOR RAMUS, MEDIAL BRANCH;  Surgeon: Julito Dacosta M.D.;  Location[facility];  Service: Pain Management   • APPENDECTOMY     • TONSILLECTOMY         Family history   Family History   Problem Relation Age of Onset   • Cancer Mother 83        endometrial   • Diabetes Mother    • Heart Disease Father    • No Known Problems Maternal Grandmother    • No Known Problems Maternal Grandfather    • No Known Problems Paternal Grandmother    • No Known Problems Paternal Grandfather    • Hyperlipidemia Neg Hx          Medications:   Outpatient Medications Marked as Taking for the 11/2/20 encounter (Office Visit) with Julito Dacosta M.D.   Medication Sig Dispense Refill   • valACYclovir (VALTREX) 500 MG Tab TAKE 1 TABLET DAILY 90 Tab 1   • metFORMIN ER (GLUCOPHAGE XR) 750 MG TABLET SR 24 HR TAKE 1 TABLET DAILY 90 Tab 1   • zolpidem (AMBIEN) 10 MG Tab      • CALCIUM PO      • zolpidem (AMBIEN) 10 MG Tab Take 1 Tab by mouth at bedtime as needed for Sleep for up to 90 days. 30 Tab 2   • rosuvastatin (CRESTOR) 20 MG Tab TAKE 1 TABLET EVERY EVENING. REPLACES  ATORVASTATIN 90 Tab 1   • pantoprazole (PROTONIX) 40 MG Tablet Delayed Response Take 1 Tab by mouth every day. 90 Tab 3   • Cholecalciferol (VITAMIN D3) 2000 UNIT Cap Take  by mouth 2 Times a Day.     • Multiple Vitamins-Minerals (MULTIVITAMIN ADULT) Tab Take  by mouth.          Current Outpatient Medications on File Prior to Visit   Medication Sig Dispense Refill   • valACYclovir (VALTREX) 500 MG Tab TAKE 1 TABLET DAILY 90 Tab 1   • metFORMIN ER (GLUCOPHAGE XR) 750 MG TABLET SR 24 HR TAKE 1 TABLET DAILY 90 Tab 1   • zolpidem (AMBIEN) 10 MG Tab      • CALCIUM PO       • zolpidem (AMBIEN) 10 MG Tab Take 1 Tab by mouth at bedtime as needed for Sleep for up to 90 days. 30 Tab 2   • rosuvastatin (CRESTOR) 20 MG Tab TAKE 1 TABLET EVERY EVENING. REPLACES  ATORVASTATIN 90 Tab 1   • pantoprazole (PROTONIX) 40 MG Tablet Delayed Response Take 1 Tab by mouth every day. 90 Tab 3   • Cholecalciferol (VITAMIN D3) 2000 UNIT Cap Take  by mouth 2 Times a Day.     • Multiple Vitamins-Minerals (MULTIVITAMIN ADULT) Tab Take  by mouth.       No current facility-administered medications on file prior to visit.          Allergies:   Allergies   Allergen Reactions   • Seasonal Itching and Runny Nose       Social Hx:   Social History     Socioeconomic History   • Marital status: Single     Spouse name: Not on file   • Number of children: Not on file   • Years of education: Not on file   • Highest education level: Not on file   Occupational History   • Not on file   Social Needs   • Financial resource strain: Not on file   • Food insecurity     Worry: Not on file     Inability: Not on file   • Transportation needs     Medical: Not on file     Non-medical: Not on file   Tobacco Use   • Smoking status: Never Smoker   • Smokeless tobacco: Never Used   Substance and Sexual Activity   • Alcohol use: Yes     Alcohol/week: 0.6 oz     Types: 1 Glasses of wine per week     Frequency: Monthly or less     Drinks per session: 1 or 2     Binge frequency: Never   • Drug use: No   • Sexual activity: Yes   Lifestyle   • Physical activity     Days per week: Not on file     Minutes per session: Not on file   • Stress: Not on file   Relationships   • Social connections     Talks on phone: Not on file     Gets together: Not on file     Attends [employer] service: Not on file     Active member of club or organization: Not on file     Attends meetings of clubs or organizations: Not on file     Relationship status: Not on file   • Intimate partner violence     Fear of current or ex partner: Not on file     Emotionally  "abused: Not on file     Physically abused: Not on file     Forced sexual activity: Not on file   Other Topics Concern   •  Service Yes   • Blood Transfusions No   • Caffeine Concern No   • Occupational Exposure No   • Hobby Hazards No   • Sleep Concern Yes   • Stress Concern No   • Weight Concern Yes   • Special Diet No   • Back Care No   • Exercise Yes   • Bike Helmet No   • Seat Belt Yes   • Self-Exams Yes   Social History Narrative   • Not on file         EXAMINATION     Physical Exam:   Vitals: /78 (BP Location: Right arm, Patient Position: Sitting, BP Cuff Size: Adult)   Pulse 78   Temp 36.6 °C (97.8 °F) (Temporal)   Ht 1.905 m (6' 3\")   Wt 110 kg (242 lb 8.1 oz)   SpO2 96%     Constitutional:   Body Habitus: Body mass index is 30.31 kg/m².  Cooperation: Fully cooperates with exam  Appearance: Well-groomed no disheveled    Respiratory-  breathing comfortable on room air, no audible wheezing  Cardiovascular- capillary refills less than 2 seconds. No lower extremity edema is noted.   Psychiatric- alert and oriented ×3. Normal affect.      MSK/NEURO    There are no signs of infection around the injection sites.   full  active range of motion with flexion, lateral flexion, and rotation bilaterally.   There is decreased active range of motion with lumbar extension.    There is  pain with lumbar extension.   There is pain with facet loading maneuver (extension rotation) with axial low back pain on the RIGHT side(s)    Palpation:   No tenderness to palpation in midline at T1-T12 levels. No tenderness to palpation in the left and right of the midline T1-L5, NEGATIVE for tenderness to palpation to the para-midline region in the lower lumbar levels.  palpation over SI joint: negative bilaterally    palpation in hip or over the gluteus medius tendon insertion: negative bilaterally      Lumbar spine Special tests  Neuro tension  Straight leg test negative bilaterally    Slump test negative bilaterally  "     Key points for the international standards for neurological classification of spinal cord injury (ISNCSCI) to light touch.     Dermatome R L                                      L2 2 2   L3 2 2   L4 2 2   L5 2 2   S1 2 2   S2 2 2         Motor Exam Lower Extremities    ? Myotome R L   Hip flexion L2 5 5   Knee extension L3 5 5   Ankle dorsiflexion L4 5 5   Toe extension L5 5 5   Ankle plantarflexion S1 5 5               MEDICAL DECISION MAKING    DATA    Labs:   Lab Results   Component Value Date/Time    SODIUM 138 08/11/2020 11:38 AM    POTASSIUM 3.9 08/11/2020 11:38 AM    CHLORIDE 106 08/11/2020 11:38 AM    CO2 23 08/11/2020 11:38 AM    GLUCOSE 122 (H) 08/11/2020 11:38 AM    BUN 18 08/11/2020 11:38 AM    CREATININE 0.88 08/11/2020 11:38 AM        No results found for: PROTHROMBTM, INR     Lab Results   Component Value Date/Time    WBC 6.3 08/11/2020 11:38 AM    RBC 4.43 (L) 08/11/2020 11:38 AM    HEMOGLOBIN 13.7 (L) 08/11/2020 11:38 AM    HEMATOCRIT 40.2 (L) 08/11/2020 11:38 AM    MCV 90.7 08/11/2020 11:38 AM    MCH 30.9 08/11/2020 11:38 AM    MCHC 34.1 08/11/2020 11:38 AM    MPV 11.4 08/11/2020 11:38 AM    NEUTSPOLYS 52.70 08/11/2020 11:38 AM    LYMPHOCYTES 35.10 08/11/2020 11:38 AM    MONOCYTES 8.30 08/11/2020 11:38 AM    EOSINOPHILS 2.70 08/11/2020 11:38 AM    BASOPHILS 1.00 08/11/2020 11:38 AM        Lab Results   Component Value Date/Time    HBA1C 6.1 (H) 08/11/2020 11:38 AM          Imaging:   I personally reviewed following images    X-ray bilateral hips 2/6/2019.  Cam deformity of the bilateral femoral heads, arthritis present the bilateral hips.  This is consistent with hip impingement syndrome bilaterally.    MRI lumbar spine 2/6/2019  History of laminectomies.  Bilateral facet arthropathy L3-4, L4-5, L5-S1.  Worst L5-S1.  No significant central canal stenosis.  Moderate neuroforaminal stenosis L4-5, mild neuroforaminal stenosis L5-S1, mild to moderate left L5-S1 neuroforaminal stenosis, mild to  moderate left L2-3 neuroforaminal stenosis.    I reviewed the following radiology reports                                        Results for orders placed during the hospital encounter of 19   MR-LUMBAR SPINE-W/O    Impression Multilevel degenerative disc disease, facet arthropathy and ligamentum flavum redundancy resulting in at most moderate foraminal and mild lateral recess stenoses.    Left hemilaminotomies                                X-ray hips 2019  Impression       1.  Convexity bilaterally at the femoral head/neck junction which can be associated with the clinical syndrome of femoroacetabular impingement    2.  Lumbar spine facet arthropathy and dextroconvex scoliosis                                                             Results for orders placed during the hospital encounter of 19   DX-LUMBAR SPINE-2 OR 3 VIEWS    Impression 1.  Dextroconvex scoliosis    2.  Multilevel facet arthropathy                                           DIAGNOSIS   Visit Diagnoses     ICD-10-CM   1. Lumbar spondylosis symptomatically right worse than left  M47.816   2. Bilateral hip joint arthritis Right worse than left  M16.0   3. Lumbar radiculopathy, previously treated with epidural and no signs of lumbar radiculopathy at this point  M54.16         ASSESSMENT and PLAN:     Abiel Cuevas Roderick  1950,   male      Abiel was seen today for follow-up.    Diagnoses and all orders for this visit:    Lumbar spondylosis symptomatically right worse than left  -     REFERRAL TO [facility]YSICAL MEDICINE REHAB    Bilateral hip joint arthritis Right worse than left    Lumbar radiculopathy, previously treated with epidural and no signs of lumbar radiculopathy at this point         Status post positive diagnostic medial branch blocks x2 with greater than 80% pain relief and greater than 80% functional improvements during the diagnostic phase and then pain returning after the diagnostic phase as expected.  He is  failed other conservative treatments with medication management home exercise program for many months and his pain is greater than 3 months.    We have decided to proceed with a radiofrequency neurotomy targeting the right L3-4, L4-5, L5-S1 facet joints with sedation    The risks benefits and alternatives to this procedure were discussed and the patient wishes to proceed with the procedure. Risks include but are not limited to damage to surrounding structures, infection, bleeding, worsening of pain which can be permanent, weakness which can be permanent. Benefits include pain relief, improved function. Alternatives includes not doing the procedure.      Given the sedation plan for the procedure advised that the patient did not use Ambien on the night of the procedure.  Okay to resume the following night as prescribed by his PCP         Follow up: After the above procedure    Thank you for allowing me to participate in the care of this patient. If you have any questions please not hesitate to contact me.          Please note that this dictation was created using voice recognition software. I have made every reasonable attempt to correct obvious errors but there may be errors of grammar and content that I may have overlooked prior to finalization of this note.      Julito Dacosta MD  Interventional Spine and Sports Physiatry  Physical Medicine and Rehabilitation  [facility]

## 2020-11-02 NOTE — PROGRESS NOTES
Follow up patient note  Interventional spine and sports physiatry, Physical medicine rehabilitation      Chief complaint:   Chief Complaint   Patient presents with   • Follow-Up     Back pain          HISTORY    Please see new patient note dated  by Dr Dacosta,  for more details.     HPI  Patient identification: Abiel Vaughn  1950,   male  With Diagnoses of Lumbar spondylosis symptomatically right worse than left, Bilateral hip joint arthritis Right worse than left, and Lumbar radiculopathy, previously treated with epidural and no signs of lumbar radiculopathy at this point were pertinent to this visit.     Procedures:  2018 right L4-5 and L5-S1 transforaminal epidural steroid injection with 70% pain relief  2019 right L4-5 and L5-S1 transforaminal epidural steroid injection with greater than 80% pain relief  2020 diagnostic medial branch block starting the right L3-4, L4-5, L5-S1 facet joints with 100% pain relief  10/12/2020 Medial Branch Blocks targeting the right L3-4, L4-5 and L5-S1  facet joints 100% pain relief during the diagnostic phase    The patient did very well after the diagnostic medial branch block as above.  He states that he was able to walk all of the aisles of a store and go on a walk with no pain and no functional deficits.  After the first few hours the pain started to return back towards its baseline.  Now the pain is 8 out of 10 in intensity aching quality right low back nonradiating worse with walking and lumbar extension improves with sitting and lumbar flexion.  He denies trauma or injuries.       ROS Red Flags :   -Fever, Chills, Sweats: Denies  Involuntary Weight Loss: Denies  Bowel/Bladder Incontinence: Denies  Saddle Anesthesia: Denies        PMHx:   Past Medical History:   Diagnosis Date   • Dyslipidemia    • GERD (gastroesophageal reflux disease)    • Insomnia        PSHx:   Past Surgical History:   Procedure Laterality Date   • LUMBAR MEDIAL BRANCH  BLOCKS Right 10/12/2020    Procedure: BLOCK, NERVE, SPINAL, LUMBAR, POSTERIOR RAMUS, MEDIAL BRANCH;  Surgeon: Julito Dacosta M.D.;  [facility];  Service: Pain Management   • LUMBAR MEDIAL BRANCH BLOCKS Right 8/24/2020    Procedure: BLOCK, NERVE, SPINAL, LUMBAR, POSTERIOR RAMUS, MEDIAL BRANCH;  Surgeon: Julito Dacosta M.D.;  Location: [facility];  Service: Pain Management   • APPENDECTOMY     • TONSILLECTOMY         Family history   Family History   Problem Relation Age of Onset   • Cancer Mother 83        endometrial   • Diabetes Mother    • Heart Disease Father    • No Known Problems Maternal Grandmother    • No Known Problems Maternal Grandfather    • No Known Problems Paternal Grandmother    • No Known Problems Paternal Grandfather    • Hyperlipidemia Neg Hx          Medications:   Outpatient Medications Marked as Taking for the 11/2/20 encounter (Office Visit) with Julito Dacosta M.D.   Medication Sig Dispense Refill   • valACYclovir (VALTREX) 500 MG Tab TAKE 1 TABLET DAILY 90 Tab 1   • metFORMIN ER (GLUCOPHAGE XR) 750 MG TABLET SR 24 HR TAKE 1 TABLET DAILY 90 Tab 1   • zolpidem (AMBIEN) 10 MG Tab      • CALCIUM PO      • zolpidem (AMBIEN) 10 MG Tab Take 1 Tab by mouth at bedtime as needed for Sleep for up to 90 days. 30 Tab 2   • rosuvastatin (CRESTOR) 20 MG Tab TAKE 1 TABLET EVERY EVENING. REPLACES  ATORVASTATIN 90 Tab 1   • pantoprazole (PROTONIX) 40 MG Tablet Delayed Response Take 1 Tab by mouth every day. 90 Tab 3   • Cholecalciferol (VITAMIN D3) 2000 UNIT Cap Take  by mouth 2 Times a Day.     • Multiple Vitamins-Minerals (MULTIVITAMIN ADULT) Tab Take  by mouth.          Current Outpatient Medications on File Prior to Visit   Medication Sig Dispense Refill   • valACYclovir (VALTREX) 500 MG Tab TAKE 1 TABLET DAILY 90 Tab 1   • metFORMIN ER (GLUCOPHAGE XR) 750 MG TABLET SR 24 HR TAKE 1 TABLET DAILY 90 Tab 1   • zolpidem (AMBIEN) 10 MG Tab      • CALCIUM PO       • zolpidem (AMBIEN) 10 MG Tab Take 1 Tab by mouth at bedtime as needed for Sleep for up to 90 days. 30 Tab 2   • rosuvastatin (CRESTOR) 20 MG Tab TAKE 1 TABLET EVERY EVENING. REPLACES  ATORVASTATIN 90 Tab 1   • pantoprazole (PROTONIX) 40 MG Tablet Delayed Response Take 1 Tab by mouth every day. 90 Tab 3   • Cholecalciferol (VITAMIN D3) 2000 UNIT Cap Take  by mouth 2 Times a Day.     • Multiple Vitamins-Minerals (MULTIVITAMIN ADULT) Tab Take  by mouth.       No current facility-administered medications on file prior to visit.          Allergies:   Allergies   Allergen Reactions   • Seasonal Itching and Runny Nose       Social Hx:   Social History     Socioeconomic History   • Marital status: Single     Spouse name: Not on file   • Number of children: Not on file   • Years of education: Not on file   • Highest education level: Not on file   Occupational History   • Not on file   Social Needs   • Financial resource strain: Not on file   • Food insecurity     Worry: Not on file     Inability: Not on file   • Transportation needs     Medical: Not on file     Non-medical: Not on file   Tobacco Use   • Smoking status: Never Smoker   • Smokeless tobacco: Never Used   Substance and Sexual Activity   • Alcohol use: Yes     Alcohol/week: 0.6 oz     Types: 1 Glasses of wine per week     Frequency: Monthly or less     Drinks per session: 1 or 2     Binge frequency: Never   • Drug use: No   • Sexual activity: Yes   Lifestyle   • Physical activity     Days per week: Not on file     Minutes per session: Not on file   • Stress: Not on file   Relationships   • Social connections     Talks on phone: Not on file     Gets together: Not on file     Attends Zoroastrianism service: Not on file     Active member of club or organization: Not on file     Attends meetings of clubs or organizations: Not on file     Relationship status: Not on file   • Intimate partner violence     Fear of current or ex partner: Not on file     Emotionally  "abused: Not on file     Physically abused: Not on file     Forced sexual activity: Not on file   Other Topics Concern   •  Service Yes   • Blood Transfusions No   • Caffeine Concern No   • Occupational Exposure No   • Hobby Hazards No   • Sleep Concern Yes   • Stress Concern No   • Weight Concern Yes   • Special Diet No   • Back Care No   • Exercise Yes   • Bike Helmet No   • Seat Belt Yes   • Self-Exams Yes   Social History Narrative   • Not on file         EXAMINATION     Physical Exam:   Vitals: /78 (BP Location: Right arm, Patient Position: Sitting, BP Cuff Size: Adult)   Pulse 78   Temp 36.6 °C (97.8 °F) (Temporal)   Ht 1.905 m (6' 3\")   Wt 110 kg (242 lb 8.1 oz)   SpO2 96%     Constitutional:   Body Habitus: Body mass index is 30.31 kg/m².  Cooperation: Fully cooperates with exam  Appearance: Well-groomed no disheveled    Respiratory-  breathing comfortable on room air, no audible wheezing  Cardiovascular- capillary refills less than 2 seconds. No lower extremity edema is noted.   Psychiatric- alert and oriented ×3. Normal affect.      MSK/NEURO    There are no signs of infection around the injection sites.   full  active range of motion with flexion, lateral flexion, and rotation bilaterally.   There is decreased active range of motion with lumbar extension.    There is  pain with lumbar extension.   There is pain with facet loading maneuver (extension rotation) with axial low back pain on the RIGHT side(s)    Palpation:   No tenderness to palpation in midline at T1-T12 levels. No tenderness to palpation in the left and right of the midline T1-L5, NEGATIVE for tenderness to palpation to the para-midline region in the lower lumbar levels.  palpation over SI joint: negative bilaterally    palpation in hip or over the gluteus medius tendon insertion: negative bilaterally      Lumbar spine Special tests  Neuro tension  Straight leg test negative bilaterally    Slump test negative bilaterally  "     Key points for the international standards for neurological classification of spinal cord injury (ISNCSCI) to light touch.     Dermatome R L                                      L2 2 2   L3 2 2   L4 2 2   L5 2 2   S1 2 2   S2 2 2         Motor Exam Lower Extremities    ? Myotome R L   Hip flexion L2 5 5   Knee extension L3 5 5   Ankle dorsiflexion L4 5 5   Toe extension L5 5 5   Ankle plantarflexion S1 5 5               MEDICAL DECISION MAKING    DATA    Labs:   Lab Results   Component Value Date/Time    SODIUM 138 08/11/2020 11:38 AM    POTASSIUM 3.9 08/11/2020 11:38 AM    CHLORIDE 106 08/11/2020 11:38 AM    CO2 23 08/11/2020 11:38 AM    GLUCOSE 122 (H) 08/11/2020 11:38 AM    BUN 18 08/11/2020 11:38 AM    CREATININE 0.88 08/11/2020 11:38 AM        No results found for: PROTHROMBTM, INR     Lab Results   Component Value Date/Time    WBC 6.3 08/11/2020 11:38 AM    RBC 4.43 (L) 08/11/2020 11:38 AM    HEMOGLOBIN 13.7 (L) 08/11/2020 11:38 AM    HEMATOCRIT 40.2 (L) 08/11/2020 11:38 AM    MCV 90.7 08/11/2020 11:38 AM    MCH 30.9 08/11/2020 11:38 AM    MCHC 34.1 08/11/2020 11:38 AM    MPV 11.4 08/11/2020 11:38 AM    NEUTSPOLYS 52.70 08/11/2020 11:38 AM    LYMPHOCYTES 35.10 08/11/2020 11:38 AM    MONOCYTES 8.30 08/11/2020 11:38 AM    EOSINOPHILS 2.70 08/11/2020 11:38 AM    BASOPHILS 1.00 08/11/2020 11:38 AM        Lab Results   Component Value Date/Time    HBA1C 6.1 (H) 08/11/2020 11:38 AM          Imaging:   I personally reviewed following images    X-ray bilateral hips 2/6/2019.  Cam deformity of the bilateral femoral heads, arthritis present the bilateral hips.  This is consistent with hip impingement syndrome bilaterally.    MRI lumbar spine 2/6/2019  History of laminectomies.  Bilateral facet arthropathy L3-4, L4-5, L5-S1.  Worst L5-S1.  No significant central canal stenosis.  Moderate neuroforaminal stenosis L4-5, mild neuroforaminal stenosis L5-S1, mild to moderate left L5-S1 neuroforaminal stenosis, mild to  moderate left L2-3 neuroforaminal stenosis.    I reviewed the following radiology reports                                        Results for orders placed during the hospital encounter of 19   MR-LUMBAR SPINE-W/O    Impression Multilevel degenerative disc disease, facet arthropathy and ligamentum flavum redundancy resulting in at most moderate foraminal and mild lateral recess stenoses.    Left hemilaminotomies                                X-ray hips 2019  Impression       1.  Convexity bilaterally at the femoral head/neck junction which can be associated with the clinical syndrome of femoroacetabular impingement    2.  Lumbar spine facet arthropathy and dextroconvex scoliosis                                                             Results for orders placed during the hospital encounter of 19   DX-LUMBAR SPINE-2 OR 3 VIEWS    Impression 1.  Dextroconvex scoliosis    2.  Multilevel facet arthropathy                                           DIAGNOSIS   Visit Diagnoses     ICD-10-CM   1. Lumbar spondylosis symptomatically right worse than left  M47.816   2. Bilateral hip joint arthritis Right worse than left  M16.0   3. Lumbar radiculopathy, previously treated with epidural and no signs of lumbar radiculopathy at this point  M54.16         ASSESSMENT and PLAN:     Abiel Cuevas Roderick  1950,   male      Abiel was seen today for follow-up.    Diagnoses and all orders for this visit:    Lumbar spondylosis symptomatically right worse than left  -     REFERRAL TO PHYSICAL MEDICINE REHAB    Bilateral hip joint arthritis Right worse than left    Lumbar radiculopathy, previously treated with epidural and no signs of lumbar radiculopathy at this point         Status post positive diagnostic medial branch blocks x2 with greater than 80% pain relief and greater than 80% functional improvements during the diagnostic phase and then pain returning after the diagnostic phase as expected.  He is  failed other conservative treatments with medication management home exercise program for many months and his pain is greater than 3 months.    We have decided to proceed with a radiofrequency neurotomy targeting the right L3-4, L4-5, L5-S1 facet joints with sedation    The risks benefits and alternatives to this procedure were discussed and the patient wishes to proceed with the procedure. Risks include but are not limited to damage to surrounding structures, infection, bleeding, worsening of pain which can be permanent, weakness which can be permanent. Benefits include pain relief, improved function. Alternatives includes not doing the procedure.      Given the sedation plan for the procedure advised that the patient did not use Ambien on the night of the procedure.  Okay to resume the following night as prescribed by his PCP         Follow up: After the above procedure    Thank you for allowing me to participate in the care of this patient. If you have any questions please not hesitate to contact me.          Please note that this dictation was created using voice recognition software. I have made every reasonable attempt to correct obvious errors but there may be errors of grammar and content that I may have overlooked prior to finalization of this note.      Julito Dacosta MD  Interventional Spine and Sports Physiatry  Physical Medicine and Rehabilitation  [facility]

## 2020-11-10 ENCOUNTER — HOSPITAL ENCOUNTER (OUTPATIENT)
Dept: LAB | Facility: MEDICAL CENTER | Age: 70
End: 2020-11-10
Attending: INTERNAL MEDICINE
Payer: MEDICARE

## 2020-11-10 DIAGNOSIS — D64.9 ANEMIA, UNSPECIFIED TYPE: ICD-10-CM

## 2020-11-10 DIAGNOSIS — E78.5 DYSLIPIDEMIA: ICD-10-CM

## 2020-11-10 DIAGNOSIS — E11.9 CONTROLLED TYPE 2 DIABETES MELLITUS WITHOUT COMPLICATION, WITHOUT LONG-TERM CURRENT USE OF INSULIN (HCC): ICD-10-CM

## 2020-11-10 DIAGNOSIS — D69.6 THROMBOCYTOPENIA (HCC): ICD-10-CM

## 2020-11-10 LAB
ALBUMIN SERPL BCP-MCNC: 4.2 G/DL (ref 3.2–4.9)
ALBUMIN/GLOB SERPL: 1.8 G/DL
ALP SERPL-CCNC: 72 U/L (ref 30–99)
ALT SERPL-CCNC: 24 U/L (ref 2–50)
ANION GAP SERPL CALC-SCNC: 12 MMOL/L (ref 7–16)
AST SERPL-CCNC: 31 U/L (ref 12–45)
BASOPHILS # BLD AUTO: 0.8 % (ref 0–1.8)
BASOPHILS # BLD: 0.06 K/UL (ref 0–0.12)
BILIRUB SERPL-MCNC: 0.6 MG/DL (ref 0.1–1.5)
BUN SERPL-MCNC: 16 MG/DL (ref 8–22)
CALCIUM SERPL-MCNC: 9.2 MG/DL (ref 8.4–10.2)
CHLORIDE SERPL-SCNC: 102 MMOL/L (ref 96–112)
CO2 SERPL-SCNC: 25 MMOL/L (ref 20–33)
CREAT SERPL-MCNC: 0.88 MG/DL (ref 0.5–1.4)
EOSINOPHIL # BLD AUTO: 0.23 K/UL (ref 0–0.51)
EOSINOPHIL NFR BLD: 3.1 % (ref 0–6.9)
ERYTHROCYTE [DISTWIDTH] IN BLOOD BY AUTOMATED COUNT: 41.8 FL (ref 35.9–50)
EST. AVERAGE GLUCOSE BLD GHB EST-MCNC: 140 MG/DL
FASTING STATUS PATIENT QL REPORTED: NORMAL
GLOBULIN SER CALC-MCNC: 2.4 G/DL (ref 1.9–3.5)
GLUCOSE SERPL-MCNC: 126 MG/DL (ref 65–99)
HBA1C MFR BLD: 6.5 % (ref 0–5.6)
HCT VFR BLD AUTO: 42.7 % (ref 42–52)
HGB BLD-MCNC: 14.1 G/DL (ref 14–18)
IMM GRANULOCYTES # BLD AUTO: 0.02 K/UL (ref 0–0.11)
IMM GRANULOCYTES NFR BLD AUTO: 0.3 % (ref 0–0.9)
LYMPHOCYTES # BLD AUTO: 2.46 K/UL (ref 1–4.8)
LYMPHOCYTES NFR BLD: 33.4 % (ref 22–41)
MCH RBC QN AUTO: 29.9 PG (ref 27–33)
MCHC RBC AUTO-ENTMCNC: 33 G/DL (ref 33.7–35.3)
MCV RBC AUTO: 90.7 FL (ref 81.4–97.8)
MONOCYTES # BLD AUTO: 0.63 K/UL (ref 0–0.85)
MONOCYTES NFR BLD AUTO: 8.5 % (ref 0–13.4)
NEUTROPHILS # BLD AUTO: 3.97 K/UL (ref 1.82–7.42)
NEUTROPHILS NFR BLD: 53.9 % (ref 44–72)
NRBC # BLD AUTO: 0 K/UL
NRBC BLD-RTO: 0 /100 WBC
PLATELET # BLD AUTO: 171 K/UL (ref 164–446)
PMV BLD AUTO: 10.7 FL (ref 9–12.9)
POTASSIUM SERPL-SCNC: 4 MMOL/L (ref 3.6–5.5)
PROT SERPL-MCNC: 6.6 G/DL (ref 6–8.2)
RBC # BLD AUTO: 4.71 M/UL (ref 4.7–6.1)
SODIUM SERPL-SCNC: 139 MMOL/L (ref 135–145)
WBC # BLD AUTO: 7.4 K/UL (ref 4.8–10.8)

## 2020-11-10 PROCEDURE — 80053 COMPREHEN METABOLIC PANEL: CPT

## 2020-11-10 PROCEDURE — 85025 COMPLETE CBC W/AUTO DIFF WBC: CPT

## 2020-11-10 PROCEDURE — 36415 COLL VENOUS BLD VENIPUNCTURE: CPT

## 2020-11-10 PROCEDURE — 83036 HEMOGLOBIN GLYCOSYLATED A1C: CPT

## 2020-11-16 ENCOUNTER — TELEPHONE (OUTPATIENT)
Dept: PHYSICAL MEDICINE AND REHAB | Facility: MEDICAL CENTER | Age: 70
End: 2020-11-16

## 2020-11-16 ENCOUNTER — TELEMEDICINE (OUTPATIENT)
Dept: MEDICAL GROUP | Age: 70
End: 2020-11-16
Payer: MEDICARE

## 2020-11-16 VITALS
WEIGHT: 236.4 LBS | DIASTOLIC BLOOD PRESSURE: 66 MMHG | HEIGHT: 75 IN | SYSTOLIC BLOOD PRESSURE: 123 MMHG | BODY MASS INDEX: 29.39 KG/M2 | HEART RATE: 67 BPM

## 2020-11-16 DIAGNOSIS — E11.9 CONTROLLED TYPE 2 DIABETES MELLITUS WITHOUT COMPLICATION, WITHOUT LONG-TERM CURRENT USE OF INSULIN (HCC): ICD-10-CM

## 2020-11-16 DIAGNOSIS — I35.9 AORTIC VALVE DISORDER: ICD-10-CM

## 2020-11-16 DIAGNOSIS — K21.9 GASTROESOPHAGEAL REFLUX DISEASE WITHOUT ESOPHAGITIS: ICD-10-CM

## 2020-11-16 DIAGNOSIS — H93.11 TINNITUS OF RIGHT EAR: ICD-10-CM

## 2020-11-16 DIAGNOSIS — D69.6 THROMBOCYTOPENIA (HCC): ICD-10-CM

## 2020-11-16 DIAGNOSIS — E78.5 DYSLIPIDEMIA: ICD-10-CM

## 2020-11-16 PROCEDURE — 99214 OFFICE O/P EST MOD 30 MIN: CPT | Mod: 95,CR | Performed by: INTERNAL MEDICINE

## 2020-11-16 RX ORDER — ROSUVASTATIN CALCIUM 20 MG/1
TABLET, COATED ORAL
Qty: 90 TAB | Refills: 3 | Status: SHIPPED | OUTPATIENT
Start: 2020-11-16 | End: 2021-04-06 | Stop reason: SDUPTHER

## 2020-11-16 RX ORDER — PANTOPRAZOLE SODIUM 40 MG/1
40 TABLET, DELAYED RELEASE ORAL DAILY
Qty: 90 TAB | Refills: 3 | Status: SHIPPED | OUTPATIENT
Start: 2020-11-16 | End: 2021-04-06 | Stop reason: SDUPTHER

## 2020-11-16 RX ORDER — METFORMIN HYDROCHLORIDE 750 MG/1
TABLET, EXTENDED RELEASE ORAL
Qty: 90 TAB | Refills: 3 | Status: SHIPPED | OUTPATIENT
Start: 2020-11-16 | End: 2020-12-15

## 2020-11-16 ASSESSMENT — FIBROSIS 4 INDEX: FIB4 SCORE: 2.66

## 2020-11-16 NOTE — PROGRESS NOTES
Telemedicine Visit: Established Patient     This Remote Face to Face encounter was conducted via Zoom. Given the importance of social distancing and other strategies recommended to reduce the risk of COVID-19 transmission, I am providing medical care to this patient via audio/video visit in place of an in person visit at the request of the patient. Verbal consent to telehealth, risks, benefits, and consequences were discussed. Patient retains the right to withdraw at any time. All existing confidentiality protections apply. The patient has access to all transmitted medical information. No dissemination of any patient images or information to other entities without further written consent.    CHIEF COMPLAINT     Chief Complaint   Patient presents with   • Lab Results   DM    HPI  Abiel Vaughn is a 70 y.o. male who presents today for the following     DM2, controlled  Internval course  HBA1C 6.5 (H) 11/10/2020 11:38 AM   HBA1C 6.6 (H) 2020 11:13 AM   HBA1C 6.2 (H) 2019 11:28 AM      Onset/  Diabetes education: ordered     Medications:   · Metformin: 750 mg daily  · ACE/ARB: no  · Statin: atorvstatin  · ASA: yes  Compliant with medications: yes  Checking feet daily/wear soft socks/shoes: advised     Diabetes ABCDE TARGETS  · Blood Pressure, goal < 140/90: yes  · Cholesterol-Lipid Panel: Controlled  · Dysalbuminuria: Pending     Diet: Regular  Exercise: Insufficient  BMI: 29     DM complications:  · Peripheral neuropathy: No numbness or tingling in feet.  · Retinopathy:   Last eye exam: . No retinopathy.   · Nephropathy:   No  · CVS: No CAD.  · GI: No gastropathy.     FH of DM: mother, ended up with insulin     Blood pressure/Aortic valve disorder  Controlled, no medications.  He has been evaluated/followed up by cardiology.     Echocardiography: 2019  Left ventricular ejection fraction is visually estimated to be 60%.  Mild concentric left ventricular hypertrophy.  Normal inferior  vena cava size and inspiratory collapse.  Mild central aortic insufficiency.  Ascending aorta is dilated with a diameter of  4.3 cm.     Dyslipidemia  Statin: Atorvastatin, 60 mg daily, taking as prescribed.   - No muscle weakness, cramps, nausea,abdominal discomfort.   Diet / exercise / BMI: as above.   FH: neg     GERD / thrombocytopenia  On omeprazole, 40 mg QD; takes medication as prescribed, that controls sx.   B12/folate were normal, PLT borderline low.  EGD from 2019 showed possible mild Britt's and stomach polyps were removed.     Denies:   - heartburn, epigastric pain.   - nausea, vomiting, or diarrhea  - dysphagia  - abnormal cough  - blood in the stool or dark tarry stools.  - early satiety  - tobacco use.  Labs showed anemia, with normal iron on studies, B12/folate.  Pending FIT.     Tinnitus RT  The patient has have gradually worsening ringing in the right ear, and that he requested ENT referral.    Reviewed PMH, PSH, FH, SH, ALL, HCM/IMM, no changes  Reviewed MEDS, no changes    Patient Active Problem List    Diagnosis Date Noted   • Anemia 08/17/2020   • Health care maintenance 08/17/2020   • Medicare annual wellness visit, subsequent 08/17/2020   • Thrombocytopenia (HCC) 08/17/2020   • Leg swelling 08/17/2020   • Controlled type 2 diabetes mellitus without complication, without long-term current use of insulin (HCC) 08/26/2019   • Seasonal allergies 08/26/2019   • HSV infection 08/26/2019   • Aortic valve disorder 10/29/2018   • Gastroesophageal reflux disease 10/03/2018   • Primary insomnia 10/03/2018   • Dyslipidemia 10/03/2018   • Primary osteoarthritis involving multiple joints 10/03/2018   • Obesity (BMI 30.0-34.9) 10/03/2018   • Decreased hearing, right 10/03/2018     CURRENT MEDICATIONS  Current Outpatient Medications   Medication Sig Dispense Refill   • valACYclovir (VALTREX) 500 MG Tab TAKE 1 TABLET DAILY 90 Tab 1   • metFORMIN ER (GLUCOPHAGE XR) 750 MG TABLET SR 24 HR TAKE 1 TABLET DAILY  90 Tab 1   • zolpidem (AMBIEN) 10 MG Tab      • CALCIUM PO      • rosuvastatin (CRESTOR) 20 MG Tab TAKE 1 TABLET EVERY EVENING. REPLACES  ATORVASTATIN 90 Tab 1   • pantoprazole (PROTONIX) 40 MG Tablet Delayed Response Take 1 Tab by mouth every day. 90 Tab 3   • Cholecalciferol (VITAMIN D3) 2000 UNIT Cap Take  by mouth 2 Times a Day.     • Multiple Vitamins-Minerals (MULTIVITAMIN ADULT) Tab Take  by mouth.       No current facility-administered medications for this visit.      ALLERGIES  Allergies: Seasonal  PAST MEDICAL HISTORY  Past Medical History:   Diagnosis Date   • Dyslipidemia    • GERD (gastroesophageal reflux disease)    • Insomnia      SURGICAL HISTORY  He  has a past surgical history that includes appendectomy; tonsillectomy; lumbar medial branch blocks (Right, 2020); and lumbar medial branch blocks (Right, 10/12/2020).  SOCIAL HISTORY  Social History     Tobacco Use   • Smoking status: Never Smoker   • Smokeless tobacco: Never Used   Substance Use Topics   • Alcohol use: Yes     Alcohol/week: 0.6 oz     Types: 1 Glasses of wine per week     Frequency: Monthly or less     Drinks per session: 1 or 2     Binge frequency: Never   • Drug use: No     Social History     Social History Narrative   • Not on file     FAMILY HISTORY  Family History   Problem Relation Age of Onset   • Cancer Mother 83        endometrial   • Diabetes Mother    • Heart Disease Father    • No Known Problems Maternal Grandmother    • No Known Problems Maternal Grandfather    • No Known Problems Paternal Grandmother    • No Known Problems Paternal Grandfather    • Hyperlipidemia Neg Hx      Family Status   Relation Name Status   • Mo     • Fa     • MGMo     • MGFa     • PGMo     • PGFa     • Neg Hx  (Not Specified)     ROS   Constitutional: Negative for fever, chills, fatigue.  HENT: Negative for congestion, sore throat.  And as above.  Eyes: Negative for vision problems.  "  Respiratory: Negative for cough, shortness of breath.  Cardiovascular: Negative for chest pain, palpitations.   Gastrointestinal: Negative for heartburn, nausea, abdominal pain.   Genitourinary: Negative for dysuria.  Musculoskeletal: Has LBP, treated by PMR  Skin: Negative for rash.   Neuro: Negative for dizziness, weakness and headaches.   Endo/Heme/Allergies: Does not bruise/bleed easily.   Psychiatric/Behavioral: Negative for depression.    Objective   Vitals obtained by patient:  Blood Pressure 123/66 (BP Location: Right arm, Patient Position: Sitting)   Pulse 67   Height 1.905 m (6' 3\")   Weight 107.2 kg (236 lb 6.4 oz)   Body Mass Index 29.55 kg/m²   Physical Exam:  Constitutional: Alert, no distress, well-groomed.  Skin: No rash in visible areas.  Eye: Round. Conjunctiva clear, lids normal.  ENMT: Lips pink without lesions, good dentition. Phonation normal.  Neck: No visible masses or thyromegaly. Moves freely without pain.  CV: no peripheral cyanosis, tachycardia.  Respiratory: Unlabored respiratory effort, no cough or audible wheezing.  Psych: Alert and oriented x3, normal affect and mood.     Labs     Labs are reviewed and discussed with a patient  Lab Results   Component Value Date/Time    CHOLSTRLTOT 139 08/11/2020 11:38 AM    LDL 84 08/11/2020 11:38 AM    HDL 33 (A) 08/11/2020 11:38 AM    TRIGLYCERIDE 108 08/11/2020 11:38 AM       Lab Results   Component Value Date/Time    SODIUM 139 11/10/2020 10:54 AM    POTASSIUM 4.0 11/10/2020 10:54 AM    CHLORIDE 102 11/10/2020 10:54 AM    CO2 25 11/10/2020 10:54 AM    GLUCOSE 126 (H) 11/10/2020 10:54 AM    BUN 16 11/10/2020 10:54 AM    CREATININE 0.88 11/10/2020 10:54 AM     Lab Results   Component Value Date/Time    ALKPHOSPHAT 72 11/10/2020 10:54 AM    ASTSGOT 31 11/10/2020 10:54 AM    ALTSGPT 24 11/10/2020 10:54 AM    TBILIRUBIN 0.6 11/10/2020 10:54 AM      Lab Results   Component Value Date/Time    HBA1C 6.5 (H) 11/10/2020 10:54 AM    HBA1C 6.1 (H) " 08/11/2020 11:38 AM    HBA1C 6.6 (H) 04/20/2020 11:13 AM     Lab Results   Component Value Date/Time    WBC 7.4 11/10/2020 10:54 AM    RBC 4.71 11/10/2020 10:54 AM    HEMOGLOBIN 14.1 11/10/2020 10:54 AM    HEMATOCRIT 42.7 11/10/2020 10:54 AM    MCV 90.7 11/10/2020 10:54 AM    MCH 29.9 11/10/2020 10:54 AM    MCHC 33.0 (L) 11/10/2020 10:54 AM    MPV 10.7 11/10/2020 10:54 AM    NEUTSPOLYS 53.90 11/10/2020 10:54 AM    LYMPHOCYTES 33.40 11/10/2020 10:54 AM    MONOCYTES 8.50 11/10/2020 10:54 AM    EOSINOPHILS 3.10 11/10/2020 10:54 AM    BASOPHILS 0.80 11/10/2020 10:54 AM      Imaging      Reviewed and discussed per HPI    Assessment and Plan     Abiel Vaughn is a 70 y.o. male    1. Controlled type 2 diabetes mellitus without complication, without long-term current use of insulin (HCC)  Controlled, continue with current treatment.  - Comp Metabolic Panel; Standing  - MICROALBUMIN CREAT RATIO URINE; Standing  - HEMOGLOBIN A1C; Standing  - metFORMIN ER (GLUCOPHAGE XR) 750 MG TABLET SR 24 HR; TAKE 1 TABLET DAILY  Dispense: 90 Tab; Refill: 3    2. Aortic valve disorder  Reviewed and discussed imaging/echocardiography    3. Dyslipidemia  Controlled, continue with current treatment.  - Comp Metabolic Panel; Standing  - Lipid Profile; Standing  - rosuvastatin (CRESTOR) 20 MG Tab; TAKE 1 TABLET EVERY EVENING. REPLACES  ATORVASTATIN  Dispense: 90 Tab; Refill: 3    4. Gastroesophageal reflux disease without esophagitis  Controlled, continue with current treatment, GI follow-up  - pantoprazole (PROTONIX) 40 MG Tablet Delayed Response; Take 1 Tab by mouth every day.  Dispense: 90 Tab; Refill: 3    5. Thrombocytopenia (HCC)  Borderline, follow-up labs  - CBC WITH DIFFERENTIAL; Standing    6. Tinnitus of right ear  Worsening, requested referral  - REFERRAL TO ENT    Follow-up: In 6 months and as needed

## 2020-11-17 DIAGNOSIS — F51.01 PRIMARY INSOMNIA: ICD-10-CM

## 2020-11-18 RX ORDER — ZOLPIDEM TARTRATE 10 MG/1
10 TABLET ORAL NIGHTLY PRN
Qty: 30 TAB | Refills: 2 | Status: SHIPPED | OUTPATIENT
Start: 2020-11-18 | End: 2021-01-18 | Stop reason: SDUPTHER

## 2020-11-19 NOTE — PREPROCEDURE INSTRUCTIONS
PAT note: PAT call made 11/19/2020 at 0942. Spoke with Abiel. Health history, allergies, medications and pre-procedure/sedation instructions reviewed with patient. Pre-procedure respiratory screening completed. Pt denies being sick/symptomatic(fever, cough, shortness of breath)  within 72 hours or having been in close contact with symptomatic individuals in the past 2 weeks.Pt was informed to contact his/her physician should he/she or any close personal contact become symptomatic prior to the procedure. Pt was told to bring a mask as he/she would be wearing it during the entire stay. It was recommended that the pt self isolate 72 hrs before the procedure and  recommend that his/her  remain on site til pt is d/michael. Pt verbalized understanding of instructions.

## 2020-11-23 ENCOUNTER — APPOINTMENT (OUTPATIENT)
Dept: RADIOLOGY | Facility: REHABILITATION | Age: 70
End: 2020-11-23
Attending: PHYSICAL MEDICINE & REHABILITATION
Payer: MEDICARE

## 2020-11-23 ENCOUNTER — HOSPITAL ENCOUNTER (OUTPATIENT)
Facility: REHABILITATION | Age: 70
End: 2020-11-23
Attending: PHYSICAL MEDICINE & REHABILITATION | Admitting: PHYSICAL MEDICINE & REHABILITATION
Payer: MEDICARE

## 2020-11-23 VITALS
SYSTOLIC BLOOD PRESSURE: 143 MMHG | TEMPERATURE: 97.5 F | HEIGHT: 75 IN | DIASTOLIC BLOOD PRESSURE: 71 MMHG | WEIGHT: 238.54 LBS | HEART RATE: 63 BPM | RESPIRATION RATE: 14 BRPM | BODY MASS INDEX: 29.66 KG/M2 | OXYGEN SATURATION: 95 %

## 2020-11-23 PROCEDURE — 64636 DESTROY L/S FACET JNT ADDL: CPT

## 2020-11-23 PROCEDURE — 64635 DESTROY LUMB/SAC FACET JNT: CPT

## 2020-11-23 PROCEDURE — 99152 MOD SED SAME PHYS/QHP 5/>YRS: CPT

## 2020-11-23 PROCEDURE — 700111 HCHG RX REV CODE 636 W/ 250 OVERRIDE (IP)

## 2020-11-23 RX ORDER — LIDOCAINE HYDROCHLORIDE 10 MG/ML
INJECTION, SOLUTION EPIDURAL; INFILTRATION; INTRACAUDAL; PERINEURAL
Status: COMPLETED
Start: 2020-11-23 | End: 2020-11-23

## 2020-11-23 RX ORDER — MIDAZOLAM HYDROCHLORIDE 1 MG/ML
INJECTION INTRAMUSCULAR; INTRAVENOUS
Status: COMPLETED
Start: 2020-11-23 | End: 2020-11-23

## 2020-11-23 RX ADMIN — LIDOCAINE HYDROCHLORIDE 30 ML: 10 INJECTION, SOLUTION EPIDURAL; INFILTRATION; INTRACAUDAL; PERINEURAL at 11:23

## 2020-11-23 RX ADMIN — FENTANYL CITRATE 50 MCG: 50 INJECTION, SOLUTION INTRAMUSCULAR; INTRAVENOUS at 11:20

## 2020-11-23 RX ADMIN — MIDAZOLAM HYDROCHLORIDE 1 MG: 1 INJECTION, SOLUTION INTRAMUSCULAR; INTRAVENOUS at 11:20

## 2020-11-23 ASSESSMENT — PAIN DESCRIPTION - PAIN TYPE: TYPE: CHRONIC PAIN

## 2020-11-23 ASSESSMENT — FIBROSIS 4 INDEX: FIB4 SCORE: 2.66

## 2020-11-23 NOTE — PROGRESS NOTES
Pt ambulating from precedure room to recovery, tolerating juice and cookies post procedure, denies pain, CGA amublate upon d/c by RN

## 2020-11-23 NOTE — NON-PROVIDER
Pre-procedure assessment completed  and pertinent health information (metal location,[place]) communicated to physician and staff during timeout. Pt positionned pre-procedure on table by RN, CST, and xray tech. Pillow placed under feet for support.Grounding pad applied to left buttock by CST and verified by RN.

## 2020-11-23 NOTE — PROGRESS NOTES
Pt given verbal and written d/c instructions including verbal infection prevention information and s/s of post procedure infection and verbalizes understanding. denies nsaid use in last 3 days, denies blood thinners use in last 5 days, denies current infection or abx use. Med rec complete. Pt has post procedural ride home with his friend Ting.

## 2020-11-23 NOTE — OP REPORT
Patient: Abiel Vaughn 70 y.o. male MRN: [id number]     Date of Service: 11/23/2020     Physician/s: Julito Dacosta MD    Pre-operative Diagnosis: Lumbar spondylosis, facet arthropathy    Post-operative Diagnosis: Lumbar spondylosis, facet arthropathy    Procedure: Medial Branch Radiofrequency neurotomy targeting the right L3-4, L4-5 and L5-S1 facet joint(s) with sedation.     Description of procedure:    The risks, benefits, and alternatives of the procedure were reviewed and discussed with the patient.  Written informed consent was freely obtained. A pre-procedural time-out was conducted by the physician verifying patient’s identity, procedure to be performed, procedure site and side, and allergy verification. Appropriate equipment was determined to be in place for the procedure.         The patient's vital signs were carefully monitored before, throughout, and after the procedure.     In the fluoroscopy suite the patient was placed in a prone position, and a pillow was placed underneath the level of the umbilicus. The skin was prepped and draped in the usual sterile fashion. The fluoroscope was placed over the low back at the appropriate angles, and the targets for needle/probe placement were marked. A 25g needle was placed into each of the markings at three levels, and approx 1cc of 1% Lidocaine was injected subcutaneously into the epidermal and dermal layers. The needle was removed.     A 18 guage, 10 cm RFN cannula with a 10 mm active tip was then placed into the skin using fluoroscopic guidance and advanced with an oblique view towards the intersection of the transverse process and superior articular process at the S1 levels on the right side, where the medial branch runs. The needle/probe tips were then verified by AP, oblique, and lateral views.     A 18 guage, 10 cm RFN cannula with a 10 mm active tip was then placed into the skin using fluoroscopic guidance and advanced with an oblique view towards  the intersection of the transverse process and superior articular process at the L5-S1 levels on the right side, where the medial branch runs. The needle/probe tips were then verified by AP, oblique, and lateral views.     Then A 18 guage, 10 cm RFN cannula with a 10 mm active tip was then placed into the skin using fluoroscopic guidance and advanced with an oblique view towards the intersection of the transverse process and superior articular process at the L4-5 levels on the right side, where the medial branch runs. The needle/probe tips were then verified by AP, oblique, and lateral views.     Then A 18 guage, 10 cm RFN cannula with a 10 mm active tip was then placed into the skin using fluoroscopic guidance and advanced with an oblique view towards the intersection of the transverse process and superior articular process at the L3-4 levels on the right side, where the medial branch runs. The needle/probe tips were then verified by AP, oblique, and lateral views.       Motor stimulation is used as an extra precaution to ensure the needle tips are off the lumbar nerve roots prior to each lesion. Following negative aspiration, a mixture ratio 1mL of 1% Lidocaine. The needles are not moved, but fluoroscope guidance is used to ensure the needles have not moved.       After a wait period of approximately 2 minutes, a radiofrequency lesion was then created at each level with a temperature of 80 degrees centigrade for 90 seconds. The probes were adjusted to a 2nd location and images were saved in 2+ views views. A 2nd radiofrequency lesion was made with 80°C for 90 seconds.   The cannulas were restyletted, and were then removed intact.     Fluoroscopic images in AP and lateral view were saved prior to each radiofrequency neurotomy.    The patient's back was covered with a 4x4 gauze, the area was cleansed with sterile normal saline, and a dressing was applied. There were no complications noted, the patient remained  hemodynamically stable, and the patient tolerated the procedure well. The patient was examined in the postoperative area and his strength exam was identical as prior to the procedure.    Follow-up as scheduled    Julito Dacosta MD  Physical Medicine and Rehabilitation  Interventional Spine and Sports Physiatry  [facility]            CPT  Radiofrequency ablation (RFA) - lumbar or sacral (1st joint):  69204  Radiofrequency ablation (RFA) - lumbar or sacral (each additional joint):  64636 x 2  moderate procedural sedation first 15 minutes: 36663

## 2020-11-24 ENCOUNTER — TELEPHONE (OUTPATIENT)
Dept: PHYSICAL MEDICINE AND REHAB | Facility: MEDICAL CENTER | Age: 70
End: 2020-11-24

## 2020-11-24 NOTE — TELEPHONE ENCOUNTER
Spoke to Pt in regards to his SP that was done with Dr. Dacosta dated 11/23/2020 for his right radiofrequency neurotomies medial branch targeting the L3-4, L4-5 and L5-S1 facet joints with fluoroscopic guidance and sedation and he mentioned that he is not noticing change yet but he just doing easy right now.    Thank you

## 2020-12-15 DIAGNOSIS — E11.9 CONTROLLED TYPE 2 DIABETES MELLITUS WITHOUT COMPLICATION, WITHOUT LONG-TERM CURRENT USE OF INSULIN (HCC): ICD-10-CM

## 2020-12-15 RX ORDER — METFORMIN HYDROCHLORIDE 750 MG/1
TABLET, EXTENDED RELEASE ORAL
Qty: 90 TAB | Refills: 4 | Status: SHIPPED | OUTPATIENT
Start: 2020-12-15 | End: 2021-04-06 | Stop reason: SDUPTHER

## 2021-01-04 ENCOUNTER — OFFICE VISIT (OUTPATIENT)
Dept: PHYSICAL MEDICINE AND REHAB | Facility: MEDICAL CENTER | Age: 71
End: 2021-01-04
Payer: MEDICARE

## 2021-01-04 VITALS
HEART RATE: 79 BPM | DIASTOLIC BLOOD PRESSURE: 58 MMHG | HEIGHT: 75 IN | SYSTOLIC BLOOD PRESSURE: 120 MMHG | OXYGEN SATURATION: 97 % | BODY MASS INDEX: 30.73 KG/M2 | TEMPERATURE: 97.8 F | WEIGHT: 247.14 LBS

## 2021-01-04 DIAGNOSIS — M16.0 BILATERAL HIP JOINT ARTHRITIS: ICD-10-CM

## 2021-01-04 DIAGNOSIS — M47.816 LUMBAR SPONDYLOSIS: ICD-10-CM

## 2021-01-04 DIAGNOSIS — G89.29 CHRONIC BILATERAL LOW BACK PAIN, UNSPECIFIED WHETHER SCIATICA PRESENT: ICD-10-CM

## 2021-01-04 DIAGNOSIS — M54.16 LUMBAR RADICULOPATHY: ICD-10-CM

## 2021-01-04 DIAGNOSIS — M54.50 CHRONIC BILATERAL LOW BACK PAIN, UNSPECIFIED WHETHER SCIATICA PRESENT: ICD-10-CM

## 2021-01-04 PROCEDURE — 99213 OFFICE O/P EST LOW 20 MIN: CPT | Performed by: PHYSICAL MEDICINE & REHABILITATION

## 2021-01-04 ASSESSMENT — PATIENT HEALTH QUESTIONNAIRE - PHQ9
SUM OF ALL RESPONSES TO PHQ QUESTIONS 1-9: 10
5. POOR APPETITE OR OVEREATING: 2 - MORE THAN HALF THE DAYS
CLINICAL INTERPRETATION OF PHQ2 SCORE: 2

## 2021-01-04 ASSESSMENT — PAIN SCALES - GENERAL: PAINLEVEL: 8=MODERATE-SEVERE PAIN

## 2021-01-04 ASSESSMENT — FIBROSIS 4 INDEX: FIB4 SCORE: 2.66

## 2021-01-05 NOTE — PROGRESS NOTES
Follow up patient note  Interventional spine and sports physiatry, Physical medicine rehabilitation      Chief complaint:   Chief Complaint   Patient presents with   • Follow-Up     Back Pain          HISTORY    Please see new patient note dated  by Dr Dacosta,  for more details.     HPI  Patient identification: Abiel Vaughn  1950,   male  With Diagnoses of Lumbar spondylosis symptomatically right worse than left, Bilateral hip joint arthritis Right worse than left, Lumbar radiculopathy, previously treated with epidural and no signs of lumbar radiculopathy at this point, and Chronic bilateral low back pain, unspecified whether sciatica present were pertinent to this visit.     Procedures:  2018 right L4-5 and L5-S1 transforaminal epidural steroid injection with 70% pain relief  2019 right L4-5 and L5-S1 transforaminal epidural steroid injection with greater than 80% pain relief  2020 diagnostic medial branch block starting the right L3-4, L4-5, L5-S1 facet joints with 100% pain relief  10/12/2020 Medial Branch Blocks targeting the right L3-4, L4-5 and L5-S1  facet joints 100% pain relief during the diagnostic phase  2020 medial branch radiofrequency neurotomy targeting the right L3-4, L4-5, L5-S1 facet joints with sedation with greater than 50% functional improvement and 50% pain improvement      The patient has had a significant provement after the radiofrequency neurotomy now with pain 4 out of 10 in intensity, 50% improved and greater than 50% improvement in function.  He notes that now when he goes to the store he does not need to use a shopping cart and he can walk without shopping cart without canes.  He is very happy with his functional improvement.  He does get intermittent 4 out of 10 pain axial nonradiating right low back worse with lumbar extension but this is significantly improved.  He has been compliant with his home exercise program previously given to him by  physical therapy and he has been able to do this daily.       ROS Red Flags :   -Fever, Chills, Sweats: Denies  Involuntary Weight Loss: Denies  Bowel/Bladder Incontinence: Denies  Saddle Anesthesia: Denies        PMHx:   Past Medical History:   Diagnosis Date   • Dyslipidemia    • GERD (gastroesophageal reflux disease)    • Insomnia        PSHx:   Past Surgical History:   Procedure Laterality Date   • NEURO DEST FACET L/S W/IG SNGL Right 11/23/2020    Procedure: DESTRUCTION, NERVE, FACET JOINT, LUMBOSACRAL, USING NEUROLYTIC AGENT, WITH FLUOROSCOPIC GUIDANCE;  Surgeon: Julito Dacosta M.D.;  [facility];  Service: Pain Management   • LUMBAR MEDIAL BRANCH BLOCKS Right 10/12/2020    Procedure: BLOCK, NERVE, SPINAL, LUMBAR, POSTERIOR RAMUS, MEDIAL BRANCH;  Surgeon: Julito Dacosta M.D.;  Location: [facility];  Service: Pain Management   • LUMBAR MEDIAL BRANCH BLOCKS Right 8/24/2020    Procedure: BLOCK, NERVE, SPINAL, LUMBAR, POSTERIOR RAMUS, MEDIAL BRANCH;  Surgeon: uJlito Dacosta M.D.;  Location: [facility];  Service: Pain Management   • APPENDECTOMY     • TONSILLECTOMY         Family history   Family History   Problem Relation Age of Onset   • Cancer Mother 83        endometrial   • Diabetes Mother    • Heart Disease Father    • No Known Problems Maternal Grandmother    • No Known Problems Maternal Grandfather    • No Known Problems Paternal Grandmother    • No Known Problems Paternal Grandfather    • Hyperlipidemia Neg Hx          Medications:   Outpatient Medications Marked as Taking for the 1/4/21 encounter (Office Visit) with Julito Dacosta M.D.   Medication Sig Dispense Refill   • metFORMIN ER (GLUCOPHAGE XR) 750 MG TABLET SR 24 HR TAKE 1 TABLET DAILY 90 Tab 4   • zolpidem (AMBIEN) 10 MG Tab Take 1 Tab by mouth at bedtime as needed for Sleep for up to 90 days. 30 Tab 2   • rosuvastatin (CRESTOR) 20 MG Tab TAKE 1 TABLET EVERY EVENING.  REPLACES  ATORVASTATIN 90 Tab 3   • pantoprazole (PROTONIX) 40 MG Tablet Delayed Response Take 1 Tab by mouth every day. 90 Tab 3   • valACYclovir (VALTREX) 500 MG Tab TAKE 1 TABLET DAILY 90 Tab 1   • CALCIUM PO      • Cholecalciferol (VITAMIN D3) 2000 UNIT Cap Take  by mouth 2 Times a Day.     • Multiple Vitamins-Minerals (MULTIVITAMIN ADULT) Tab Take  by mouth.          Current Outpatient Medications on File Prior to Visit   Medication Sig Dispense Refill   • metFORMIN ER (GLUCOPHAGE XR) 750 MG TABLET SR 24 HR TAKE 1 TABLET DAILY 90 Tab 4   • zolpidem (AMBIEN) 10 MG Tab Take 1 Tab by mouth at bedtime as needed for Sleep for up to 90 days. 30 Tab 2   • rosuvastatin (CRESTOR) 20 MG Tab TAKE 1 TABLET EVERY EVENING. REPLACES  ATORVASTATIN 90 Tab 3   • pantoprazole (PROTONIX) 40 MG Tablet Delayed Response Take 1 Tab by mouth every day. 90 Tab 3   • valACYclovir (VALTREX) 500 MG Tab TAKE 1 TABLET DAILY 90 Tab 1   • CALCIUM PO      • Cholecalciferol (VITAMIN D3) 2000 UNIT Cap Take  by mouth 2 Times a Day.     • Multiple Vitamins-Minerals (MULTIVITAMIN ADULT) Tab Take  by mouth.       No current facility-administered medications on file prior to visit.          Allergies:   Allergies   Allergen Reactions   • Seasonal Itching and Runny Nose       Social Hx:   Social History     Socioeconomic History   • Marital status: Single     Spouse name: Not on file   • Number of children: Not on file   • Years of education: Not on file   • Highest education level: Not on file   Occupational History   • Not on file   Social Needs   • Financial resource strain: Not on file   • Food insecurity     Worry: Not on file     Inability: Not on file   • Transportation needs     Medical: Not on file     Non-medical: Not on file   Tobacco Use   • Smoking status: Never Smoker   • Smokeless tobacco: Never Used   Substance and Sexual Activity   • Alcohol use: Yes     Alcohol/week: 0.6 oz     Types: 1 Glasses of wine per week     Frequency: Monthly  "or less     Drinks per session: 1 or 2     Binge frequency: Never   • Drug use: No   • Sexual activity: Yes   Lifestyle   • Physical activity     Days per week: Not on file     Minutes per session: Not on file   • Stress: Not on file   Relationships   • Social connections     Talks on phone: Not on file     Gets together: Not on file     Attends Holiness service: Not on file     Active member of club or organization: Not on file     Attends meetings of clubs or organizations: Not on file     Relationship status: Not on file   • Intimate partner violence     Fear of current or ex partner: Not on file     Emotionally abused: Not on file     Physically abused: Not on file     Forced sexual activity: Not on file   Other Topics Concern   •  Service Yes   • Blood Transfusions No   • Caffeine Concern No   • Occupational Exposure No   • Hobby Hazards No   • Sleep Concern Yes   • Stress Concern No   • Weight Concern Yes   • Special Diet No   • Back Care No   • Exercise Yes   • Bike Helmet No   • Seat Belt Yes   • Self-Exams Yes   Social History Narrative   • Not on file         EXAMINATION     Physical Exam:   Vitals: /58 (BP Location: Right arm, Patient Position: Sitting, BP Cuff Size: Adult)   Pulse 79   Temp 36.6 °C (97.8 °F) (Temporal)   Ht 1.905 m (6' 3\")   Wt 112.1 kg (247 lb 2.2 oz)   SpO2 97%     Constitutional:   Body Habitus: Body mass index is 30.89 kg/m².  Cooperation: Fully cooperates with exam  Appearance: Well-groomed no disheveled    Respiratory-  breathing comfortable on room air, no audible wheezing  Cardiovascular- capillary refills less than 2 seconds. No lower extremity edema is noted.   Psychiatric- alert and oriented ×3. Normal affect.      MSK/NEURO      There are no signs of infection around the injection sites.   full  active range of motion with flexion, lateral flexion, and rotation bilaterally.   There is full  active range of motion with lumbar extension.    There is no  pain " with lumbar extension.   There is no pain with facet loading maneuver (extension rotation) with axial low back pain on the BILATERAL side(s)    Palpation:   No tenderness to palpation in midline at T1-T12 levels. No tenderness to palpation in the left and right of the midline T1-L5, NEGATIVE for tenderness to palpation to the para-midline region in the lower lumbar levels.  palpation over SI joint: negative bilaterally    palpation in hip or over the gluteus medius tendon insertion: negative bilaterally      Lumbar spine Special tests  Neuro tension  Straight leg test negative bilaterally    Slump test negative bilaterally      Key points for the international standards for neurological classification of spinal cord injury (ISNCSCI) to light touch.     Dermatome R L                                      L2 2 2   L3 2 2   L4 2 2   L5 2 2   S1 2 2   S2 2 2         Motor Exam Lower Extremities    ? Myotome R L   Hip flexion L2 5 5   Knee extension L3 5 5   Ankle dorsiflexion L4 5 5   Toe extension L5 5 5   Ankle plantarflexion S1 5 5                 MEDICAL DECISION MAKING    DATA    Labs:   Lab Results   Component Value Date/Time    SODIUM 139 11/10/2020 10:54 AM    POTASSIUM 4.0 11/10/2020 10:54 AM    CHLORIDE 102 11/10/2020 10:54 AM    CO2 25 11/10/2020 10:54 AM    GLUCOSE 126 (H) 11/10/2020 10:54 AM    BUN 16 11/10/2020 10:54 AM    CREATININE 0.88 11/10/2020 10:54 AM        No results found for: PROTHROMBTM, INR     Lab Results   Component Value Date/Time    WBC 7.4 11/10/2020 10:54 AM    RBC 4.71 11/10/2020 10:54 AM    HEMOGLOBIN 14.1 11/10/2020 10:54 AM    HEMATOCRIT 42.7 11/10/2020 10:54 AM    MCV 90.7 11/10/2020 10:54 AM    MCH 29.9 11/10/2020 10:54 AM    MCHC 33.0 (L) 11/10/2020 10:54 AM    MPV 10.7 11/10/2020 10:54 AM    NEUTSPOLYS 53.90 11/10/2020 10:54 AM    LYMPHOCYTES 33.40 11/10/2020 10:54 AM    MONOCYTES 8.50 11/10/2020 10:54 AM    EOSINOPHILS 3.10 11/10/2020 10:54 AM    BASOPHILS 0.80 11/10/2020 10:54  AM        Lab Results   Component Value Date/Time    HBA1C 6.5 (H) 11/10/2020 10:54 AM          Imaging:   I personally reviewed following images    I reviewed the fluoroscopic images from 11/23/2020 showing medial branch radiofrequency neurotomy targeting the right L3-4, 4 5, 5 S1 facet joints.  I reviewed these with the patient on 1/4/2021.    X-ray bilateral hips 2/6/2019.  Cam deformity of the bilateral femoral heads, arthritis present the bilateral hips.  This is consistent with hip impingement syndrome bilaterally.    MRI lumbar spine 2/6/2019  History of laminectomies.  Bilateral facet arthropathy L3-4, L4-5, L5-S1.  Worst L5-S1.  No significant central canal stenosis.  Moderate neuroforaminal stenosis L4-5, mild neuroforaminal stenosis L5-S1, mild to moderate left L5-S1 neuroforaminal stenosis, mild to moderate left L2-3 neuroforaminal stenosis.    I reviewed the following radiology reports                                        Results for orders placed during the hospital encounter of 02/06/19   MR-LUMBAR SPINE-W/O    Impression Multilevel degenerative disc disease, facet arthropathy and ligamentum flavum redundancy resulting in at most moderate foraminal and mild lateral recess stenoses.    Left hemilaminotomies                                X-ray hips 2/6/2019  Impression       1.  Convexity bilaterally at the femoral head/neck junction which can be associated with the clinical syndrome of femoroacetabular impingement    2.  Lumbar spine facet arthropathy and dextroconvex scoliosis                                                             Results for orders placed during the hospital encounter of 02/06/19   DX-LUMBAR SPINE-2 OR 3 VIEWS    Impression 1.  Dextroconvex scoliosis    2.  Multilevel facet arthropathy                                           DIAGNOSIS   Visit Diagnoses     ICD-10-CM   1. Lumbar spondylosis symptomatically right worse than left  M47.816   2. Bilateral hip joint arthritis  Right worse than left  M16.0   3. Lumbar radiculopathy, previously treated with epidural and no signs of lumbar radiculopathy at this point  M54.16   4. Chronic bilateral low back pain, unspecified whether sciatica present  M54.5    G89.29         ASSESSMENT and PLAN:     Abiel Vaughn  1950,   male      Abiel was seen today for follow-up.    Diagnoses and all orders for this visit:    Lumbar spondylosis symptomatically right worse than left  -     REFERRAL TO PHYSICAL THERAPY    Bilateral hip joint arthritis Right worse than left  -     REFERRAL TO PHYSICAL THERAPY    Lumbar radiculopathy, previously treated with epidural and no signs of lumbar radiculopathy at this point  -     REFERRAL TO PHYSICAL THERAPY    Chronic bilateral low back pain, unspecified whether sciatica present  -     REFERRAL TO PHYSICAL THERAPY         The patient is had a significant improvement in pain and function described above after the radiofrequency neurotomy targeting the right L3-4, L4-5, L5-S1 facet joints.  He is walking more, he can tolerate his home exercise program and he may have plateaued with this current program.  We discussed additions to his home exercise program he is amenable to further physical therapy to advance his home exercise program.    He has not required any over-the-counter pain medication since the neurotomy.    Follow up: 2021     Thank you for allowing me to participate in the care of this patient. If you have any questions please not hesitate to contact me.          Please note that this dictation was created using voice recognition software. I have made every reasonable attempt to correct obvious errors but there may be errors of grammar and content that I may have overlooked prior to finalization of this note.      Julito Dacosta MD  Interventional Spine and Sports Physiatry  Physical Medicine and Rehabilitation  [facility]

## 2021-01-16 DIAGNOSIS — Z23 NEED FOR VACCINATION: ICD-10-CM

## 2021-01-17 ENCOUNTER — PATIENT MESSAGE (OUTPATIENT)
Dept: MEDICAL GROUP | Age: 71
End: 2021-01-17

## 2021-01-17 DIAGNOSIS — F51.01 PRIMARY INSOMNIA: ICD-10-CM

## 2021-01-18 DIAGNOSIS — F51.01 PRIMARY INSOMNIA: ICD-10-CM

## 2021-01-18 RX ORDER — ZOLPIDEM TARTRATE 10 MG/1
10 TABLET ORAL NIGHTLY PRN
Qty: 90 TAB | Refills: 0 | Status: SHIPPED | OUTPATIENT
Start: 2021-01-18 | End: 2021-04-20 | Stop reason: SDUPTHER

## 2021-01-18 RX ORDER — ZOLPIDEM TARTRATE 10 MG/1
10 TABLET ORAL NIGHTLY PRN
Qty: 90 TAB | Refills: 0 | Status: SHIPPED | OUTPATIENT
Start: 2021-01-18 | End: 2021-01-18 | Stop reason: CLARIF

## 2021-01-18 NOTE — PROGRESS NOTES
Received request via: Patient    Was the patient seen in the last year in this department? Yes    Does the patient have an active prescription (recently filled or refills available) for medication(s) requested? No       Incorrect pharmacy listed on earlier refill, correct pharmacy listed now

## 2021-01-18 NOTE — PATIENT COMMUNICATION
Received request via: Patient    Was the patient seen in the last year in this department? Yes    Does the patient have an active prescription (recently filled or refills available) for medication(s) requested? Yes, Patient is requesting a 90 day supply due to change in insurance and increase in drug cost with new insurance. Patient states that the medication is cheaper through his mail order pharmacy as a 90 day supply.

## 2021-02-17 ENCOUNTER — IMMUNIZATION (OUTPATIENT)
Dept: FAMILY PLANNING/WOMEN'S HEALTH CLINIC | Facility: IMMUNIZATION CENTER | Age: 71
End: 2021-02-17
Attending: INTERNAL MEDICINE
Payer: MEDICARE

## 2021-02-17 DIAGNOSIS — Z23 ENCOUNTER FOR VACCINATION: Primary | ICD-10-CM

## 2021-02-17 DIAGNOSIS — Z23 NEED FOR VACCINATION: ICD-10-CM

## 2021-02-17 PROCEDURE — 0001A PFIZER SARS-COV-2 VACCINE: CPT

## 2021-02-17 PROCEDURE — 91300 PFIZER SARS-COV-2 VACCINE: CPT

## 2021-03-13 ENCOUNTER — IMMUNIZATION (OUTPATIENT)
Dept: FAMILY PLANNING/WOMEN'S HEALTH CLINIC | Facility: IMMUNIZATION CENTER | Age: 71
End: 2021-03-13
Attending: INTERNAL MEDICINE
Payer: MEDICARE

## 2021-03-13 DIAGNOSIS — Z23 ENCOUNTER FOR VACCINATION: Primary | ICD-10-CM

## 2021-03-13 PROCEDURE — 91300 PFIZER SARS-COV-2 VACCINE: CPT

## 2021-03-13 PROCEDURE — 0002A PFIZER SARS-COV-2 VACCINE: CPT

## 2021-04-05 ENCOUNTER — OFFICE VISIT (OUTPATIENT)
Dept: PHYSICAL MEDICINE AND REHAB | Facility: MEDICAL CENTER | Age: 71
End: 2021-04-05
Payer: MEDICARE

## 2021-04-05 VITALS
BODY MASS INDEX: 30.95 KG/M2 | SYSTOLIC BLOOD PRESSURE: 116 MMHG | HEIGHT: 75 IN | HEART RATE: 82 BPM | WEIGHT: 248.9 LBS | DIASTOLIC BLOOD PRESSURE: 58 MMHG | TEMPERATURE: 98.2 F | OXYGEN SATURATION: 94 %

## 2021-04-05 DIAGNOSIS — K21.9 GASTROESOPHAGEAL REFLUX DISEASE WITHOUT ESOPHAGITIS: ICD-10-CM

## 2021-04-05 DIAGNOSIS — M54.16 LUMBAR RADICULOPATHY: ICD-10-CM

## 2021-04-05 DIAGNOSIS — B00.9 HSV INFECTION: ICD-10-CM

## 2021-04-05 DIAGNOSIS — F51.01 PRIMARY INSOMNIA: ICD-10-CM

## 2021-04-05 DIAGNOSIS — M48.061 SPINAL STENOSIS OF LUMBAR REGION, UNSPECIFIED WHETHER NEUROGENIC CLAUDICATION PRESENT: ICD-10-CM

## 2021-04-05 DIAGNOSIS — M25.852 FEMOROACETABULAR IMPINGEMENT OF BOTH HIPS: ICD-10-CM

## 2021-04-05 DIAGNOSIS — M25.851 FEMOROACETABULAR IMPINGEMENT OF BOTH HIPS: ICD-10-CM

## 2021-04-05 DIAGNOSIS — M47.816 LUMBAR SPONDYLOSIS: ICD-10-CM

## 2021-04-05 DIAGNOSIS — E78.5 DYSLIPIDEMIA: ICD-10-CM

## 2021-04-05 DIAGNOSIS — Z98.890 HISTORY OF LUMBOSACRAL SPINE SURGERY: ICD-10-CM

## 2021-04-05 DIAGNOSIS — M48.07 SPINAL STENOSIS OF LUMBOSACRAL REGION: ICD-10-CM

## 2021-04-05 DIAGNOSIS — E11.9 CONTROLLED TYPE 2 DIABETES MELLITUS WITHOUT COMPLICATION, WITHOUT LONG-TERM CURRENT USE OF INSULIN (HCC): ICD-10-CM

## 2021-04-05 PROCEDURE — 99214 OFFICE O/P EST MOD 30 MIN: CPT | Performed by: PHYSICAL MEDICINE & REHABILITATION

## 2021-04-05 RX ORDER — ROSUVASTATIN CALCIUM 20 MG/1
TABLET, COATED ORAL
Qty: 90 TABLET | Refills: 3 | Status: CANCELLED | OUTPATIENT
Start: 2021-04-05

## 2021-04-05 RX ORDER — METFORMIN HYDROCHLORIDE 750 MG/1
750 TABLET, EXTENDED RELEASE ORAL
Qty: 90 TABLET | Refills: 4 | Status: CANCELLED | OUTPATIENT
Start: 2021-04-05

## 2021-04-05 RX ORDER — PANTOPRAZOLE SODIUM 40 MG/1
40 TABLET, DELAYED RELEASE ORAL DAILY
Qty: 90 TABLET | Refills: 3 | Status: CANCELLED | OUTPATIENT
Start: 2021-04-05

## 2021-04-05 RX ORDER — DICLOFENAC SODIUM 75 MG/1
75 TABLET, DELAYED RELEASE ORAL
COMMUNITY
Start: 2020-07-04 | End: 2021-04-20

## 2021-04-05 ASSESSMENT — FIBROSIS 4 INDEX: FIB4 SCORE: 2.66

## 2021-04-05 ASSESSMENT — PAIN SCALES - GENERAL: PAINLEVEL: 9=SEVERE PAIN

## 2021-04-05 NOTE — PROGRESS NOTES
Follow up patient note  Interventional spine and sports physiatry, Physical medicine rehabilitation      Chief complaint:   Chief Complaint   Patient presents with   • Follow-Up     Back pain          HISTORY    Please see new patient note dated  by Dr Dacosta,  for more details.     HPI  Patient identification: Abiel Vaughn  1950,   male  With Diagnoses of Lumbar radiculopathy, Lumbar spondylosis symptomatically right worse than left, Spinal stenosis of lumbar region, unspecified whether neurogenic claudication present, Femoroacetabular impingement of both hips. stable, History of lumbosacral spine surgery, done in [place], and Spinal stenosis of lumbosacral region were pertinent to this visit.     Procedures:  2018 right L4-5 and L5-S1 transforaminal epidural steroid injection with 70% pain relief  2019 right L4-5 and L5-S1 transforaminal epidural steroid injection with greater than 80% pain relief  2020 diagnostic medial branch block starting the right L3-4, L4-5, L5-S1 facet joints with 100% pain relief  10/12/2020 Medial Branch Blocks targeting the right L3-4, L4-5 and L5-S1  facet joints 100% pain relief during the diagnostic phase  2020 medial branch radiofrequency neurotomy targeting the right L3-4, L4-5, L5-S1 facet joints with sedation with greater than 50% functional improvement and 50% pain improvement    Over th past 3 months, the patient has had leg heaviness right worse than left. Worse with walking. 9/10 intensity. Right low back and right leg pain. He has done his exercises from PT including the past six months. He does these daily. He can only walk for about 1/2 block before he has to stop. He has a difficult time doing his daily exercise because of the worsening pain.       ROS Red Flags :   -Fever, Chills, Sweats: Denies  Involuntary Weight Loss: Denies  Bowel/Bladder Incontinence: Denies  Saddle Anesthesia: Denies        PMHx:   Past Medical History:    Diagnosis Date   • Dyslipidemia    • GERD (gastroesophageal reflux disease)    • Insomnia        PSHx:   Past Surgical History:   Procedure Laterality Date   • NEURO DEST FACET L/S W/IG SNGL Right 11/23/2020    Procedure: DESTRUCTION, NERVE, FACET JOINT, LUMBOSACRAL, USING NEUROLYTIC AGENT, WITH FLUOROSCOPIC GUIDANCE;  Surgeon: Julito Dacosta M.D.;  [facility];  Service: Pain Management   • LUMBAR MEDIAL BRANCH BLOCKS Right 10/12/2020    Procedure: BLOCK, NERVE, SPINAL, LUMBAR, POSTERIOR RAMUS, MEDIAL BRANCH;  Surgeon: Julito Dacosta M.D.;  Location[facility];  Service: Pain Management   • LUMBAR MEDIAL BRANCH BLOCKS Right 8/24/2020    Procedure: BLOCK, NERVE, SPINAL, LUMBAR, POSTERIOR RAMUS, MEDIAL BRANCH;  Surgeon: Julito Dacosta M.D.;  Location: [facility];  Service: Pain Management   • APPENDECTOMY     • TONSILLECTOMY         Family history   Family History   Problem Relation Age of Onset   • Cancer Mother 83        endometrial   • Diabetes Mother    • Heart Disease Father    • No Known Problems Maternal Grandmother    • No Known Problems Maternal Grandfather    • No Known Problems Paternal Grandmother    • No Known Problems Paternal Grandfather    • Hyperlipidemia Neg Hx          Medications:   Outpatient Medications Marked as Taking for the 4/5/21 encounter (Office Visit) with Julito Dacosta M.D.   Medication Sig Dispense Refill   • diclofenac DR (VOLTAREN) 75 MG Tablet Delayed Response Take 75 mg by mouth.     • zolpidem (AMBIEN) 10 MG Tab Take 1 Tab by mouth at bedtime as needed for Sleep for up to 90 days. 90 Tab 0   • metFORMIN ER (GLUCOPHAGE XR) 750 MG TABLET SR 24 HR TAKE 1 TABLET DAILY 90 Tab 4   • rosuvastatin (CRESTOR) 20 MG Tab TAKE 1 TABLET EVERY EVENING. REPLACES  ATORVASTATIN 90 Tab 3   • pantoprazole (PROTONIX) 40 MG Tablet Delayed Response Take 1 Tab by mouth every day. 90 Tab 3   • valACYclovir (VALTREX) 500 MG Tab  TAKE 1 TABLET DAILY 90 Tab 1   • CALCIUM PO      • Cholecalciferol (VITAMIN D3) 2000 UNIT Cap Take  by mouth 2 Times a Day.     • Multiple Vitamins-Minerals (MULTIVITAMIN ADULT) Tab Take  by mouth.          Current Outpatient Medications on File Prior to Visit   Medication Sig Dispense Refill   • diclofenac DR (VOLTAREN) 75 MG Tablet Delayed Response Take 75 mg by mouth.     • zolpidem (AMBIEN) 10 MG Tab Take 1 Tab by mouth at bedtime as needed for Sleep for up to 90 days. 90 Tab 0   • metFORMIN ER (GLUCOPHAGE XR) 750 MG TABLET SR 24 HR TAKE 1 TABLET DAILY 90 Tab 4   • rosuvastatin (CRESTOR) 20 MG Tab TAKE 1 TABLET EVERY EVENING. REPLACES  ATORVASTATIN 90 Tab 3   • pantoprazole (PROTONIX) 40 MG Tablet Delayed Response Take 1 Tab by mouth every day. 90 Tab 3   • valACYclovir (VALTREX) 500 MG Tab TAKE 1 TABLET DAILY 90 Tab 1   • CALCIUM PO      • Cholecalciferol (VITAMIN D3) 2000 UNIT Cap Take  by mouth 2 Times a Day.     • Multiple Vitamins-Minerals (MULTIVITAMIN ADULT) Tab Take  by mouth.       No current facility-administered medications on file prior to visit.         Allergies:   Allergies   Allergen Reactions   • Seasonal Itching and Runny Nose       Social Hx:   Social History     Socioeconomic History   • Marital status: Single     Spouse name: Not on file   • Number of children: Not on file   • Years of education: Not on file   • Highest education level: Not on file   Occupational History   • Not on file   Tobacco Use   • Smoking status: Never Smoker   • Smokeless tobacco: Never Used   Substance and Sexual Activity   • Alcohol use: Yes     Alcohol/week: 0.6 oz     Types: 1 Glasses of wine per week   • Drug use: No   • Sexual activity: Yes   Other Topics Concern   •  Service Yes   • Blood Transfusions No   • Caffeine Concern No   • Occupational Exposure No   • Hobby Hazards No   • Sleep Concern Yes   • Stress Concern No   • Weight Concern Yes   • Special Diet No   • Back Care No   • Exercise Yes   •  "Bike Helmet No   • Seat Belt Yes   • Self-Exams Yes   Social History Narrative   • Not on file     Social Determinants of Health     Financial Resource Strain:    • Difficulty of Paying Living Expenses:    Food Insecurity:    • Worried About Running Out of Food in the Last Year:    • Ran Out of Food in the Last Year:    Transportation Needs:    • Lack of Transportation (Medical):    • Lack of Transportation (Non-Medical):    Physical Activity:    • Days of Exercise per Week:    • Minutes of Exercise per Session:    Stress:    • Feeling of Stress :    Social Connections:    • Frequency of Communication with Friends and Family:    • Frequency of Social Gatherings with Friends and Family:    • Attends Sikhism Services:    • Active Member of Clubs or Organizations:    • Attends Club or Organization Meetings:    • Marital Status:    Intimate Partner Violence:    • Fear of Current or Ex-Partner:    • Emotionally Abused:    • Physically Abused:    • Sexually Abused:          EXAMINATION     Physical Exam:   Vitals: /58 (BP Location: Left arm, Patient Position: Sitting, BP Cuff Size: Adult)   Pulse 82   Temp 36.8 °C (98.2 °F) (Temporal)   Ht 1.905 m (6' 3\")   Wt 113 kg (248 lb 14.4 oz)   SpO2 94%     Constitutional:   Body Habitus: Body mass index is 31.11 kg/m².  Cooperation: Fully cooperates with exam  Appearance: Well-groomed no disheveled    Respiratory-  breathing comfortable on room air, no audible wheezing  Cardiovascular- capillary refills less than 2 seconds. No lower extremity edema is noted.   Psychiatric- alert and oriented ×3. Normal affect.      MSK/NEURO      Key points for the international standards for neurological classification of spinal cord injury (ISNCSCI) to light touch.     Dermatome R L                                      L2 2 2   L3 2 2   L4 2 2   L5 2 2   S1 2 2   S2 2 2       Motor Exam Lower Extremities    ? Myotome R L   Hip flexion L2 5 5   Knee extension L3 5 5   Ankle " dorsiflexion L4 5 5   Toe extension L5 5 5   Ankle plantarflexion S1 5 5                   MEDICAL DECISION MAKING    DATA    Labs:   Lab Results   Component Value Date/Time    SODIUM 139 11/10/2020 10:54 AM    POTASSIUM 4.0 11/10/2020 10:54 AM    CHLORIDE 102 11/10/2020 10:54 AM    CO2 25 11/10/2020 10:54 AM    GLUCOSE 126 (H) 11/10/2020 10:54 AM    BUN 16 11/10/2020 10:54 AM    CREATININE 0.88 11/10/2020 10:54 AM        No results found for: PROTHROMBTM, INR     Lab Results   Component Value Date/Time    WBC 7.4 11/10/2020 10:54 AM    RBC 4.71 11/10/2020 10:54 AM    HEMOGLOBIN 14.1 11/10/2020 10:54 AM    HEMATOCRIT 42.7 11/10/2020 10:54 AM    MCV 90.7 11/10/2020 10:54 AM    MCH 29.9 11/10/2020 10:54 AM    MCHC 33.0 (L) 11/10/2020 10:54 AM    MPV 10.7 11/10/2020 10:54 AM    NEUTSPOLYS 53.90 11/10/2020 10:54 AM    LYMPHOCYTES 33.40 11/10/2020 10:54 AM    MONOCYTES 8.50 11/10/2020 10:54 AM    EOSINOPHILS 3.10 11/10/2020 10:54 AM    BASOPHILS 0.80 11/10/2020 10:54 AM        Lab Results   Component Value Date/Time    HBA1C 6.5 (H) 11/10/2020 10:54 AM          Imaging:   I personally reviewed following images    I reviewed the fluoroscopic images from 11/23/2020 showing medial branch radiofrequency neurotomy targeting the right L3-4, 4 5, 5 S1 facet joints.  I reviewed these with the patient on 1/4/2021.    X-ray bilateral hips 2/6/2019.  Cam deformity of the bilateral femoral heads, arthritis present the bilateral hips.  This is consistent with hip impingement syndrome bilaterally.    MRI lumbar spine 2/6/2019  History of laminectomies.  Bilateral facet arthropathy L3-4, L4-5, L5-S1.  Worst L5-S1.  No significant central canal stenosis.  Moderate neuroforaminal stenosis L4-5, mild neuroforaminal stenosis L5-S1, mild to moderate left L5-S1 neuroforaminal stenosis, mild to moderate left L2-3 neuroforaminal stenosis.    I reviewed the following radiology reports                                        Results for orders  placed during the hospital encounter of 19   MR-LUMBAR SPINE-W/O    Impression Multilevel degenerative disc disease, facet arthropathy and ligamentum flavum redundancy resulting in at most moderate foraminal and mild lateral recess stenoses.    Left hemilaminotomies                                X-ray hips 2019  Impression       1.  Convexity bilaterally at the femoral head/neck junction which can be associated with the clinical syndrome of femoroacetabular impingement    2.  Lumbar spine facet arthropathy and dextroconvex scoliosis                                                             Results for orders placed during the hospital encounter of 19   DX-LUMBAR SPINE-2 OR 3 VIEWS    Impression 1.  Dextroconvex scoliosis    2.  Multilevel facet arthropathy                                           DIAGNOSIS   Visit Diagnoses     ICD-10-CM   1. Lumbar radiculopathy  M54.16   2. Lumbar spondylosis symptomatically right worse than left  M47.816   3. Spinal stenosis of lumbar region, unspecified whether neurogenic claudication present  M48.061   4. Femoroacetabular impingement of both hips. stable  M25.851    M25.852   5. History of lumbosacral spine surgery, done in [place]  Z98.890   6. Spinal stenosis of lumbosacral region  M48.07         ASSESSMENT and PLAN:     Abiel Vaughn  1950,   male      Abiel was seen today for follow-up.    Diagnoses and all orders for this visit:    Lumbar radiculopathy  -     REFERRAL TO PSYCHOLOGY  -     REFERRAL TO SPINE SURGERY  -     MR-LUMBAR SPINE-W/O; Future    Lumbar spondylosis symptomatically right worse than left  -     REFERRAL TO PSYCHOLOGY  -     REFERRAL TO SPINE SURGERY  -     MR-LUMBAR SPINE-W/O; Future    Spinal stenosis of lumbar region, unspecified whether neurogenic claudication present  -     REFERRAL TO PSYCHOLOGY  -     REFERRAL TO SPINE SURGERY  -     MR-LUMBAR SPINE-W/O; Future    Femoroacetabular impingement of both hips.  stable  -     REFERRAL TO PSYCHOLOGY  -     REFERRAL TO SPINE SURGERY  -     MR-LUMBAR SPINE-W/O; Future    History of lumbosacral spine surgery, done in [place]  -     REFERRAL TO PSYCHOLOGY  -     REFERRAL TO SPINE SURGERY  -     MR-LUMBAR SPINE-W/O; Future    Spinal stenosis of lumbosacral region  -     MR-LUMBAR SPINE-W/O; Future       The patient was previously doing well after the radiofrequency neurotomy.  Over the past 3 months he has had worsening of his pain in the low back and has developed pain radiating down the right leg.  Given the patient's history of spine surgery I believe is reasonable to proceed with additional advanced imaging including an MRI lumbar spine.  The patient also requested a referral to see a spine surgeon for consultation after the MRI and I believe this is reasonable.    We also discussed potential spinal cord stimulator trials.  I placed a referral to psychology.  I provided the patient with information on spinal cord stimulation and he wanted to have our local rep contact him with additional information.  If there are no surgical interventions and other less invasive interventions are unlikely to provide the patient with benefit and he may be a good candidate for spinal cord stimulation.    For now it is reasonable for the patient to continue diclofenac 75 mg twice daily.  Ideally he would not be on long-term NSAIDs but this is providing him with at least some benefit with functional improvement in pain improvement.    Follow up: After the above consults    Thank you for allowing me to participate in the care of this patient. If you have any questions please not hesitate to contact me.          Please note that this dictation was created using voice recognition software. I have made every reasonable attempt to correct obvious errors but there may be errors of grammar and content that I may have overlooked prior to finalization of this note.      Julito Dacosta MD  Interventional  Spine and Sports Physiatry  Physical Medicine and Rehabilitation  [facility]

## 2021-04-06 ENCOUNTER — PATIENT MESSAGE (OUTPATIENT)
Dept: MEDICAL GROUP | Age: 71
End: 2021-04-06

## 2021-04-06 DIAGNOSIS — K21.9 GASTROESOPHAGEAL REFLUX DISEASE WITHOUT ESOPHAGITIS: ICD-10-CM

## 2021-04-06 DIAGNOSIS — B00.9 HSV INFECTION: ICD-10-CM

## 2021-04-06 DIAGNOSIS — E78.5 DYSLIPIDEMIA: ICD-10-CM

## 2021-04-06 DIAGNOSIS — E11.9 CONTROLLED TYPE 2 DIABETES MELLITUS WITHOUT COMPLICATION, WITHOUT LONG-TERM CURRENT USE OF INSULIN (HCC): ICD-10-CM

## 2021-04-06 RX ORDER — VALACYCLOVIR HYDROCHLORIDE 500 MG/1
TABLET, FILM COATED ORAL
Qty: 90 TABLET | Refills: 4 | Status: SHIPPED | OUTPATIENT
Start: 2021-04-06 | End: 2021-04-20 | Stop reason: SDUPTHER

## 2021-04-06 RX ORDER — ROSUVASTATIN CALCIUM 20 MG/1
TABLET, COATED ORAL
Qty: 90 TABLET | Refills: 4 | Status: SHIPPED | OUTPATIENT
Start: 2021-04-06 | End: 2021-12-01

## 2021-04-06 RX ORDER — ZOLPIDEM TARTRATE 10 MG/1
10 TABLET ORAL NIGHTLY PRN
Qty: 90 TABLET | Refills: 0 | OUTPATIENT
Start: 2021-04-06 | End: 2021-07-05

## 2021-04-06 RX ORDER — VALACYCLOVIR HYDROCHLORIDE 500 MG/1
500 TABLET, FILM COATED ORAL
Qty: 90 TABLET | Refills: 1 | Status: CANCELLED | OUTPATIENT
Start: 2021-04-06

## 2021-04-06 RX ORDER — PANTOPRAZOLE SODIUM 40 MG/1
40 TABLET, DELAYED RELEASE ORAL DAILY
Qty: 90 TABLET | Refills: 3 | Status: SHIPPED | OUTPATIENT
Start: 2021-04-06 | End: 2022-03-20

## 2021-04-06 RX ORDER — METFORMIN HYDROCHLORIDE 750 MG/1
750 TABLET, EXTENDED RELEASE ORAL DAILY
Qty: 90 TABLET | Refills: 4 | Status: SHIPPED | OUTPATIENT
Start: 2021-04-06 | End: 2022-01-20

## 2021-04-06 NOTE — TELEPHONE ENCOUNTER
Phone Number Called: [phone] (home)       Call outcome: Spoke to patient regarding message below.    Message: Spoke with patient about his medication refill, let him know it was denied. Dr Sepulveda wanted him to make an appointment for the refill. Scheduled an appointment for him 4/20/21 at 4pm

## 2021-04-06 NOTE — PATIENT COMMUNICATION
Received request via: Patient    Was the patient seen in the last year in this department? Yes    Does the patient have an active prescription (recently filled or refills available) for medication(s) requested? INSURANCE CHANGED, NEEDS TO BE SENT AGAIN

## 2021-04-12 ENCOUNTER — TELEMEDICINE (OUTPATIENT)
Dept: PHYSICAL MEDICINE AND REHAB | Facility: MEDICAL CENTER | Age: 71
End: 2021-04-12
Payer: MEDICARE

## 2021-04-12 VITALS — HEIGHT: 75 IN | BODY MASS INDEX: 29.84 KG/M2 | WEIGHT: 240 LBS

## 2021-04-12 DIAGNOSIS — F40.240 CLAUSTROPHOBIA: ICD-10-CM

## 2021-04-12 DIAGNOSIS — M48.061 SPINAL STENOSIS OF LUMBAR REGION, UNSPECIFIED WHETHER NEUROGENIC CLAUDICATION PRESENT: ICD-10-CM

## 2021-04-12 DIAGNOSIS — Z98.890 HISTORY OF LUMBOSACRAL SPINE SURGERY: ICD-10-CM

## 2021-04-12 DIAGNOSIS — M54.16 LUMBAR RADICULOPATHY: ICD-10-CM

## 2021-04-12 DIAGNOSIS — M47.816 LUMBAR SPONDYLOSIS: ICD-10-CM

## 2021-04-12 PROCEDURE — 99214 OFFICE O/P EST MOD 30 MIN: CPT | Mod: 95,CR | Performed by: PHYSICAL MEDICINE & REHABILITATION

## 2021-04-12 RX ORDER — ALPRAZOLAM 0.5 MG/1
0.5 TABLET ORAL PRN
Qty: 2 TABLET | Refills: 0 | Status: SHIPPED | OUTPATIENT
Start: 2021-04-12 | End: 2021-04-13

## 2021-04-12 ASSESSMENT — PAIN SCALES - GENERAL: PAINLEVEL: 9=SEVERE PAIN

## 2021-04-12 ASSESSMENT — FIBROSIS 4 INDEX: FIB4 SCORE: 2.66

## 2021-04-12 ASSESSMENT — PATIENT HEALTH QUESTIONNAIRE - PHQ9: CLINICAL INTERPRETATION OF PHQ2 SCORE: 0

## 2021-04-12 NOTE — PROGRESS NOTES
Follow up patient note  Interventional spine and sports physiatry, Physical medicine rehabilitation      Chief complaint:   Chief Complaint   Patient presents with   • Follow-Up     Back pain          HISTORY    Please see new patient note dated  by Dr Dacosta,  for more details.     HPI  Patient identification: Abiel Vaughn  1950,   male  With The encounter diagnosis was Claustrophobia.     Procedures:  2018 right L4-5 and L5-S1 transforaminal epidural steroid injection with 70% pain relief  2019 right L4-5 and L5-S1 transforaminal epidural steroid injection with greater than 80% pain relief  2020 diagnostic medial branch block starting the right L3-4, L4-5, L5-S1 facet joints with 100% pain relief  10/12/2020 Medial Branch Blocks targeting the right L3-4, L4-5 and L5-S1  facet joints 100% pain relief during the diagnostic phase  2020 medial branch radiofrequency neurotomy targeting the right L3-4, L4-5, L5-S1 facet joints with sedation with greater than 50% functional improvement and 50% pain improvement        This encounter was conducted via Zoom.   Audio and video were used with the platform above  Verbal consent was obtained. Patient's identity was verified.  This was done during the covid 19 pandemic      The patient has an upcoming MRI and was discussing his claustrophobia and he did not think he would be able to make it through the MRI without sedation.  He is requesting for the sedation for the procedure.  He continues to have low back pain worse with walking 9 out of 10 intensity right leg and right back pain as well.  He can walk for about a half a block before he has to stop.      ROS Red Flags :   -Fever, Chills, Sweats: Denies  Involuntary Weight Loss: Denies  Bowel/Bladder Incontinence: Denies  Saddle Anesthesia: Denies        PMHx:   Past Medical History:   Diagnosis Date   • Dyslipidemia    • GERD (gastroesophageal reflux disease)    • Insomnia        PSHx:    Past Surgical History:   Procedure Laterality Date   • NEURO DEST FACET L/S W/IG SNGL Right 11/23/2020    Procedure: DESTRUCTION, NERVE, FACET JOINT, LUMBOSACRAL, USING NEUROLYTIC AGENT, WITH FLUOROSCOPIC GUIDANCE;  Surgeon: Julito Dacosta M.D.;  [facility];  Service: Pain Management   • LUMBAR MEDIAL BRANCH BLOCKS Right 10/12/2020    Procedure: BLOCK, NERVE, SPINAL, LUMBAR, POSTERIOR RAMUS, MEDIAL BRANCH;  Surgeon: Julito Dacosta M.D.;  Location: [facility];  Service: Pain Management   • LUMBAR MEDIAL BRANCH BLOCKS Right 8/24/2020    Procedure: BLOCK, NERVE, SPINAL, LUMBAR, POSTERIOR RAMUS, MEDIAL BRANCH;  Surgeon: Julito Dacosta M.D.;  Location: [facility];  Service: Pain Management   • APPENDECTOMY     • TONSILLECTOMY         Family history   Family History   Problem Relation Age of Onset   • Cancer Mother 83        endometrial   • Diabetes Mother    • Heart Disease Father    • No Known Problems Maternal Grandmother    • No Known Problems Maternal Grandfather    • No Known Problems Paternal Grandmother    • No Known Problems Paternal Grandfather    • Hyperlipidemia Neg Hx          Medications:   Outpatient Medications Marked as Taking for the 4/12/21 encounter (Telemedicine) with Julito Dacosta M.D.   Medication Sig Dispense Refill   • ALPRAZolam (XANAX) 0.5 MG Tab Take 1 tablet by mouth as needed for Anxiety (take one tab 1 hour prior to MRI. OK to repeat x 1. do not drive on this med) for up to 1 day. 2 tablet 0   • valACYclovir (VALTREX) 500 MG Tab TAKE 1 TABLET DAILY 90 tablet 4   • metFORMIN ER (GLUCOPHAGE XR) 750 MG TABLET SR 24 HR Take 1 tablet by mouth every day. 90 tablet 4   • pantoprazole (PROTONIX) 40 MG Tablet Delayed Response Take 1 tablet by mouth every day. 90 tablet 3   • rosuvastatin (CRESTOR) 20 MG Tab TAKE 1 TABLET EVERY EVENING. REPLACES  ATORVASTATIN 90 tablet 4   • diclofenac DR (VOLTAREN) 75 MG Tablet Delayed  Response Take 75 mg by mouth.     • zolpidem (AMBIEN) 10 MG Tab Take 1 Tab by mouth at bedtime as needed for Sleep for up to 90 days. 90 Tab 0   • CALCIUM PO      • Cholecalciferol (VITAMIN D3) 2000 UNIT Cap Take  by mouth 2 Times a Day.     • Multiple Vitamins-Minerals (MULTIVITAMIN ADULT) Tab Take  by mouth.          Current Outpatient Medications on File Prior to Visit   Medication Sig Dispense Refill   • valACYclovir (VALTREX) 500 MG Tab TAKE 1 TABLET DAILY 90 tablet 4   • metFORMIN ER (GLUCOPHAGE XR) 750 MG TABLET SR 24 HR Take 1 tablet by mouth every day. 90 tablet 4   • pantoprazole (PROTONIX) 40 MG Tablet Delayed Response Take 1 tablet by mouth every day. 90 tablet 3   • rosuvastatin (CRESTOR) 20 MG Tab TAKE 1 TABLET EVERY EVENING. REPLACES  ATORVASTATIN 90 tablet 4   • diclofenac DR (VOLTAREN) 75 MG Tablet Delayed Response Take 75 mg by mouth.     • zolpidem (AMBIEN) 10 MG Tab Take 1 Tab by mouth at bedtime as needed for Sleep for up to 90 days. 90 Tab 0   • CALCIUM PO      • Cholecalciferol (VITAMIN D3) 2000 UNIT Cap Take  by mouth 2 Times a Day.     • Multiple Vitamins-Minerals (MULTIVITAMIN ADULT) Tab Take  by mouth.       No current facility-administered medications on file prior to visit.         Allergies:   Allergies   Allergen Reactions   • Seasonal Itching and Runny Nose       Social Hx:   Social History     Socioeconomic History   • Marital status: Single     Spouse name: Not on file   • Number of children: Not on file   • Years of education: Not on file   • Highest education level: Not on file   Occupational History   • Not on file   Tobacco Use   • Smoking status: Never Smoker   • Smokeless tobacco: Never Used   Substance and Sexual Activity   • Alcohol use: Yes     Alcohol/week: 0.6 oz     Types: 1 Glasses of wine per week   • Drug use: No   • Sexual activity: Yes   Other Topics Concern   •  Service Yes   • Blood Transfusions No   • Caffeine Concern No   • Occupational Exposure No   •  "Hobby Hazards No   • Sleep Concern Yes   • Stress Concern No   • Weight Concern Yes   • Special Diet No   • Back Care No   • Exercise Yes   • Bike Helmet No   • Seat Belt Yes   • Self-Exams Yes   Social History Narrative   • Not on file     Social Determinants of Health     Financial Resource Strain:    • Difficulty of Paying Living Expenses:    Food Insecurity:    • Worried About Running Out of Food in the Last Year:    • Ran Out of Food in the Last Year:    Transportation Needs:    • Lack of Transportation (Medical):    • Lack of Transportation (Non-Medical):    Physical Activity:    • Days of Exercise per Week:    • Minutes of Exercise per Session:    Stress:    • Feeling of Stress :    Social Connections:    • Frequency of Communication with Friends and Family:    • Frequency of Social Gatherings with Friends and Family:    • Attends Jainism Services:    • Active Member of Clubs or Organizations:    • Attends Club or Organization Meetings:    • Marital Status:    Intimate Partner Violence:    • Fear of Current or Ex-Partner:    • Emotionally Abused:    • Physically Abused:    • Sexually Abused:          EXAMINATION     Physical Exam:   Vitals: Ht 1.905 m (6' 3\")   Wt 109 kg (240 lb)     Constitutional:   Body Habitus: Body mass index is 30 kg/m².  Cooperation: Fully cooperates with exam  Appearance: Well-groomed no disheveled    Respiratory-  breathing comfortable on room air, no audible wheezing  Cardiovascular- capillary refills less than 2 seconds. No lower extremity edema is noted.   Psychiatric- alert and oriented ×3. Normal affect.      MSK/NEURO    Exam is limited as this is a telehealth visit            MEDICAL DECISION MAKING    DATA    Labs:   Lab Results   Component Value Date/Time    SODIUM 139 11/10/2020 10:54 AM    POTASSIUM 4.0 11/10/2020 10:54 AM    CHLORIDE 102 11/10/2020 10:54 AM    CO2 25 11/10/2020 10:54 AM    GLUCOSE 126 (H) 11/10/2020 10:54 AM    BUN 16 11/10/2020 10:54 AM    CREATININE " 0.88 11/10/2020 10:54 AM        No results found for: PROTHROMBTM, INR     Lab Results   Component Value Date/Time    WBC 7.4 11/10/2020 10:54 AM    RBC 4.71 11/10/2020 10:54 AM    HEMOGLOBIN 14.1 11/10/2020 10:54 AM    HEMATOCRIT 42.7 11/10/2020 10:54 AM    MCV 90.7 11/10/2020 10:54 AM    MCH 29.9 11/10/2020 10:54 AM    MCHC 33.0 (L) 11/10/2020 10:54 AM    MPV 10.7 11/10/2020 10:54 AM    NEUTSPOLYS 53.90 11/10/2020 10:54 AM    LYMPHOCYTES 33.40 11/10/2020 10:54 AM    MONOCYTES 8.50 11/10/2020 10:54 AM    EOSINOPHILS 3.10 11/10/2020 10:54 AM    BASOPHILS 0.80 11/10/2020 10:54 AM        Lab Results   Component Value Date/Time    HBA1C 6.5 (H) 11/10/2020 10:54 AM          Imaging:   I personally reviewed following images    I reviewed the fluoroscopic images from 11/23/2020 showing medial branch radiofrequency neurotomy targeting the right L3-4, 4 5, 5 S1 facet joints.  I reviewed these with the patient on 1/4/2021.    X-ray bilateral hips 2/6/2019.  Cam deformity of the bilateral femoral heads, arthritis present the bilateral hips.  This is consistent with hip impingement syndrome bilaterally.    MRI lumbar spine 2/6/2019  History of laminectomies.  Bilateral facet arthropathy L3-4, L4-5, L5-S1.  Worst L5-S1.  No significant central canal stenosis.  Moderate neuroforaminal stenosis L4-5, mild neuroforaminal stenosis L5-S1, mild to moderate left L5-S1 neuroforaminal stenosis, mild to moderate left L2-3 neuroforaminal stenosis.    I reviewed the following radiology reports                                        Results for orders placed during the hospital encounter of 02/06/19   MR-LUMBAR SPINE-W/O    Impression Multilevel degenerative disc disease, facet arthropathy and ligamentum flavum redundancy resulting in at most moderate foraminal and mild lateral recess stenoses.    Left hemilaminotomies                                X-ray hips 2/6/2019  Impression       1.  Convexity bilaterally at the femoral head/neck  junction which can be associated with the clinical syndrome of femoroacetabular impingement    2.  Lumbar spine facet arthropathy and dextroconvex scoliosis                                                             Results for orders placed during the hospital encounter of 19   DX-LUMBAR SPINE-2 OR 3 VIEWS    Impression 1.  Dextroconvex scoliosis    2.  Multilevel facet arthropathy                                           DIAGNOSIS   Visit Diagnoses     ICD-10-CM   1. Claustrophobia  F40.240         ASSESSMENT and PLAN:     Abiel Vaughn  1950,   male      Abiel was seen today for follow-up.    Diagnoses and all orders for this visit:    Claustrophobia  -     ALPRAZolam (XANAX) 0.5 MG Tab; Take 1 tablet by mouth as needed for Anxiety (take one tab 1 hour prior to MRI. OK to repeat x 1. do not drive on this med) for up to 1 day.       We discussed the addictive potential of Xanax and he is not to use this with his alcohol or with other pain medications.  I provided the patient with 2 tabs specifically for the patient's MRI for his claustrophobia.    Last opioid risk scale: 2021    reviewed: 2021        Opioid Risk Score: 0    Interpretation of Opioid Risk Score   Score 0-3 = Low risk of abuse. Do UDS at least once per year.  Score 4-7 = Moderate risk of abuse. Do UDS 1-4 times per year.  Score 8+ = High risk of abuse. Refer to specialist.     I reviewed the     In prescribing controlled substances to this patient, I certify that I have obtained and reviewed the medical history of Abiel Vaughn. I have also made a good luis alfredo effort to obtain applicable records from other providers who have treated the patient and records did not demonstrate any increased risk of substance abuse that would prevent me from prescribing controlled substances.     I have conducted a physical exam and documented it. I have reviewed Mr. Vaughn’s prescription history as maintained by the  [place] Prescription Monitoring Program.     I have assessed the patient’s risk for abuse, dependency, and addiction using the validated Opioid Risk Tool available at https://www.mdcalc.com/qiuuux-ugbs-qlwv-ort-narcotic-abuse.     Given the above, I believe the benefits of controlled substance therapy outweigh the risks. The reasons for prescribing controlled substances include non-narcotic, oral analgesic alternatives have been inadequate for pain control. Accordingly, I have discussed the risk and benefits, treatment plan, and alternative therapies with the patient.            Follow up: After the above diagnostic tests    Thank you for allowing me to participate in the care of this patient. If you have any questions please not hesitate to contact me.          Please note that this dictation was created using voice recognition software. I have made every reasonable attempt to correct obvious errors but there may be errors of grammar and content that I may have overlooked prior to finalization of this note.      Julito Dacosta MD  Interventional Spine and Sports Physiatry  Physical Medicine and Rehabilitation  [facility]

## 2021-04-16 ENCOUNTER — HOSPITAL ENCOUNTER (OUTPATIENT)
Dept: LAB | Facility: MEDICAL CENTER | Age: 71
End: 2021-04-16
Attending: INTERNAL MEDICINE
Payer: MEDICARE

## 2021-04-16 DIAGNOSIS — E78.5 DYSLIPIDEMIA: ICD-10-CM

## 2021-04-16 DIAGNOSIS — E11.9 CONTROLLED TYPE 2 DIABETES MELLITUS WITHOUT COMPLICATION, WITHOUT LONG-TERM CURRENT USE OF INSULIN (HCC): ICD-10-CM

## 2021-04-16 DIAGNOSIS — D69.6 THROMBOCYTOPENIA (HCC): ICD-10-CM

## 2021-04-16 LAB
ALBUMIN SERPL BCP-MCNC: 4.1 G/DL (ref 3.2–4.9)
ALBUMIN/GLOB SERPL: 1.8 G/DL
ALP SERPL-CCNC: 68 U/L (ref 30–99)
ALT SERPL-CCNC: 29 U/L (ref 2–50)
ANION GAP SERPL CALC-SCNC: 11 MMOL/L (ref 7–16)
AST SERPL-CCNC: 30 U/L (ref 12–45)
BASOPHILS # BLD AUTO: 0.8 % (ref 0–1.8)
BASOPHILS # BLD: 0.05 K/UL (ref 0–0.12)
BILIRUB SERPL-MCNC: 0.5 MG/DL (ref 0.1–1.5)
BUN SERPL-MCNC: 19 MG/DL (ref 8–22)
CALCIUM SERPL-MCNC: 9.2 MG/DL (ref 8.4–10.2)
CHLORIDE SERPL-SCNC: 104 MMOL/L (ref 96–112)
CHOLEST SERPL-MCNC: 152 MG/DL (ref 100–199)
CO2 SERPL-SCNC: 21 MMOL/L (ref 20–33)
CREAT SERPL-MCNC: 0.91 MG/DL (ref 0.5–1.4)
CREAT UR-MCNC: 146.38 MG/DL
EOSINOPHIL # BLD AUTO: 0.14 K/UL (ref 0–0.51)
EOSINOPHIL NFR BLD: 2.3 % (ref 0–6.9)
ERYTHROCYTE [DISTWIDTH] IN BLOOD BY AUTOMATED COUNT: 44 FL (ref 35.9–50)
EST. AVERAGE GLUCOSE BLD GHB EST-MCNC: 146 MG/DL
FASTING STATUS PATIENT QL REPORTED: NORMAL
GLOBULIN SER CALC-MCNC: 2.3 G/DL (ref 1.9–3.5)
GLUCOSE SERPL-MCNC: 130 MG/DL (ref 65–99)
HBA1C MFR BLD: 6.7 % (ref 4–5.6)
HCT VFR BLD AUTO: 42.4 % (ref 42–52)
HDLC SERPL-MCNC: 33 MG/DL
HGB BLD-MCNC: 13.7 G/DL (ref 14–18)
IMM GRANULOCYTES # BLD AUTO: 0.01 K/UL (ref 0–0.11)
IMM GRANULOCYTES NFR BLD AUTO: 0.2 % (ref 0–0.9)
LDLC SERPL CALC-MCNC: 98 MG/DL
LYMPHOCYTES # BLD AUTO: 2.13 K/UL (ref 1–4.8)
LYMPHOCYTES NFR BLD: 35.4 % (ref 22–41)
MCH RBC QN AUTO: 29.5 PG (ref 27–33)
MCHC RBC AUTO-ENTMCNC: 32.3 G/DL (ref 33.7–35.3)
MCV RBC AUTO: 91.2 FL (ref 81.4–97.8)
MICROALBUMIN UR-MCNC: <1.2 MG/DL
MICROALBUMIN/CREAT UR: NORMAL MG/G (ref 0–30)
MONOCYTES # BLD AUTO: 0.53 K/UL (ref 0–0.85)
MONOCYTES NFR BLD AUTO: 8.8 % (ref 0–13.4)
NEUTROPHILS # BLD AUTO: 3.15 K/UL (ref 1.82–7.42)
NEUTROPHILS NFR BLD: 52.5 % (ref 44–72)
NRBC # BLD AUTO: 0 K/UL
NRBC BLD-RTO: 0 /100 WBC
PLATELET # BLD AUTO: 153 K/UL (ref 164–446)
PMV BLD AUTO: 12.4 FL (ref 9–12.9)
POTASSIUM SERPL-SCNC: 4.2 MMOL/L (ref 3.6–5.5)
PROT SERPL-MCNC: 6.4 G/DL (ref 6–8.2)
RBC # BLD AUTO: 4.65 M/UL (ref 4.7–6.1)
SODIUM SERPL-SCNC: 136 MMOL/L (ref 135–145)
TRIGL SERPL-MCNC: 106 MG/DL (ref 0–149)
WBC # BLD AUTO: 6 K/UL (ref 4.8–10.8)

## 2021-04-16 PROCEDURE — 82043 UR ALBUMIN QUANTITATIVE: CPT

## 2021-04-16 PROCEDURE — 83036 HEMOGLOBIN GLYCOSYLATED A1C: CPT

## 2021-04-16 PROCEDURE — 82607 VITAMIN B-12: CPT

## 2021-04-16 PROCEDURE — 85025 COMPLETE CBC W/AUTO DIFF WBC: CPT

## 2021-04-16 PROCEDURE — 82746 ASSAY OF FOLIC ACID SERUM: CPT

## 2021-04-16 PROCEDURE — 36415 COLL VENOUS BLD VENIPUNCTURE: CPT

## 2021-04-16 PROCEDURE — 82570 ASSAY OF URINE CREATININE: CPT

## 2021-04-16 PROCEDURE — 80061 LIPID PANEL: CPT

## 2021-04-16 PROCEDURE — 80053 COMPREHEN METABOLIC PANEL: CPT

## 2021-04-17 LAB
FOLATE SERPL-MCNC: 27.3 NG/ML
VIT B12 SERPL-MCNC: 959 PG/ML (ref 211–911)

## 2021-04-19 ENCOUNTER — TELEPHONE (OUTPATIENT)
Dept: MEDICAL GROUP | Age: 71
End: 2021-04-19

## 2021-04-19 NOTE — TELEPHONE ENCOUNTER
ESTABLISHED PATIENT PRE-VISIT PLANNING     Patient was NOT contacted to complete PVP.     Note: Patient will not be contacted if there is no indication to call.     1.  Reviewed notes from the last few office visits within the medical group: Yes    2.  If any orders were placed at last visit or intended to be done for this visit (i.e. 6 mos follow-up), do we have Results/Consult Notes?         •  Labs - Labs ordered, completed on 4/16/21 and results are in chart.  Note: If patient appointment is for lab review and patient did not complete labs, check with provider if OK to reschedule patient until labs completed.       •  Imaging - Imaging ordered and scheduled for MR-LUMBAR SPINE -W/O       •  Referrals - Referral ordered, patient has NOT been seen.    3. Is this appointment scheduled as a Hospital Follow-Up? No    4.  Immunizations were updated in Epic using Reconcile Outside Information activity? Yes    5.  Patient is due for the following Health Maintenance Topics:   Health Maintenance Due   Topic Date Due   • RETINAL SCREENING  Never done   • IMM HEP B VACCINE (1 of 3 - Risk 3-dose series) Never done       6.  AHA (Pulse8) form printed for Provider? N/A

## 2021-04-20 ENCOUNTER — TELEMEDICINE (OUTPATIENT)
Dept: MEDICAL GROUP | Age: 71
End: 2021-04-20
Payer: MEDICARE

## 2021-04-20 VITALS
SYSTOLIC BLOOD PRESSURE: 110 MMHG | BODY MASS INDEX: 30.21 KG/M2 | DIASTOLIC BLOOD PRESSURE: 60 MMHG | HEIGHT: 75 IN | WEIGHT: 243 LBS

## 2021-04-20 DIAGNOSIS — M79.605 PAIN IN BOTH LOWER EXTREMITIES: ICD-10-CM

## 2021-04-20 DIAGNOSIS — K21.9 GASTROESOPHAGEAL REFLUX DISEASE WITHOUT ESOPHAGITIS: ICD-10-CM

## 2021-04-20 DIAGNOSIS — D69.6 THROMBOCYTOPENIA (HCC): ICD-10-CM

## 2021-04-20 DIAGNOSIS — E11.9 CONTROLLED TYPE 2 DIABETES MELLITUS WITHOUT COMPLICATION, WITHOUT LONG-TERM CURRENT USE OF INSULIN (HCC): ICD-10-CM

## 2021-04-20 DIAGNOSIS — I35.9 AORTIC VALVE DISORDER: ICD-10-CM

## 2021-04-20 DIAGNOSIS — Z12.5 SCREENING FOR MALIGNANT NEOPLASM OF PROSTATE: ICD-10-CM

## 2021-04-20 DIAGNOSIS — E78.5 DYSLIPIDEMIA: ICD-10-CM

## 2021-04-20 DIAGNOSIS — G89.29 CHRONIC LUMBOSACRAL PAIN: ICD-10-CM

## 2021-04-20 DIAGNOSIS — M79.604 PAIN IN BOTH LOWER EXTREMITIES: ICD-10-CM

## 2021-04-20 DIAGNOSIS — B00.9 HSV INFECTION: ICD-10-CM

## 2021-04-20 DIAGNOSIS — M54.50 CHRONIC LUMBOSACRAL PAIN: ICD-10-CM

## 2021-04-20 DIAGNOSIS — F51.01 PRIMARY INSOMNIA: ICD-10-CM

## 2021-04-20 PROCEDURE — 99214 OFFICE O/P EST MOD 30 MIN: CPT | Mod: 95,CR | Performed by: INTERNAL MEDICINE

## 2021-04-20 RX ORDER — DICLOFENAC SODIUM 75 MG/1
75 TABLET, DELAYED RELEASE ORAL 2 TIMES DAILY
Qty: 60 TABLET | Refills: 3 | Status: SHIPPED | OUTPATIENT
Start: 2021-04-20 | End: 2021-07-20 | Stop reason: SDUPTHER

## 2021-04-20 RX ORDER — ZOLPIDEM TARTRATE 10 MG/1
10 TABLET ORAL NIGHTLY PRN
Qty: 90 TABLET | Refills: 0 | Status: SHIPPED | OUTPATIENT
Start: 2021-04-20 | End: 2021-07-20 | Stop reason: SDUPTHER

## 2021-04-20 RX ORDER — VALACYCLOVIR HYDROCHLORIDE 500 MG/1
TABLET, FILM COATED ORAL
Qty: 180 TABLET | Refills: 4 | Status: SHIPPED | OUTPATIENT
Start: 2021-04-20 | End: 2021-04-28 | Stop reason: SDUPTHER

## 2021-04-20 ASSESSMENT — FIBROSIS 4 INDEX: FIB4 SCORE: 2.62

## 2021-04-20 NOTE — TELEPHONE ENCOUNTER
Received request via: Patient    Was the patient seen in the last year in this department? Yes    Does the patient have an active prescription (recently filled or refills available) for medication(s) requested? Patient takes 2 times a day

## 2021-04-20 NOTE — PROGRESS NOTES
Telemedicine Visit: Established Patient     This Remote Face to Face encounter was conducted via Zoom. Given the importance of social distancing and other strategies recommended to reduce the risk of COVID-19 transmission, I am providing medical care to this patient via audio/video visit in place of an in person visit at the request of the patient. Verbal consent to telehealth, risks, benefits, and consequences were discussed. Patient retains the right to withdraw at any time. All existing confidentiality protections apply. The patient has access to all transmitted medical information. No dissemination of any patient images or information to other entities without further written consent.    CHIEF COMPLAINT     DM, labs; ambien refill    HPI  Abiel Vaughn is a 70 y.o. male who presents today for the following     DM2, controlled  Internval course  HBA1C 6.7 (H) 2021 12:45 PM   HBA1C 6.5 (H) 11/10/2020 10:54 AM   HBA1C 6.1 (H) 2020 11:38 AM     Onset/  Diabetes education: ordered     Medications:   · Metformin: 750 mg daily  · ACE/ARB: no  · Statin: atorvstatin  · ASA: yes  Compliant with medications: yes  Checking feet daily/wear soft socks/shoes: advised     Diabetes ABCDE TARGETS  · Blood Pressure, goal < 140/90: yes  · Cholesterol-Lipid Panel: Controlled  · Dysalbuminuria: Pending     Diet: Regular  Exercise: Insufficient  BMI: 29     DM complications:  · Peripheral neuropathy: No numbness or tingling in feet.  · Retinopathy:   Last eye exam: . No retinopathy.   · Nephropathy:   No  · CVS: No CAD.  · GI: No gastropathy.     FH of DM: mother, ended up with insulin     Blood pressure/Aortic valve disorder  Controlled, no medications.  He has been evaluated/followed up by cardiology.     Echocardiography: 2019  Left ventricular ejection fraction is visually estimated to be 60%.  Mild concentric left ventricular hypertrophy.  Normal inferior vena cava size and inspiratory  collapse.  Mild central aortic insufficiency.  Ascending aorta is dilated with a diameter of  4.3 cm.     Dyslipidemia  Statin: Atorvastatin, 60 mg daily, taking as prescribed.   - No muscle weakness, cramps, nausea,abdominal discomfort.   Diet / exercise / BMI: as above.   FH: neg     GERD / thrombocytopenia  On omeprazole, 40 mg QD; takes medication as prescribed, that controls sx.   B12/folate were normal, PLT borderline low.  EGD from 2019 showed possible mild Britt's and stomach polyps were removed.     Denies:   - heartburn, epigastric pain.   - nausea, vomiting, or diarrhea  - dysphagia  - abnormal cough  - blood in the stool or dark tarry stools.  - early satiety  - tobacco use.  Labs showed anemia, with normal iron on studies, B12/folate.  Pending FIT.     Insomnia  The patient has a trouble to go and stay asleep.   Used Ambien, 10 mg at bedtime.   Discussed sleep hygiene:   - Go to bed and wake up at consistent times whether work/school day or not.   - Keep room dark, quiet, and comfortable.   - Don't have naps.  - Increase exposure to sunlight during awake times and avoid bright lights before going to sleep.   - Avoid stimulant or caffeine use more than 4 hours after wake time.   - Avoid meal or exercise 2-3 hours prior to going to bed.    LBP, B/L LE pain  The patient has have chronic degenerative disc disease/chronic lower back pain, accompanied with pain in both lower extremities.  He has been followed up by PMR.  He requested the diclofenac refill pain referral to acupuncture.    Reviewed PMH, PSH, FH, SH, ALL, HCM/IMM, no changes  Reviewed MEDS, no changes    Patient Active Problem List    Diagnosis Date Noted   • Anemia 08/17/2020   • Health care maintenance 08/17/2020   • Medicare annual wellness visit, subsequent 08/17/2020   • Thrombocytopenia (HCC) 08/17/2020   • Leg swelling 08/17/2020   • Controlled type 2 diabetes mellitus without complication, without long-term current use of insulin (HCC)  08/26/2019   • Seasonal allergies 08/26/2019   • HSV infection 08/26/2019   • Aortic valve disorder 10/29/2018   • Gastroesophageal reflux disease 10/03/2018   • Primary insomnia 10/03/2018   • Dyslipidemia 10/03/2018   • Primary osteoarthritis involving multiple joints 10/03/2018   • Obesity (BMI 30.0-34.9) 10/03/2018   • Decreased hearing, right 10/03/2018     CURRENT MEDICATIONS  Current Outpatient Medications   Medication Sig Dispense Refill   • zolpidem (AMBIEN) 10 MG Tab Take 1 tablet by mouth at bedtime as needed for Sleep for up to 90 days. 90 tablet 0   • valACYclovir (VALTREX) 500 MG Tab TAKE 1 TABLET DAILY 90 tablet 4   • metFORMIN ER (GLUCOPHAGE XR) 750 MG TABLET SR 24 HR Take 1 tablet by mouth every day. 90 tablet 4   • pantoprazole (PROTONIX) 40 MG Tablet Delayed Response Take 1 tablet by mouth every day. 90 tablet 3   • rosuvastatin (CRESTOR) 20 MG Tab TAKE 1 TABLET EVERY EVENING. REPLACES  ATORVASTATIN 90 tablet 4   • diclofenac DR (VOLTAREN) 75 MG Tablet Delayed Response Take 75 mg by mouth.     • CALCIUM PO      • Cholecalciferol (VITAMIN D3) 2000 UNIT Cap Take  by mouth 2 Times a Day.     • Multiple Vitamins-Minerals (MULTIVITAMIN ADULT) Tab Take  by mouth.       No current facility-administered medications for this visit.     ALLERGIES  Allergies: Seasonal  PAST MEDICAL HISTORY  Past Medical History:   Diagnosis Date   • Dyslipidemia    • GERD (gastroesophageal reflux disease)    • Insomnia      SURGICAL HISTORY  He  has a past surgical history that includes appendectomy; tonsillectomy; lumbar medial branch blocks (Right, 8/24/2020); lumbar medial branch blocks (Right, 10/12/2020); and neuro dest facet l/s w/ig sngl (Right, 11/23/2020).  SOCIAL HISTORY  Social History     Tobacco Use   • Smoking status: Never Smoker   • Smokeless tobacco: Never Used   Substance Use Topics   • Alcohol use: Yes     Alcohol/week: 0.6 oz     Types: 1 Glasses of wine per week   • Drug use: No     Social History  "    Social History Narrative   • Not on file     FAMILY HISTORY  Family History   Problem Relation Age of Onset   • Cancer Mother 83        endometrial   • Diabetes Mother    • Heart Disease Father    • No Known Problems Maternal Grandmother    • No Known Problems Maternal Grandfather    • No Known Problems Paternal Grandmother    • No Known Problems Paternal Grandfather    • Hyperlipidemia Neg Hx      Family Status   Relation Name Status   • Mo     • Fa     • MGMo     • MGFa     • PGMo     • PGFa     • Neg Hx  (Not Specified)       ROS   Constitutional: Negative for fever, chills, fatigue.  HENT: Negative for congestion, sore throat.  Eyes: Negative for vision problems.   Respiratory: Negative for cough, shortness of breath.  Cardiovascular: Negative for chest pain, palpitations.   Gastrointestinal: Negative for heartburn, nausea, abdominal pain.   Genitourinary: Negative for dysuria.  Musculoskeletal: Per HPI.   Skin: Negative for rash.   Neuro: Negative for dizziness, weakness and headaches.   Endo/Heme/Allergies: Does not bruise/bleed easily.   Psychiatric/Behavioral: Negative for depression.    Objective   Vitals obtained by patient:  Blood Pressure 110/60 (BP Location: Left arm, Patient Position: Sitting)   Height 1.905 m (6' 3\")   Weight 110 kg (243 lb)   Body Mass Index 30.37 kg/m²   Physical Exam:  Constitutional: Alert, no distress, well-groomed.  Skin: No rash in visible areas.  Eye: Round. Conjunctiva clear, lids normal.  ENMT: Lips pink without lesions, good dentition. Phonation normal.  Neck: No visible masses or thyromegaly. Moves freely without pain.  CV: no peripheral cyanosis, tachycardia.  Respiratory: Unlabored respiratory effort, no cough or audible wheezing.  Psych: Alert and oriented x3, normal affect and mood.     Labs     Labs are reviewed and discussed with a patient  Lab Results   Component Value Date/Time    CHOLSTRLTOT 152 2021 " 12:45 PM    LDL 98 04/16/2021 12:45 PM    HDL 33 (A) 04/16/2021 12:45 PM    TRIGLYCERIDE 106 04/16/2021 12:45 PM       Lab Results   Component Value Date/Time    SODIUM 136 04/16/2021 12:45 PM    POTASSIUM 4.2 04/16/2021 12:45 PM    CHLORIDE 104 04/16/2021 12:45 PM    CO2 21 04/16/2021 12:45 PM    GLUCOSE 130 (H) 04/16/2021 12:45 PM    BUN 19 04/16/2021 12:45 PM    CREATININE 0.91 04/16/2021 12:45 PM     Lab Results   Component Value Date/Time    ALKPHOSPHAT 68 04/16/2021 12:45 PM    ASTSGOT 30 04/16/2021 12:45 PM    ALTSGPT 29 04/16/2021 12:45 PM    TBILIRUBIN 0.5 04/16/2021 12:45 PM      Lab Results   Component Value Date/Time    HBA1C 6.7 (H) 04/16/2021 12:45 PM    HBA1C 6.5 (H) 11/10/2020 10:54 AM    HBA1C 6.1 (H) 08/11/2020 11:38 AM     Lab Results   Component Value Date/Time    WBC 6.0 04/16/2021 12:45 PM    RBC 4.65 (L) 04/16/2021 12:45 PM    HEMOGLOBIN 13.7 (L) 04/16/2021 12:45 PM    HEMATOCRIT 42.4 04/16/2021 12:45 PM    MCV 91.2 04/16/2021 12:45 PM    MCH 29.5 04/16/2021 12:45 PM    MCHC 32.3 (L) 04/16/2021 12:45 PM    MPV 12.4 04/16/2021 12:45 PM    NEUTSPOLYS 52.50 04/16/2021 12:45 PM    LYMPHOCYTES 35.40 04/16/2021 12:45 PM    MONOCYTES 8.80 04/16/2021 12:45 PM    EOSINOPHILS 2.30 04/16/2021 12:45 PM    BASOPHILS 0.80 04/16/2021 12:45 PM      Imaging      None    Assessment and Plan     Abiel Vaughn is a 70 y.o. male    1. Controlled type 2 diabetes mellitus without complication, without long-term current use of insulin (HCC)  - Controlled, continue with current treatment.  - HEMOGLOBIN A1C; Future  - Basic Metabolic Panel; Future    2. Aortic valve disorder  asymptomatic    3. Dyslipidemia  - Controlled, continue with current treatment.    4. Gastroesophageal reflux disease without esophagitis  - Controlled, continue with current treatment.    5. Thrombocytopenia (HCC)  -Borderline, stable, follow-up labs  - CBC WITH DIFFERENTIAL; Future    6. Primary insomnia  - Controlled, continue with  current treatment.  - zolpidem (AMBIEN) 10 MG Tab; Take 1 tablet by mouth at bedtime as needed for Sleep for up to 90 days.  Dispense: 90 tablet; Refill: 0    Obtained and reviewed patient utilization report from [facility] pharmacy database on 4/20/2021 3:32 PM  prior to writing prescription for controlled substance II, III or IV per [place] bill . Based on assessment of the report, the prescription is medically necessary.     7. Chronic lumbosacral pain  Advised to continue activity as tolerated  - REFERRAL FOR ACUPUNCTURE  - diclofenac DR (VOLTAREN) 75 MG Tablet Delayed Response; Take 1 tablet by mouth 2 times a day.  Dispense: 60 tablet; Refill: 3  8. Pain in both lower extremities  Requested referral  - REFERRAL FOR ACUPUNCTURE    9. Screening for malignant neoplasm of prostate  - PROSTATE SPECIFIC AG SCREENING; Future    Follow-up: in 6 months and prn

## 2021-04-21 ENCOUNTER — HOSPITAL ENCOUNTER (OUTPATIENT)
Dept: RADIOLOGY | Facility: MEDICAL CENTER | Age: 71
End: 2021-04-21
Attending: PHYSICAL MEDICINE & REHABILITATION
Payer: MEDICARE

## 2021-04-21 DIAGNOSIS — M25.851 FEMOROACETABULAR IMPINGEMENT OF BOTH HIPS: ICD-10-CM

## 2021-04-21 DIAGNOSIS — Z98.890 HISTORY OF LUMBOSACRAL SPINE SURGERY: ICD-10-CM

## 2021-04-21 DIAGNOSIS — M48.07 SPINAL STENOSIS OF LUMBOSACRAL REGION: ICD-10-CM

## 2021-04-21 DIAGNOSIS — M48.061 SPINAL STENOSIS OF LUMBAR REGION, UNSPECIFIED WHETHER NEUROGENIC CLAUDICATION PRESENT: ICD-10-CM

## 2021-04-21 DIAGNOSIS — M54.16 LUMBAR RADICULOPATHY: ICD-10-CM

## 2021-04-21 DIAGNOSIS — M47.816 LUMBAR SPONDYLOSIS: ICD-10-CM

## 2021-04-21 DIAGNOSIS — M25.852 FEMOROACETABULAR IMPINGEMENT OF BOTH HIPS: ICD-10-CM

## 2021-04-21 PROCEDURE — 72148 MRI LUMBAR SPINE W/O DYE: CPT

## 2021-04-28 DIAGNOSIS — B00.9 HSV INFECTION: ICD-10-CM

## 2021-04-29 RX ORDER — VALACYCLOVIR HYDROCHLORIDE 500 MG/1
TABLET, FILM COATED ORAL
Qty: 180 TABLET | Refills: 4 | Status: SHIPPED | OUTPATIENT
Start: 2021-04-29

## 2021-05-19 ENCOUNTER — PATIENT MESSAGE (OUTPATIENT)
Dept: MEDICAL GROUP | Age: 71
End: 2021-05-19

## 2021-05-19 RX ORDER — MONTELUKAST SODIUM 10 MG/1
10 TABLET ORAL DAILY
Qty: 90 TABLET | Refills: 4 | Status: ON HOLD | OUTPATIENT
Start: 2021-05-19 | End: 2022-07-05

## 2021-05-19 NOTE — TELEPHONE ENCOUNTER
From: Abiel Vaughn  To: Physician Lynette Payne  Sent: 5/19/2021 11:14 AM PDT  Subject: Prescription Question    Hello Dr. Sepulveda,    I have had a bottle of a prescription for Singulair (Montelukast Sodium) 10MG Tablets that was issued to me by my former physician in [place] back on 8/31/2018. It was a prescription for 90 tablets for up to 90 days. I have been ingesting the tablets as needed during my hay fever allergy season successfully without any side effects during the past several years. I am about use my last several tablets as I'm experiencing symptoms currently.    If you approve for me to continue to use this medication may I have a new prescription for another 90 tablets sent to my mail order pharmacy at [facility].    Thank you, Abiel

## 2021-06-09 ENCOUNTER — OFFICE VISIT (OUTPATIENT)
Dept: PHYSICAL MEDICINE AND REHAB | Facility: MEDICAL CENTER | Age: 71
End: 2021-06-09
Payer: MEDICARE

## 2021-06-09 VITALS
OXYGEN SATURATION: 95 % | SYSTOLIC BLOOD PRESSURE: 132 MMHG | WEIGHT: 246.25 LBS | BODY MASS INDEX: 30.62 KG/M2 | HEIGHT: 75 IN | TEMPERATURE: 97.9 F | HEART RATE: 73 BPM | DIASTOLIC BLOOD PRESSURE: 64 MMHG

## 2021-06-09 DIAGNOSIS — M47.816 LUMBAR SPONDYLOSIS: ICD-10-CM

## 2021-06-09 DIAGNOSIS — M96.1 POST LAMINECTOMY SYNDROME: ICD-10-CM

## 2021-06-09 DIAGNOSIS — M48.07 SPINAL STENOSIS OF LUMBOSACRAL REGION: ICD-10-CM

## 2021-06-09 DIAGNOSIS — Z98.890 HISTORY OF LUMBOSACRAL SPINE SURGERY: ICD-10-CM

## 2021-06-09 DIAGNOSIS — M54.16 LUMBAR RADICULOPATHY: ICD-10-CM

## 2021-06-09 PROCEDURE — 99214 OFFICE O/P EST MOD 30 MIN: CPT | Performed by: PHYSICAL MEDICINE & REHABILITATION

## 2021-06-09 RX ORDER — CEPHALEXIN 250 MG/1
250 CAPSULE ORAL 4 TIMES DAILY
Qty: 32 CAPSULE | Refills: 0 | Status: SHIPPED | OUTPATIENT
Start: 2021-06-09 | End: 2021-06-17

## 2021-06-09 RX ORDER — GABAPENTIN 100 MG/1
CAPSULE ORAL 3 TIMES DAILY
COMMUNITY
Start: 2021-06-04 | End: 2021-10-13 | Stop reason: SDUPTHER

## 2021-06-09 ASSESSMENT — PAIN SCALES - GENERAL: PAINLEVEL: 6=MODERATE PAIN

## 2021-06-09 ASSESSMENT — PATIENT HEALTH QUESTIONNAIRE - PHQ9
CLINICAL INTERPRETATION OF PHQ2 SCORE: 2
5. POOR APPETITE OR OVEREATING: 3 - NEARLY EVERY DAY
SUM OF ALL RESPONSES TO PHQ QUESTIONS 1-9: 14

## 2021-06-09 ASSESSMENT — FIBROSIS 4 INDEX: FIB4 SCORE: 2.62

## 2021-06-09 NOTE — PATIENT INSTRUCTIONS
Your procedure will be at the [facility] special procedure suite.    [facility] [place] [address]       PRE-PROCEDURE INSTRUCTIONS  You may take your regular medications except:   · No Anti-inflammatories 5 days prior to your procedure. Anti-inflammatories include medicines such as  ibuprofen (Motrin, Advil), Excedrin, Naproxen (Aleve, Anaprox, Naprelan, Naprosyn), Celecoxib (Celebrex), Diclofenac (Voltaren-XR tab), and Meloxicam (Mobic).   · You can take the remainder of your pain medications as prescribed.   · If you are having a diagnostic procedure such as a medial branch block, do not use her pain meds on the day of the procedure  · No Glucophage or Metformin 24 hours before your procedure. You may resume next day after your procedure.  · Call the physiatry office if you are taking or prescribed anti-biotics within five days of procedure.  · Please ask provider if you are taking any new diabetes medication.  · CONTINUE TAKING BLOOD PRESSURE MEDICATIONS AS PRESCRIBED.  · Pain medications will not be prescribed on the procedure day. Procedural pain medication may be used by your provider   · Call your doctor's office performing the procedure if you have a fever, chills, rash or new illness prior to your procedure    Anticoagulation/antiplatelet medications  No Blood thinning medications such as Coumadin, Xarelto, aspirin or Plavix 5 days prior to procedure unless your doctor said to continue these medications. Call your doctor if a new medication is prescribed in this class.     Restrictions for eating before procedure:   · If you are getting procedural sedation, then do not eat to for 8 hours prior to procedure appointment time. Do not drink fluids for four hours prior to your procedure time.   · If you are not having procedural sedation, then Skip the meal prior to your procedure. If you have a morning procedure then skip breakfast. If you have an afternoon procedure then skip lunch.   · You  may drink clear liquids up to 2 hours prior to your procedure  · You must have a  the day of procedure to accompany you home.      POST PROCEDURE INSTRUCTIONS   · No heavy lifting, strenuous bending or strenuous exercise for 3 days after your procedure.  · Additional instructions will be given on the day of the procedure  · IF YOU EXPERIENCE PROLONGED WEAKNESS LONGER THAN ONE DAY, BOWEL OR BLADDER INCONTINENCE THEN PLEASE CALL THE PHYSIATRY OFFICE.  · Your leg may feel heavy, weak and numb for up to 1-2 days. Be very careful walking.   ·  You may resume normal activities 3 days after procedure.

## 2021-06-09 NOTE — PROGRESS NOTES
Follow up patient note  Interventional spine and sports physiatry, Physical medicine rehabilitation      Chief complaint:   Chief Complaint   Patient presents with   • Follow-Up     Back pain          HISTORY    Please see new patient note dated  by Dr Dacosta,  for more details.     HPI  Patient identification: Abiel Vaughn  1950,   male  With Diagnoses of Lumbar spondylosis symptomatically right worse than left, Lumbar radiculopathy, History of lumbosacral spine surgery, done in [place], Post laminectomy syndrome, and Spinal stenosis of lumbosacral region were pertinent to this visit.     Procedures:  2018 right L4-5 and L5-S1 transforaminal epidural steroid injection with 70% pain relief  2019 right L4-5 and L5-S1 transforaminal epidural steroid injection with greater than 80% pain relief  2020 diagnostic medial branch block starting the right L3-4, L4-5, L5-S1 facet joints with 100% pain relief  10/12/2020 Medial Branch Blocks targeting the right L3-4, L4-5 and L5-S1  facet joints 100% pain relief during the diagnostic phase  2020 medial branch radiofrequency neurotomy targeting the right L3-4, L4-5, L5-S1 facet joints with sedation with greater than 50% functional improvement and 50% pain improvement    Chronic constant 6/10 pain that gets up to 9/10 pain with lumbar extension. Shopping cart sign positive. Leg aching and pain with standing and walking. This can radiate to the legs right worse than left.     Failed PT, home exercise program, medication management.     He has trid lyrica but had to stop because of fatigue. He is now using gabapentin again with mild improvement.     Psychological testing for pain as depression and pain commonly coexist and need to both be treated.     Opioid Risk Score: 0      Interpretation of Opioid Risk Score   Score 0-3 = Low risk of abuse. Do UDS at least once per year.  Score 4-7 = Moderate risk of abuse. Do UDS 1-4 times per year.  Score  8+ = High risk of abuse. Refer to specialist.    PHQ  Depression Screen (PHQ-2/PHQ-9) 4/5/2021 4/12/2021 6/9/2021   PHQ-2 Total Score 2 0 2   PHQ-9 Total Score 8 - 14       Interpretation of PHQ-9 Total Score   Score Severity   1-4 No Depression   5-9 Mild Depression   10-14 Moderate Depression   15-19 Moderately Severe Depression   20-27 Severe Depression     ROS Red Flags :   -Fever, Chills, Sweats: Denies  Involuntary Weight Loss: Denies  Bowel/Bladder Incontinence: Denies  Saddle Anesthesia: Denies        PMHx:   Past Medical History:   Diagnosis Date   • Dyslipidemia    • GERD (gastroesophageal reflux disease)    • Insomnia        PSHx:   Past Surgical History:   Procedure Laterality Date   • NEURO DEST FACET L/S W/IG SNGL Right 11/23/2020    Procedure: DESTRUCTION, NERVE, FACET JOINT, LUMBOSACRAL, USING NEUROLYTIC AGENT, WITH FLUOROSCOPIC GUIDANCE;  Surgeon: Julito Dacosta M.D.;  [facility];  Service: Pain Management   • LUMBAR MEDIAL BRANCH BLOCKS Right 10/12/2020    Procedure: BLOCK, NERVE, SPINAL, LUMBAR, POSTERIOR RAMUS, MEDIAL BRANCH;  Surgeon: Julito Dacosta M.D.;  Location: [facility];  Service: Pain Management   • LUMBAR MEDIAL BRANCH BLOCKS Right 8/24/2020    Procedure: BLOCK, NERVE, SPINAL, LUMBAR, POSTERIOR RAMUS, MEDIAL BRANCH;  Surgeon: Julito Dacosta M.D.;  Location: [facility];  Service: Pain Management   • APPENDECTOMY     • TONSILLECTOMY         Family history   Family History   Problem Relation Age of Onset   • Cancer Mother 83        endometrial   • Diabetes Mother    • Heart Disease Father    • No Known Problems Maternal Grandmother    • No Known Problems Maternal Grandfather    • No Known Problems Paternal Grandmother    • No Known Problems Paternal Grandfather    • Hyperlipidemia Neg Hx          Medications:   Outpatient Medications Marked as Taking for the 6/9/21 encounter (Office Visit) with Julito Dacosta M.D.    Medication Sig Dispense Refill   • gabapentin (NEURONTIN) 100 MG Cap      • montelukast (SINGULAIR) 10 MG Tab Take 1 tablet by mouth every day. 90 tablet 4   • valACYclovir (VALTREX) 500 MG Tab TAKE 1 TABLET  tablet 4   • zolpidem (AMBIEN) 10 MG Tab Take 1 tablet by mouth at bedtime as needed for Sleep for up to 90 days. 90 tablet 0   • diclofenac DR (VOLTAREN) 75 MG Tablet Delayed Response Take 1 tablet by mouth 2 times a day. 60 tablet 3   • metFORMIN ER (GLUCOPHAGE XR) 750 MG TABLET SR 24 HR Take 1 tablet by mouth every day. 90 tablet 4   • pantoprazole (PROTONIX) 40 MG Tablet Delayed Response Take 1 tablet by mouth every day. 90 tablet 3   • rosuvastatin (CRESTOR) 20 MG Tab TAKE 1 TABLET EVERY EVENING. REPLACES  ATORVASTATIN 90 tablet 4   • CALCIUM PO      • Cholecalciferol (VITAMIN D3) 2000 UNIT Cap Take  by mouth 2 Times a Day.     • Multiple Vitamins-Minerals (MULTIVITAMIN ADULT) Tab Take  by mouth.          Current Outpatient Medications on File Prior to Visit   Medication Sig Dispense Refill   • gabapentin (NEURONTIN) 100 MG Cap      • montelukast (SINGULAIR) 10 MG Tab Take 1 tablet by mouth every day. 90 tablet 4   • valACYclovir (VALTREX) 500 MG Tab TAKE 1 TABLET  tablet 4   • zolpidem (AMBIEN) 10 MG Tab Take 1 tablet by mouth at bedtime as needed for Sleep for up to 90 days. 90 tablet 0   • diclofenac DR (VOLTAREN) 75 MG Tablet Delayed Response Take 1 tablet by mouth 2 times a day. 60 tablet 3   • metFORMIN ER (GLUCOPHAGE XR) 750 MG TABLET SR 24 HR Take 1 tablet by mouth every day. 90 tablet 4   • pantoprazole (PROTONIX) 40 MG Tablet Delayed Response Take 1 tablet by mouth every day. 90 tablet 3   • rosuvastatin (CRESTOR) 20 MG Tab TAKE 1 TABLET EVERY EVENING. REPLACES  ATORVASTATIN 90 tablet 4   • CALCIUM PO      • Cholecalciferol (VITAMIN D3) 2000 UNIT Cap Take  by mouth 2 Times a Day.     • Multiple Vitamins-Minerals (MULTIVITAMIN ADULT) Tab Take  by mouth.       No current  facility-administered medications on file prior to visit.         Allergies:   Allergies   Allergen Reactions   • Seasonal Itching and Runny Nose       Social Hx:   Social History     Socioeconomic History   • Marital status: Single     Spouse name: Not on file   • Number of children: Not on file   • Years of education: Not on file   • Highest education level: Not on file   Occupational History   • Not on file   Tobacco Use   • Smoking status: Never Smoker   • Smokeless tobacco: Never Used   Vaping Use   • Vaping Use: Never used   Substance and Sexual Activity   • Alcohol use: Yes     Alcohol/week: 0.6 oz     Types: 1 Glasses of wine per week   • Drug use: No   • Sexual activity: Yes   Other Topics Concern   •  Service Yes   • Blood Transfusions No   • Caffeine Concern No   • Occupational Exposure No   • Hobby Hazards No   • Sleep Concern Yes   • Stress Concern No   • Weight Concern Yes   • Special Diet No   • Back Care No   • Exercise Yes   • Bike Helmet No   • Seat Belt Yes   • Self-Exams Yes   Social History Narrative   • Not on file     Social Determinants of Health     Financial Resource Strain:    • Difficulty of Paying Living Expenses:    Food Insecurity:    • Worried About Running Out of Food in the Last Year:    • Ran Out of Food in the Last Year:    Transportation Needs:    • Lack of Transportation (Medical):    • Lack of Transportation (Non-Medical):    Physical Activity:    • Days of Exercise per Week:    • Minutes of Exercise per Session:    Stress:    • Feeling of Stress :    Social Connections:    • Frequency of Communication with Friends and Family:    • Frequency of Social Gatherings with Friends and Family:    • Attends Rastafarian Services:    • Active Member of Clubs or Organizations:    • Attends Club or Organization Meetings:    • Marital Status:    Intimate Partner Violence:    • Fear of Current or Ex-Partner:    • Emotionally Abused:    • Physically Abused:    • Sexually Abused:   "        EXAMINATION     Physical Exam:   Vitals: /64 (BP Location: Left arm, Patient Position: Sitting, BP Cuff Size: Adult)   Pulse 73   Temp 36.6 °C (97.9 °F) (Temporal)   Ht 1.905 m (6' 3\")   Wt 112 kg (246 lb 4.1 oz)   SpO2 95%     Constitutional:   Body Habitus: Body mass index is 30.78 kg/m².  Cooperation: Fully cooperates with exam  Appearance: Well-groomed no disheveled    Respiratory-  breathing comfortable on room air, no audible wheezing  Cardiovascular- capillary refills less than 2 seconds. No lower extremity edema is noted.   Psychiatric- alert and oriented ×3. Normal affect.      MSK/NEURO      decreased active range of motion with flexion, lateral flexion, and rotation bilaterally.   There is decreased active range of motion with lumbar extension.    There is  pain with lumbar extension.   There is pain with facet loading maneuver (extension rotation) with axial low back pain on the BILATERAL side(s)    Palpation:   No tenderness to palpation in midline at T1-T12 levels. No tenderness to palpation in the left and right of the midline T1-L5, NEGATIVE for tenderness to palpation to the para-midline region in the lower lumbar levels.  palpation over SI joint: negative bilaterally    palpation in hip or over the gluteus medius tendon insertion: negative bilaterally        Key points for the international standards for neurological classification of spinal cord injury (ISNCSCI) to light touch.     Dermatome R L                                      L2 2 2   L3 2 2   L4 1 2   L5 1 2   S1 2 2   S2 2 2         Motor Exam Lower Extremities    ? Myotome R L   Hip flexion L2 5 5   Knee extension L3 5 5   Ankle dorsiflexion L4 5 5   Toe extension L5 5 5   Ankle plantarflexion S1 5 5                   MEDICAL DECISION MAKING    DATA    Labs:   Lab Results   Component Value Date/Time    SODIUM 136 04/16/2021 12:45 PM    POTASSIUM 4.2 04/16/2021 12:45 PM    CHLORIDE 104 04/16/2021 12:45 PM    CO2 21 " 04/16/2021 12:45 PM    GLUCOSE 130 (H) 04/16/2021 12:45 PM    BUN 19 04/16/2021 12:45 PM    CREATININE 0.91 04/16/2021 12:45 PM        No results found for: PROTHROMBTM, INR     Lab Results   Component Value Date/Time    WBC 6.0 04/16/2021 12:45 PM    RBC 4.65 (L) 04/16/2021 12:45 PM    HEMOGLOBIN 13.7 (L) 04/16/2021 12:45 PM    HEMATOCRIT 42.4 04/16/2021 12:45 PM    MCV 91.2 04/16/2021 12:45 PM    MCH 29.5 04/16/2021 12:45 PM    MCHC 32.3 (L) 04/16/2021 12:45 PM    MPV 12.4 04/16/2021 12:45 PM    NEUTSPOLYS 52.50 04/16/2021 12:45 PM    LYMPHOCYTES 35.40 04/16/2021 12:45 PM    MONOCYTES 8.80 04/16/2021 12:45 PM    EOSINOPHILS 2.30 04/16/2021 12:45 PM    BASOPHILS 0.80 04/16/2021 12:45 PM        Lab Results   Component Value Date/Time    HBA1C 6.7 (H) 04/16/2021 12:45 PM          Imaging:   I personally reviewed following images    I reviewed the fluoroscopic images from 11/23/2020 showing medial branch radiofrequency neurotomy targeting the right L3-4, 4 5, 5 S1 facet joints.  I reviewed these with the patient on 1/4/2021.    X-ray bilateral hips 2/6/2019.  Cam deformity of the bilateral femoral heads, arthritis present the bilateral hips.  This is consistent with hip impingement syndrome bilaterally.    MRI lumbar spine 2/6/2019  History of laminectomies.  Bilateral facet arthropathy L3-4, L4-5, L5-S1.  Worst L5-S1.  No significant central canal stenosis.  Moderate neuroforaminal stenosis L4-5, mild neuroforaminal stenosis L5-S1, mild to moderate left L5-S1 neuroforaminal stenosis, mild to moderate left L2-3 neuroforaminal stenosis.    I reviewed the following radiology reports                                        Results for orders placed during the hospital encounter of 02/06/19   MR-LUMBAR SPINE-W/O    Impression Multilevel degenerative disc disease, facet arthropathy and ligamentum flavum redundancy resulting in at most moderate foraminal and mild lateral recess stenoses.    Left hemilaminotomies                                 X-ray hips 2019  Impression       1.  Convexity bilaterally at the femoral head/neck junction which can be associated with the clinical syndrome of femoroacetabular impingement    2.  Lumbar spine facet arthropathy and dextroconvex scoliosis                                                             Results for orders placed during the hospital encounter of 19   DX-LUMBAR SPINE-2 OR 3 VIEWS    Impression 1.  Dextroconvex scoliosis    2.  Multilevel facet arthropathy                                           DIAGNOSIS   Visit Diagnoses     ICD-10-CM   1. Lumbar spondylosis symptomatically right worse than left  M47.816   2. Lumbar radiculopathy  M54.16   3. History of lumbosacral spine surgery, done in [place]  Z98.890   4. Post laminectomy syndrome  M96.1   5. Spinal stenosis of lumbosacral region  M48.07         ASSESSMENT and PLAN:     Abiel Vaughn  1950,   male      Abiel was seen today for follow-up.    Diagnoses and all orders for this visit:    Lumbar spondylosis symptomatically right worse than left  -     DX-THORACIC SPINE-2 VIEWS; Future  -     MR-THORACIC SPINE-W/O; Future    Lumbar radiculopathy  -     DX-THORACIC SPINE-2 VIEWS; Future  -     MR-THORACIC SPINE-W/O; Future    History of lumbosacral spine surgery, done in [place]  -     DX-THORACIC SPINE-2 VIEWS; Future  -     MR-THORACIC SPINE-W/O; Future    Post laminectomy syndrome  -     DX-THORACIC SPINE-2 VIEWS; Future  -     MR-THORACIC SPINE-W/O; Future    Spinal stenosis of lumbosacral region  -     DX-THORACIC SPINE-2 VIEWS; Future  -     MR-THORACIC SPINE-W/O; Future         The patient passed psychological evaluation for stimulator from Dr. Suarez of psychology.    Continue gabapentin    Stop diclofenac 5 days prior to the procedure below.    The case was also reviewed by Dr. Edith Lane of spine surgery who agrees with spinal cord stimulation.    I have ordered spinal cord stimulator trials with  sedation.    The risks benefits and alternatives to this procedure were discussed and the patient wishes to proceed with the procedure. Risks include but are not limited to damage to surrounding structures, infection, bleeding, worsening of pain which can be permanent, weakness which can be permanent. Benefits include pain relief, improved function. Alternatives includes not doing the procedure.             Last opioid risk scale: 4/13/2021    reviewed: 6/9/2021        Opioid Risk Score: 0    Interpretation of Opioid Risk Score   Score 0-3 = Low risk of abuse. Do UDS at least once per year.  Score 4-7 = Moderate risk of abuse. Do UDS 1-4 times per year.  Score 8+ = High risk of abuse. Refer to specialist.     I reviewed the     In prescribing controlled substances to this patient, I certify that I have obtained and reviewed the medical history of Abiel Vaughn. I have also made a good luis alfredo effort to obtain applicable records from other providers who have treated the patient and records did not demonstrate any increased risk of substance abuse that would prevent me from prescribing controlled substances.     I have conducted a physical exam and documented it. I have reviewed Mr. Vaughn’s prescription history as maintained by the [place] Prescription Monitoring Program.     I have assessed the patient’s risk for abuse, dependency, and addiction using the validated Opioid Risk Tool available at https://www.mdcalc.com/irdnsy-kzyl-lhit-ort-narcotic-abuse.     Given the above, I believe the benefits of controlled substance therapy outweigh the risks. The reasons for prescribing controlled substances include non-narcotic, oral analgesic alternatives have been inadequate for pain control. Accordingly, I have discussed the risk and benefits, treatment plan, and alternative therapies with the patient.            Follow up: After the above diagnostic tests    Thank you for allowing me to participate in the  care of this patient. If you have any questions please not hesitate to contact me.          Please note that this dictation was created using voice recognition software. I have made every reasonable attempt to correct obvious errors but there may be errors of grammar and content that I may have overlooked prior to finalization of this note.      uJlito Dacosta MD  Interventional Spine and Sports Physiatry  Physical Medicine and Rehabilitation  [facility]

## 2021-06-22 DIAGNOSIS — M54.16 LUMBAR RADICULOPATHY: ICD-10-CM

## 2021-06-22 DIAGNOSIS — M96.1 POST LAMINECTOMY SYNDROME: ICD-10-CM

## 2021-06-22 DIAGNOSIS — M47.816 LUMBAR SPONDYLOSIS: ICD-10-CM

## 2021-06-22 DIAGNOSIS — Z98.890 HISTORY OF LUMBOSACRAL SPINE SURGERY: ICD-10-CM

## 2021-06-23 NOTE — PROGRESS NOTES
I reviewed the notes from Dr. Lane from neurosurgery.    I placed an order for bilateral lower extremity EMG.

## 2021-06-25 ENCOUNTER — HOSPITAL ENCOUNTER (OUTPATIENT)
Dept: RADIOLOGY | Facility: MEDICAL CENTER | Age: 71
End: 2021-06-25
Attending: PHYSICAL MEDICINE & REHABILITATION
Payer: MEDICARE

## 2021-06-25 DIAGNOSIS — M48.07 SPINAL STENOSIS OF LUMBOSACRAL REGION: ICD-10-CM

## 2021-06-25 DIAGNOSIS — Z98.890 HISTORY OF LUMBOSACRAL SPINE SURGERY: ICD-10-CM

## 2021-06-25 DIAGNOSIS — M47.816 LUMBAR SPONDYLOSIS: ICD-10-CM

## 2021-06-25 DIAGNOSIS — M54.16 LUMBAR RADICULOPATHY: ICD-10-CM

## 2021-06-25 DIAGNOSIS — M96.1 POST LAMINECTOMY SYNDROME: ICD-10-CM

## 2021-06-25 PROCEDURE — 72070 X-RAY EXAM THORAC SPINE 2VWS: CPT

## 2021-06-25 PROCEDURE — 72146 MRI CHEST SPINE W/O DYE: CPT | Mod: MH

## 2021-07-01 ENCOUNTER — NON-PROVIDER VISIT (OUTPATIENT)
Dept: NEUROLOGY | Facility: MEDICAL CENTER | Age: 71
End: 2021-07-01
Attending: PHYSICAL MEDICINE & REHABILITATION
Payer: MEDICARE

## 2021-07-01 DIAGNOSIS — M54.16 LUMBAR RADICULOPATHY: ICD-10-CM

## 2021-07-01 PROCEDURE — 95910 NRV CNDJ TEST 7-8 STUDIES: CPT | Mod: 26 | Performed by: PSYCHIATRY & NEUROLOGY

## 2021-07-01 PROCEDURE — 95886 MUSC TEST DONE W/N TEST COMP: CPT | Mod: 26 | Performed by: PSYCHIATRY & NEUROLOGY

## 2021-07-01 PROCEDURE — 95910 NRV CNDJ TEST 7-8 STUDIES: CPT | Performed by: PSYCHIATRY & NEUROLOGY

## 2021-07-01 PROCEDURE — 95886 MUSC TEST DONE W/N TEST COMP: CPT | Performed by: PSYCHIATRY & NEUROLOGY

## 2021-07-01 NOTE — PROCEDURES
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 There are mild active denervation changes in the bilateral medial gastrocnemius which can be seen secondary to the polyneuropathy; however, differential does include subacute bilateral S1 radiculopathy. There is no EMG evidence of bilateral lumbosacral radiculopathy. Recommend clinical correlation.      Blanca Valdez MD  Neurology - Neurophysiology        REASON FOR REFERRAL:  Mr. Abiel Vaughn 70 y.o. referred by Dr. Julito Dacosta for an evaluation of lumbar radiculopathy. Patient has a history of L-spine intervention x4 with refractory back pain.  Current symptoms include soreness and discomfort in the legs with prolonged standing or walking improves with leaning forward.  He endorses right greater than left stocking distribution numbness in the lower extremities present for years.    Height: 6'3\"  Weight: 240 lbs    Symptom focused neurological exam shows essentially preserved strength in the bilateral lower extremities with stocking distribution numbness in the feet.       ELECTRODIAGNOSTIC EXAMINATION:  Nerve conduction studies (NCS) and electromyography (EMG) are utilized to evaluate direct or indirect damage to the peripheral nervous system. NCS are performed to measure the nerve(s) response(s) to electrostimulation across a given nerve segment. EMG evaluates the passive and active electrical activity of the muscle(s) in question.  Muscles are innervated by specific peripheral nerves and roots. Often times, several nerves the muscle to be examined in order to determine the presence or absence of the disease process. Furthermore, nerves and muscles may need to be tested in a [id number] " comparison, as well as in additional extremities, as this may be crucial in characterizing the extent of the disease process, which may be diffuse or isolated and of varying degree of severity. The extent of the neurodiagnostic exam is justified as it may help arrive to a proper diagnosis, which ultimately may contribute to better management of the patient. Therefore, the nerves to muscles examined during the study were medically necessary.    Unless otherwise noted, temperature of the extremity(s) study was monitored before and during the examination and remained between 32 and 36 degrees C for the upper extremities, and between 30 and 36 degrees C for the lower extremities. The patient tolerated testing well, without any complications.       NERVE CONDUCTION STUDY SUMMARY:  Selected nerves of the bilateral lower extremity are studied.    Unobtainable bilateral sural sensory responses.  Unobtainable left common peroneal motor response at the extensor digitorum brevis.  Abnormal left common peroneal motor response at the tibialis anterior due to low amplitude.  Abnormal right common peroneal motor response at the extensor digitorum brevis due to low amplitude and secondary slowing.  Abnormal right common peroneal motor response at the tibialis anterior due to low amplitude.  Abnormal bilateral tibial motor responses at the abductor hallucis brevis due to low amplitude. Proximal stimulation was unobtainable.      NEEDLE EMG SUMMARY:  Concentric needle study of selected bilateral lower extremity muscles is performed.     Insertion activity is increased in the bilateral medial gastrocnemius due to presence of fibrillation potentials and positive waves.  With activation, there are normal morphology (amplitude/duration) motor unit action potentials firing with normal recruitment in muscles tested.       PATIENT DATA TABLES  Nerve Conduction Studies     Stim Site NR Onset (ms) Norm Onset (ms) O-P Amp (µV) Norm O-P Amp  Site1 Site2 Delta-P (ms) Dist (cm) Aron (m/s) Norm Aron (m/s)   Left Sural Anti Sensory (Lat Mall)  35.2°C   Calf *NR  <4.6  >3 Calf Lat Mall  14.0  >40   Right Sural Anti Sensory (Lat Mall)  35.2°C   Calf *NR  <4.6  >3 Calf Lat Mall  14.0  >40        Stim Site NR Onset (ms) Norm Onset (ms) O-P Amp (mV) Norm O-P Amp Site1 Site2 Delta-0 (ms) Dist (cm) Aron (m/s) Norm Aron (m/s)   Left Peroneal EDB Motor (Ext Dig Brev)  35.2°C   Ankle *NR  <6  >2.5 B Fib Ankle  0.0  >40   B Fib *NR     Poplt B Fib  0.0     Poplt *NR             Right Peroneal EDB Motor (Ext Dig Brev)   Ankle    4.1 <6 *0.6 >2.5 B Fib Ankle 11.9 39.0 *33 >40   B Fib    16.0  0.5  Poplt B Fib 2.8 10.0 36    Poplt    18.8  0.3          Left Peroneal TA Motor (AntTibialis)  35.2°C   Fib Head    3.6 <4.5 2.5 3 Poplit Fib Head 1.9 10.0 53 >40   Poplit    5.5  2.3          Right Peroneal TA Motor (AntTibialis)  35.2°C   Fib Head    3.0 <4.5 *2.7 >3 Poplit Fib Head 1.1 10.0 91 >40   Poplit    4.1  2.2          Left Tibial Motor (Abd Chapa Brev)  35.2°C   Ankle    3.3 <6 *0.8 >4 Knee Ankle  0.0  >40   Knee *NR             Right Tibial Motor (Abd Chapa Brev)  35.2°C   Ankle    4.0 <6 *1.1 >4 Knee Ankle  0.0  >40   Knee *NR                                   Electromyography     Side Muscle Nerve Root Ins Act Fibs Psw Amp Dur Poly Recrt Int Pat Comment   Right AntTibialis Dp Br Fibular L4-5 Nml Nml Nml Nml Nml 0 Nml Nml    Right Gastroc Tibial S1-2 *Incr *1+ *1+ Nml Nml 0 Nml Nml    Right VastusLat Femoral L2-4 Nml Nml Nml Nml Nml 0 Nml Nml    Right GluteusMed SupGluteal L5-S1 Nml Nml Nml Nml Nml 0 Nml Nml    Left AntTibialis Dp Br Fibular L4-5 Nml Nml Nml Nml Nml 0 Nml Nml    Left Gastroc Tibial S1-2 *Incr *2+ *2+ Nml Nml 0 Nml Nml    Left VastusLat Femoral L2-4 Nml Nml Nml Nml Nml 0 Nml Nml    Left GluteusMed SupGluteal L5-S1 Nml Nml Nml Nml Nml 0 Nml Nml    Left BicepsFemS Sciatic L5-S1 Nml Nml Nml Nml Nml 0 Nml Nml    Right BicepsFemS Sciatic L5-S1 Nml Nml Nml Nml  Nml 0 Nml Nml

## 2021-07-09 ENCOUNTER — HOSPITAL ENCOUNTER (OUTPATIENT)
Dept: RADIOLOGY | Facility: MEDICAL CENTER | Age: 71
End: 2021-07-09
Attending: NEUROLOGICAL SURGERY
Payer: MEDICARE

## 2021-07-09 DIAGNOSIS — M25.552 LEFT HIP PAIN: ICD-10-CM

## 2021-07-09 DIAGNOSIS — M25.551 RIGHT HIP PAIN: ICD-10-CM

## 2021-07-09 PROCEDURE — 73521 X-RAY EXAM HIPS BI 2 VIEWS: CPT

## 2021-07-16 ENCOUNTER — HOSPITAL ENCOUNTER (OUTPATIENT)
Dept: LAB | Facility: MEDICAL CENTER | Age: 71
End: 2021-07-16
Attending: INTERNAL MEDICINE
Payer: MEDICARE

## 2021-07-16 DIAGNOSIS — M25.552 JOINT PAIN OF LEFT HIP ON MOVEMENT: ICD-10-CM

## 2021-07-16 DIAGNOSIS — M25.551 JOINT PAIN OF RIGHT HIP ON MOVEMENT: ICD-10-CM

## 2021-07-16 DIAGNOSIS — E11.9 CONTROLLED TYPE 2 DIABETES MELLITUS WITHOUT COMPLICATION, WITHOUT LONG-TERM CURRENT USE OF INSULIN (HCC): ICD-10-CM

## 2021-07-16 DIAGNOSIS — E78.5 DYSLIPIDEMIA: ICD-10-CM

## 2021-07-16 DIAGNOSIS — D69.6 THROMBOCYTOPENIA (HCC): ICD-10-CM

## 2021-07-16 LAB
ALBUMIN SERPL BCP-MCNC: 4.3 G/DL (ref 3.2–4.9)
ALBUMIN/GLOB SERPL: 1.9 G/DL
ALP SERPL-CCNC: 66 U/L (ref 30–99)
ALT SERPL-CCNC: 29 U/L (ref 2–50)
ANION GAP SERPL CALC-SCNC: 9 MMOL/L (ref 7–16)
AST SERPL-CCNC: 29 U/L (ref 12–45)
BASOPHILS # BLD AUTO: 0.6 % (ref 0–1.8)
BASOPHILS # BLD: 0.04 K/UL (ref 0–0.12)
BILIRUB SERPL-MCNC: 0.5 MG/DL (ref 0.1–1.5)
BUN SERPL-MCNC: 15 MG/DL (ref 8–22)
CALCIUM SERPL-MCNC: 9 MG/DL (ref 8.4–10.2)
CHLORIDE SERPL-SCNC: 108 MMOL/L (ref 96–112)
CHOLEST SERPL-MCNC: 140 MG/DL (ref 100–199)
CO2 SERPL-SCNC: 24 MMOL/L (ref 20–33)
CREAT SERPL-MCNC: 0.88 MG/DL (ref 0.5–1.4)
EOSINOPHIL # BLD AUTO: 0.23 K/UL (ref 0–0.51)
EOSINOPHIL NFR BLD: 3.7 % (ref 0–6.9)
ERYTHROCYTE [DISTWIDTH] IN BLOOD BY AUTOMATED COUNT: 45 FL (ref 35.9–50)
EST. AVERAGE GLUCOSE BLD GHB EST-MCNC: 131 MG/DL
FASTING STATUS PATIENT QL REPORTED: NORMAL
GLOBULIN SER CALC-MCNC: 2.3 G/DL (ref 1.9–3.5)
GLUCOSE SERPL-MCNC: 123 MG/DL (ref 65–99)
HBA1C MFR BLD: 6.2 % (ref 4–5.6)
HCT VFR BLD AUTO: 41.7 % (ref 42–52)
HDLC SERPL-MCNC: 31 MG/DL
HGB BLD-MCNC: 13.7 G/DL (ref 14–18)
IMM GRANULOCYTES # BLD AUTO: 0.02 K/UL (ref 0–0.11)
IMM GRANULOCYTES NFR BLD AUTO: 0.3 % (ref 0–0.9)
LDLC SERPL CALC-MCNC: 81 MG/DL
LYMPHOCYTES # BLD AUTO: 2.1 K/UL (ref 1–4.8)
LYMPHOCYTES NFR BLD: 33.8 % (ref 22–41)
MCH RBC QN AUTO: 30.2 PG (ref 27–33)
MCHC RBC AUTO-ENTMCNC: 32.9 G/DL (ref 33.7–35.3)
MCV RBC AUTO: 92.1 FL (ref 81.4–97.8)
MONOCYTES # BLD AUTO: 0.49 K/UL (ref 0–0.85)
MONOCYTES NFR BLD AUTO: 7.9 % (ref 0–13.4)
NEUTROPHILS # BLD AUTO: 3.33 K/UL (ref 1.82–7.42)
NEUTROPHILS NFR BLD: 53.7 % (ref 44–72)
NRBC # BLD AUTO: 0 K/UL
NRBC BLD-RTO: 0 /100 WBC
PLATELET # BLD AUTO: 159 K/UL (ref 164–446)
PMV BLD AUTO: 11.4 FL (ref 9–12.9)
POTASSIUM SERPL-SCNC: 4.4 MMOL/L (ref 3.6–5.5)
PROT SERPL-MCNC: 6.6 G/DL (ref 6–8.2)
RBC # BLD AUTO: 4.53 M/UL (ref 4.7–6.1)
SODIUM SERPL-SCNC: 141 MMOL/L (ref 135–145)
TRIGL SERPL-MCNC: 140 MG/DL (ref 0–149)
WBC # BLD AUTO: 6.2 K/UL (ref 4.8–10.8)

## 2021-07-16 PROCEDURE — 83036 HEMOGLOBIN GLYCOSYLATED A1C: CPT | Mod: GA

## 2021-07-16 PROCEDURE — 80053 COMPREHEN METABOLIC PANEL: CPT

## 2021-07-16 PROCEDURE — 36415 COLL VENOUS BLD VENIPUNCTURE: CPT

## 2021-07-16 PROCEDURE — 85025 COMPLETE CBC W/AUTO DIFF WBC: CPT

## 2021-07-16 PROCEDURE — 80061 LIPID PANEL: CPT

## 2021-07-16 PROCEDURE — 82570 ASSAY OF URINE CREATININE: CPT

## 2021-07-16 PROCEDURE — 82043 UR ALBUMIN QUANTITATIVE: CPT

## 2021-07-17 LAB
CREAT UR-MCNC: 137.52 MG/DL
MICROALBUMIN UR-MCNC: <1.2 MG/DL
MICROALBUMIN/CREAT UR: NORMAL MG/G (ref 0–30)

## 2021-07-19 ENCOUNTER — TELEPHONE (OUTPATIENT)
Dept: MEDICAL GROUP | Age: 71
End: 2021-07-19

## 2021-07-19 ENCOUNTER — TELEPHONE (OUTPATIENT)
Dept: PHYSICAL MEDICINE AND REHAB | Facility: MEDICAL CENTER | Age: 71
End: 2021-07-19

## 2021-07-19 NOTE — TELEPHONE ENCOUNTER
ESTABLISHED PATIENT PRE-VISIT PLANNING   Monday, 07/19/2021@1:28PM:    Patient was NOT contacted to complete PVP.     Note: Patient will not be contacted if there is no indication to call.     1.  Reviewed notes from the last few office visits within the medical group: Yes    2.  If any orders were placed at last visit or intended to be done for this visit (i.e. 6 mos follow-up), do we have Results/Consult Notes?         •  Labs - Labs ordered, completed on 07/16/2021 and results are in chart.  Note: If patient appointment is for lab review and patient did not complete labs, check with provider if OK to reschedule patient until labs completed.       •  Imaging - Imaging was not ordered at last office visit.       •  Referrals - Referral ordered, patient was seen and consult notes are in chart. Care Teams updated  YES.    3. Is this appointment scheduled as a Hospital Follow-Up? No    4.  Immunizations were updated in Epic using Reconcile Outside Information activity? Yes    5.  Patient is due for the following Health Maintenance Topics:   Health Maintenance Due   Topic Date Due   • RETINAL SCREENING  Never done   • DIABETES MONOFILAMENT / LE EXAM  08/17/2021     6.  AHA (Pulse[employer]) form printed for Provider? N/A

## 2021-07-20 ENCOUNTER — TELEMEDICINE (OUTPATIENT)
Dept: MEDICAL GROUP | Age: 71
End: 2021-07-20
Payer: MEDICARE

## 2021-07-20 VITALS
DIASTOLIC BLOOD PRESSURE: 67 MMHG | BODY MASS INDEX: 29.84 KG/M2 | WEIGHT: 240 LBS | HEIGHT: 75 IN | SYSTOLIC BLOOD PRESSURE: 110 MMHG | HEART RATE: 66 BPM

## 2021-07-20 DIAGNOSIS — F51.01 PRIMARY INSOMNIA: ICD-10-CM

## 2021-07-20 DIAGNOSIS — I35.9 AORTIC VALVE DISORDER: ICD-10-CM

## 2021-07-20 DIAGNOSIS — E11.9 CONTROLLED TYPE 2 DIABETES MELLITUS WITHOUT COMPLICATION, WITHOUT LONG-TERM CURRENT USE OF INSULIN (HCC): ICD-10-CM

## 2021-07-20 DIAGNOSIS — E78.5 DYSLIPIDEMIA: ICD-10-CM

## 2021-07-20 DIAGNOSIS — G89.29 CHRONIC LUMBOSACRAL PAIN: ICD-10-CM

## 2021-07-20 DIAGNOSIS — M54.50 CHRONIC LUMBOSACRAL PAIN: ICD-10-CM

## 2021-07-20 DIAGNOSIS — D69.6 THROMBOCYTOPENIA (HCC): ICD-10-CM

## 2021-07-20 PROCEDURE — 99214 OFFICE O/P EST MOD 30 MIN: CPT | Mod: 95 | Performed by: INTERNAL MEDICINE

## 2021-07-20 RX ORDER — DICLOFENAC SODIUM 75 MG/1
75 TABLET, DELAYED RELEASE ORAL 2 TIMES DAILY
Qty: 180 TABLET | Refills: 1 | Status: SHIPPED | OUTPATIENT
Start: 2021-07-20 | End: 2021-11-05

## 2021-07-20 RX ORDER — DICLOFENAC SODIUM 75 MG/1
75 TABLET, DELAYED RELEASE ORAL 2 TIMES DAILY
Qty: 60 TABLET | Refills: 5 | Status: SHIPPED | OUTPATIENT
Start: 2021-07-20 | End: 2021-07-20 | Stop reason: SDUPTHER

## 2021-07-20 RX ORDER — ZOLPIDEM TARTRATE 10 MG/1
10 TABLET ORAL NIGHTLY PRN
Qty: 90 TABLET | Refills: 0 | Status: SHIPPED | OUTPATIENT
Start: 2021-07-20 | End: 2021-10-11 | Stop reason: SDUPTHER

## 2021-07-20 ASSESSMENT — FIBROSIS 4 INDEX: FIB4 SCORE: 2.44

## 2021-07-20 NOTE — PROGRESS NOTES
Telemedicine Visit: Established Patient     This Remote Face to Face encounter was conducted via Zoom. Given the importance of social distancing and other strategies recommended to reduce the risk of COVID-19 transmission, I am providing medical care to this patient via audio/video visit in place of an in person visit at the request of the patient. Verbal consent to telehealth, risks, benefits, and consequences were discussed. Patient retains the right to withdraw at any time. All existing confidentiality protections apply. The patient has access to all transmitted medical information. No dissemination of any patient images or information to other entities without further written consent.    CHIEF COMPLAINT     DM labs    HPI  Abiel Vaughn is a 70 y.o. male who presents today for the following     DM2, controlled  Internval course      Onset/  Diabetes education: ordered     Medications:   · Metformin: 750 mg daily  · ACE/ARB: no  · Statin: atorvstatin  · ASA: yes  Compliant with medications: yes  Checking feet daily/wear soft socks/shoes: advised     Diabetes ABCDE TARGETS  · Blood Pressure, goal < 140/90: yes  · Cholesterol-Lipid Panel: Controlled  · Dysalbuminuria: Pending     Diet: Regular  Exercise: Insufficient  BMI: 30     DM complications:  · Peripheral neuropathy: No numbness or tingling in feet.  · Retinopathy:   Last eye exam: . No retinopathy.   · Nephropathy:   No  · CVS: No CAD.  · GI: No gastropathy.     FH of DM: mother, ended up with insulin     Blood pressure/Aortic valve disorder  Controlled, no medications.  He has been evaluated/followed up by cardiology.     Echocardiography: 2019  Left ventricular ejection fraction is visually estimated to be 60%.  Mild concentric left ventricular hypertrophy.  Normal inferior vena cava size and inspiratory collapse.  Mild central aortic insufficiency.  Ascending aorta is dilated with a diameter of  4.3 cm.     Dyslipidemia  Statin:  Atorvastatin, 60 mg daily, taking as prescribed.   - No muscle weakness, cramps, nausea,abdominal discomfort.   Diet / exercise / BMI: as above.   FH: neg     Thrombocytopenia  CBC showed thrombocytopenia.  Patient has not have easy bleeding or bruising.     Insomnia  The patient has a trouble to go and stay asleep.   Used Ambien, 10 mg at bedtime.   Discussed sleep hygiene:   - Go to bed and wake up at consistent times whether work/school day or not.   - Keep room dark, quiet, and comfortable.   - Don't have naps.  - Increase exposure to sunlight during awake times and avoid bright lights before going to sleep.   - Avoid stimulant or caffeine use more than 4 hours after wake time.   - Avoid meal or exercise 2-3 hours prior to going to bed.     LBP  The patient has have chronic degenerative disc disease/chronic lower back pain, accompanied with pain in both lower extremities.  He has been followed up by PMR.  He requested the diclofenac refill.    Reviewed PMH, PSH, FH, SH, ALL, HCM/IMM, no changes  Reviewed MEDS, no changes    Patient Active Problem List    Diagnosis Date Noted   • Anemia 08/17/2020   • Health care maintenance 08/17/2020   • Medicare annual wellness visit, subsequent 08/17/2020   • Thrombocytopenia (HCC) 08/17/2020   • Leg swelling 08/17/2020   • Controlled type 2 diabetes mellitus without complication, without long-term current use of insulin (HCC) 08/26/2019   • Seasonal allergies 08/26/2019   • HSV infection 08/26/2019   • Aortic valve disorder 10/29/2018   • Gastroesophageal reflux disease 10/03/2018   • Primary insomnia 10/03/2018   • Dyslipidemia 10/03/2018   • Primary osteoarthritis involving multiple joints 10/03/2018   • Obesity (BMI 30.0-34.9) 10/03/2018   • Decreased hearing, right 10/03/2018     CURRENT MEDICATIONS  Current Outpatient Medications   Medication Sig Dispense Refill   • gabapentin (NEURONTIN) 100 MG Cap      • montelukast (SINGULAIR) 10 MG Tab Take 1 tablet by mouth every  day. 90 tablet 4   • valACYclovir (VALTREX) 500 MG Tab TAKE 1 TABLET  tablet 4   • diclofenac DR (VOLTAREN) 75 MG Tablet Delayed Response Take 1 tablet by mouth 2 times a day. 60 tablet 3   • metFORMIN ER (GLUCOPHAGE XR) 750 MG TABLET SR 24 HR Take 1 tablet by mouth every day. 90 tablet 4   • pantoprazole (PROTONIX) 40 MG Tablet Delayed Response Take 1 tablet by mouth every day. 90 tablet 3   • rosuvastatin (CRESTOR) 20 MG Tab TAKE 1 TABLET EVERY EVENING. REPLACES  ATORVASTATIN 90 tablet 4   • CALCIUM PO      • Cholecalciferol (VITAMIN D3) 2000 UNIT Cap Take  by mouth 2 Times a Day.     • Multiple Vitamins-Minerals (MULTIVITAMIN ADULT) Tab Take  by mouth.       No current facility-administered medications for this visit.     ALLERGIES  Allergies: Seasonal  PAST MEDICAL HISTORY  Past Medical History:   Diagnosis Date   • Dyslipidemia    • GERD (gastroesophageal reflux disease)    • Insomnia      SURGICAL HISTORY  He  has a past surgical history that includes appendectomy; tonsillectomy; lumbar medial branch blocks (Right, 2020); lumbar medial branch blocks (Right, 10/12/2020); and neuro dest facet l/s w/ig sngl (Right, 2020).  SOCIAL HISTORY  Social History     Tobacco Use   • Smoking status: Never Smoker   • Smokeless tobacco: Never Used   Vaping Use   • Vaping Use: Never used   Substance Use Topics   • Alcohol use: Yes     Alcohol/week: 0.6 oz     Types: 1 Glasses of wine per week   • Drug use: No     Social History     Social History Narrative   • Not on file     FAMILY HISTORY  Family History   Problem Relation Age of Onset   • Cancer Mother 83        endometrial   • Diabetes Mother    • Heart Disease Father    • No Known Problems Maternal Grandmother    • No Known Problems Maternal Grandfather    • No Known Problems Paternal Grandmother    • No Known Problems Paternal Grandfather    • Hyperlipidemia Neg Hx      Family Status   Relation Name Status   • Mo     • Fa     • MGMo   "   • MGFa     • PGMo     • PGFa     • Neg Hx  (Not Specified)     ROS   Constitutional: Negative for fever, chills, fatigue.  HENT: Negative for congestion, sore throat.  Eyes: Negative for vision problems.   Respiratory: Negative for cough, shortness of breath.  Cardiovascular: Negative for chest pain, palpitations.   Gastrointestinal: Negative for heartburn, nausea, abdominal pain.   Genitourinary: Negative for dysuria.  Musculoskeletal: Per HPI.   Skin: Negative for rash.   Neuro: Negative for dizziness, weakness and headaches.   Endo/Heme/Allergies: Does not bruise/bleed easily.   Psychiatric/Behavioral: Negative for depression.    Objective   Vitals obtained by patient:  Blood Pressure 110/67 (BP Location: Left arm)   Pulse 66   Height 1.905 m (6' 3\")   Weight 109 kg (240 lb)   Body Mass Index 30.00 kg/m²   Physical Exam:  Constitutional: Alert, no distress, well-groomed.  Skin: No rash in visible areas.  Eye: Round. Conjunctiva clear, lids normal.  ENMT: Lips pink without lesions, good dentition. Phonation normal.  Neck: No visible masses or thyromegaly. Moves freely without pain.  CV: no peripheral cyanosis, tachycardia.  Respiratory: Unlabored respiratory effort, no cough or audible wheezing.  Psych: Alert and oriented x3, normal affect and mood.     Labs     Labs are reviewed and discussed with a patient  Lab Results   Component Value Date/Time    CHOLSTRLTOT 140 2021 12:15 PM    LDL 81 2021 12:15 PM    HDL 31 (A) 2021 12:15 PM    TRIGLYCERIDE 140 2021 12:15 PM       Lab Results   Component Value Date/Time    SODIUM 141 2021 12:15 PM    POTASSIUM 4.4 2021 12:15 PM    CHLORIDE 108 2021 12:15 PM    CO2 24 2021 12:15 PM    GLUCOSE 123 (H) 2021 12:15 PM    BUN 15 2021 12:15 PM    CREATININE 0.88 2021 12:15 PM     Lab Results   Component Value Date/Time    ALKPHOSPHAT 66 2021 12:15 PM    ASTSGOT 29 " 07/16/2021 12:15 PM    ALTSGPT 29 07/16/2021 12:15 PM    TBILIRUBIN 0.5 07/16/2021 12:15 PM      Lab Results   Component Value Date/Time    HBA1C 6.2 (H) 07/16/2021 12:15 PM    HBA1C 6.7 (H) 04/16/2021 12:45 PM    HBA1C 6.5 (H) 11/10/2020 10:54 AM     No results found for: TSH  No results found for: FREET4    Lab Results   Component Value Date/Time    WBC 6.2 07/16/2021 12:15 PM    RBC 4.53 (L) 07/16/2021 12:15 PM    HEMOGLOBIN 13.7 (L) 07/16/2021 12:15 PM    HEMATOCRIT 41.7 (L) 07/16/2021 12:15 PM    MCV 92.1 07/16/2021 12:15 PM    MCH 30.2 07/16/2021 12:15 PM    MCHC 32.9 (L) 07/16/2021 12:15 PM    MPV 11.4 07/16/2021 12:15 PM    NEUTSPOLYS 53.70 07/16/2021 12:15 PM    LYMPHOCYTES 33.80 07/16/2021 12:15 PM    MONOCYTES 7.90 07/16/2021 12:15 PM    EOSINOPHILS 3.70 07/16/2021 12:15 PM    BASOPHILS 0.60 07/16/2021 12:15 PM      Imaging      None    Assessment and Plan     Abiel Vaughn is a 70 y.o. male    1. Controlled type 2 diabetes mellitus without complication, without long-term current use of insulin (HCC)  - Controlled, continue with current management.  - Comp Metabolic Panel; Standing  - HEMOGLOBIN A1C; Standing  - Patient identified as having weight management issue.  Appropriate orders and counseling given.    2. Aortic valve disorder  Reviewed imaging    3. Dyslipidemia  - Controlled, continue with current management.  - Comp Metabolic Panel; Standing  - Lipid Profile; Standing    4. Thrombocytopenia (HCC)  Borderline, follow-up labs  - CBC WITH DIFFERENTIAL; Standing    5. Primary insomnia  - Controlled, continue with current management.  - zolpidem (AMBIEN) 10 MG Tab; Take 1 tablet by mouth at bedtime as needed for Sleep for up to 90 days.  Dispense: 90 tablet; Refill: 0    Obtained and reviewed patient utilization report from [facility] pharmacy database on 7/20/2021 11:21 AM  prior to writing prescription for controlled substance II, III or IV per [place] bill . Based on assessment of  the report, the prescription is medically necessary.     6. Chronic lumbosacral pain  Pending trial pain stimulator, f/u by PMR  - diclofenac DR (VOLTAREN) 75 MG Tablet Delayed Response; Take 1 tablet by mouth 2 times a day.  Dispense: 180 tablet; Refill: 1    Follow-up: in 3 months, ambien refill

## 2021-07-26 RX ORDER — CEPHALEXIN 250 MG/1
250 CAPSULE ORAL 4 TIMES DAILY
COMMUNITY
End: 2021-10-25

## 2021-07-28 ENCOUNTER — HOSPITAL ENCOUNTER (OUTPATIENT)
Facility: REHABILITATION | Age: 71
End: 2021-07-28
Attending: PHYSICAL MEDICINE & REHABILITATION | Admitting: PHYSICAL MEDICINE & REHABILITATION
Payer: MEDICARE

## 2021-07-28 ENCOUNTER — APPOINTMENT (OUTPATIENT)
Dept: RADIOLOGY | Facility: REHABILITATION | Age: 71
End: 2021-07-28
Attending: PHYSICAL MEDICINE & REHABILITATION
Payer: MEDICARE

## 2021-07-28 VITALS
HEIGHT: 75 IN | HEART RATE: 61 BPM | OXYGEN SATURATION: 97 % | RESPIRATION RATE: 18 BRPM | BODY MASS INDEX: 29.88 KG/M2 | TEMPERATURE: 98.1 F | DIASTOLIC BLOOD PRESSURE: 61 MMHG | SYSTOLIC BLOOD PRESSURE: 119 MMHG | WEIGHT: 240.3 LBS

## 2021-07-28 DIAGNOSIS — M47.816 LUMBAR SPONDYLOSIS: ICD-10-CM

## 2021-07-28 PROCEDURE — 700111 HCHG RX REV CODE 636 W/ 250 OVERRIDE (IP)

## 2021-07-28 PROCEDURE — 99152 MOD SED SAME PHYS/QHP 5/>YRS: CPT

## 2021-07-28 PROCEDURE — 99153 MOD SED SAME PHYS/QHP EA: CPT

## 2021-07-28 PROCEDURE — C1778 LEAD, NEUROSTIMULATOR: HCPCS

## 2021-07-28 PROCEDURE — 63650 IMPLANT NEUROELECTRODES: CPT

## 2021-07-28 RX ORDER — LIDOCAINE HYDROCHLORIDE 10 MG/ML
INJECTION, SOLUTION EPIDURAL; INFILTRATION; INTRACAUDAL; PERINEURAL
Status: COMPLETED
Start: 2021-07-28 | End: 2021-07-28

## 2021-07-28 RX ORDER — MIDAZOLAM HYDROCHLORIDE 1 MG/ML
INJECTION INTRAMUSCULAR; INTRAVENOUS
Status: COMPLETED
Start: 2021-07-28 | End: 2021-07-28

## 2021-07-28 RX ADMIN — LIDOCAINE HYDROCHLORIDE 20 ML: 10 INJECTION, SOLUTION EPIDURAL; INFILTRATION; INTRACAUDAL; PERINEURAL at 14:50

## 2021-07-28 RX ADMIN — MIDAZOLAM HYDROCHLORIDE 1 MG: 1 INJECTION, SOLUTION INTRAMUSCULAR; INTRAVENOUS at 14:46

## 2021-07-28 RX ADMIN — FENTANYL CITRATE 50 MCG: 50 INJECTION, SOLUTION INTRAMUSCULAR; INTRAVENOUS at 14:46

## 2021-07-28 ASSESSMENT — FIBROSIS 4 INDEX: FIB4 SCORE: 2.47

## 2021-07-28 ASSESSMENT — PAIN DESCRIPTION - PAIN TYPE
TYPE: CHRONIC PAIN
TYPE: CHRONIC PAIN

## 2021-07-28 NOTE — H&P
Physical medicine and rehabilitation preprocedure history & Physical Note    Date  7/28/2021    Primary Care Physician  Lynette Payne M.D.    CC  Pre-Op Diagnosis Codes:     * Lumbar spondylosis [M47.816]     * Lumbar radiculopathy [M54.16]     * History of lumbosacral spine surgery [Z98.890]     * Post laminectomy syndrome [M96.1]     HPI  This is a 71 y.o. male who presented with chronic low back pain with postlaminectomy syndrome failed conservative treatments with injections, home exercise program, medication management continues to have significant pain which has been greater than 3 months of moderate to severe intensity with functional deficits.  Here for spinal cord stimulator trials    See previous notes of Dr. Dacosta    Past Medical History:   Diagnosis Date   • Dyslipidemia    • GERD (gastroesophageal reflux disease)    • Insomnia        Past Surgical History:   Procedure Laterality Date   • NEURO DEST FACET L/S W/IG SNGL Right 11/23/2020    Procedure: DESTRUCTION, NERVE, FACET JOINT, LUMBOSACRAL, USING NEUROLYTIC AGENT, WITH FLUOROSCOPIC GUIDANCE;  Surgeon: Julito Dacosta M.D.;  [facility];  Service: Pain Management   • LUMBAR MEDIAL BRANCH BLOCKS Right 10/12/2020    Procedure: BLOCK, NERVE, SPINAL, LUMBAR, POSTERIOR RAMUS, MEDIAL BRANCH;  Surgeon: Julito Dacosta M.D.;  Location[facility];  Service: Pain Management   • LUMBAR MEDIAL BRANCH BLOCKS Right 8/24/2020    Procedure: BLOCK, NERVE, SPINAL, LUMBAR, POSTERIOR RAMUS, MEDIAL BRANCH;  Surgeon: Julito Dacosta M.D.;  Location: [facility];  Service: Pain Management   • APPENDECTOMY     • TONSILLECTOMY         No current facility-administered medications for this encounter.       Social History     Socioeconomic History   • Marital status: Single     Spouse name: Not on file   • Number of children: Not on file   • Years of education: Not on file   • Highest education level:  Not on file   Occupational History   • Not on file   Tobacco Use   • Smoking status: Never Smoker   • Smokeless tobacco: Never Used   Vaping Use   • Vaping Use: Never used   Substance and Sexual Activity   • Alcohol use: Yes     Alcohol/week: 0.6 oz     Types: 1 Glasses of wine per week   • Drug use: No   • Sexual activity: Yes   Other Topics Concern   •  Service Yes   • Blood Transfusions No   • Caffeine Concern No   • Occupational Exposure No   • Hobby Hazards No   • Sleep Concern Yes   • Stress Concern No   • Weight Concern Yes   • Special Diet No   • Back Care No   • Exercise Yes   • Bike Helmet No   • Seat Belt Yes   • Self-Exams Yes   Social History Narrative   • Not on file     Social Determinants of Health     Financial Resource Strain:    • Difficulty of Paying Living Expenses:    Food Insecurity:    • Worried About Running Out of Food in the Last Year:    • Ran Out of Food in the Last Year:    Transportation Needs:    • Lack of Transportation (Medical):    • Lack of Transportation (Non-Medical):    Physical Activity:    • Days of Exercise per Week:    • Minutes of Exercise per Session:    Stress:    • Feeling of Stress :    Social Connections:    • Frequency of Communication with Friends and Family:    • Frequency of Social Gatherings with Friends and Family:    • Attends Congregation Services:    • Active Member of Clubs or Organizations:    • Attends Club or Organization Meetings:    • Marital Status:    Intimate Partner Violence:    • Fear of Current or Ex-Partner:    • Emotionally Abused:    • Physically Abused:    • Sexually Abused:        Family History   Problem Relation Age of Onset   • Cancer Mother 83        endometrial   • Diabetes Mother    • Heart Disease Father    • No Known Problems Maternal Grandmother    • No Known Problems Maternal Grandfather    • No Known Problems Paternal Grandmother    • No Known Problems Paternal Grandfather    • Hyperlipidemia Neg Hx         Allergies  Seasonal    Review of Systems  Comprehensive review of systems was negative       Physical Exam  Constitutional:       General: He is not in acute distress.     Appearance: Normal appearance. He is well-developed. He is not diaphoretic.   Cardiovascular:      Rate and Rhythm: Normal rate.   Pulmonary:      Effort: Pulmonary effort is normal.      Breath sounds: Normal breath sounds.   Abdominal:      Palpations: Abdomen is soft.   Skin:     General: Skin is warm and dry.      Capillary Refill: Capillary refill takes less than 2 seconds.   Neurological:      Mental Status: He is alert and oriented to person, place, and time.   Psychiatric:         Behavior: Behavior normal.         Thought Content: Thought content normal.         Judgment: Judgment normal.          Vital Signs  Blood Pressure : 129/67   Temperature: 36.7 °C (98.1 °F)   Pulse: 66   Respiration: 16   Pulse Oximetry: 98 %     Vitals reviewed    Labs:                    Radiology:  DX-PORTABLE FLUOROSCOPY < 1 HOUR    (Results Pending)         Assessment/Plan:  Pre-Op Diagnosis Codes:     * Lumbar spondylosis [M47.816]     * Lumbar radiculopathy [M54.16]     * History of lumbosacral spine surgery [Z98.890]     * Post laminectomy syndrome [M96.1]  Procedure(s):  Spinal cord stimulation trial  .

## 2021-07-28 NOTE — OP REPORT
Date of Service: 7/28/2021    Physician/s: Julito Dacosta MD    Pre-operative Diagnosis:   (M47.816) Lumbar spondylosis symptomatically right worse than left  (M54.16) Lumbar radiculopathy  (Z98.890) History of lumbosacral spine surgery, done in [place]  (M96.1) Post laminectomy syndrome  (M48.07) Spinal stenosis of lumbosacral region     Post-operative Diagnosis:   (M47.816) Lumbar spondylosis symptomatically right worse than left  (M54.16) Lumbar radiculopathy  (Z98.890) History of lumbosacral spine surgery, done in [place]  (M96.1) Post laminectomy syndrome  (M48.07) Spinal stenosis of lumbosacral region     Procedure: Spinal cord stimulator trial with two lead placement      Description of procedure:  The risks, benefits, and alternatives of the procedure were reviewed and discussed with the patient. Written informed consent was freely obtained. A pre-procedural time-out was conducted by the physician verifying patient’s identity, procedure to be performed, procedure site and side, and allergy verification. Appropriate equipment was determined to be in place for the procedure.     Perioperative antibiotics were given with 2 g of Ancef    {Moderation sedation was achieved with Versed (1mg) and Fentanyl (50mcg). Monitoring of the patients vital signs and respiratory status was provided by trained independent registered nurse during the entire course of the procedures and under my supervision and recoded in the patient’s medical record. The duration of sedation was over 10 minutes.      The patient's vital signs were carefully monitored before, throughout, and after the procedure.     In the fluoroscopy suite the patient was placed in a prone position, a pillow placed underneath their umbilicus. The fluoroscope was placed over the lumbar spine at the appropriate injection AP angle view, and the target for injection was marked. The skin was prepped and draped in the usual sterile fashion. A 25g needle was placed into  the marked site, and approx 2cc of 1% Lidocaine was injected subcutaneously into the epidermal and dermal layers. The needle was removed.     An 25g 3.5 inch  spinal needle was then placed and advanced under fluoroscopic guidance down to the level of the lamina, and 5cc of 1% Lidocaine was used to numb the needle tract. The needle was then removed intact.     A 14g introducer needle was then advanced into the T12-L1  interlaminar space utilizing the initial position AP view and a lateral view to ensure proper location of the needle tip at all times. A loss of resistance technique was used to guide the needle into the epidural space in a lateral fluoroscopic view.     The first lead was placed into the distal end of the introducer needle, and advanced into the epidural space under live fluoroscopy to ensure the needle was placed posteriorly in the epidural space. In the AP view, the lead was advanced up to the top of the T7 vertebral body - the lead was placed adjacent to the midline. The same procedure was again done for another lead.     The leads were attached to the external battery and the patient was trialed with greater than 3 parameters to ensure effectiveness in the patient's painful area of his index pain.  Right-sided pain was well controlled however the left-sided pain was not controlled.  The left lead was adjusted to be slightly more left in the original position still to the top of the T7 vertebral body.  This was again tested with greater than 3 parameters with an external battery.  There was slightly better coverage to the left side.  This was again retracted and moved slightly more left for the left lead.  The leads were attached to external battery and the patient was trialed with greater than 3 parameters which were and the patient had excellent coverage bilaterally in the low back.     Once optimal field coverage was obtained, the area was thoroughly cleaned with saline and dried immediately. The  leads were then carefully steri-stripped down and the leads were covered with tegaderm.    This was a challenging procedure given the patients and previous surgery with scar tissue as well as challenges with the anatomical midline versus the physiologic midline which required multiple adjustments of the lead.  This added to the mental effort for complexity this procedure.  This also made acquiring fluoroscopic images more time-consuming and challenging.  Final fluoroscopic images were obtained and saved.     There were no complications noted. The patient was then evaluated post-procedure, and was hemodynamically stable prior to leaving the post-operative care unit. The patient will follow-up in approximately one week from now for further evaluation.        Julito Dacosta MD  Physical Medicine and Rehabilitation  Interventional Spine and Sports Physiatry  [facility]       CPT  Fluoroscopic  needle guidance [id number]  Percutaneous implantation of neurostimulator electrode array, epidural 63650-22 x 2  moderate procedural sedation first 15 minutes:[id number]  Moderate procedural sedation additional 15 minutes [id number]

## 2021-07-28 NOTE — PROGRESS NOTES
1445 PM,         Onto procedure table  on prone positioned and necessary monitoring equipment connected ( oxygen, EKG pad,pulse oximeter, BP ) with the help by  team  ( CST, X-ray TECH and RN ). Hands supported with stool underneath headrest of the bed , lower extremities supported with pillow.    1449 PM.    Patient identified. Site and procedure confirmed and nathanael by Dr. Dacosta .Medication allergies. Reviewed pertinent medical history ( GERD, Anemia, osteoarthritis, ).Timeout completed, team and patient agreed.

## 2021-07-29 ENCOUNTER — TELEPHONE (OUTPATIENT)
Dept: PHYSICAL MEDICINE AND REHAB | Facility: MEDICAL CENTER | Age: 71
End: 2021-07-29

## 2021-07-29 NOTE — TELEPHONE ENCOUNTER
Spoke to Pt in regards to his SP that was done with Dr. Dacosta dated 7/28/21 for his Spinal cord stimulation trial and she stated that he is fine.

## 2021-07-29 NOTE — PROGRESS NOTES
Patient taking fluids well, no nausea.  I walked patient to truck, passenger side.  Sig other in drivers seat.  A staff member saw them switch with patient in drivers seat.  I went out and reminded him he cannot drive while under the influence of Fentanyl/Versed.  That he is legally intoxicated for 24 hours.  He switched seats with sig. Other and she drove out of parking lot with him in the passenger seat.  MD aware.

## 2021-07-30 ENCOUNTER — APPOINTMENT (OUTPATIENT)
Dept: RADIOLOGY | Facility: MEDICAL CENTER | Age: 71
End: 2021-07-30
Attending: PHYSICAL MEDICINE & REHABILITATION
Payer: MEDICARE

## 2021-08-03 ENCOUNTER — HOSPITAL ENCOUNTER (OUTPATIENT)
Dept: RADIOLOGY | Facility: MEDICAL CENTER | Age: 71
End: 2021-08-03
Attending: PHYSICAL MEDICINE & REHABILITATION
Payer: MEDICARE

## 2021-08-03 DIAGNOSIS — M47.816 LUMBAR SPONDYLOSIS: ICD-10-CM

## 2021-08-03 PROCEDURE — 72070 X-RAY EXAM THORAC SPINE 2VWS: CPT

## 2021-08-04 ENCOUNTER — OFFICE VISIT (OUTPATIENT)
Dept: PHYSICAL MEDICINE AND REHAB | Facility: MEDICAL CENTER | Age: 71
End: 2021-08-04
Payer: MEDICARE

## 2021-08-04 VITALS
TEMPERATURE: 97.5 F | HEIGHT: 75 IN | BODY MASS INDEX: 30.51 KG/M2 | OXYGEN SATURATION: 96 % | DIASTOLIC BLOOD PRESSURE: 84 MMHG | WEIGHT: 245.37 LBS | SYSTOLIC BLOOD PRESSURE: 112 MMHG | HEART RATE: 70 BPM

## 2021-08-04 DIAGNOSIS — M48.07 SPINAL STENOSIS OF LUMBOSACRAL REGION: ICD-10-CM

## 2021-08-04 DIAGNOSIS — M48.061 SPINAL STENOSIS OF LUMBAR REGION, UNSPECIFIED WHETHER NEUROGENIC CLAUDICATION PRESENT: ICD-10-CM

## 2021-08-04 DIAGNOSIS — M96.1 POST LAMINECTOMY SYNDROME: ICD-10-CM

## 2021-08-04 DIAGNOSIS — Z98.890 HISTORY OF LUMBOSACRAL SPINE SURGERY: ICD-10-CM

## 2021-08-04 DIAGNOSIS — M47.816 LUMBAR SPONDYLOSIS: ICD-10-CM

## 2021-08-04 DIAGNOSIS — M54.16 LUMBAR RADICULOPATHY: ICD-10-CM

## 2021-08-04 PROCEDURE — 99024 POSTOP FOLLOW-UP VISIT: CPT | Performed by: PHYSICAL MEDICINE & REHABILITATION

## 2021-08-04 ASSESSMENT — PATIENT HEALTH QUESTIONNAIRE - PHQ9
SUM OF ALL RESPONSES TO PHQ QUESTIONS 1-9: 7
CLINICAL INTERPRETATION OF PHQ2 SCORE: 2
5. POOR APPETITE OR OVEREATING: 1 - SEVERAL DAYS

## 2021-08-04 ASSESSMENT — PAIN SCALES - GENERAL: PAINLEVEL: NO PAIN

## 2021-08-04 ASSESSMENT — FIBROSIS 4 INDEX: FIB4 SCORE: 2.47

## 2021-08-04 NOTE — PROGRESS NOTES
Follow up patient note  Interventional spine and sports physiatry, Physical medicine rehabilitation      Chief complaint:   Chief Complaint   Patient presents with   • Follow-Up     back pain          HISTORY    Please see new patient note dated  by Dr Dacosta,  for more details.     HPI  Patient identification: Abiel Vaughn  1950,   male  With Diagnoses of Lumbar spondylosis symptomatically right worse than left, Lumbar radiculopathy, History of lumbosacral spine surgery, done in [place], Spinal stenosis of lumbar region, unspecified whether neurogenic claudication present, Post laminectomy syndrome, and Spinal stenosis of lumbosacral region were pertinent to this visit.     Procedures:  2018 right L4-5 and L5-S1 transforaminal epidural steroid injection with 70% pain relief  2019 right L4-5 and L5-S1 transforaminal epidural steroid injection with greater than 80% pain relief  2020 diagnostic medial branch block starting the right L3-4, L4-5, L5-S1 facet joints with 100% pain relief  10/12/2020 Medial Branch Blocks targeting the right L3-4, L4-5 and L5-S1  facet joints 100% pain relief during the diagnostic phase  2020 medial branch radiofrequency neurotomy targeting the right L3-4, L4-5, L5-S1 facet joints with sedation greater than 50% improvement in pain and function but this only lasted for a few months  2021 spinal cord stimulator trial, negative trial    Unfortunately patient had no significant improvement during spinal cord stimulator trial.  He continues to have the same pains with low back pain and leg pain worse with lumbar extension and walking improves with sitting.  This is 6 out of 10 in intensity and can occasionally be 9 out of 10 in intensity.  There was no improvement and no change in his symptoms overall during the trial.    Failed PT, home exercise program, medication management.     He has trid lyrica but had to stop because of fatigue. He is now using  gabapentin again with mild improvement.     Psychological testing for pain as depression and pain commonly coexist and need to both be treated.     Opioid Risk Score: 0      Interpretation of Opioid Risk Score   Score 0-3 = Low risk of abuse. Do UDS at least once per year.  Score 4-7 = Moderate risk of abuse. Do UDS 1-4 times per year.  Score 8+ = High risk of abuse. Refer to specialist.    PHQ  Depression Screen (PHQ-2/PHQ-9) 4/12/2021 6/9/2021 8/4/2021   PHQ-2 Total Score 0 2 2   PHQ-9 Total Score - 14 7       Interpretation of PHQ-9 Total Score   Score Severity   1-4 No Depression   5-9 Mild Depression   10-14 Moderate Depression   15-19 Moderately Severe Depression   20-27 Severe Depression     ROS Red Flags :   -Fever, Chills, Sweats: Denies  Involuntary Weight Loss: Denies  Bowel/Bladder Incontinence: Denies  Saddle Anesthesia: Denies        PMHx:   Past Medical History:   Diagnosis Date   • Dyslipidemia    • GERD (gastroesophageal reflux disease)    • Insomnia        PSHx:   Past Surgical History:   Procedure Laterality Date   • CT FLUOROSCOPIC GUIDANCE NEEDLE PLACEMENT  7/28/2021    Procedure: Spinal cord stimulation trial;  Surgeon: Julito Dacosta M.D.;  [facility];  Service: Pain Management   • PB PERCUT IMPLNT NEUROELECT,EPIDURAL  7/28/2021    Procedure: .;  Surgeon: Julito Dacosta M.D.;  Location: [facility];  Service: Pain Management   • NEURO DEST FACET L/S W/IG SNGL Right 11/23/2020    Procedure: DESTRUCTION, NERVE, FACET JOINT, LUMBOSACRAL, USING NEUROLYTIC AGENT, WITH FLUOROSCOPIC GUIDANCE;  Surgeon: Julito Dacosta M.D.;  Location: [facility];  Service: Pain Management   • LUMBAR MEDIAL BRANCH BLOCKS Right 10/12/2020    Procedure: BLOCK, NERVE, SPINAL, LUMBAR, POSTERIOR RAMUS, MEDIAL BRANCH;  Surgeon: Julito Dacosta M.D.;  [facility];  Service: Pain Management   • LUMBAR MEDIAL BRANCH BLOCKS Right  8/24/2020    Procedure: BLOCK, NERVE, SPINAL, LUMBAR, POSTERIOR RAMUS, MEDIAL BRANCH;  Surgeon: Julito Dacosta M.D.;  [facility];  Service: Pain Management   • APPENDECTOMY     • TONSILLECTOMY         Family history   Family History   Problem Relation Age of Onset   • Cancer Mother 83        endometrial   • Diabetes Mother    • Heart Disease Father    • No Known Problems Maternal Grandmother    • No Known Problems Maternal Grandfather    • No Known Problems Paternal Grandmother    • No Known Problems Paternal Grandfather    • Hyperlipidemia Neg Hx          Medications:   Outpatient Medications Marked as Taking for the 8/4/21 encounter (Office Visit) with Julito Dacosta M.D.   Medication Sig Dispense Refill   • cephALEXin (KEFLEX) 250 MG Cap Take 250 mg by mouth 4 times a day.     • diclofenac DR (VOLTAREN) 75 MG Tablet Delayed Response Take 1 tablet by mouth 2 times a day. 180 tablet 1   • zolpidem (AMBIEN) 10 MG Tab Take 1 tablet by mouth at bedtime as needed for Sleep for up to 90 days. 90 tablet 0   • gabapentin (NEURONTIN) 100 MG Cap 3 times a day.     • montelukast (SINGULAIR) 10 MG Tab Take 1 tablet by mouth every day. 90 tablet 4   • valACYclovir (VALTREX) 500 MG Tab TAKE 1 TABLET  tablet 4   • metFORMIN ER (GLUCOPHAGE XR) 750 MG TABLET SR 24 HR Take 1 tablet by mouth every day. 90 tablet 4   • pantoprazole (PROTONIX) 40 MG Tablet Delayed Response Take 1 tablet by mouth every day. 90 tablet 3   • rosuvastatin (CRESTOR) 20 MG Tab TAKE 1 TABLET EVERY EVENING. REPLACES  ATORVASTATIN 90 tablet 4   • CALCIUM PO      • Cholecalciferol (VITAMIN D3) 2000 UNIT Cap Take  by mouth 2 Times a Day.     • Multiple Vitamins-Minerals (MULTIVITAMIN ADULT) Tab Take  by mouth.          Current Outpatient Medications on File Prior to Visit   Medication Sig Dispense Refill   • cephALEXin (KEFLEX) 250 MG Cap Take 250 mg by mouth 4 times a day.     • diclofenac DR (VOLTAREN) 75 MG Tablet  Delayed Response Take 1 tablet by mouth 2 times a day. 180 tablet 1   • zolpidem (AMBIEN) 10 MG Tab Take 1 tablet by mouth at bedtime as needed for Sleep for up to 90 days. 90 tablet 0   • gabapentin (NEURONTIN) 100 MG Cap 3 times a day.     • montelukast (SINGULAIR) 10 MG Tab Take 1 tablet by mouth every day. 90 tablet 4   • valACYclovir (VALTREX) 500 MG Tab TAKE 1 TABLET  tablet 4   • metFORMIN ER (GLUCOPHAGE XR) 750 MG TABLET SR 24 HR Take 1 tablet by mouth every day. 90 tablet 4   • pantoprazole (PROTONIX) 40 MG Tablet Delayed Response Take 1 tablet by mouth every day. 90 tablet 3   • rosuvastatin (CRESTOR) 20 MG Tab TAKE 1 TABLET EVERY EVENING. REPLACES  ATORVASTATIN 90 tablet 4   • CALCIUM PO      • Cholecalciferol (VITAMIN D3) 2000 UNIT Cap Take  by mouth 2 Times a Day.     • Multiple Vitamins-Minerals (MULTIVITAMIN ADULT) Tab Take  by mouth.       No current facility-administered medications on file prior to visit.         Allergies:   Allergies   Allergen Reactions   • Seasonal Itching and Runny Nose       Social Hx:   Social History     Socioeconomic History   • Marital status: Single     Spouse name: Not on file   • Number of children: Not on file   • Years of education: Not on file   • Highest education level: Not on file   Occupational History   • Not on file   Tobacco Use   • Smoking status: Never Smoker   • Smokeless tobacco: Never Used   Vaping Use   • Vaping Use: Never used   Substance and Sexual Activity   • Alcohol use: Yes     Alcohol/week: 0.6 oz     Types: 1 Glasses of wine per week   • Drug use: No   • Sexual activity: Yes   Other Topics Concern   •  Service Yes   • Blood Transfusions No   • Caffeine Concern No   • Occupational Exposure No   • Hobby Hazards No   • Sleep Concern Yes   • Stress Concern No   • Weight Concern Yes   • Special Diet No   • Back Care No   • Exercise Yes   • Bike Helmet No   • Seat Belt Yes   • Self-Exams Yes   Social History Narrative   • Not on file  "    Social Determinants of Health     Financial Resource Strain:    • Difficulty of Paying Living Expenses:    Food Insecurity:    • Worried About Running Out of Food in the Last Year:    • Ran Out of Food in the Last Year:    Transportation Needs:    • Lack of Transportation (Medical):    • Lack of Transportation (Non-Medical):    Physical Activity:    • Days of Exercise per Week:    • Minutes of Exercise per Session:    Stress:    • Feeling of Stress :    Social Connections:    • Frequency of Communication with Friends and Family:    • Frequency of Social Gatherings with Friends and Family:    • Attends [employer] Services:    • Active Member of Clubs or Organizations:    • Attends Club or Organization Meetings:    • Marital Status:    Intimate Partner Violence:    • Fear of Current or Ex-Partner:    • Emotionally Abused:    • Physically Abused:    • Sexually Abused:          EXAMINATION     Physical Exam:   Vitals: /84 (BP Location: Right arm, Patient Position: Sitting, BP Cuff Size: Adult)   Pulse 70   Temp 36.4 °C (97.5 °F) (Temporal)   Ht 1.905 m (6' 3\")   Wt 111 kg (245 lb 6 oz)   SpO2 96%     Constitutional:   Body Habitus: Body mass index is 30.67 kg/m².  Cooperation: Fully cooperates with exam  Appearance: Well-groomed no disheveled    Respiratory-  breathing comfortable on room air, no audible wheezing  Cardiovascular- capillary refills less than 2 seconds. No lower extremity edema is noted.   Psychiatric- alert and oriented ×3. Normal affect.      Exam done 8/5/2021   MSK/NEURO  There are no signs of infection.  Spinal cord stimulator leads were pulled.  The area was cleaned with ChloraPrep and covered with a Band-Aid.    decreased active range of motion with flexion, lateral flexion, and rotation bilaterally.   There is decreased active range of motion with lumbar extension.    There is  pain with lumbar extension.   There is pain with facet loading maneuver (extension rotation) with axial low " back pain on the BILATERAL side(s)    Palpation:   No tenderness to palpation in midline at T1-T12 levels. No tenderness to palpation in the left and right of the midline T1-L5, NEGATIVE for tenderness to palpation to the para-midline region in the lower lumbar levels.  palpation over SI joint: negative bilaterally    palpation in hip or over the gluteus medius tendon insertion: negative bilaterally        Key points for the international standards for neurological classification of spinal cord injury (ISNCSCI) to light touch.     Dermatome R L                                      L2 2 2   L3 2 2   L4 1 2   L5 1 2   S1 2 2   S2 2 2         Motor Exam Lower Extremities    ? Myotome R L   Hip flexion L2 5 5   Knee extension L3 5 5   Ankle dorsiflexion L4 5 5   Toe extension L5 5 5   Ankle plantarflexion S1 5 5                 MEDICAL DECISION MAKING    DATA    Labs:   Lab Results   Component Value Date/Time    SODIUM 141 07/16/2021 12:15 PM    POTASSIUM 4.4 07/16/2021 12:15 PM    CHLORIDE 108 07/16/2021 12:15 PM    CO2 24 07/16/2021 12:15 PM    GLUCOSE 123 (H) 07/16/2021 12:15 PM    BUN 15 07/16/2021 12:15 PM    CREATININE 0.88 07/16/2021 12:15 PM        No results found for: PROTHROMBTM, INR     Lab Results   Component Value Date/Time    WBC 6.2 07/16/2021 12:15 PM    RBC 4.53 (L) 07/16/2021 12:15 PM    HEMOGLOBIN 13.7 (L) 07/16/2021 12:15 PM    HEMATOCRIT 41.7 (L) 07/16/2021 12:15 PM    MCV 92.1 07/16/2021 12:15 PM    MCH 30.2 07/16/2021 12:15 PM    MCHC 32.9 (L) 07/16/2021 12:15 PM    MPV 11.4 07/16/2021 12:15 PM    NEUTSPOLYS 53.70 07/16/2021 12:15 PM    LYMPHOCYTES 33.80 07/16/2021 12:15 PM    MONOCYTES 7.90 07/16/2021 12:15 PM    EOSINOPHILS 3.70 07/16/2021 12:15 PM    BASOPHILS 0.60 07/16/2021 12:15 PM        Lab Results   Component Value Date/Time    HBA1C 6.2 (H) 07/16/2021 12:15 PM          Imaging:   I personally reviewed following images    X-ray thoracic spine 8/3/2021 with spinal cord stimulator leads in  the posterior epidural space at the upper level of T7 at a similar location to when they were originally placed.    I reviewed the fluoroscopic images from 11/23/2020 showing medial branch radiofrequency neurotomy targeting the right L3-4, 4 5, 5 S1 facet joints.  I reviewed these with the patient on 1/4/2021.    X-ray bilateral hips 2/6/2019.  Cam deformity of the bilateral femoral heads, arthritis present the bilateral hips.  This is consistent with hip impingement syndrome bilaterally.    MRI lumbar spine 2/6/2019  History of laminectomies.  Bilateral facet arthropathy L3-4, L4-5, L5-S1.  Worst L5-S1.  No significant central canal stenosis.  Moderate neuroforaminal stenosis L4-5, mild neuroforaminal stenosis L5-S1, mild to moderate left L5-S1 neuroforaminal stenosis, mild to moderate left L2-3 neuroforaminal stenosis.    I reviewed the following radiology reports    XR thoracic spine 8/4/2021  FINDINGS:  The alignment is normal. There is no evidence of fracture.     No focal compression deformities are identified. There is moderate degenerative disc disease. Neurostimulator lead is now identified in the thoracic spinal canal with upper END at the T7 level.     IMPRESSION:  1.  No compression deformity or acute fracture.  2.  No change in degenerative disc disease.                                            Results for orders placed during the hospital encounter of 02/06/19   MR-LUMBAR SPINE-W/O    Impression Multilevel degenerative disc disease, facet arthropathy and ligamentum flavum redundancy resulting in at most moderate foraminal and mild lateral recess stenoses.    Left hemilaminotomies                                X-ray hips 2/6/2019  Impression       1.  Convexity bilaterally at the femoral head/neck junction which can be associated with the clinical syndrome of femoroacetabular impingement    2.  Lumbar spine facet arthropathy and dextroconvex scoliosis                                                              Results for orders placed during the hospital encounter of 19   DX-LUMBAR SPINE-2 OR 3 VIEWS    Impression 1.  Dextroconvex scoliosis    2.  Multilevel facet arthropathy                                           DIAGNOSIS   Visit Diagnoses     ICD-10-CM   1. Lumbar spondylosis symptomatically right worse than left  M47.816   2. Lumbar radiculopathy  M54.16   3. History of lumbosacral spine surgery, done in [place]  Z98.890   4. Spinal stenosis of lumbar region, unspecified whether neurogenic claudication present  M48.061   5. Post laminectomy syndrome  M96.1   6. Spinal stenosis of lumbosacral region  M48.07         ASSESSMENT and PLAN:     Abiel Vaughn  1950,   male      Abiel was seen today for follow-up.    Diagnoses and all orders for this visit:    Lumbar spondylosis symptomatically right worse than left    Lumbar radiculopathy    History of lumbosacral spine surgery, done in [place]    Spinal stenosis of lumbar region, unspecified whether neurogenic claudication present    Post laminectomy syndrome    Spinal stenosis of lumbosacral region         The patient passed psychological evaluation for stimulator from Dr. Suarez of psychology.    I requested the records from Dr Johnie Riley in [facility]    Unfortunately the patient had no significant improvement during spinal cord stimulator trial and this was a negative trial.  Because of this we will not be pursuing permanent placement of a spinal cord stimulator.  We discussed other options including peripheral nerve stimulation, acupuncture, medication management.  At this time the patient wishes to continue his current regiment, we discussed additional home exercises, we will regroup in approximately 2 months to discuss further options.    At this time he can continue diclofenac and gabapentin        Follow up: 10/5/2021     Thank you for allowing me to participate in the care of this patient. If you have any questions please  not hesitate to contact me.          Please note that this dictation was created using voice recognition software. I have made every reasonable attempt to correct obvious errors but there may be errors of grammar and content that I may have overlooked prior to finalization of this note.      Julito Dacosta MD  Interventional Spine and Sports Physiatry  Physical Medicine and Rehabilitation  [facility]

## 2021-10-05 ENCOUNTER — OFFICE VISIT (OUTPATIENT)
Dept: PHYSICAL MEDICINE AND REHAB | Facility: MEDICAL CENTER | Age: 71
End: 2021-10-05
Payer: MEDICARE

## 2021-10-05 VITALS
DIASTOLIC BLOOD PRESSURE: 70 MMHG | WEIGHT: 240.52 LBS | TEMPERATURE: 96.9 F | BODY MASS INDEX: 29.91 KG/M2 | HEIGHT: 75 IN | OXYGEN SATURATION: 100 % | SYSTOLIC BLOOD PRESSURE: 130 MMHG | HEART RATE: 75 BPM

## 2021-10-05 DIAGNOSIS — M47.816 LUMBAR SPONDYLOSIS: ICD-10-CM

## 2021-10-05 DIAGNOSIS — M48.062 SPINAL STENOSIS OF LUMBAR REGION WITH NEUROGENIC CLAUDICATION: ICD-10-CM

## 2021-10-05 DIAGNOSIS — M16.0 BILATERAL HIP JOINT ARTHRITIS: ICD-10-CM

## 2021-10-05 DIAGNOSIS — M54.16 LUMBAR RADICULOPATHY: ICD-10-CM

## 2021-10-05 DIAGNOSIS — M96.1 POST LAMINECTOMY SYNDROME: ICD-10-CM

## 2021-10-05 PROCEDURE — 99214 OFFICE O/P EST MOD 30 MIN: CPT | Performed by: PHYSICAL MEDICINE & REHABILITATION

## 2021-10-05 ASSESSMENT — PAIN SCALES - GENERAL: PAINLEVEL: 7=MODERATE-SEVERE PAIN

## 2021-10-05 ASSESSMENT — PATIENT HEALTH QUESTIONNAIRE - PHQ9
5. POOR APPETITE OR OVEREATING: 0 - NOT AT ALL
SUM OF ALL RESPONSES TO PHQ QUESTIONS 1-9: 7
CLINICAL INTERPRETATION OF PHQ2 SCORE: 2

## 2021-10-05 ASSESSMENT — FIBROSIS 4 INDEX: FIB4 SCORE: 2.47

## 2021-10-05 NOTE — H&P (VIEW-ONLY)
Follow up patient note  Interventional spine and sports physiatry, Physical medicine rehabilitation      Chief complaint:   Chief Complaint   Patient presents with   • Follow-Up     Back pain          HISTORY    Please see new patient note dated  by Dr Dacosta,  for more details.     HPI  Patient identification: Abiel Vaughn  1950,   male  With Diagnoses of Bilateral hip joint arthritis Right worse than left, Lumbar spondylosis symptomatically right worse than left, Lumbar radiculopathy, Spinal stenosis of lumbar region with neurogenic claudication, and Post laminectomy syndrome were pertinent to this visit.     Procedures:  2018 right L4-5 and L5-S1 transforaminal epidural steroid injection with 70% pain relief  2019 right L4-5 and L5-S1 transforaminal epidural steroid injection with greater than 80% pain relief  2020 diagnostic medial branch block starting the right L3-4, L4-5, L5-S1 facet joints with 100% pain relief  10/12/2020 Medial Branch Blocks targeting the right L3-4, L4-5 and L5-S1  facet joints 100% pain relief during the diagnostic phase  2020 medial branch radiofrequency neurotomy targeting the right L3-4, L4-5, L5-S1 facet joints with sedation greater than 50% improvement in pain and function but this only lasted for a few months  2021 spinal cord stimulator trial, negative trial      He continues to have issues with walking. He can walk for about 1/4 mile at a time. Then he has to sit.  At that point his pain will improve with sitting and he is able to walk again for the same predictable amount and then he would have to sit when his back pain will radiate down the bilateral legs with a heavy feeling in the leg 7 of 10 intensity.  This is causing functional deficits.  He is failed multiple conservative treatments described above.     He continues to do his physican and PT prescribed home exercise program which he has done daily for over the past three months.      Medications tried: Lyrica had to be stopped because of sedative side effects.  He does tolerate low-dose gabapentin. NSAIDs, tylenol.       PMHx:   Past Medical History:   Diagnosis Date   • Dyslipidemia    • GERD (gastroesophageal reflux disease)    • Insomnia        PSHx:   Past Surgical History:   Procedure Laterality Date   • SC FLUOROSCOPIC GUIDANCE NEEDLE PLACEMENT  7/28/2021    Procedure: Spinal cord stimulation trial;  Surgeon: Julito Dacosta M.D.;  [facility];  Service: Pain Management   • PB PERCUT IMPLNT NEUROELECT,EPIDURAL  7/28/2021    Procedure: .;  Surgeon: Julito Dacosta M.D.;  Location: [facility];  Service: Pain Management   • NEURO DEST FACET L/S W/IG SNGL Right 11/23/2020    Procedure: DESTRUCTION, NERVE, FACET JOINT, LUMBOSACRAL, USING NEUROLYTIC AGENT, WITH FLUOROSCOPIC GUIDANCE;  Surgeon: Julito Dacosta M.D.;  Location: [facility];  Service: Pain Management   • LUMBAR MEDIAL BRANCH BLOCKS Right 10/12/2020    Procedure: BLOCK, NERVE, SPINAL, LUMBAR, POSTERIOR RAMUS, MEDIAL BRANCH;  Surgeon: Julito Dacosta M.D.;  [facility];  Service: Pain Management   • LUMBAR MEDIAL BRANCH BLOCKS Right 8/24/2020    Procedure: BLOCK, NERVE, SPINAL, LUMBAR, POSTERIOR RAMUS, MEDIAL BRANCH;  Surgeon: Julito Dacosta M.D.;  Location: [facility];  Service: Pain Management   • APPENDECTOMY     • TONSILLECTOMY         Family history   Family History   Problem Relation Age of Onset   • Cancer Mother 83        endometrial   • Diabetes Mother    • Heart Disease Father    • No Known Problems Maternal Grandmother    • No Known Problems Maternal Grandfather    • No Known Problems Paternal Grandmother    • No Known Problems Paternal Grandfather    • Hyperlipidemia Neg Hx          Medications:   Outpatient Medications Marked as Taking for the 10/5/21 encounter (Office Visit) with Julito Dacosta  M.D.   Medication Sig Dispense Refill   • cephALEXin (KEFLEX) 250 MG Cap Take 250 mg by mouth 4 times a day.     • diclofenac DR (VOLTAREN) 75 MG Tablet Delayed Response Take 1 tablet by mouth 2 times a day. 180 tablet 1   • zolpidem (AMBIEN) 10 MG Tab Take 1 tablet by mouth at bedtime as needed for Sleep for up to 90 days. 90 tablet 0   • gabapentin (NEURONTIN) 100 MG Cap 3 times a day.     • montelukast (SINGULAIR) 10 MG Tab Take 1 tablet by mouth every day. 90 tablet 4   • valACYclovir (VALTREX) 500 MG Tab TAKE 1 TABLET  tablet 4   • metFORMIN ER (GLUCOPHAGE XR) 750 MG TABLET SR 24 HR Take 1 tablet by mouth every day. 90 tablet 4   • pantoprazole (PROTONIX) 40 MG Tablet Delayed Response Take 1 tablet by mouth every day. 90 tablet 3   • rosuvastatin (CRESTOR) 20 MG Tab TAKE 1 TABLET EVERY EVENING. REPLACES  ATORVASTATIN 90 tablet 4   • CALCIUM PO      • Cholecalciferol (VITAMIN D3) 2000 UNIT Cap Take  by mouth 2 Times a Day.     • Multiple Vitamins-Minerals (MULTIVITAMIN ADULT) Tab Take  by mouth.          Current Outpatient Medications on File Prior to Visit   Medication Sig Dispense Refill   • cephALEXin (KEFLEX) 250 MG Cap Take 250 mg by mouth 4 times a day.     • diclofenac DR (VOLTAREN) 75 MG Tablet Delayed Response Take 1 tablet by mouth 2 times a day. 180 tablet 1   • zolpidem (AMBIEN) 10 MG Tab Take 1 tablet by mouth at bedtime as needed for Sleep for up to 90 days. 90 tablet 0   • gabapentin (NEURONTIN) 100 MG Cap 3 times a day.     • montelukast (SINGULAIR) 10 MG Tab Take 1 tablet by mouth every day. 90 tablet 4   • valACYclovir (VALTREX) 500 MG Tab TAKE 1 TABLET  tablet 4   • metFORMIN ER (GLUCOPHAGE XR) 750 MG TABLET SR 24 HR Take 1 tablet by mouth every day. 90 tablet 4   • pantoprazole (PROTONIX) 40 MG Tablet Delayed Response Take 1 tablet by mouth every day. 90 tablet 3   • rosuvastatin (CRESTOR) 20 MG Tab TAKE 1 TABLET EVERY EVENING. REPLACES  ATORVASTATIN 90 tablet 4   • CALCIUM PO       • Cholecalciferol (VITAMIN D3) 2000 UNIT Cap Take  by mouth 2 Times a Day.     • Multiple Vitamins-Minerals (MULTIVITAMIN ADULT) Tab Take  by mouth.       No current facility-administered medications on file prior to visit.         Allergies:   Allergies   Allergen Reactions   • Seasonal Itching and Runny Nose       Social Hx:   Social History     Socioeconomic History   • Marital status: Single     Spouse name: Not on file   • Number of children: Not on file   • Years of education: Not on file   • Highest education level: Not on file   Occupational History   • Not on file   Tobacco Use   • Smoking status: Never Smoker   • Smokeless tobacco: Never Used   Vaping Use   • Vaping Use: Never used   Substance and Sexual Activity   • Alcohol use: Yes     Alcohol/week: 0.6 oz     Types: 1 Glasses of wine per week   • Drug use: No   • Sexual activity: Yes   Other Topics Concern   •  Service Yes   • Blood Transfusions No   • Caffeine Concern No   • Occupational Exposure No   • Hobby Hazards No   • Sleep Concern Yes   • Stress Concern No   • Weight Concern Yes   • Special Diet No   • Back Care No   • Exercise Yes   • Bike Helmet No   • Seat Belt Yes   • Self-Exams Yes   Social History Narrative   • Not on file     Social Determinants of Health     Financial Resource Strain:    • Difficulty of Paying Living Expenses:    Food Insecurity:    • Worried About Running Out of Food in the Last Year:    • Ran Out of Food in the Last Year:    Transportation Needs:    • Lack of Transportation (Medical):    • Lack of Transportation (Non-Medical):    Physical Activity:    • Days of Exercise per Week:    • Minutes of Exercise per Session:    Stress:    • Feeling of Stress :    Social Connections:    • Frequency of Communication with Friends and Family:    • Frequency of Social Gatherings with Friends and Family:    • Attends [employer] Services:    • Active Member of Clubs or Organizations:    • Attends Club or Organization  "Meetings:    • Marital Status:    Intimate Partner Violence:    • Fear of Current or Ex-Partner:    • Emotionally Abused:    • Physically Abused:    • Sexually Abused:          EXAMINATION     Physical Exam:   Vitals: /70 (BP Location: Right arm, Patient Position: Sitting, BP Cuff Size: Adult)   Pulse 75   Temp 36.1 °C (96.9 °F) (Temporal)   Ht 1.905 m (6' 3\")   Wt 109 kg (240 lb 8.4 oz)   SpO2 100%     Constitutional:   Body Habitus: Body mass index is 30.06 kg/m².  Cooperation: Fully cooperates with exam  Appearance: Well-groomed no disheveled    Respiratory-  breathing comfortable on room air, no audible wheezing  Cardiovascular- capillary refills less than 2 seconds. No lower extremity edema is noted.   Psychiatric- alert and oriented ×3. Normal affect.    MSK/NEURO      Thoracic/Lumbar Spine/Sacral Spine/Hips   decreased active range of motion with flexion, lateral flexion, and rotation bilaterally.   There is decreased active range of motion with lumbar extension.      Palpation:   No tenderness to palpation in midline at T1-T12 levels. No tenderness to palpation in the left and right of the midline T1-L5  palpation over SI joint: negative bilaterally    palpation in hip or over the gluteus medius tendon insertion: negative bilaterally      Lumbar spine Special tests  Neuro tension  Straight leg test negative bilaterally    Slump test negative bilaterally      Key points for the international standards for neurological classification of spinal cord injury (ISNCSCI) to light touch.     Dermatome R L                                      L2 2 2   L3 2 2   L4 1 2   L5 1 2   S1 2 2   S2 2 2         Motor Exam Lower Extremities    ? Myotome R L   Hip flexion L2 5 5   Knee extension L3 5 5   Ankle dorsiflexion L4 5 5   Toe extension L5 5 5   Ankle plantarflexion S1 5 5     Decreased ROM in the bilateral hips, FAIRs maneuver positive bilaterally.     MEDICAL DECISION MAKING    DATA    Labs:   Lab Results "   Component Value Date/Time    SODIUM 141 07/16/2021 12:15 PM    POTASSIUM 4.4 07/16/2021 12:15 PM    CHLORIDE 108 07/16/2021 12:15 PM    CO2 24 07/16/2021 12:15 PM    GLUCOSE 123 (H) 07/16/2021 12:15 PM    BUN 15 07/16/2021 12:15 PM    CREATININE 0.88 07/16/2021 12:15 PM        No results found for: PROTHROMBTM, INR     Lab Results   Component Value Date/Time    WBC 6.2 07/16/2021 12:15 PM    RBC 4.53 (L) 07/16/2021 12:15 PM    HEMOGLOBIN 13.7 (L) 07/16/2021 12:15 PM    HEMATOCRIT 41.7 (L) 07/16/2021 12:15 PM    MCV 92.1 07/16/2021 12:15 PM    MCH 30.2 07/16/2021 12:15 PM    MCHC 32.9 (L) 07/16/2021 12:15 PM    MPV 11.4 07/16/2021 12:15 PM    NEUTSPOLYS 53.70 07/16/2021 12:15 PM    LYMPHOCYTES 33.80 07/16/2021 12:15 PM    MONOCYTES 7.90 07/16/2021 12:15 PM    EOSINOPHILS 3.70 07/16/2021 12:15 PM    BASOPHILS 0.60 07/16/2021 12:15 PM        Lab Results   Component Value Date/Time    HBA1C 6.2 (H) 07/16/2021 12:15 PM          Imaging:   I personally reviewed following images    X-ray thoracic spine 8/3/2021 with spinal cord stimulator leads in the posterior epidural space at the upper level of T7 at a similar location to when they were originally placed.    I reviewed the fluoroscopic images from 11/23/2020 showing medial branch radiofrequency neurotomy targeting the right L3-4, 4 5, 5 S1 facet joints.  I reviewed these with the patient on 1/4/2021.    X-ray bilateral hips 2/6/2019.  Cam deformity of the bilateral femoral heads, arthritis present the bilateral hips.  This is consistent with hip impingement syndrome bilaterally.    MRI lumbar spine 2/6/2019  History of laminectomies.  Bilateral facet arthropathy L3-4, L4-5, L5-S1.  Worst L5-S1.  No significant central canal stenosis.  Moderate neuroforaminal stenosis L4-5, mild neuroforaminal stenosis L5-S1, mild to moderate left L5-S1 neuroforaminal stenosis, mild to moderate left L2-3 neuroforaminal stenosis.    I reviewed the following radiology reports    XR  thoracic spine 2021  FINDINGS:  The alignment is normal. There is no evidence of fracture.     No focal compression deformities are identified. There is moderate degenerative disc disease. Neurostimulator lead is now identified in the thoracic spinal canal with upper END at the T7 level.     IMPRESSION:  1.  No compression deformity or acute fracture.  2.  No change in degenerative disc disease.                                            Results for orders placed during the hospital encounter of 19   MR-LUMBAR SPINE-W/O    Impression Multilevel degenerative disc disease, facet arthropathy and ligamentum flavum redundancy resulting in at most moderate foraminal and mild lateral recess stenoses.    Left hemilaminotomies                                X-ray hips 2019  Impression       1.  Convexity bilaterally at the femoral head/neck junction which can be associated with the clinical syndrome of femoroacetabular impingement    2.  Lumbar spine facet arthropathy and dextroconvex scoliosis                                                             Results for orders placed during the hospital encounter of 19   DX-LUMBAR SPINE-2 OR 3 VIEWS    Impression 1.  Dextroconvex scoliosis    2.  Multilevel facet arthropathy                                           DIAGNOSIS   Visit Diagnoses     ICD-10-CM   1. Bilateral hip joint arthritis Right worse than left  M16.0   2. Lumbar spondylosis symptomatically right worse than left  M47.816   3. Lumbar radiculopathy  M54.16   4. Spinal stenosis of lumbar region with neurogenic claudication  M48.062   5. Post laminectomy syndrome  M96.1         ASSESSMENT and PLAN:     Abiel Cuevas Roderick  1950,   male      Abiel was seen today for follow-up.    Diagnoses and all orders for this visit:    Bilateral hip joint arthritis Right worse than left    Lumbar spondylosis symptomatically right worse than left    Lumbar radiculopathy    Spinal stenosis of lumbar  region with neurogenic claudication    Post laminectomy syndrome         The patient passed psychological evaluation for stimulator from Dr. Suarez of psychology.    Patient failed the above procedures.  I believe he may have a component to his pain and dysfunction from hip arthritis however clinically he does present mostly with spinal stenosis type symptoms.  There are no areas of high-grade spinal stenosis on exam.  He may have a portion from intra-articular hip etiology does have signs of possible hip impingement syndrome in the bilateral hips as well.    I have ordered a right diagnostic and therapeutic hip injection with fluoroscopic guidance    I would like for the patient to go on walks and do activities that would normally irritate his leg after this injection to help with the diagnostic portion and we will do 1 side at a time to also aid with the diagnostic portion of this test.    The risks benefits and alternatives to this procedure were discussed and the patient wishes to proceed with the procedure. Risks include but are not limited to damage to surrounding structures, infection, bleeding, worsening of pain which can be permanent, weakness which can be permanent. Benefits include pain relief, improved function. Alternatives includes not doing the procedure.         Follow up: after the above diagnostic and therapeutic procedure    Thank you for allowing me to participate in the care of this patient. If you have any questions please not hesitate to contact me.          Please note that this dictation was created using voice recognition software. I have made every reasonable attempt to correct obvious errors but there may be errors of grammar and content that I may have overlooked prior to finalization of this note.      Julito Dacosta MD  Interventional Spine and Sports Physiatry  Physical Medicine and Rehabilitation  [facility]

## 2021-10-05 NOTE — PROGRESS NOTES
Follow up patient note  Interventional spine and sports physiatry, Physical medicine rehabilitation      Chief complaint:   Chief Complaint   Patient presents with   • Follow-Up     Back pain          HISTORY    Please see new patient note dated  by Dr Dacosta,  for more details.     HPI  Patient identification: Abiel Vaughn  1950,   male  With Diagnoses of Bilateral hip joint arthritis Right worse than left, Lumbar spondylosis symptomatically right worse than left, Lumbar radiculopathy, Spinal stenosis of lumbar region with neurogenic claudication, and Post laminectomy syndrome were pertinent to this visit.     Procedures:  2018 right L4-5 and L5-S1 transforaminal epidural steroid injection with 70% pain relief  2019 right L4-5 and L5-S1 transforaminal epidural steroid injection with greater than 80% pain relief  2020 diagnostic medial branch block starting the right L3-4, L4-5, L5-S1 facet joints with 100% pain relief  10/12/2020 Medial Branch Blocks targeting the right L3-4, L4-5 and L5-S1  facet joints 100% pain relief during the diagnostic phase  2020 medial branch radiofrequency neurotomy targeting the right L3-4, L4-5, L5-S1 facet joints with sedation greater than 50% improvement in pain and function but this only lasted for a few months  2021 spinal cord stimulator trial, negative trial      He continues to have issues with walking. He can walk for about 1/4 mile at a time. Then he has to sit.  At that point his pain will improve with sitting and he is able to walk again for the same predictable amount and then he would have to sit when his back pain will radiate down the bilateral legs with a heavy feeling in the leg 7 of 10 intensity.  This is causing functional deficits.  He is failed multiple conservative treatments described above.     He continues to do his physican and PT prescribed home exercise program which he has done daily for over the past three months.      Medications tried: Lyrica had to be stopped because of sedative side effects.  He does tolerate low-dose gabapentin. NSAIDs, tylenol.       PMHx:   Past Medical History:   Diagnosis Date   • Dyslipidemia    • GERD (gastroesophageal reflux disease)    • Insomnia        PSHx:   Past Surgical History:   Procedure Laterality Date   • GA FLUOROSCOPIC GUIDANCE NEEDLE PLACEMENT  7/28/2021    Procedure: Spinal cord stimulation trial;  Surgeon: Julito Dacosta M.D.;  [facility];  Service: Pain Management   • PB PERCUT IMPLNT NEUROELECT,EPIDURAL  7/28/2021    Procedure: .;  Surgeon: Julito Dacosta M.D.;  Location: [facility];  Service: Pain Management   • NEURO DEST FACET L/S W/IG SNGL Right 11/23/2020    Procedure: DESTRUCTION, NERVE, FACET JOINT, LUMBOSACRAL, USING NEUROLYTIC AGENT, WITH FLUOROSCOPIC GUIDANCE;  Surgeon: Julito Dacosta M.D.;  Location: [facility];  Service: Pain Management   • LUMBAR MEDIAL BRANCH BLOCKS Right 10/12/2020    Procedure: BLOCK, NERVE, SPINAL, LUMBAR, POSTERIOR RAMUS, MEDIAL BRANCH;  Surgeon: Julito Dacosta M.D.;  [facility];  Service: Pain Management   • LUMBAR MEDIAL BRANCH BLOCKS Right 8/24/2020    Procedure: BLOCK, NERVE, SPINAL, LUMBAR, POSTERIOR RAMUS, MEDIAL BRANCH;  Surgeon: Julito Dacosta M.D.;  Location: [facility];  Service: Pain Management   • APPENDECTOMY     • TONSILLECTOMY         Family history   Family History   Problem Relation Age of Onset   • Cancer Mother 83        endometrial   • Diabetes Mother    • Heart Disease Father    • No Known Problems Maternal Grandmother    • No Known Problems Maternal Grandfather    • No Known Problems Paternal Grandmother    • No Known Problems Paternal Grandfather    • Hyperlipidemia Neg Hx          Medications:   Outpatient Medications Marked as Taking for the 10/5/21 encounter (Office Visit) with Julito Dacosta  M.D.   Medication Sig Dispense Refill   • cephALEXin (KEFLEX) 250 MG Cap Take 250 mg by mouth 4 times a day.     • diclofenac DR (VOLTAREN) 75 MG Tablet Delayed Response Take 1 tablet by mouth 2 times a day. 180 tablet 1   • zolpidem (AMBIEN) 10 MG Tab Take 1 tablet by mouth at bedtime as needed for Sleep for up to 90 days. 90 tablet 0   • gabapentin (NEURONTIN) 100 MG Cap 3 times a day.     • montelukast (SINGULAIR) 10 MG Tab Take 1 tablet by mouth every day. 90 tablet 4   • valACYclovir (VALTREX) 500 MG Tab TAKE 1 TABLET  tablet 4   • metFORMIN ER (GLUCOPHAGE XR) 750 MG TABLET SR 24 HR Take 1 tablet by mouth every day. 90 tablet 4   • pantoprazole (PROTONIX) 40 MG Tablet Delayed Response Take 1 tablet by mouth every day. 90 tablet 3   • rosuvastatin (CRESTOR) 20 MG Tab TAKE 1 TABLET EVERY EVENING. REPLACES  ATORVASTATIN 90 tablet 4   • CALCIUM PO      • Cholecalciferol (VITAMIN D3) 2000 UNIT Cap Take  by mouth 2 Times a Day.     • Multiple Vitamins-Minerals (MULTIVITAMIN ADULT) Tab Take  by mouth.          Current Outpatient Medications on File Prior to Visit   Medication Sig Dispense Refill   • cephALEXin (KEFLEX) 250 MG Cap Take 250 mg by mouth 4 times a day.     • diclofenac DR (VOLTAREN) 75 MG Tablet Delayed Response Take 1 tablet by mouth 2 times a day. 180 tablet 1   • zolpidem (AMBIEN) 10 MG Tab Take 1 tablet by mouth at bedtime as needed for Sleep for up to 90 days. 90 tablet 0   • gabapentin (NEURONTIN) 100 MG Cap 3 times a day.     • montelukast (SINGULAIR) 10 MG Tab Take 1 tablet by mouth every day. 90 tablet 4   • valACYclovir (VALTREX) 500 MG Tab TAKE 1 TABLET  tablet 4   • metFORMIN ER (GLUCOPHAGE XR) 750 MG TABLET SR 24 HR Take 1 tablet by mouth every day. 90 tablet 4   • pantoprazole (PROTONIX) 40 MG Tablet Delayed Response Take 1 tablet by mouth every day. 90 tablet 3   • rosuvastatin (CRESTOR) 20 MG Tab TAKE 1 TABLET EVERY EVENING. REPLACES  ATORVASTATIN 90 tablet 4   • CALCIUM PO       • Cholecalciferol (VITAMIN D3) 2000 UNIT Cap Take  by mouth 2 Times a Day.     • Multiple Vitamins-Minerals (MULTIVITAMIN ADULT) Tab Take  by mouth.       No current facility-administered medications on file prior to visit.         Allergies:   Allergies   Allergen Reactions   • Seasonal Itching and Runny Nose       Social Hx:   Social History     Socioeconomic History   • Marital status: Single     Spouse name: Not on file   • Number of children: Not on file   • Years of education: Not on file   • Highest education level: Not on file   Occupational History   • Not on file   Tobacco Use   • Smoking status: Never Smoker   • Smokeless tobacco: Never Used   Vaping Use   • Vaping Use: Never used   Substance and Sexual Activity   • Alcohol use: Yes     Alcohol/week: 0.6 oz     Types: 1 Glasses of wine per week   • Drug use: No   • Sexual activity: Yes   Other Topics Concern   •  Service Yes   • Blood Transfusions No   • Caffeine Concern No   • Occupational Exposure No   • Hobby Hazards No   • Sleep Concern Yes   • Stress Concern No   • Weight Concern Yes   • Special Diet No   • Back Care No   • Exercise Yes   • Bike Helmet No   • Seat Belt Yes   • Self-Exams Yes   Social History Narrative   • Not on file     Social Determinants of Health     Financial Resource Strain:    • Difficulty of Paying Living Expenses:    Food Insecurity:    • Worried About Running Out of Food in the Last Year:    • Ran Out of Food in the Last Year:    Transportation Needs:    • Lack of Transportation (Medical):    • Lack of Transportation (Non-Medical):    Physical Activity:    • Days of Exercise per Week:    • Minutes of Exercise per Session:    Stress:    • Feeling of Stress :    Social Connections:    • Frequency of Communication with Friends and Family:    • Frequency of Social Gatherings with Friends and Family:    • Attends [employer] Services:    • Active Member of Clubs or Organizations:    • Attends Club or Organization  "Meetings:    • Marital Status:    Intimate Partner Violence:    • Fear of Current or Ex-Partner:    • Emotionally Abused:    • Physically Abused:    • Sexually Abused:          EXAMINATION     Physical Exam:   Vitals: /70 (BP Location: Right arm, Patient Position: Sitting, BP Cuff Size: Adult)   Pulse 75   Temp 36.1 °C (96.9 °F) (Temporal)   Ht 1.905 m (6' 3\")   Wt 109 kg (240 lb 8.4 oz)   SpO2 100%     Constitutional:   Body Habitus: Body mass index is 30.06 kg/m².  Cooperation: Fully cooperates with exam  Appearance: Well-groomed no disheveled    Respiratory-  breathing comfortable on room air, no audible wheezing  Cardiovascular- capillary refills less than 2 seconds. No lower extremity edema is noted.   Psychiatric- alert and oriented ×3. Normal affect.    MSK/NEURO      Thoracic/Lumbar Spine/Sacral Spine/Hips   decreased active range of motion with flexion, lateral flexion, and rotation bilaterally.   There is decreased active range of motion with lumbar extension.      Palpation:   No tenderness to palpation in midline at T1-T12 levels. No tenderness to palpation in the left and right of the midline T1-L5  palpation over SI joint: negative bilaterally    palpation in hip or over the gluteus medius tendon insertion: negative bilaterally      Lumbar spine Special tests  Neuro tension  Straight leg test negative bilaterally    Slump test negative bilaterally      Key points for the international standards for neurological classification of spinal cord injury (ISNCSCI) to light touch.     Dermatome R L                                      L2 2 2   L3 2 2   L4 1 2   L5 1 2   S1 2 2   S2 2 2         Motor Exam Lower Extremities    ? Myotome R L   Hip flexion L2 5 5   Knee extension L3 5 5   Ankle dorsiflexion L4 5 5   Toe extension L5 5 5   Ankle plantarflexion S1 5 5     Decreased ROM in the bilateral hips, FAIRs maneuver positive bilaterally.     MEDICAL DECISION MAKING    DATA    Labs:   Lab Results "   Component Value Date/Time    SODIUM 141 07/16/2021 12:15 PM    POTASSIUM 4.4 07/16/2021 12:15 PM    CHLORIDE 108 07/16/2021 12:15 PM    CO2 24 07/16/2021 12:15 PM    GLUCOSE 123 (H) 07/16/2021 12:15 PM    BUN 15 07/16/2021 12:15 PM    CREATININE 0.88 07/16/2021 12:15 PM        No results found for: PROTHROMBTM, INR     Lab Results   Component Value Date/Time    WBC 6.2 07/16/2021 12:15 PM    RBC 4.53 (L) 07/16/2021 12:15 PM    HEMOGLOBIN 13.7 (L) 07/16/2021 12:15 PM    HEMATOCRIT 41.7 (L) 07/16/2021 12:15 PM    MCV 92.1 07/16/2021 12:15 PM    MCH 30.2 07/16/2021 12:15 PM    MCHC 32.9 (L) 07/16/2021 12:15 PM    MPV 11.4 07/16/2021 12:15 PM    NEUTSPOLYS 53.70 07/16/2021 12:15 PM    LYMPHOCYTES 33.80 07/16/2021 12:15 PM    MONOCYTES 7.90 07/16/2021 12:15 PM    EOSINOPHILS 3.70 07/16/2021 12:15 PM    BASOPHILS 0.60 07/16/2021 12:15 PM        Lab Results   Component Value Date/Time    HBA1C 6.2 (H) 07/16/2021 12:15 PM          Imaging:   I personally reviewed following images    X-ray thoracic spine 8/3/2021 with spinal cord stimulator leads in the posterior epidural space at the upper level of T7 at a similar location to when they were originally placed.    I reviewed the fluoroscopic images from 11/23/2020 showing medial branch radiofrequency neurotomy targeting the right L3-4, 4 5, 5 S1 facet joints.  I reviewed these with the patient on 1/4/2021.    X-ray bilateral hips 2/6/2019.  Cam deformity of the bilateral femoral heads, arthritis present the bilateral hips.  This is consistent with hip impingement syndrome bilaterally.    MRI lumbar spine 2/6/2019  History of laminectomies.  Bilateral facet arthropathy L3-4, L4-5, L5-S1.  Worst L5-S1.  No significant central canal stenosis.  Moderate neuroforaminal stenosis L4-5, mild neuroforaminal stenosis L5-S1, mild to moderate left L5-S1 neuroforaminal stenosis, mild to moderate left L2-3 neuroforaminal stenosis.    I reviewed the following radiology reports    XR  thoracic spine 2021  FINDINGS:  The alignment is normal. There is no evidence of fracture.     No focal compression deformities are identified. There is moderate degenerative disc disease. Neurostimulator lead is now identified in the thoracic spinal canal with upper END at the T7 level.     IMPRESSION:  1.  No compression deformity or acute fracture.  2.  No change in degenerative disc disease.                                            Results for orders placed during the hospital encounter of 19   MR-LUMBAR SPINE-W/O    Impression Multilevel degenerative disc disease, facet arthropathy and ligamentum flavum redundancy resulting in at most moderate foraminal and mild lateral recess stenoses.    Left hemilaminotomies                                X-ray hips 2019  Impression       1.  Convexity bilaterally at the femoral head/neck junction which can be associated with the clinical syndrome of femoroacetabular impingement    2.  Lumbar spine facet arthropathy and dextroconvex scoliosis                                                             Results for orders placed during the hospital encounter of 19   DX-LUMBAR SPINE-2 OR 3 VIEWS    Impression 1.  Dextroconvex scoliosis    2.  Multilevel facet arthropathy                                           DIAGNOSIS   Visit Diagnoses     ICD-10-CM   1. Bilateral hip joint arthritis Right worse than left  M16.0   2. Lumbar spondylosis symptomatically right worse than left  M47.816   3. Lumbar radiculopathy  M54.16   4. Spinal stenosis of lumbar region with neurogenic claudication  M48.062   5. Post laminectomy syndrome  M96.1         ASSESSMENT and PLAN:     Abiel Cuevas Roderick  1950,   male      Abiel was seen today for follow-up.    Diagnoses and all orders for this visit:    Bilateral hip joint arthritis Right worse than left    Lumbar spondylosis symptomatically right worse than left    Lumbar radiculopathy    Spinal stenosis of lumbar  region with neurogenic claudication    Post laminectomy syndrome         The patient passed psychological evaluation for stimulator from Dr. Suarez of psychology.    Patient failed the above procedures.  I believe he may have a component to his pain and dysfunction from hip arthritis however clinically he does present mostly with spinal stenosis type symptoms.  There are no areas of high-grade spinal stenosis on exam.  He may have a portion from intra-articular hip etiology does have signs of possible hip impingement syndrome in the bilateral hips as well.    I have ordered a right diagnostic and therapeutic hip injection with fluoroscopic guidance    I would like for the patient to go on walks and do activities that would normally irritate his leg after this injection to help with the diagnostic portion and we will do 1 side at a time to also aid with the diagnostic portion of this test.    The risks benefits and alternatives to this procedure were discussed and the patient wishes to proceed with the procedure. Risks include but are not limited to damage to surrounding structures, infection, bleeding, worsening of pain which can be permanent, weakness which can be permanent. Benefits include pain relief, improved function. Alternatives includes not doing the procedure.         Follow up: after the above diagnostic and therapeutic procedure    Thank you for allowing me to participate in the care of this patient. If you have any questions please not hesitate to contact me.          Please note that this dictation was created using voice recognition software. I have made every reasonable attempt to correct obvious errors but there may be errors of grammar and content that I may have overlooked prior to finalization of this note.      Julito Dacosta MD  Interventional Spine and Sports Physiatry  Physical Medicine and Rehabilitation  [facility]

## 2021-10-05 NOTE — PATIENT INSTRUCTIONS
Your procedure will be at the [facility] special procedure suite.    [facility] [place] [address]       PRE-PROCEDURE INSTRUCTIONS  You may take your regular medications except:   · No Anti-inflammatories 5 days prior to your procedure. Anti-inflammatories include medicines such as  ibuprofen (Motrin, Advil), Excedrin, Naproxen (Aleve, Anaprox, Naprelan, Naprosyn), Celecoxib (Celebrex), Diclofenac (Voltaren-XR tab), and Meloxicam (Mobic).   · You can take the remainder of your pain medications as prescribed.   · If you are having a diagnostic procedure such as a medial branch block, do not use her pain meds on the day of the procedure  · No Glucophage or Metformin 24 hours before your procedure. You may resume next day after your procedure.  · Call the physiatry office if you are taking or prescribed anti-biotics within five days of procedure.  · Please ask provider if you are taking any new diabetes medication.  · CONTINUE TAKING BLOOD PRESSURE MEDICATIONS AS PRESCRIBED.  · Pain medications will not be prescribed on the procedure day. Procedural pain medication may be used by your provider   · Call your doctor's office performing the procedure if you have a fever, chills, rash or new illness prior to your procedure    Anticoagulation/antiplatelet medications  No Blood thinning medications such as Coumadin, Xarelto, aspirin or Plavix 5 days prior to procedure unless your doctor said to continue these medications. Call your doctor if a new medication is prescribed in this class.     Restrictions for eating before procedure:   · If you are getting procedural sedation, then do not eat to for 8 hours prior to procedure appointment time. Do not drink fluids for four hours prior to your procedure time.   · If you are not having procedural sedation, then Skip the meal prior to your procedure. If you have a morning procedure then skip breakfast. If you have an afternoon procedure then skip lunch.   · You  may drink clear liquids up to 2 hours prior to your procedure  · You must have a  the day of procedure to accompany you home.      POST PROCEDURE INSTRUCTIONS   · No heavy lifting, strenuous bending or strenuous exercise for 3 days after your procedure.  · No hot tubs, baths, swimming for 3 days after your procedure  · You can remove the bandage the day after the procedure.  · IF YOU RECEIVED A STEROID INJECTION. PLEASE NOTE THAT THERE MAY BE A DELAY FOR THE INJECTION TO START WORKING, THE DELAY MAY BE UP TO TWO WEEKS. IF YOU HAVE DIABETES, PLEASE NOTE THAT YOUR SUGAR LEVELS MAY BE ELEVATED FOR 1-2 DAYS AFTER A STEROID INJECTION.  THE STEROID MAY CAUSE TEMPORARY SYMPTOMS WHICH USUALLY RESOLVE ON THEIR OWN WITHIN 1 TO 2 DAYS INCLUDING FACIAL FLUSHING OR A FEELING OF WARMTH ON THE FACE, TEMPORARY INCREASES IN BLOOD SUGAR, INSOMNIA, INCREASED HUNGER  · IF YOU EXPERIENCE PROLONGED WEAKNESS LONGER THAN ONE DAY, BOWEL OR BLADDER INCONTINENCE THEN PLEASE CALL THE PHYSIATRY OFFICE.  · Your leg may feel heavy, weak and numb for up to 1-2 days. Be very careful walking.   ·  You may resume normal activities 3 days after procedure.

## 2021-10-11 ENCOUNTER — TELEMEDICINE (OUTPATIENT)
Dept: MEDICAL GROUP | Age: 71
End: 2021-10-11
Payer: MEDICARE

## 2021-10-11 VITALS — SYSTOLIC BLOOD PRESSURE: 116 MMHG | DIASTOLIC BLOOD PRESSURE: 64 MMHG | HEIGHT: 75 IN | BODY MASS INDEX: 30.06 KG/M2

## 2021-10-11 DIAGNOSIS — Z91.89 OTHER SPECIFIED PERSONAL RISK FACTORS, NOT ELSEWHERE CLASSIFIED: ICD-10-CM

## 2021-10-11 DIAGNOSIS — E78.5 DYSLIPIDEMIA: ICD-10-CM

## 2021-10-11 DIAGNOSIS — F51.01 PRIMARY INSOMNIA: ICD-10-CM

## 2021-10-11 DIAGNOSIS — E11.9 CONTROLLED TYPE 2 DIABETES MELLITUS WITHOUT COMPLICATION, WITHOUT LONG-TERM CURRENT USE OF INSULIN (HCC): ICD-10-CM

## 2021-10-11 DIAGNOSIS — I35.9 AORTIC VALVE DISORDER: ICD-10-CM

## 2021-10-11 PROCEDURE — 99214 OFFICE O/P EST MOD 30 MIN: CPT | Mod: 95 | Performed by: INTERNAL MEDICINE

## 2021-10-11 RX ORDER — ZOLPIDEM TARTRATE 10 MG/1
10 TABLET ORAL NIGHTLY PRN
Qty: 90 TABLET | Refills: 0 | Status: SHIPPED | OUTPATIENT
Start: 2021-10-11 | End: 2021-11-05

## 2021-10-11 NOTE — PROGRESS NOTES
Telemedicine Visit: Established Patient     This Remote Face to Face encounter was conducted via Zoom. Given the importance of social distancing and other strategies recommended to reduce the risk of COVID-19 transmission, I am providing medical care to this patient via audio/video visit in place of an in person visit at the request of the patient. Verbal consent to telehealth, risks, benefits, and consequences were discussed. Patient retains the right to withdraw at any time. All existing confidentiality protections apply. The patient has access to all transmitted medical information. No dissemination of any patient images or information to other entities without further written consent.    CHIEF COMPLAINT     Insomnia, Ambien refill    HPI  Abiel Vaughn is a 71 y.o. male who presents today for the following     Insomnia  The patient has a trouble to go and stay asleep.   Used Ambien, 10 mg at bedtime.   Discussed sleep hygiene:   - Go to bed and wake up at consistent times whether work/school day or not.   - Keep room dark, quiet, and comfortable.   - Don't have naps.  - Increase exposure to sunlight during awake times and avoid bright lights before going to sleep.   - Avoid stimulant or caffeine use more than 4 hours after wake time.   - Avoid meal or exercise 2-3 hours prior to going to bed.    Aortic valve disorder (mild AI), DM2, dyslipidemia  71-year-old, male, with history of the above comorbidities, followed up in the past by cardiology.  On Crestor 20 mg daily.  He requested CT cardiac score.    Echocardiography, 1/9/2019  No prior study is available for comparison.   Left ventricular ejection fraction is visually estimated to be 60%.  Mild concentric left ventricular hypertrophy.  Normal inferior vena cava size and inspiratory collapse.  Mild central aortic insufficiency.  Ascending aorta is dilated with a   diameter of  4.3 cm.    Reviewed PMH, PSH, FH, SH, ALL, HCM/IMM, no changes  Reviewed  MEDS, no changes    Patient Active Problem List    Diagnosis Date Noted   • Anemia 08/17/2020   • Health care maintenance 08/17/2020   • Medicare annual wellness visit, subsequent 08/17/2020   • Thrombocytopenia (HCC) 08/17/2020   • Leg swelling 08/17/2020   • Controlled type 2 diabetes mellitus without complication, without long-term current use of insulin (HCC) 08/26/2019   • Seasonal allergies 08/26/2019   • HSV infection 08/26/2019   • Aortic valve disorder 10/29/2018   • Gastroesophageal reflux disease 10/03/2018   • Primary insomnia 10/03/2018   • Dyslipidemia 10/03/2018   • Primary osteoarthritis involving multiple joints 10/03/2018   • Obesity (BMI 30.0-34.9) 10/03/2018   • Decreased hearing, right 10/03/2018     CURRENT MEDICATIONS  Current Outpatient Medications   Medication Sig Dispense Refill   • zolpidem (AMBIEN) 10 MG Tab Take 1 Tablet by mouth at bedtime as needed for Sleep for up to 90 days. 90 Tablet 0   • cephALEXin (KEFLEX) 250 MG Cap Take 250 mg by mouth 4 times a day.     • diclofenac DR (VOLTAREN) 75 MG Tablet Delayed Response Take 1 tablet by mouth 2 times a day. 180 tablet 1   • gabapentin (NEURONTIN) 100 MG Cap 3 times a day.     • montelukast (SINGULAIR) 10 MG Tab Take 1 tablet by mouth every day. 90 tablet 4   • valACYclovir (VALTREX) 500 MG Tab TAKE 1 TABLET  tablet 4   • metFORMIN ER (GLUCOPHAGE XR) 750 MG TABLET SR 24 HR Take 1 tablet by mouth every day. 90 tablet 4   • pantoprazole (PROTONIX) 40 MG Tablet Delayed Response Take 1 tablet by mouth every day. 90 tablet 3   • rosuvastatin (CRESTOR) 20 MG Tab TAKE 1 TABLET EVERY EVENING. REPLACES  ATORVASTATIN 90 tablet 4   • CALCIUM PO      • Cholecalciferol (VITAMIN D3) 2000 UNIT Cap Take  by mouth 2 Times a Day.     • Multiple Vitamins-Minerals (MULTIVITAMIN ADULT) Tab Take  by mouth.       No current facility-administered medications for this visit.     ALLERGIES  Allergies: Seasonal  PAST MEDICAL HISTORY  Past Medical History:  "  Diagnosis Date   • Dyslipidemia    • GERD (gastroesophageal reflux disease)    • Insomnia      SURGICAL HISTORY  He  has a past surgical history that includes appendectomy; tonsillectomy; lumbar medial branch blocks (Right, 2020); lumbar medial branch blocks (Right, 10/12/2020); neuro dest facet l/s w/ig sngl (Right, 2020); pr fluoroscopic guidance needle placement (2021); and pr percut implnt neuroelect,epidural (2021).  SOCIAL HISTORY  Social History     Tobacco Use   • Smoking status: Never Smoker   • Smokeless tobacco: Never Used   Vaping Use   • Vaping Use: Never used   Substance Use Topics   • Alcohol use: Yes     Alcohol/week: 0.6 oz     Types: 1 Glasses of wine per week   • Drug use: No     Social History     Social History Narrative   • Not on file     FAMILY HISTORY  Family History   Problem Relation Age of Onset   • Cancer Mother 83        endometrial   • Diabetes Mother    • Heart Disease Father    • No Known Problems Maternal Grandmother    • No Known Problems Maternal Grandfather    • No Known Problems Paternal Grandmother    • No Known Problems Paternal Grandfather    • Hyperlipidemia Neg Hx      Family Status   Relation Name Status   • Mo     • Fa     • MGMo     • MGFa     • PGMo     • PGFa     • Neg Hx  (Not Specified)     ROS   Constitutional: Negative for fever, chills, fatigue.  Eyes: Negative for vision problems.   Respiratory: Negative for cough, shortness of breath.  Cardiovascular: Negative for chest pain, palpitations.   Gastrointestinal: Negative for heartburn, nausea, abdominal pain.   Musculoskeletal: Complains of chronic hip pain.   Skin: Negative for rash.   Neuro: Negative for dizziness, weakness and headaches.   Endo/Heme/Allergies: Does not bruise/bleed easily.   Psychiatric/Behavioral: Negative for depression.    Objective   Vitals obtained by patient:  Blood Pressure 116/64   Height 1.905 m (6' 3\")   Body Mass " Index 30.06 kg/m²   Physical Exam:  Constitutional: Alert, no distress, well-groomed.  Skin: No rash in visible areas.  Eye: Round. Conjunctiva clear, lids normal.  ENMT: Lips pink without lesions, good dentition. Phonation normal.  Neck: No visible masses or thyromegaly. Moves freely without pain.  CV: no peripheral cyanosis, tachycardia.  Respiratory: Unlabored respiratory effort, no cough or audible wheezing.  Psych: Alert and oriented x3, normal affect and mood.     Labs     Labs are reviewed and discussed with a patient  Lab Results   Component Value Date/Time    CHOLSTRLTOT 140 07/16/2021 12:15 PM    LDL 81 07/16/2021 12:15 PM    HDL 31 (A) 07/16/2021 12:15 PM    TRIGLYCERIDE 140 07/16/2021 12:15 PM       Lab Results   Component Value Date/Time    SODIUM 141 07/16/2021 12:15 PM    POTASSIUM 4.4 07/16/2021 12:15 PM    CHLORIDE 108 07/16/2021 12:15 PM    CO2 24 07/16/2021 12:15 PM    GLUCOSE 123 (H) 07/16/2021 12:15 PM    BUN 15 07/16/2021 12:15 PM    CREATININE 0.88 07/16/2021 12:15 PM     Lab Results   Component Value Date/Time    ALKPHOSPHAT 66 07/16/2021 12:15 PM    ASTSGOT 29 07/16/2021 12:15 PM    ALTSGPT 29 07/16/2021 12:15 PM    TBILIRUBIN 0.5 07/16/2021 12:15 PM      Lab Results   Component Value Date/Time    HBA1C 6.2 (H) 07/16/2021 12:15 PM    HBA1C 6.7 (H) 04/16/2021 12:45 PM    HBA1C 6.5 (H) 11/10/2020 10:54 AM     No results found for: TSH  No results found for: FREET4    Lab Results   Component Value Date/Time    WBC 6.2 07/16/2021 12:15 PM    RBC 4.53 (L) 07/16/2021 12:15 PM    HEMOGLOBIN 13.7 (L) 07/16/2021 12:15 PM    HEMATOCRIT 41.7 (L) 07/16/2021 12:15 PM    MCV 92.1 07/16/2021 12:15 PM    MCH 30.2 07/16/2021 12:15 PM    MCHC 32.9 (L) 07/16/2021 12:15 PM    MPV 11.4 07/16/2021 12:15 PM    NEUTSPOLYS 53.70 07/16/2021 12:15 PM    LYMPHOCYTES 33.80 07/16/2021 12:15 PM    MONOCYTES 7.90 07/16/2021 12:15 PM    EOSINOPHILS 3.70 07/16/2021 12:15 PM    BASOPHILS 0.60 07/16/2021 12:15 PM      Imaging       Reviewed and discussed per HPI    Assessment and Plan     Abiel Vaughn is a 71 y.o. male    1. Primary insomnia  - Controlled, continue with current management.  - zolpidem (AMBIEN) 10 MG Tab; Take 1 Tablet by mouth at bedtime as needed for Sleep for up to 90 days.  Dispense: 90 Tablet; Refill: 0    Obtained and reviewed patient utilization report from [facility] pharmacy database on 10/11/2021 12:32 PM  prior to writing prescription for controlled substance II, III or IV per [place] bill . Based on assessment of the report, the prescription is medically necessary.     2. Aortic valve disorder  Asymptomatic, mild, continue cardiology follow-up  - CT-CARDIAC SCORING  3. Other specified personal risk factors, not elsewhere classified  - CT-CARDIAC SCORING  4. Controlled type 2 diabetes mellitus without complication, without long-term current use of insulin (HCC)  - Controlled, continue with current management.  Risk for CAD.  - CT-CARDIAC SCORING    5. Dyslipidemia  - Controlled, continue with current management.  Risk for CAD.  - CT-CARDIAC SCORING    He will receive Covid booster and flu vaccine in the pharmacy    Follow-up: in 3 months, DM labs

## 2021-10-13 DIAGNOSIS — M79.604 PAIN IN BOTH LOWER EXTREMITIES: ICD-10-CM

## 2021-10-13 DIAGNOSIS — M79.605 PAIN IN BOTH LOWER EXTREMITIES: ICD-10-CM

## 2021-10-13 RX ORDER — GABAPENTIN 100 MG/1
100 CAPSULE ORAL 3 TIMES DAILY
Qty: 270 CAPSULE | Refills: 1 | Status: SHIPPED | OUTPATIENT
Start: 2021-10-13 | End: 2022-04-26

## 2021-10-27 ENCOUNTER — APPOINTMENT (OUTPATIENT)
Dept: RADIOLOGY | Facility: REHABILITATION | Age: 71
End: 2021-10-27
Attending: PHYSICAL MEDICINE & REHABILITATION
Payer: MEDICARE

## 2021-10-27 ENCOUNTER — HOSPITAL ENCOUNTER (OUTPATIENT)
Facility: REHABILITATION | Age: 71
End: 2021-10-27
Attending: PHYSICAL MEDICINE & REHABILITATION | Admitting: PHYSICAL MEDICINE & REHABILITATION
Payer: MEDICARE

## 2021-10-27 VITALS
RESPIRATION RATE: 16 BRPM | BODY MASS INDEX: 29.99 KG/M2 | TEMPERATURE: 97.7 F | HEART RATE: 63 BPM | SYSTOLIC BLOOD PRESSURE: 134 MMHG | HEIGHT: 75 IN | OXYGEN SATURATION: 96 % | DIASTOLIC BLOOD PRESSURE: 72 MMHG | WEIGHT: 241.18 LBS

## 2021-10-27 PROCEDURE — 20610 DRAIN/INJ JOINT/BURSA W/O US: CPT

## 2021-10-27 PROCEDURE — 700117 HCHG RX CONTRAST REV CODE 255

## 2021-10-27 PROCEDURE — 700111 HCHG RX REV CODE 636 W/ 250 OVERRIDE (IP)

## 2021-10-27 PROCEDURE — 77002 NEEDLE LOCALIZATION BY XRAY: CPT

## 2021-10-27 RX ORDER — LIDOCAINE HYDROCHLORIDE 10 MG/ML
INJECTION, SOLUTION EPIDURAL; INFILTRATION; INTRACAUDAL; PERINEURAL
Status: COMPLETED
Start: 2021-10-27 | End: 2021-10-27

## 2021-10-27 RX ORDER — DEXAMETHASONE SODIUM PHOSPHATE 10 MG/ML
INJECTION, SOLUTION INTRAMUSCULAR; INTRAVENOUS
Status: COMPLETED
Start: 2021-10-27 | End: 2021-10-27

## 2021-10-27 RX ADMIN — DEXAMETHASONE SODIUM PHOSPHATE 10 MG: 10 INJECTION, SOLUTION INTRAMUSCULAR; INTRAVENOUS at 13:33

## 2021-10-27 RX ADMIN — LIDOCAINE HYDROCHLORIDE 10 ML: 10 INJECTION, SOLUTION EPIDURAL; INFILTRATION; INTRACAUDAL; PERINEURAL at 13:31

## 2021-10-27 RX ADMIN — IOHEXOL 5 ML: 240 INJECTION, SOLUTION INTRATHECAL; INTRAVASCULAR; INTRAVENOUS; ORAL at 13:33

## 2021-10-27 ASSESSMENT — FIBROSIS 4 INDEX: FIB4 SCORE: 2.47

## 2021-10-27 ASSESSMENT — PAIN DESCRIPTION - PAIN TYPE: TYPE: CHRONIC PAIN

## 2021-10-27 NOTE — INTERVAL H&P NOTE
H&P reviewed. The patient was examined and there are no changes to the H&P     Lungs clear to auscultation bilaterally.  The patient had his Covid vaccine approximately 1 week ago.  We discussed the risk benefits and alternatives of proceeding today or waiting a few weeks.  He had no reactions to the vaccine.  We decided to proceed with the procedure today.  No abdominal tenderness.  Heart regular rate and rhythm.  Vitals reviewed.    Proceed with the originally scheduled procedure.  The risks, benefits and alternatives were discussed.  The patient understands these.    Pre-Op Diagnosis Codes:     * Bilateral hip joint arthritis [M16.0]  Procedure(s):  Diagnostic and therapeutic fluoroscopically guided intra-articular RIGHT hip injection  .    Julito Dacosta MD  Physical Medicine and Rehabilitation  Interventional Spine and Sports Physiatry  [facility]

## 2021-10-27 NOTE — PROGRESS NOTES
Preprocedure assessment completed.  Pertinent health information(Gerd, aortic valve disorder) communicated to physician and staff prior to time out.  Patient positioned preprocedure by RN, CSTand XRAY.  Foam wedge under knees for support.

## 2021-10-27 NOTE — PROGRESS NOTES
Pt received to recovery area with report from procedure room RN Joanne.  NAV.  Ice compress applied to the affected area.  No swelling noted, dressing CDI.  Pt tolerates PO fluids and snack without difficulty.  Dr. Dacosta evaluated patient.  Meets criteria for discharge.  Pt ambulatory without difficulty.  Discharged to designated .

## 2021-10-27 NOTE — OP REPORT
Date of Service: 10/27/2021     Patient: Abiel Vaughn 71 y.o. male     MRN: [id number]     Physician/s: Julito Dacosta MD    Pre-operative Diagnosis: right  hip osteoarthritis    Post-operative Diagnosis: right hip osteoarthritis    Procedure: right diagnostic and  therapeutic intra-articular Hip injection with fluoroscopic guidance    Description of procedure:    The risks, benefits, and alternatives of the procedure were reviewed and discussed with the patient.  Written informed consent was freely obtained. A pre-procedural time-out was conducted by the physician verifying patient’s identity, procedure to be performed, procedure site and side, and allergy verification. Appropriate equipment was determined to be in place for the procedure.         The femoral artery nerve and vein as well as the inguinal ligament were identified with ultrasound and marked with a marking pen.  The entire procedure took place lateral to the femoral vessels and nerve and inferior to the inguinal ligament.    In the procedure suite the patient was placed in a supine position with and the skin was prepped and draped in the usual sterile fashion. A 27-gauge 1.5 inch needle was used with approximately 2 mL of 1% lidocaine for local anesthesia at the junction of the femoral head and neck.  A 25g 3.5 inch needle was placed into skin and advanced under fluoroscopic guidance into the right hip joint space. 4 mL of contrast was used to clearly demonstrate the outlining of the joint capsule. Following negative aspiration, 5mL of 1% lidocaine with 1mL of 10mg/mL dexamethasone was then injected into the joint, and the needle was subsequently removed intact after restyleted. The patient's hip was wiped with a 4x4 gauze, the area was cleansed with alcohol prep, and a bandaid was applied. There were no complications noted.       The patient was given a pain diary    Follow-up as scheduled      Julito Dacosta MD  Interventional Spine and  Pain  Physical Medicine and Rehabilitation  [facility]          CPT  Major joint/bursa:  [id number]  Fluoroscopic  needle guidance [id number]

## 2021-10-27 NOTE — PROGRESS NOTES
1308 Pt admitted to pre-procedure area.  Procedure and site confirmed, consent signed.  VSS.  Medication allergies and current medications reviewed in Epic.  Designated  waiting in the car.  Printed discharge instructions reviewed and signed.  Pertinent medical information (GERD, Aortic Valve disorder,) reviewed in Epic and communicated to procedure RN.  Pt denies taking any NSAIDS/blood-thinners/anti-coagulants in the last 5 days.

## 2021-10-30 ENCOUNTER — HOSPITAL ENCOUNTER (OUTPATIENT)
Dept: RADIOLOGY | Facility: MEDICAL CENTER | Age: 71
End: 2021-10-30
Attending: INTERNAL MEDICINE
Payer: COMMERCIAL

## 2021-10-30 PROCEDURE — 4410556 CT-CARDIAC SCORING (SELF PAY ONLY)

## 2021-11-01 ENCOUNTER — TELEPHONE (OUTPATIENT)
Dept: PHYSICAL MEDICINE AND REHAB | Facility: MEDICAL CENTER | Age: 71
End: 2021-11-01

## 2021-11-01 DIAGNOSIS — I35.9 AORTIC VALVE DISORDER: ICD-10-CM

## 2021-11-01 DIAGNOSIS — E78.5 DYSLIPIDEMIA: ICD-10-CM

## 2021-11-01 NOTE — TELEPHONE ENCOUNTER
Spoke to Pt in regards to his SP that was done with Dr. Dacosta dated 10/27/21 for his Diagnostic and therapeutic Fluoroscopically guided intra-articular RIGHT hip injection and he mentioned that he can't say that there is an improvement but no bad side effect.

## 2021-11-05 DIAGNOSIS — F51.01 PRIMARY INSOMNIA: ICD-10-CM

## 2021-11-05 DIAGNOSIS — G89.29 CHRONIC LUMBOSACRAL PAIN: ICD-10-CM

## 2021-11-05 DIAGNOSIS — M54.50 CHRONIC LUMBOSACRAL PAIN: ICD-10-CM

## 2021-11-05 RX ORDER — ZOLPIDEM TARTRATE 10 MG/1
TABLET ORAL
Qty: 90 TABLET | Refills: 0 | Status: SHIPPED | OUTPATIENT
Start: 2021-11-05 | End: 2022-01-19 | Stop reason: SDUPTHER

## 2021-11-05 RX ORDER — DICLOFENAC SODIUM 75 MG/1
TABLET, DELAYED RELEASE ORAL
Qty: 180 TABLET | Refills: 1 | Status: ON HOLD | OUTPATIENT
Start: 2021-11-05 | End: 2022-07-05

## 2021-11-08 ENCOUNTER — TELEPHONE (OUTPATIENT)
Dept: MEDICAL GROUP | Age: 71
End: 2021-11-08

## 2021-11-08 NOTE — TELEPHONE ENCOUNTER
DOCUMENTATION OF PAR STATUS:    1. Name of Medication & Dose: pantoprazole 40mg    2. Name of Prescription Coverage Company & phone #: [email or web address]    3. Date Prior Auth Submitted: 11/8/2021    4. What information was given to obtain insurance decision? icd 10 code    5. Prior Auth Status? sent to plan  6. Patient Notified: N\A

## 2021-11-17 ENCOUNTER — OFFICE VISIT (OUTPATIENT)
Dept: PHYSICAL MEDICINE AND REHAB | Facility: MEDICAL CENTER | Age: 71
End: 2021-11-17
Payer: MEDICARE

## 2021-11-17 VITALS
DIASTOLIC BLOOD PRESSURE: 60 MMHG | TEMPERATURE: 97.5 F | SYSTOLIC BLOOD PRESSURE: 106 MMHG | HEIGHT: 75 IN | BODY MASS INDEX: 30.15 KG/M2 | HEART RATE: 78 BPM | WEIGHT: 242.51 LBS | OXYGEN SATURATION: 93 %

## 2021-11-17 DIAGNOSIS — Z98.890 HISTORY OF LUMBOSACRAL SPINE SURGERY: ICD-10-CM

## 2021-11-17 DIAGNOSIS — M48.062 SPINAL STENOSIS OF LUMBAR REGION WITH NEUROGENIC CLAUDICATION: ICD-10-CM

## 2021-11-17 DIAGNOSIS — M96.1 POST LAMINECTOMY SYNDROME: ICD-10-CM

## 2021-11-17 DIAGNOSIS — M47.816 LUMBAR SPONDYLOSIS: ICD-10-CM

## 2021-11-17 DIAGNOSIS — M54.16 LUMBAR RADICULOPATHY: ICD-10-CM

## 2021-11-17 DIAGNOSIS — M16.0 BILATERAL HIP JOINT ARTHRITIS: ICD-10-CM

## 2021-11-17 PROCEDURE — 99214 OFFICE O/P EST MOD 30 MIN: CPT | Performed by: PHYSICAL MEDICINE & REHABILITATION

## 2021-11-17 ASSESSMENT — PATIENT HEALTH QUESTIONNAIRE - PHQ9
CLINICAL INTERPRETATION OF PHQ2 SCORE: 1
5. POOR APPETITE OR OVEREATING: 0 - NOT AT ALL
SUM OF ALL RESPONSES TO PHQ QUESTIONS 1-9: 5

## 2021-11-17 ASSESSMENT — PAIN SCALES - GENERAL: PAINLEVEL: 8=MODERATE-SEVERE PAIN

## 2021-11-17 ASSESSMENT — FIBROSIS 4 INDEX: FIB4 SCORE: 2.47

## 2021-11-17 NOTE — PROGRESS NOTES
Follow up patient note  Interventional spine and sports physiatry, Physical medicine rehabilitation      Chief complaint:   Chief Complaint   Patient presents with   • Follow-Up     Hip pain          HISTORY    Please see new patient note dated  by Dr Dacosta,  for more details.     HPI  Patient identification: Abiel Vaughn  1950,   male  With Diagnoses of Lumbar spondylosis symptomatically right worse than left, Lumbar radiculopathy, Spinal stenosis of lumbar region with neurogenic claudication, Post laminectomy syndrome, History of lumbosacral spine surgery, done in [place], and Bilateral hip joint arthritis Right worse than left were pertinent to this visit.     Procedures:  2018 right L4-5 and L5-S1 transforaminal epidural steroid injection with 70% pain relief  2019 right L4-5 and L5-S1 transforaminal epidural steroid injection with greater than 80% pain relief  2020 diagnostic medial branch block starting the right L3-4, L4-5, L5-S1 facet joints with 100% pain relief  10/12/2020 Medial Branch Blocks targeting the right L3-4, L4-5 and L5-S1  facet joints 100% pain relief during the diagnostic phase  2020 medial branch radiofrequency neurotomy targeting the right L3-4, L4-5, L5-S1 facet joints with sedation greater than 50% improvement in pain and function but this only lasted for a few months  2021 spinal cord stimulator trial, negative trial  10/27/2021 right diagnostic and therapeutic hip injection with No improvement    Patient continues to have low back pain, axial, constant 5 out of 10 intensity worse with standing and walking.  This can be 7 out of 10 in intensity with exercise.  This is worse with exercise and lumbar extension.  He does have a heavy feeling in the right leg which is worse with walking but the majority of his pain is axial low back pain.        Medications tried: Lyrica had to be stopped because of sedative side effects.  He does tolerate low-dose  gabapentin. NSAIDs, tylenol.       PMHx:   Past Medical History:   Diagnosis Date   • Dyslipidemia    • GERD (gastroesophageal reflux disease)    • Insomnia        PSHx:   Past Surgical History:   Procedure Laterality Date   • [place] DRAIN/INJECT LARGE JOINT/BURSA Right 10/27/2021    Procedure: Diagnostic and therapeutic fluoroscopically guided intra-articular RIGHT hip injection;  Surgeon: Julito Dacosta M.D.;  [facility];  Service: Pain Management   • KY FLUOROSCOPIC GUIDANCE NEEDLE PLACEMENT Right 10/27/2021    Procedure: .;  Surgeon: Julito Dacosta M.D.;  Location: [facility];  Service: Pain Management   • [place] FLUOROSCOPIC GUIDANCE NEEDLE PLACEMENT  7/28/2021    Procedure: Spinal cord stimulation trial;  Surgeon: Julito Dacosta M.D.;  Location: [facility];  Service: Pain Management   • PB PERCUT IMPLNT NEUROELECT,EPIDURAL  7/28/2021    Procedure: .;  Surgeon: Julito Dacosta M.D.;  Location: [facility];  Service: Pain Management   • NEURO DEST FACET L/S W/IG SNGL Right 11/23/2020    Procedure: DESTRUCTION, NERVE, FACET JOINT, LUMBOSACRAL, USING NEUROLYTIC AGENT, WITH FLUOROSCOPIC GUIDANCE;  Surgeon: Julito Dacosta M.D.;  Location: [facility];  Service: Pain Management   • LUMBAR MEDIAL BRANCH BLOCKS Right 10/12/2020    Procedure: BLOCK, NERVE, SPINAL, LUMBAR, POSTERIOR RAMUS, MEDIAL BRANCH;  Surgeon: Julito Dacosta M.D.;  Location: [facility];  Service: Pain Management   • LUMBAR MEDIAL BRANCH BLOCKS Right 8/24/2020    Procedure: BLOCK, NERVE, SPINAL, LUMBAR, POSTERIOR RAMUS, MEDIAL BRANCH;  Surgeon: Julito Dacosta M.D.;  Location: [facility];  Service: Pain Management   • APPENDECTOMY     • TONSILLECTOMY         Family history   Family History   Problem Relation Age of Onset   • Cancer Mother 83        endometrial   • Diabetes Mother    • Heart Disease Father     • No Known Problems Maternal Grandmother    • No Known Problems Maternal Grandfather    • No Known Problems Paternal Grandmother    • No Known Problems Paternal Grandfather    • Hyperlipidemia Neg Hx          Medications:   Outpatient Medications Marked as Taking for the 11/17/21 encounter (Office Visit) with Julito Dacosta M.D.   Medication Sig Dispense Refill   • aspirin EC (ECOTRIN) 81 MG Tablet Delayed Response Take 81 mg by mouth every day.     • zolpidem (AMBIEN) 10 MG Tab TAKE 1 TABLET AT BEDTIME ASNEEDED FOR SLEEP FOR UP TO 90 DAYS 90 Tablet 0   • diclofenac DR (VOLTAREN) 75 MG Tablet Delayed Response TAKE 1 TABLET TWICE A DAY. 180 Tablet 1   • gabapentin (NEURONTIN) 100 MG Cap Take 1 Capsule by mouth 3 times a day. 270 Capsule 1   • montelukast (SINGULAIR) 10 MG Tab Take 1 tablet by mouth every day. 90 tablet 4   • valACYclovir (VALTREX) 500 MG Tab TAKE 1 TABLET  tablet 4   • metFORMIN ER (GLUCOPHAGE XR) 750 MG TABLET SR 24 HR Take 1 tablet by mouth every day. 90 tablet 4   • pantoprazole (PROTONIX) 40 MG Tablet Delayed Response Take 1 tablet by mouth every day. 90 tablet 3   • rosuvastatin (CRESTOR) 20 MG Tab TAKE 1 TABLET EVERY EVENING. REPLACES  ATORVASTATIN 90 tablet 4   • CALCIUM PO      • Cholecalciferol (VITAMIN D3) 2000 UNIT Cap Take  by mouth 2 Times a Day.     • Multiple Vitamins-Minerals (MULTIVITAMIN ADULT) Tab Take  by mouth.          Current Outpatient Medications on File Prior to Visit   Medication Sig Dispense Refill   • aspirin EC (ECOTRIN) 81 MG Tablet Delayed Response Take 81 mg by mouth every day.     • zolpidem (AMBIEN) 10 MG Tab TAKE 1 TABLET AT BEDTIME ASNEEDED FOR SLEEP FOR UP TO 90 DAYS 90 Tablet 0   • diclofenac DR (VOLTAREN) 75 MG Tablet Delayed Response TAKE 1 TABLET TWICE A DAY. 180 Tablet 1   • gabapentin (NEURONTIN) 100 MG Cap Take 1 Capsule by mouth 3 times a day. 270 Capsule 1   • montelukast (SINGULAIR) 10 MG Tab Take 1 tablet by mouth every day. 90 tablet 4   •  valACYclovir (VALTREX) 500 MG Tab TAKE 1 TABLET  tablet 4   • metFORMIN ER (GLUCOPHAGE XR) 750 MG TABLET SR 24 HR Take 1 tablet by mouth every day. 90 tablet 4   • pantoprazole (PROTONIX) 40 MG Tablet Delayed Response Take 1 tablet by mouth every day. 90 tablet 3   • rosuvastatin (CRESTOR) 20 MG Tab TAKE 1 TABLET EVERY EVENING. REPLACES  ATORVASTATIN 90 tablet 4   • CALCIUM PO      • Cholecalciferol (VITAMIN D3) 2000 UNIT Cap Take  by mouth 2 Times a Day.     • Multiple Vitamins-Minerals (MULTIVITAMIN ADULT) Tab Take  by mouth.       No current facility-administered medications on file prior to visit.         Allergies:   Allergies   Allergen Reactions   • Seasonal Itching and Runny Nose       Social Hx:   Social History     Socioeconomic History   • Marital status: Single     Spouse name: Not on file   • Number of children: Not on file   • Years of education: Not on file   • Highest education level: Not on file   Occupational History   • Not on file   Tobacco Use   • Smoking status: Never Smoker   • Smokeless tobacco: Never Used   Vaping Use   • Vaping Use: Never used   Substance and Sexual Activity   • Alcohol use: Yes     Alcohol/week: 0.6 oz     Types: 1 Glasses of wine per week   • Drug use: No   • Sexual activity: Yes   Other Topics Concern   •  Service Yes   • Blood Transfusions No   • Caffeine Concern No   • Occupational Exposure No   • Hobby Hazards No   • Sleep Concern Yes   • Stress Concern No   • Weight Concern Yes   • Special Diet No   • Back Care No   • Exercise Yes   • Bike Helmet No   • Seat Belt Yes   • Self-Exams Yes   Social History Narrative   • Not on file     Social Determinants of Health     Financial Resource Strain:    • Difficulty of Paying Living Expenses: Not on file   Food Insecurity:    • Worried About Running Out of Food in the Last Year: Not on file   • Ran Out of Food in the Last Year: Not on file   Transportation Needs:    • Lack of Transportation (Medical): Not on  "file   • Lack of Transportation (Non-Medical): Not on file   Physical Activity:    • Days of Exercise per Week: Not on file   • Minutes of Exercise per Session: Not on file   Stress:    • Feeling of Stress : Not on file   Social Connections:    • Frequency of Communication with Friends and Family: Not on file   • Frequency of Social Gatherings with Friends and Family: Not on file   • Attends [employer] Services: Not on file   • Active Member of Clubs or Organizations: Not on file   • Attends Club or Organization Meetings: Not on file   • Marital Status: Not on file   Intimate Partner Violence:    • Fear of Current or Ex-Partner: Not on file   • Emotionally Abused: Not on file   • Physically Abused: Not on file   • Sexually Abused: Not on file   Housing Stability:    • Unable to Pay for Housing in the Last Year: Not on file   • Number of Places Lived in the Last Year: Not on file   • Unstable Housing in the Last Year: Not on file         EXAMINATION     Physical Exam:   Vitals: /60 (BP Location: Left arm, Patient Position: Sitting, BP Cuff Size: Adult)   Pulse 78   Temp 36.4 °C (97.5 °F) (Temporal)   Ht 1.905 m (6' 3\")   Wt 110 kg (242 lb 8.1 oz)   SpO2 93%     Constitutional:   Body Habitus: Body mass index is 30.31 kg/m².  Cooperation: Fully cooperates with exam  Appearance: Well-groomed no disheveled    Respiratory-  breathing comfortable on room air, no audible wheezing  Cardiovascular- capillary refills less than 2 seconds. No lower extremity edema is noted.   Psychiatric- alert and oriented ×3. Normal affect.    MSK/NEURO    exam done 11/17/2021      Thoracic/Lumbar Spine/Sacral Spine/Hips   decreased active range of motion with flexion, lateral flexion, and rotation bilaterally.   There is decreased active range of motion with lumbar extension.      Palpation:   No tenderness to palpation in midline at T1-T12 levels. No tenderness to palpation in the left and right of the midline T1-L5  palpation " over SI joint: negative bilaterally    palpation in hip or over the gluteus medius tendon insertion: negative bilaterally      Lumbar spine Special tests  Neuro tension  Straight leg test negative bilaterally    Slump test negative bilaterally      Key points for the international standards for neurological classification of spinal cord injury (ISNCSCI) to light touch.     Dermatome R L                                      L2 2 2   L3 2 2   L4 1 2   L5 1 2   S1 2 2   S2 2 2         Motor Exam Lower Extremities    ? Myotome R L   Hip flexion L2 5 5   Knee extension L3 5 5   Ankle dorsiflexion L4 5 5   Toe extension L5 5 5   Ankle plantarflexion S1 5 5     Decreased ROM in the bilateral hips, FAIRs maneuver negative bilaterally.    MEDICAL DECISION MAKING    DATA    Labs:   Lab Results   Component Value Date/Time    SODIUM 141 07/16/2021 12:15 PM    POTASSIUM 4.4 07/16/2021 12:15 PM    CHLORIDE 108 07/16/2021 12:15 PM    CO2 24 07/16/2021 12:15 PM    GLUCOSE 123 (H) 07/16/2021 12:15 PM    BUN 15 07/16/2021 12:15 PM    CREATININE 0.88 07/16/2021 12:15 PM        No results found for: PROTHROMBTM, INR     Lab Results   Component Value Date/Time    WBC 6.2 07/16/2021 12:15 PM    RBC 4.53 (L) 07/16/2021 12:15 PM    HEMOGLOBIN 13.7 (L) 07/16/2021 12:15 PM    HEMATOCRIT 41.7 (L) 07/16/2021 12:15 PM    MCV 92.1 07/16/2021 12:15 PM    MCH 30.2 07/16/2021 12:15 PM    MCHC 32.9 (L) 07/16/2021 12:15 PM    MPV 11.4 07/16/2021 12:15 PM    NEUTSPOLYS 53.70 07/16/2021 12:15 PM    LYMPHOCYTES 33.80 07/16/2021 12:15 PM    MONOCYTES 7.90 07/16/2021 12:15 PM    EOSINOPHILS 3.70 07/16/2021 12:15 PM    BASOPHILS 0.60 07/16/2021 12:15 PM        Lab Results   Component Value Date/Time    HBA1C 6.2 (H) 07/16/2021 12:15 PM          Imaging:   I personally reviewed following images    X-ray thoracic spine 8/3/2021 with spinal cord stimulator leads in the posterior epidural space at the upper level of T7 at a similar location to when they were  originally placed.    I reviewed the fluoroscopic images from 11/23/2020 showing medial branch radiofrequency neurotomy targeting the right L3-4, 4 5, 5 S1 facet joints.  I reviewed these with the patient on 1/4/2021.    X-ray bilateral hips 2/6/2019.  Cam deformity of the bilateral femoral heads, arthritis present the bilateral hips.  This is consistent with hip impingement syndrome bilaterally.    MRI lumbar spine 2/6/2019  History of laminectomies.  Bilateral facet arthropathy L3-4, L4-5, L5-S1.  Worst L5-S1.  No significant central canal stenosis.  Moderate neuroforaminal stenosis L4-5, mild neuroforaminal stenosis L5-S1, mild to moderate left L5-S1 neuroforaminal stenosis, mild to moderate left L2-3 neuroforaminal stenosis.    I reviewed the following radiology reports    XR thoracic spine 8/4/2021  FINDINGS:  The alignment is normal. There is no evidence of fracture.     No focal compression deformities are identified. There is moderate degenerative disc disease. Neurostimulator lead is now identified in the thoracic spinal canal with upper END at the T7 level.     IMPRESSION:  1.  No compression deformity or acute fracture.  2.  No change in degenerative disc disease.                                            Results for orders placed during the hospital encounter of 02/06/19   MR-LUMBAR SPINE-W/O    Impression Multilevel degenerative disc disease, facet arthropathy and ligamentum flavum redundancy resulting in at most moderate foraminal and mild lateral recess stenoses.    Left hemilaminotomies                                X-ray hips 2/6/2019  Impression       1.  Convexity bilaterally at the femoral head/neck junction which can be associated with the clinical syndrome of femoroacetabular impingement    2.  Lumbar spine facet arthropathy and dextroconvex scoliosis                                                             Results for orders placed during the hospital encounter of 02/06/19   DX-LUMBAR  SPINE-2 OR 3 VIEWS    Impression 1.  Dextroconvex scoliosis    2.  Multilevel facet arthropathy                                           DIAGNOSIS   Visit Diagnoses     ICD-10-CM   1. Lumbar spondylosis symptomatically right worse than left  M47.816   2. Lumbar radiculopathy  M54.16   3. Spinal stenosis of lumbar region with neurogenic claudication  M48.062   4. Post laminectomy syndrome  M96.1   5. History of lumbosacral spine surgery, done in [place]  Z98.890   6. Bilateral hip joint arthritis Right worse than left  M16.0         ASSESSMENT and PLAN:     Abiel Vaughn  1950,   male      Abiel was seen today for follow-up.    Diagnoses and all orders for this visit:    Lumbar spondylosis symptomatically right worse than left    Lumbar radiculopathy    Spinal stenosis of lumbar region with neurogenic claudication    Post laminectomy syndrome    History of lumbosacral spine surgery, done in [place]    Bilateral hip joint arthritis Right worse than left           The patient passed psychological evaluation for stimulator from Dr. Suarez of psychology.      The hip component of his pain did improve but this was not a major amount of his back pain which is causing majority of his symptoms.  There was no significant improvement in that pain.  He continues to have axial low back pain worse with standing and walking as well as with exercise.  We discussed other interventions including peripheral nerve stimulation of the medial branches likely at the L2 level bilaterally and the patient wishes to think about this.  I provided the patient with literature about this and we had a discussion.  We discussed additions to the patient's home exercise program.  Continue gabapentin    Follow up: As needed with me    Thank you for allowing me to participate in the care of this patient. If you have any questions please not hesitate to contact me.          Please note that this dictation was created using voice  recognition software. I have made every reasonable attempt to correct obvious errors but there may be errors of grammar and content that I may have overlooked prior to finalization of this note.      Julito Dacosta MD  Interventional Spine and Sports Physiatry  Physical Medicine and Rehabilitation  [facility]

## 2021-11-18 NOTE — TELEPHONE ENCOUNTER
FINAL PRIOR AUTHORIZATION STATUS:    1.  Name of Medication & Dose: Pantoprazole 40mg    2. Prior Auth Status: Approved through 11/10/2022     3. Action Taken: Pharmacy Notified: no Patient Notified: no

## 2021-11-24 ENCOUNTER — TELEPHONE (OUTPATIENT)
Dept: CARDIOLOGY | Facility: MEDICAL CENTER | Age: 71
End: 2021-11-24

## 2021-11-24 NOTE — TELEPHONE ENCOUNTER
Spoke with pt who stated last time seeing a cardiologist in 2018 with RS, notes in chart. Confirmed with pt that all recent testing and notes are in chart.    Appt time and date confirmed with pt.

## 2021-12-01 ENCOUNTER — OFFICE VISIT (OUTPATIENT)
Dept: CARDIOLOGY | Facility: MEDICAL CENTER | Age: 71
End: 2021-12-01
Attending: INTERNAL MEDICINE
Payer: MEDICARE

## 2021-12-01 VITALS
DIASTOLIC BLOOD PRESSURE: 74 MMHG | OXYGEN SATURATION: 97 % | RESPIRATION RATE: 12 BRPM | SYSTOLIC BLOOD PRESSURE: 124 MMHG | WEIGHT: 244.8 LBS | HEART RATE: 74 BPM | BODY MASS INDEX: 30.44 KG/M2 | HEIGHT: 75 IN

## 2021-12-01 DIAGNOSIS — E78.5 DYSLIPIDEMIA: ICD-10-CM

## 2021-12-01 DIAGNOSIS — I71.20 AORTIC ANEURYSM, INTRATHORACIC (HCC): ICD-10-CM

## 2021-12-01 DIAGNOSIS — I35.9 AORTIC VALVE DISORDER: ICD-10-CM

## 2021-12-01 DIAGNOSIS — M79.89 LEG SWELLING: ICD-10-CM

## 2021-12-01 DIAGNOSIS — R00.2 PALPITATIONS: ICD-10-CM

## 2021-12-01 DIAGNOSIS — Z76.89 ESTABLISHING CARE WITH NEW DOCTOR, ENCOUNTER FOR: ICD-10-CM

## 2021-12-01 DIAGNOSIS — E66.9 OBESITY (BMI 30.0-34.9): ICD-10-CM

## 2021-12-01 LAB — EKG IMPRESSION: NORMAL

## 2021-12-01 PROCEDURE — 93000 ELECTROCARDIOGRAM COMPLETE: CPT | Performed by: INTERNAL MEDICINE

## 2021-12-01 PROCEDURE — 99204 OFFICE O/P NEW MOD 45 MIN: CPT | Performed by: INTERNAL MEDICINE

## 2021-12-01 RX ORDER — ATORVASTATIN CALCIUM 20 MG/1
20 TABLET, FILM COATED ORAL NIGHTLY
Status: ON HOLD | COMMUNITY
End: 2022-07-05

## 2021-12-01 RX ORDER — FAMOTIDINE 40 MG/1
40 TABLET, FILM COATED ORAL PRN
COMMUNITY
Start: 2021-11-19 | End: 2022-10-12

## 2021-12-01 RX ORDER — FAMOTIDINE 40 MG/1
TABLET, FILM COATED ORAL
COMMUNITY
Start: 2021-11-19 | End: 2021-12-01

## 2021-12-01 ASSESSMENT — FIBROSIS 4 INDEX: FIB4 SCORE: 2.47

## 2021-12-01 NOTE — PROGRESS NOTES
Cardiology Initial Consultation Note    Date of note:    12/1/2021    Primary Care Provider: Lynette Payne M.D.  Referring Provider: Lynette Payne, *     Patient Name: Abiel Vaughn   YOB: 1950  MRN:              [id number]    Chief Complaint: Palpitations    Abiel Vaughn is a 71 y.o. male  patient presented today with complaints of palpitations.  He has GERD, takes pantoprazole, famotidine.  He gets pressure, burning in his lower chest with GERD.  Is doing much better.  Occasionally feels palpitations.  He had a CT cardiac score recently he is worried about result.  Denies chest pain, shortness of breath with exertion.        Past Medical History:   Diagnosis Date   • Dyslipidemia    • GERD (gastroesophageal reflux disease)    • Insomnia          Past Surgical History:   Procedure Laterality Date   • [place] DRAIN/INJECT LARGE JOINT/BURSA Right 10/27/2021    Procedure: Diagnostic and therapeutic fluoroscopically guided intra-articular RIGHT hip injection;  Surgeon: Julito Dacosta M.D.;  Location: [facility];  Service: Pain Management   • [place] FLUOROSCOPIC GUIDANCE NEEDLE PLACEMENT Right 10/27/2021    Procedure: .;  Surgeon: Julito Dacosta M.D.;  Location: [facility];  Service: Pain Management   • SC FLUOROSCOPIC GUIDANCE NEEDLE PLACEMENT  7/28/2021    Procedure: Spinal cord stimulation trial;  Surgeon: Julito Dacosta M.D.;  Location: [facility];  Service: Pain Management   • PB PERCUT IMPLNT NEUROELECT,EPIDURAL  7/28/2021    Procedure: .;  Surgeon: Julito Dacosta M.D.;  Location: [facility];  Service: Pain Management   • NEURO DEST FACET L/S W/IG SNGL Right 11/23/2020    Procedure: DESTRUCTION, NERVE, FACET JOINT, LUMBOSACRAL, USING NEUROLYTIC AGENT, WITH FLUOROSCOPIC GUIDANCE;  Surgeon: Julito Dacosta M.D.;  Location: [facility];  Service: Pain Management   • LUMBAR  MEDIAL BRANCH BLOCKS Right 10/12/2020    Procedure: BLOCK, NERVE, SPINAL, LUMBAR, POSTERIOR RAMUS, MEDIAL BRANCH;  Surgeon: Julito Dacosta M.D.;  Location: [facility];  Service: Pain Management   • LUMBAR MEDIAL BRANCH BLOCKS Right 8/24/2020    Procedure: BLOCK, NERVE, SPINAL, LUMBAR, POSTERIOR RAMUS, MEDIAL BRANCH;  Surgeon: Julito Dacosta M.D.;  Location: [facility];  Service: Pain Management   • APPENDECTOMY     • TONSILLECTOMY           Current Outpatient Medications   Medication Sig Dispense Refill   • atorvastatin (LIPITOR) 20 MG Tab Take 20 mg by mouth every evening.     • famotidine (PEPCID) 40 MG Tab      • TURMERIC PO Take  by mouth.     • aspirin EC (ECOTRIN) 81 MG Tablet Delayed Response Take 81 mg by mouth every day.     • zolpidem (AMBIEN) 10 MG Tab TAKE 1 TABLET AT BEDTIME ASNEEDED FOR SLEEP FOR UP TO 90 DAYS 90 Tablet 0   • diclofenac DR (VOLTAREN) 75 MG Tablet Delayed Response TAKE 1 TABLET TWICE A DAY. 180 Tablet 1   • gabapentin (NEURONTIN) 100 MG Cap Take 1 Capsule by mouth 3 times a day. 270 Capsule 1   • montelukast (SINGULAIR) 10 MG Tab Take 1 tablet by mouth every day. 90 tablet 4   • valACYclovir (VALTREX) 500 MG Tab TAKE 1 TABLET  tablet 4   • metFORMIN ER (GLUCOPHAGE XR) 750 MG TABLET SR 24 HR Take 1 tablet by mouth every day. 90 tablet 4   • pantoprazole (PROTONIX) 40 MG Tablet Delayed Response Take 1 tablet by mouth every day. 90 tablet 3   • CALCIUM PO      • Cholecalciferol (VITAMIN D3) 2000 UNIT Cap Take  by mouth 2 Times a Day.     • Multiple Vitamins-Minerals (MULTIVITAMIN ADULT) Tab Take  by mouth.       No current facility-administered medications for this visit.         Allergies   Allergen Reactions   • Seasonal Itching and Runny Nose         Family History   Problem Relation Age of Onset   • Cancer Mother 83        endometrial   • Diabetes Mother    • Heart Disease Father    • No Known Problems Maternal Grandmother    • No Known  "Problems Maternal Grandfather    • No Known Problems Paternal Grandmother    • No Known Problems Paternal Grandfather    • Hyperlipidemia Neg Hx              Physical Exam:  Ambulatory Vitals  /74 (BP Location: Left arm, Patient Position: Sitting, BP Cuff Size: Adult)   Pulse 74   Resp 12   Ht 1.905 m (6' 3\")   Wt 111 kg (244 lb 12.8 oz)   SpO2 97%    Oxygen Therapy:  Pulse Oximetry: 97 %  BP Readings from Last 4 Encounters:   12/01/21 124/74   11/17/21 106/60   10/27/21 134/72   10/11/21 116/64       Weight/BMI: Body mass index is 30.6 kg/m².  Wt Readings from Last 4 Encounters:   12/01/21 111 kg (244 lb 12.8 oz)   11/17/21 110 kg (242 lb 8.1 oz)   10/27/21 109 kg (241 lb 2.9 oz)   10/05/21 109 kg (240 lb 8.4 oz)         General: Well appearing and in no apparent distress  Head: atrumatic  Eyes: No conjunctival pallor   ENT: normal external appearance of nose and ears  Neck: JVD absent, carotid bruits absent  Lungs: respiratory sounds  normal, additional breath sounds absent  Heart: Regular rhythm,   No palpable thrills on palpation, murmurs absent, no rubs,   Lower extremity edema absent.     Lab Data Review:  Lab Results   Component Value Date/Time    CHOLSTRLTOT 140 07/16/2021 12:15 PM    LDL 81 07/16/2021 12:15 PM    HDL 31 (A) 07/16/2021 12:15 PM    TRIGLYCERIDE 140 07/16/2021 12:15 PM       Lab Results   Component Value Date/Time    SODIUM 141 07/16/2021 12:15 PM    POTASSIUM 4.4 07/16/2021 12:15 PM    CHLORIDE 108 07/16/2021 12:15 PM    CO2 24 07/16/2021 12:15 PM    GLUCOSE 123 (H) 07/16/2021 12:15 PM    BUN 15 07/16/2021 12:15 PM    CREATININE 0.88 07/16/2021 12:15 PM     Lab Results   Component Value Date/Time    ALKPHOSPHAT 66 07/16/2021 12:15 PM    ASTSGOT 29 07/16/2021 12:15 PM    ALTSGPT 29 07/16/2021 12:15 PM    TBILIRUBIN 0.5 07/16/2021 12:15 PM      Lab Results   Component Value Date/Time    WBC 6.2 07/16/2021 12:15 PM     Echocardiogram 1/9/2019 reviewed, personally interpreted.  Dilated " thoracic ascending aorta 4.3 cm, mild aortic regurgitation.    EKG 2021 reviewed, sinus rhythm first-degree AV block.      Medical Decision Makin-year-old male patient with diabetes, dyslipidemia with abnormal CT cardiac score, palpitations.    He has been on statin for 20 years.  Reassured CT cardiac score is not very useful in people taking statin.  He has no anginal symptoms.  We will repeat echocardiogram to follow-up his aortic regurgitation, ascending thoracic aortic aneurysm size.  Zio patch to evaluate his palpitations.    Return in about 1 year (around 2022).    This note was dictated using Dragon speech recognition software.    Leroy AREVALO  Interventional cardiologist  [facility]  [address] [address]  [place] [address]  Phone: [phone]  Fax: [phone]

## 2021-12-07 ENCOUNTER — NON-PROVIDER VISIT (OUTPATIENT)
Dept: CARDIOLOGY | Facility: MEDICAL CENTER | Age: 71
End: 2021-12-07
Attending: INTERNAL MEDICINE
Payer: MEDICARE

## 2021-12-07 DIAGNOSIS — R00.2 PALPITATIONS: ICD-10-CM

## 2021-12-07 DIAGNOSIS — I47.29 NSVT (NONSUSTAINED VENTRICULAR TACHYCARDIA) (HCC): ICD-10-CM

## 2021-12-07 DIAGNOSIS — I47.20 VT (VENTRICULAR TACHYCARDIA) (HCC): ICD-10-CM

## 2021-12-07 NOTE — PROGRESS NOTES
Home enrollment completed in the 14 day [facility] Holter monitoring program per Leroy Carter M.D.  Monitor to be mailed to patient by [employer].  >Currently pending EOS.

## 2021-12-29 ENCOUNTER — HOSPITAL ENCOUNTER (OUTPATIENT)
Dept: LAB | Facility: MEDICAL CENTER | Age: 71
End: 2021-12-29
Attending: INTERNAL MEDICINE
Payer: MEDICARE

## 2021-12-29 DIAGNOSIS — D69.6 THROMBOCYTOPENIA (HCC): ICD-10-CM

## 2021-12-29 DIAGNOSIS — E78.5 DYSLIPIDEMIA: ICD-10-CM

## 2021-12-29 DIAGNOSIS — E11.9 CONTROLLED TYPE 2 DIABETES MELLITUS WITHOUT COMPLICATION, WITHOUT LONG-TERM CURRENT USE OF INSULIN (HCC): ICD-10-CM

## 2021-12-29 LAB
ALBUMIN SERPL BCP-MCNC: 4.1 G/DL (ref 3.2–4.9)
ALBUMIN/GLOB SERPL: 1.7 G/DL
ALP SERPL-CCNC: 68 U/L (ref 30–99)
ALT SERPL-CCNC: 27 U/L (ref 2–50)
ANION GAP SERPL CALC-SCNC: 12 MMOL/L (ref 7–16)
AST SERPL-CCNC: 29 U/L (ref 12–45)
BASOPHILS # BLD AUTO: 0.8 % (ref 0–1.8)
BASOPHILS # BLD: 0.06 K/UL (ref 0–0.12)
BILIRUB SERPL-MCNC: 0.5 MG/DL (ref 0.1–1.5)
BUN SERPL-MCNC: 19 MG/DL (ref 8–22)
CALCIUM SERPL-MCNC: 9.2 MG/DL (ref 8.4–10.2)
CHLORIDE SERPL-SCNC: 104 MMOL/L (ref 96–112)
CHOLEST SERPL-MCNC: 142 MG/DL (ref 100–199)
CO2 SERPL-SCNC: 22 MMOL/L (ref 20–33)
CREAT SERPL-MCNC: 1.06 MG/DL (ref 0.5–1.4)
EOSINOPHIL # BLD AUTO: 0.23 K/UL (ref 0–0.51)
EOSINOPHIL NFR BLD: 3 % (ref 0–6.9)
ERYTHROCYTE [DISTWIDTH] IN BLOOD BY AUTOMATED COUNT: 43.2 FL (ref 35.9–50)
EST. AVERAGE GLUCOSE BLD GHB EST-MCNC: 131 MG/DL
FASTING STATUS PATIENT QL REPORTED: NORMAL
GLOBULIN SER CALC-MCNC: 2.4 G/DL (ref 1.9–3.5)
GLUCOSE SERPL-MCNC: 123 MG/DL (ref 65–99)
HBA1C MFR BLD: 6.2 % (ref 4–5.6)
HCT VFR BLD AUTO: 41.6 % (ref 42–52)
HDLC SERPL-MCNC: 31 MG/DL
HGB BLD-MCNC: 13.7 G/DL (ref 14–18)
IMM GRANULOCYTES # BLD AUTO: 0.01 K/UL (ref 0–0.11)
IMM GRANULOCYTES NFR BLD AUTO: 0.1 % (ref 0–0.9)
LDLC SERPL CALC-MCNC: 84 MG/DL
LYMPHOCYTES # BLD AUTO: 2.41 K/UL (ref 1–4.8)
LYMPHOCYTES NFR BLD: 31.1 % (ref 22–41)
MCH RBC QN AUTO: 30.4 PG (ref 27–33)
MCHC RBC AUTO-ENTMCNC: 32.9 G/DL (ref 33.7–35.3)
MCV RBC AUTO: 92.2 FL (ref 81.4–97.8)
MONOCYTES # BLD AUTO: 0.75 K/UL (ref 0–0.85)
MONOCYTES NFR BLD AUTO: 9.7 % (ref 0–13.4)
NEUTROPHILS # BLD AUTO: 4.28 K/UL (ref 1.82–7.42)
NEUTROPHILS NFR BLD: 55.3 % (ref 44–72)
NRBC # BLD AUTO: 0 K/UL
NRBC BLD-RTO: 0 /100 WBC
PLATELET # BLD AUTO: 183 K/UL (ref 164–446)
PMV BLD AUTO: 11.8 FL (ref 9–12.9)
POTASSIUM SERPL-SCNC: 3.9 MMOL/L (ref 3.6–5.5)
PROT SERPL-MCNC: 6.5 G/DL (ref 6–8.2)
RBC # BLD AUTO: 4.51 M/UL (ref 4.7–6.1)
SODIUM SERPL-SCNC: 138 MMOL/L (ref 135–145)
TRIGL SERPL-MCNC: 137 MG/DL (ref 0–149)
WBC # BLD AUTO: 7.7 K/UL (ref 4.8–10.8)

## 2021-12-29 PROCEDURE — 83036 HEMOGLOBIN GLYCOSYLATED A1C: CPT | Mod: GA

## 2021-12-29 PROCEDURE — 85025 COMPLETE CBC W/AUTO DIFF WBC: CPT

## 2021-12-29 PROCEDURE — 80053 COMPREHEN METABOLIC PANEL: CPT

## 2021-12-29 PROCEDURE — 36415 COLL VENOUS BLD VENIPUNCTURE: CPT

## 2021-12-29 PROCEDURE — 80061 LIPID PANEL: CPT

## 2022-01-05 ENCOUNTER — TELEPHONE (OUTPATIENT)
Dept: CARDIOLOGY | Facility: MEDICAL CENTER | Age: 72
End: 2022-01-05

## 2022-01-05 PROCEDURE — 93248 EXT ECG>7D<15D REV&INTERPJ: CPT | Performed by: INTERNAL MEDICINE

## 2022-01-06 ENCOUNTER — PATIENT MESSAGE (OUTPATIENT)
Dept: CARDIOLOGY | Facility: MEDICAL CENTER | Age: 72
End: 2022-01-06

## 2022-01-06 ENCOUNTER — TELEPHONE (OUTPATIENT)
Dept: CARDIOLOGY | Facility: MEDICAL CENTER | Age: 72
End: 2022-01-06

## 2022-01-06 NOTE — TELEPHONE ENCOUNTER
----- Message from YVONNE Howard sent at 1/6/2022 11:58 AM PST -----  Monitor shows NSR with 1 short VT run. Hx of CAD. Was he symptomatic to this event? No diary entries noted. Not on bb therapy. Consider if HR/BP can tolerate. Trial toprol 12.5 mg QPM. SC

## 2022-01-10 RX ORDER — METOPROLOL SUCCINATE 25 MG/1
12.5 TABLET, EXTENDED RELEASE ORAL EVERY EVENING
Qty: 45 TABLET | Refills: 3 | Status: SHIPPED | OUTPATIENT
Start: 2022-01-10 | End: 2022-12-23

## 2022-01-10 NOTE — TELEPHONE ENCOUNTER
Called patient and discussed results. He does not report any symptoms while wearing monitor. Patient does not take VS regularly, but his BP has been 120s/70s during visits. He is agreeable to start metoprolol. Discussed potential side effects and patient will monitor VS regularly and let us know if he has any concerns.

## 2022-01-11 ENCOUNTER — HOSPITAL ENCOUNTER (OUTPATIENT)
Dept: CARDIOLOGY | Facility: MEDICAL CENTER | Age: 72
End: 2022-01-11
Attending: INTERNAL MEDICINE
Payer: MEDICARE

## 2022-01-11 DIAGNOSIS — I71.20 AORTIC ANEURYSM, INTRATHORACIC (HCC): ICD-10-CM

## 2022-01-11 LAB
LV EJECT FRACT  99904: 65
LV EJECT FRACT MOD 2C 99903: 69.77
LV EJECT FRACT MOD 4C 99902: 71.16
LV EJECT FRACT MOD BP 99901: 69.48

## 2022-01-11 PROCEDURE — 93306 TTE W/DOPPLER COMPLETE: CPT

## 2022-01-11 PROCEDURE — 93306 TTE W/DOPPLER COMPLETE: CPT | Mod: 26 | Performed by: INTERNAL MEDICINE

## 2022-01-14 ENCOUNTER — TELEPHONE (OUTPATIENT)
Dept: CARDIOLOGY | Facility: MEDICAL CENTER | Age: 72
End: 2022-01-14

## 2022-01-14 NOTE — TELEPHONE ENCOUNTER
----- Message from YVONNE Howard sent at 1/12/2022  9:54 AM PST -----  EF normal. Aneurysmal aorta at 4.4. make sure BP controlled. Needs follow up in December 2022. Repeat echo annually for aneurysm. SC

## 2022-01-14 NOTE — TELEPHONE ENCOUNTER
Notified patient of results. He states his SBP remains 115-120. He will notify us with any changes or concerns.

## 2022-01-19 ENCOUNTER — TELEMEDICINE (OUTPATIENT)
Dept: MEDICAL GROUP | Age: 72
End: 2022-01-19
Payer: MEDICARE

## 2022-01-19 VITALS
WEIGHT: 248 LBS | HEART RATE: 60 BPM | HEIGHT: 75 IN | BODY MASS INDEX: 30.84 KG/M2 | SYSTOLIC BLOOD PRESSURE: 121 MMHG | DIASTOLIC BLOOD PRESSURE: 63 MMHG

## 2022-01-19 DIAGNOSIS — E11.9 CONTROLLED TYPE 2 DIABETES MELLITUS WITHOUT COMPLICATION, WITHOUT LONG-TERM CURRENT USE OF INSULIN (HCC): ICD-10-CM

## 2022-01-19 DIAGNOSIS — I71.20 AORTIC ANEURYSM, INTRATHORACIC (HCC): ICD-10-CM

## 2022-01-19 DIAGNOSIS — E78.5 DYSLIPIDEMIA: ICD-10-CM

## 2022-01-19 DIAGNOSIS — I35.9 AORTIC VALVE DISORDER: ICD-10-CM

## 2022-01-19 DIAGNOSIS — D69.6 THROMBOCYTOPENIA (HCC): ICD-10-CM

## 2022-01-19 DIAGNOSIS — F51.01 PRIMARY INSOMNIA: ICD-10-CM

## 2022-01-19 PROCEDURE — 99214 OFFICE O/P EST MOD 30 MIN: CPT | Mod: 95 | Performed by: INTERNAL MEDICINE

## 2022-01-19 RX ORDER — ZOLPIDEM TARTRATE 10 MG/1
TABLET ORAL
Qty: 90 TABLET | Refills: 0 | Status: SHIPPED | OUTPATIENT
Start: 2022-01-19 | End: 2022-04-16

## 2022-01-19 RX ORDER — ROSUVASTATIN CALCIUM 20 MG/1
TABLET, COATED ORAL
COMMUNITY
Start: 2021-12-01 | End: 2022-05-09 | Stop reason: SDUPTHER

## 2022-01-19 ASSESSMENT — FIBROSIS 4 INDEX: FIB4 SCORE: 2.23

## 2022-01-19 ASSESSMENT — PATIENT HEALTH QUESTIONNAIRE - PHQ9: CLINICAL INTERPRETATION OF PHQ2 SCORE: 0

## 2022-01-19 NOTE — PROGRESS NOTES
Telemedicine Visit: Established Patient     This Remote Face to Face encounter was conducted via Zoom. Given the importance of social distancing and other strategies recommended to reduce the risk of COVID-19 transmission, I am providing medical care to this patient via audio/video visit in place of an in person visit at the request of the patient. Verbal consent to telehealth, risks, benefits, and consequences were discussed. Patient retains the right to withdraw at any time. All existing confidentiality protections apply. The patient has access to all transmitted medical information. No dissemination of any patient images or information to other entities without further written consent.    CHIEF COMPLAINT     Chief Complaint   Patient presents with   • Medication Management   • Lab Results   DM2    HPI  Abiel Vaughn is a 71 y.o. male who presents today for the following     DM2, controlled  Internval course   - A1c 6.2, previous 6.2     Onset/  Diabetes education: ordered     Medications:   ? Metformin: 750 mg daily  ? ACE/ARB: no  ? Statin: atorvstatin  ? ASA: yes  Compliant with medications: yes  Checking feet daily/wear soft socks/shoes: advised     Diabetes ABCDE TARGETS  ? Blood Pressure, goal < 140/90: yes  ? Cholesterol-Lipid Panel: Controlled  ? Dysalbuminuria: Pending     Diet: Regular  Exercise: Insufficient  BMI: 30     DM complications:  ? Peripheral neuropathy: No numbness or tingling in feet.  ? Retinopathy:   Last eye exam: . No retinopathy.   ? Nephropathy:   No  ? CVS: No CAD.  ? GI: No gastropathy.     FH of DM: mother, ended up with insulin     Blood pressure/Aortic valve disorder/aortic aneurysm  Controlled, no medications.  He has been evaluated/followed up by cardiology.     Echocardiography: 2019  Left ventricular ejection fraction is visually estimated to be 60%.  Mild concentric left ventricular hypertrophy.  Normal inferior vena cava size and inspiratory  collapse.  Mild central aortic insufficiency.  Ascending aorta is dilated with a diameter of  4.3 cm.     CT cardiac score, 10/30/2021  Calcium Score of 100-399 AND <75th percentile:    Dyslipidemia  Statin: Atorvastatin, 60 mg daily, taking as prescribed.   - No muscle weakness, cramps, nausea,abdominal discomfort.   Diet / exercise / BMI: as above.   FH: neg     Thrombocytopenia  CBC showed thrombocytopenia.  Patient has not have easy bleeding or bruising.     Insomnia  The patient has a trouble to go and stay asleep.   Used Ambien, 10 mg at bedtime.   Discussed sleep hygiene:   - Go to bed and wake up at consistent times whether work/school day or not.   - Keep room dark, quiet, and comfortable.   - Don't have naps.  - Increase exposure to sunlight during awake times and avoid bright lights before going to sleep.   - Avoid stimulant or caffeine use more than 4 hours after wake time.   - Avoid meal or exercise 2-3 hours prior to going to bed.     Reviewed PMH, PSH, FH, SH, ALL, HCM/IMM, no changes  Reviewed MEDS, no changes    Patient Active Problem List    Diagnosis Date Noted   • Anemia 08/17/2020   • Health care maintenance 08/17/2020   • Medicare annual wellness visit, subsequent 08/17/2020   • Thrombocytopenia (HCC) 08/17/2020   • Leg swelling 08/17/2020   • Controlled type 2 diabetes mellitus without complication, without long-term current use of insulin (HCC) 08/26/2019   • Seasonal allergies 08/26/2019   • HSV infection 08/26/2019   • Aortic valve disorder 10/29/2018   • Gastroesophageal reflux disease 10/03/2018   • Primary insomnia 10/03/2018   • Dyslipidemia 10/03/2018   • Primary osteoarthritis involving multiple joints 10/03/2018   • Obesity (BMI 30.0-34.9) 10/03/2018   • Decreased hearing, right 10/03/2018     CURRENT MEDICATIONS  Current Outpatient Medications   Medication Sig Dispense Refill   • rosuvastatin (CRESTOR) 20 MG Tab      • metoprolol SR (TOPROL XL) 25 MG TABLET SR 24 HR Take 0.5 Tablets  by mouth every evening. 45 Tablet 3   • atorvastatin (LIPITOR) 20 MG Tab Take 20 mg by mouth every evening.     • famotidine (PEPCID) 40 MG Tab      • TURMERIC PO Take  by mouth.     • aspirin EC (ECOTRIN) 81 MG Tablet Delayed Response Take 81 mg by mouth every day.     • zolpidem (AMBIEN) 10 MG Tab TAKE 1 TABLET AT BEDTIME ASNEEDED FOR SLEEP FOR UP TO 90 DAYS 90 Tablet 0   • diclofenac DR (VOLTAREN) 75 MG Tablet Delayed Response TAKE 1 TABLET TWICE A DAY. 180 Tablet 1   • gabapentin (NEURONTIN) 100 MG Cap Take 1 Capsule by mouth 3 times a day. 270 Capsule 1   • montelukast (SINGULAIR) 10 MG Tab Take 1 tablet by mouth every day. 90 tablet 4   • valACYclovir (VALTREX) 500 MG Tab TAKE 1 TABLET  tablet 4   • metFORMIN ER (GLUCOPHAGE XR) 750 MG TABLET SR 24 HR Take 1 tablet by mouth every day. 90 tablet 4   • pantoprazole (PROTONIX) 40 MG Tablet Delayed Response Take 1 tablet by mouth every day. 90 tablet 3   • CALCIUM PO      • Cholecalciferol (VITAMIN D3) 2000 UNIT Cap Take  by mouth 2 Times a Day.     • Multiple Vitamins-Minerals (MULTIVITAMIN ADULT) Tab Take  by mouth.       No current facility-administered medications for this visit.     ALLERGIES  Allergies: Seasonal  PAST MEDICAL HISTORY  Past Medical History:   Diagnosis Date   • Dyslipidemia    • GERD (gastroesophageal reflux disease)    • Insomnia      SURGICAL HISTORY  He  has a past surgical history that includes appendectomy; tonsillectomy; lumbar medial branch blocks (Right, 8/24/2020); lumbar medial branch blocks (Right, 10/12/2020); neuro dest facet l/s w/ig sngl (Right, 11/23/2020); pr fluoroscopic guidance needle placement (7/28/2021); pr percut implnt neuroelect,epidural (7/28/2021); pr drain/inject large joint/bursa (Right, 10/27/2021); and pr fluoroscopic guidance needle placement (Right, 10/27/2021).  SOCIAL HISTORY  Social History     Tobacco Use   • Smoking status: Never Smoker   • Smokeless tobacco: Never Used   Vaping Use   • Vaping Use:  "Never used   Substance Use Topics   • Alcohol use: Yes     Alcohol/week: 0.6 oz     Types: 1 Glasses of wine per week   • Drug use: No     Social History     Social History Narrative   • Not on file     FAMILY HISTORY  Family History   Problem Relation Age of Onset   • Cancer Mother 83        endometrial   • Diabetes Mother    • Heart Disease Father    • No Known Problems Maternal Grandmother    • No Known Problems Maternal Grandfather    • No Known Problems Paternal Grandmother    • No Known Problems Paternal Grandfather    • Hyperlipidemia Neg Hx      Family Status   Relation Name Status   • Mo     • Fa     • MGMo     • MGFa     • PGMo     • PGFa     • Neg Hx  (Not Specified)     ROS   Constitutional: Negative for fever, chills, fatigue.  HENT: Negative for congestion, sore throat.  Eyes: Negative for vision problems.   Respiratory: Negative for cough, shortness of breath.  Cardiovascular: Negative for chest pain, palpitations.   Gastrointestinal: Negative for heartburn, nausea, abdominal pain.   Genitourinary: Negative for dysuria.  Musculoskeletal: c/o LBP.  Skin: Negative for rash.   Neuro: Negative for dizziness, weakness and headaches.   Endo/Heme/Allergies: Does not bruise/bleed easily.   Psychiatric/Behavioral: Negative for depression.    Objective   Vitals obtained by patient:  Blood Pressure 121/63 (BP Location: Right arm, Patient Position: Sitting, BP Cuff Size: Adult) Comment: pt took at home  Pulse 60 Comment: pt took at home  Height 1.905 m (6' 3\")   Weight 112 kg (248 lb)   Body Mass Index 31.00 kg/m²   Physical Exam:  Constitutional: Alert, no distress, well-groomed.  Skin: No rash in visible areas.  Eye: Round. Conjunctiva clear, lids normal.  ENMT: Lips pink without lesions, good dentition. Phonation normal.  Neck: No visible masses or thyromegaly. Moves freely without pain.  CV: no peripheral cyanosis, tachycardia.  Respiratory: Unlabored " respiratory effort, no cough or audible wheezing.  Psych: Alert and oriented x3, normal affect and mood.     Labs     Labs are reviewed and discussed with a patient  Lab Results   Component Value Date/Time    CHOLSTRLTOT 142 12/29/2021 11:57 AM    LDL 84 12/29/2021 11:57 AM    HDL 31 (A) 12/29/2021 11:57 AM    TRIGLYCERIDE 137 12/29/2021 11:57 AM       Lab Results   Component Value Date/Time    SODIUM 138 12/29/2021 11:57 AM    POTASSIUM 3.9 12/29/2021 11:57 AM    CHLORIDE 104 12/29/2021 11:57 AM    CO2 22 12/29/2021 11:57 AM    GLUCOSE 123 (H) 12/29/2021 11:57 AM    BUN 19 12/29/2021 11:57 AM    CREATININE 1.06 12/29/2021 11:57 AM     Lab Results   Component Value Date/Time    ALKPHOSPHAT 68 12/29/2021 11:57 AM    ASTSGOT 29 12/29/2021 11:57 AM    ALTSGPT 27 12/29/2021 11:57 AM    TBILIRUBIN 0.5 12/29/2021 11:57 AM      Lab Results   Component Value Date/Time    HBA1C 6.2 (H) 12/29/2021 11:57 AM    HBA1C 6.2 (H) 07/16/2021 12:15 PM    HBA1C 6.7 (H) 04/16/2021 12:45 PM     No results found for: TSH  No results found for: FREET4    Lab Results   Component Value Date/Time    WBC 7.7 12/29/2021 11:57 AM    RBC 4.51 (L) 12/29/2021 11:57 AM    HEMOGLOBIN 13.7 (L) 12/29/2021 11:57 AM    HEMATOCRIT 41.6 (L) 12/29/2021 11:57 AM    MCV 92.2 12/29/2021 11:57 AM    MCH 30.4 12/29/2021 11:57 AM    MCHC 32.9 (L) 12/29/2021 11:57 AM    MPV 11.8 12/29/2021 11:57 AM    NEUTSPOLYS 55.30 12/29/2021 11:57 AM    LYMPHOCYTES 31.10 12/29/2021 11:57 AM    MONOCYTES 9.70 12/29/2021 11:57 AM    EOSINOPHILS 3.00 12/29/2021 11:57 AM    BASOPHILS 0.80 12/29/2021 11:57 AM      Imaging      Reviewed and discussed per HPI    Assessment and Plan     Abiel Vaughn is a 71 y.o. male    1. Controlled type 2 diabetes mellitus without complication, without long-term current use of insulin (HCC)  - Controlled, continue with current management.    2. Aortic valve disorder  3. Aortic aneurysm, intrathoracic (HCC)  Asymptomatic, f/u  cardiology    4. Dyslipidemia  - Controlled, continue with current management.    5. Thrombocytopenia (HCC)  Borderline, f/u labs    6. Primary insomnia  - Controlled, continue with current management.  - zolpidem (AMBIEN) 10 MG Tab; TAKE 1 TABLET AT BEDTIME ASNEEDED FOR SLEEP FOR UP TO 90 DAYS  Dispense: 90 Tablet; Refill: 0  Obtained and reviewed patient utilization report from [facility] pharmacy database on 1/19/2022 10:39 AM  prior to writing prescription for controlled substance II, III or IV per [place] bill . Based on assessment of the report, the prescription is medically necessary.    Follow-up: in 3 months, ambien refill

## 2022-01-20 DIAGNOSIS — E11.9 CONTROLLED TYPE 2 DIABETES MELLITUS WITHOUT COMPLICATION, WITHOUT LONG-TERM CURRENT USE OF INSULIN (HCC): ICD-10-CM

## 2022-01-20 RX ORDER — METFORMIN HYDROCHLORIDE 750 MG/1
TABLET, EXTENDED RELEASE ORAL
Qty: 90 TABLET | Refills: 3 | Status: SHIPPED | OUTPATIENT
Start: 2022-01-20 | End: 2023-03-29 | Stop reason: SDUPTHER

## 2022-02-10 ENCOUNTER — HOSPITAL ENCOUNTER (OUTPATIENT)
Dept: LAB | Facility: MEDICAL CENTER | Age: 72
End: 2022-02-10
Attending: INTERNAL MEDICINE
Payer: MEDICARE

## 2022-02-10 LAB — CREAT SERPL-MCNC: 0.83 MG/DL (ref 0.5–1.4)

## 2022-02-10 PROCEDURE — 36415 COLL VENOUS BLD VENIPUNCTURE: CPT

## 2022-02-10 PROCEDURE — 82565 ASSAY OF CREATININE: CPT

## 2022-02-15 ENCOUNTER — HOSPITAL ENCOUNTER (OUTPATIENT)
Dept: RADIOLOGY | Facility: MEDICAL CENTER | Age: 72
End: 2022-02-15
Attending: UROLOGY
Payer: MEDICARE

## 2022-02-15 DIAGNOSIS — R31.0 GROSS HEMATURIA: ICD-10-CM

## 2022-02-15 PROCEDURE — 700117 HCHG RX CONTRAST REV CODE 255: Performed by: UROLOGY

## 2022-02-15 PROCEDURE — 74178 CT ABD&PLV WO CNTR FLWD CNTR: CPT | Mod: MH

## 2022-02-15 RX ADMIN — IOHEXOL 100 ML: 350 INJECTION, SOLUTION INTRAVENOUS at 15:41

## 2022-03-20 DIAGNOSIS — K21.9 GASTROESOPHAGEAL REFLUX DISEASE WITHOUT ESOPHAGITIS: ICD-10-CM

## 2022-03-20 RX ORDER — PANTOPRAZOLE SODIUM 40 MG/1
TABLET, DELAYED RELEASE ORAL
Qty: 90 TABLET | Refills: 3 | Status: SHIPPED | OUTPATIENT
Start: 2022-03-20 | End: 2022-10-12 | Stop reason: SDUPTHER

## 2022-04-19 ENCOUNTER — TELEMEDICINE (OUTPATIENT)
Dept: MEDICAL GROUP | Age: 72
End: 2022-04-19
Payer: MEDICARE

## 2022-04-19 VITALS
SYSTOLIC BLOOD PRESSURE: 125 MMHG | WEIGHT: 238 LBS | TEMPERATURE: 97.5 F | BODY MASS INDEX: 29.59 KG/M2 | DIASTOLIC BLOOD PRESSURE: 65 MMHG | HEIGHT: 75 IN

## 2022-04-19 DIAGNOSIS — F51.01 PRIMARY INSOMNIA: ICD-10-CM

## 2022-04-19 DIAGNOSIS — M25.561 CHRONIC PAIN OF RIGHT KNEE: ICD-10-CM

## 2022-04-19 DIAGNOSIS — G89.29 CHRONIC PAIN OF RIGHT KNEE: ICD-10-CM

## 2022-04-19 DIAGNOSIS — I35.9 AORTIC VALVE DISORDER: ICD-10-CM

## 2022-04-19 DIAGNOSIS — I71.21 ASCENDING AORTIC ANEURYSM (HCC): ICD-10-CM

## 2022-04-19 PROCEDURE — 99214 OFFICE O/P EST MOD 30 MIN: CPT | Mod: 95 | Performed by: INTERNAL MEDICINE

## 2022-04-19 RX ORDER — ZOLPIDEM TARTRATE 10 MG/1
TABLET ORAL
Qty: 90 TABLET | Refills: 0 | Status: ON HOLD | OUTPATIENT
Start: 2022-04-19 | End: 2022-07-05

## 2022-04-19 ASSESSMENT — FIBROSIS 4 INDEX: FIB4 SCORE: 2.23

## 2022-04-19 NOTE — PROGRESS NOTES
Telemedicine Visit: Established Patient     This Remote Face to Face encounter was conducted via Zoom. Given the importance of social distancing and other strategies recommended to reduce the risk of COVID-19 transmission, I am providing medical care to this patient via audio/video visit in place of an in person visit at the request of the patient. Verbal consent to telehealth, risks, benefits, and consequences were discussed. Patient retains the right to withdraw at any time. All existing confidentiality protections apply. The patient has access to all transmitted medical information. No dissemination of any patient images or information to other entities without further written consent.    CHIEF COMPLAINT     Abiel Vaughn is a 71 y.o. male who presents for follow up of the following     Insomnia  The patient has a trouble to go and stay asleep.   Used Ambien, 10 mg at bedtime.   Discussed sleep hygiene:   - Go to bed and wake up at consistent times whether work/school day or not.   - Keep room dark, quiet, and comfortable.   - Don't have naps.  - Increase exposure to sunlight during awake times and avoid bright lights before going to sleep.   - Avoid stimulant or caffeine use more than 4 hours after wake time.     Aortic valve disorder/aortic aneurysm  Controlled, no medications.  He has been evaluated/followed up by cardiology.  Echocardiography   1/11/22,   The left ventricular ejection fraction is visually estimated to be 65%.  Normal regional wall motion.  Mild aortic insufficiency.  The ascending aorta is aneurysmal with a diameter of 4.4 cm.    1/9/2019  Left ventricular ejection fraction is visually estimated to be 60%.  Mild concentric left ventricular hypertrophy.  Normal inferior vena cava size and inspiratory collapse.  Mild central aortic insufficiency.  Ascending aorta is dilated with a diameter of  4.3 cm.     CT cardiac score, 10/30/2021  Calcium Score of 100-399 AND <75th percentile:      Right knee pain  Chronic condition, gradually worsening, treated by orthopedics with local injections.  Possible knee replacement in the near future.     Current Outpatient Medications   Medication Sig Dispense Refill   • zolpidem (AMBIEN) 10 MG Tab TAKE 1 TABLET AT BEDTIME ASNEEDED FOR SLEEP FOR UP TO 90 DAYS 90 Tablet 0   • pantoprazole (PROTONIX) 40 MG Tablet Delayed Response TAKE 1 TABLET DAILY DUE TO SEVERE GASTROESOPHAGEAL    REFLUX DISEASE 90 Tablet 3   • metFORMIN ER (GLUCOPHAGE XR) 750 MG TABLET SR 24 HR TAKE 1 TABLET DAILY 90 Tablet 3   • rosuvastatin (CRESTOR) 20 MG Tab      • metoprolol SR (TOPROL XL) 25 MG TABLET SR 24 HR Take 0.5 Tablets by mouth every evening. 45 Tablet 3   • atorvastatin (LIPITOR) 20 MG Tab Take 20 mg by mouth every evening.     • famotidine (PEPCID) 40 MG Tab      • TURMERIC PO Take  by mouth.     • aspirin EC (ECOTRIN) 81 MG Tablet Delayed Response Take 81 mg by mouth every day.     • diclofenac DR (VOLTAREN) 75 MG Tablet Delayed Response TAKE 1 TABLET TWICE A DAY. 180 Tablet 1   • gabapentin (NEURONTIN) 100 MG Cap Take 1 Capsule by mouth 3 times a day. 270 Capsule 1   • montelukast (SINGULAIR) 10 MG Tab Take 1 tablet by mouth every day. 90 tablet 4   • valACYclovir (VALTREX) 500 MG Tab TAKE 1 TABLET  tablet 4   • CALCIUM PO      • Cholecalciferol (VITAMIN D3) 2000 UNIT Cap Take  by mouth 2 Times a Day.     • Multiple Vitamins-Minerals (MULTIVITAMIN ADULT) Tab Take  by mouth.       No current facility-administered medications for this visit.     He  has a past medical history of Dyslipidemia, GERD (gastroesophageal reflux disease), and Insomnia.  He  has a past surgical history that includes appendectomy; tonsillectomy; lumbar medial branch blocks (Right, 8/24/2020); lumbar medial branch blocks (Right, 10/12/2020); neuro dest facet l/s w/ig sngl (Right, 11/23/2020); pr fluoroscopic guidance needle placement (7/28/2021); pr percut implnt neuroelect,epidural (7/28/2021); pr  "drain/inject large joint/bursa (Right, 10/27/2021); and pr fluoroscopic guidance needle placement (Right, 10/27/2021).  Social History     Tobacco Use   • Smoking status: Never Smoker   • Smokeless tobacco: Never Used   Vaping Use   • Vaping Use: Never used   Substance Use Topics   • Alcohol use: Yes     Alcohol/week: 0.6 oz     Types: 1 Glasses of wine per week   • Drug use: No     Social History     Social History Narrative   • Not on file     Family History   Problem Relation Age of Onset   • Cancer Mother 83        endometrial   • Diabetes Mother    • Heart Disease Father    • No Known Problems Maternal Grandmother    • No Known Problems Maternal Grandfather    • No Known Problems Paternal Grandmother    • No Known Problems Paternal Grandfather    • Hyperlipidemia Neg Hx      Family Status   Relation Name Status   • Mo     • Fa     • MGMo     • MGFa     • PGMo     • PGFa     • Neg Hx  (Not Specified)       ROS   Taking supplements to help mood or symptoms: yes  Using alcohol or other substances to help mood or symptoms: no  No polydipsia, polyuria.  No temperature intolerance.  No bowel changes  Denies symptoms of keith: grandiosity, euphoria, need for little or no sleep, rapid pressured speech, spending sprees, reckless or risky behavior, hypersexual behavior.   No visual or auditory hallucinations.    Labs     None.     PHYSICAL EXAM   Blood Pressure 125/65   Temperature 36.4 °C (97.5 °F)   Height 1.905 m (6' 3\")   Weight 108 kg (238 lb)   General: normal speech pattern and content   Clothing and grooming normal.  Behavior: no psychomotor abnormalities or impulsivity  Eye contact: good  Affect: normal  Though content: normal insight and reasoning, no evidence of psychotic process.   Neck/Thyroid: No adenopathy, no palpable thyroid nodules.  Lungs: CTAB  Heart: RRR no ectopy  Neuro: Gait normal. Reflexes normal and symmetric. Sensation grossly intact      "   Abnormal findings: none      Labs     Labs are reviewed and discussed with a patient  Lab Results   Component Value Date/Time    CHOLSTRLTOT 142 12/29/2021 11:57 AM    LDL 84 12/29/2021 11:57 AM    HDL 31 (A) 12/29/2021 11:57 AM    TRIGLYCERIDE 137 12/29/2021 11:57 AM       Lab Results   Component Value Date/Time    SODIUM 138 12/29/2021 11:57 AM    POTASSIUM 3.9 12/29/2021 11:57 AM    CHLORIDE 104 12/29/2021 11:57 AM    CO2 22 12/29/2021 11:57 AM    GLUCOSE 123 (H) 12/29/2021 11:57 AM    BUN 19 12/29/2021 11:57 AM    CREATININE 0.83 02/10/2022 05:16 PM     Lab Results   Component Value Date/Time    ALKPHOSPHAT 68 12/29/2021 11:57 AM    ASTSGOT 29 12/29/2021 11:57 AM    ALTSGPT 27 12/29/2021 11:57 AM    TBILIRUBIN 0.5 12/29/2021 11:57 AM      Lab Results   Component Value Date/Time    HBA1C 6.2 (H) 12/29/2021 11:57 AM    HBA1C 6.2 (H) 07/16/2021 12:15 PM    HBA1C 6.7 (H) 04/16/2021 12:45 PM     No results found for: TSH  No results found for: FREET4    Lab Results   Component Value Date/Time    WBC 7.7 12/29/2021 11:57 AM    RBC 4.51 (L) 12/29/2021 11:57 AM    HEMOGLOBIN 13.7 (L) 12/29/2021 11:57 AM    HEMATOCRIT 41.6 (L) 12/29/2021 11:57 AM    MCV 92.2 12/29/2021 11:57 AM    MCH 30.4 12/29/2021 11:57 AM    MCHC 32.9 (L) 12/29/2021 11:57 AM    MPV 11.8 12/29/2021 11:57 AM    NEUTSPOLYS 55.30 12/29/2021 11:57 AM    LYMPHOCYTES 31.10 12/29/2021 11:57 AM    MONOCYTES 9.70 12/29/2021 11:57 AM    EOSINOPHILS 3.00 12/29/2021 11:57 AM    BASOPHILS 0.80 12/29/2021 11:57 AM      Imaging     Reviewed and discussed per HPI    ASSESMENT AND PLAN     1. Primary insomnia  - Controlled, continue with current management.  - zolpidem (AMBIEN) 10 MG Tab; TAKE 1 TABLET AT BEDTIME ASNEEDED FOR SLEEP FOR UP TO 90 DAYS  Dispense: 90 Tablet; Refill: 0    2. Aortic valve disorder  3. Ascending aortic aneurysm (HCC)   Blood pressure is controlled, follow-up cardiology    4. Chronic pain of right knee   Gradually worsening, continue  orthopedics follow-up and activity as tolerated    Smoking Counseling: Nonsmoker    Follow up: in 3 months, DM labs and Ambien refill    Please note that this dictation was created using voice recognition software. Despite all efforts, some minor grammar mistakes are possible.

## 2022-04-25 DIAGNOSIS — M79.605 PAIN IN BOTH LOWER EXTREMITIES: ICD-10-CM

## 2022-04-25 DIAGNOSIS — M79.604 PAIN IN BOTH LOWER EXTREMITIES: ICD-10-CM

## 2022-04-26 RX ORDER — GABAPENTIN 100 MG/1
CAPSULE ORAL
Qty: 270 CAPSULE | Refills: 1 | Status: SHIPPED | OUTPATIENT
Start: 2022-04-26 | End: 2023-04-14

## 2022-05-03 ENCOUNTER — HOSPITAL ENCOUNTER (OUTPATIENT)
Dept: RADIOLOGY | Facility: MEDICAL CENTER | Age: 72
End: 2022-05-03
Attending: ORTHOPAEDIC SURGERY
Payer: MEDICARE

## 2022-05-03 DIAGNOSIS — M25.561 CHRONIC PAIN OF RIGHT KNEE: ICD-10-CM

## 2022-05-03 DIAGNOSIS — G89.29 CHRONIC PAIN OF RIGHT KNEE: ICD-10-CM

## 2022-05-03 PROCEDURE — 73564 X-RAY EXAM KNEE 4 OR MORE: CPT | Mod: RT

## 2022-05-05 PROBLEM — M17.11 ARTHRITIS OF RIGHT KNEE: Status: ACTIVE | Noted: 2022-05-05

## 2022-05-09 ENCOUNTER — PATIENT MESSAGE (OUTPATIENT)
Dept: MEDICAL GROUP | Age: 72
End: 2022-05-09
Payer: MEDICARE

## 2022-05-09 RX ORDER — ROSUVASTATIN CALCIUM 20 MG/1
20 TABLET, COATED ORAL DAILY
Qty: 90 TABLET | Refills: 4 | Status: SHIPPED | OUTPATIENT
Start: 2022-05-09 | End: 2023-04-21 | Stop reason: SDUPTHER

## 2022-07-01 ENCOUNTER — HOSPITAL ENCOUNTER (OUTPATIENT)
Dept: LAB | Facility: MEDICAL CENTER | Age: 72
End: 2022-07-01
Attending: INTERNAL MEDICINE
Payer: MEDICARE

## 2022-07-01 DIAGNOSIS — D69.6 THROMBOCYTOPENIA (HCC): ICD-10-CM

## 2022-07-01 DIAGNOSIS — E78.5 DYSLIPIDEMIA: ICD-10-CM

## 2022-07-01 DIAGNOSIS — E11.9 CONTROLLED TYPE 2 DIABETES MELLITUS WITHOUT COMPLICATION, WITHOUT LONG-TERM CURRENT USE OF INSULIN (HCC): ICD-10-CM

## 2022-07-01 LAB
ALBUMIN SERPL BCP-MCNC: 4.4 G/DL (ref 3.2–4.9)
ALBUMIN/GLOB SERPL: 2 G/DL
ALP SERPL-CCNC: 70 U/L (ref 30–99)
ALT SERPL-CCNC: 25 U/L (ref 2–50)
ANION GAP SERPL CALC-SCNC: 11 MMOL/L (ref 7–16)
AST SERPL-CCNC: 28 U/L (ref 12–45)
BASOPHILS # BLD AUTO: 0.6 % (ref 0–1.8)
BASOPHILS # BLD: 0.05 K/UL (ref 0–0.12)
BILIRUB SERPL-MCNC: 0.5 MG/DL (ref 0.1–1.5)
BUN SERPL-MCNC: 21 MG/DL (ref 8–22)
CALCIUM SERPL-MCNC: 9.4 MG/DL (ref 8.4–10.2)
CHLORIDE SERPL-SCNC: 105 MMOL/L (ref 96–112)
CHOLEST SERPL-MCNC: 131 MG/DL (ref 100–199)
CO2 SERPL-SCNC: 25 MMOL/L (ref 20–33)
CREAT SERPL-MCNC: 0.94 MG/DL (ref 0.5–1.4)
EOSINOPHIL # BLD AUTO: 0.23 K/UL (ref 0–0.51)
EOSINOPHIL NFR BLD: 2.9 % (ref 0–6.9)
ERYTHROCYTE [DISTWIDTH] IN BLOOD BY AUTOMATED COUNT: 44.7 FL (ref 35.9–50)
EST. AVERAGE GLUCOSE BLD GHB EST-MCNC: 148 MG/DL
FASTING STATUS PATIENT QL REPORTED: NORMAL
GFR SERPLBLD CREATININE-BSD FMLA CKD-EPI: 85 ML/MIN/1.73 M 2
GLOBULIN SER CALC-MCNC: 2.2 G/DL (ref 1.9–3.5)
GLUCOSE SERPL-MCNC: 122 MG/DL (ref 65–99)
HBA1C MFR BLD: 6.8 % (ref 4–5.6)
HCT VFR BLD AUTO: 41.4 % (ref 42–52)
HDLC SERPL-MCNC: 32 MG/DL
HGB BLD-MCNC: 13.6 G/DL (ref 14–18)
IMM GRANULOCYTES # BLD AUTO: 0.02 K/UL (ref 0–0.11)
IMM GRANULOCYTES NFR BLD AUTO: 0.3 % (ref 0–0.9)
LDLC SERPL CALC-MCNC: 76 MG/DL
LYMPHOCYTES # BLD AUTO: 2.03 K/UL (ref 1–4.8)
LYMPHOCYTES NFR BLD: 25.9 % (ref 22–41)
MCH RBC QN AUTO: 30.9 PG (ref 27–33)
MCHC RBC AUTO-ENTMCNC: 32.9 G/DL (ref 33.7–35.3)
MCV RBC AUTO: 94.1 FL (ref 81.4–97.8)
MONOCYTES # BLD AUTO: 0.6 K/UL (ref 0–0.85)
MONOCYTES NFR BLD AUTO: 7.6 % (ref 0–13.4)
NEUTROPHILS # BLD AUTO: 4.92 K/UL (ref 1.82–7.42)
NEUTROPHILS NFR BLD: 62.7 % (ref 44–72)
NRBC # BLD AUTO: 0 K/UL
NRBC BLD-RTO: 0 /100 WBC
PLATELET # BLD AUTO: 166 K/UL (ref 164–446)
PMV BLD AUTO: 10.9 FL (ref 9–12.9)
POTASSIUM SERPL-SCNC: 4.1 MMOL/L (ref 3.6–5.5)
PROT SERPL-MCNC: 6.6 G/DL (ref 6–8.2)
RBC # BLD AUTO: 4.4 M/UL (ref 4.7–6.1)
SODIUM SERPL-SCNC: 141 MMOL/L (ref 135–145)
TRIGL SERPL-MCNC: 114 MG/DL (ref 0–149)
WBC # BLD AUTO: 7.9 K/UL (ref 4.8–10.8)

## 2022-07-01 PROCEDURE — 80061 LIPID PANEL: CPT

## 2022-07-01 PROCEDURE — 83036 HEMOGLOBIN GLYCOSYLATED A1C: CPT | Mod: GA

## 2022-07-01 PROCEDURE — 80053 COMPREHEN METABOLIC PANEL: CPT

## 2022-07-01 PROCEDURE — 36415 COLL VENOUS BLD VENIPUNCTURE: CPT

## 2022-07-01 PROCEDURE — 85025 COMPLETE CBC W/AUTO DIFF WBC: CPT

## 2022-07-03 DIAGNOSIS — G89.29 CHRONIC LUMBOSACRAL PAIN: ICD-10-CM

## 2022-07-03 DIAGNOSIS — M54.50 CHRONIC LUMBOSACRAL PAIN: ICD-10-CM

## 2022-07-04 ENCOUNTER — HOSPITAL ENCOUNTER (OUTPATIENT)
Facility: MEDICAL CENTER | Age: 72
End: 2022-07-05
Attending: EMERGENCY MEDICINE | Admitting: STUDENT IN AN ORGANIZED HEALTH CARE EDUCATION/TRAINING PROGRAM
Payer: MEDICARE

## 2022-07-04 ENCOUNTER — APPOINTMENT (OUTPATIENT)
Dept: RADIOLOGY | Facility: MEDICAL CENTER | Age: 72
End: 2022-07-04
Payer: MEDICARE

## 2022-07-04 ENCOUNTER — APPOINTMENT (OUTPATIENT)
Dept: RADIOLOGY | Facility: MEDICAL CENTER | Age: 72
End: 2022-07-04
Attending: EMERGENCY MEDICINE
Payer: MEDICARE

## 2022-07-04 DIAGNOSIS — R07.9 CHEST PAIN, UNSPECIFIED TYPE: ICD-10-CM

## 2022-07-04 PROBLEM — R60.0 EDEMA OF BOTH LOWER LEGS: Status: ACTIVE | Noted: 2022-07-04

## 2022-07-04 PROBLEM — R07.89 CHEST PAIN, ATYPICAL: Status: ACTIVE | Noted: 2022-07-04

## 2022-07-04 LAB
ALBUMIN SERPL BCP-MCNC: 4.2 G/DL (ref 3.2–4.9)
ALBUMIN/GLOB SERPL: 1.8 G/DL
ALP SERPL-CCNC: 75 U/L (ref 30–99)
ALT SERPL-CCNC: 22 U/L (ref 2–50)
ANION GAP SERPL CALC-SCNC: 14 MMOL/L (ref 7–16)
AST SERPL-CCNC: 26 U/L (ref 12–45)
BASOPHILS # BLD AUTO: 0.6 % (ref 0–1.8)
BASOPHILS # BLD: 0.06 K/UL (ref 0–0.12)
BILIRUB SERPL-MCNC: 0.4 MG/DL (ref 0.1–1.5)
BUN SERPL-MCNC: 20 MG/DL (ref 8–22)
CALCIUM SERPL-MCNC: 9 MG/DL (ref 8.4–10.2)
CHLORIDE SERPL-SCNC: 107 MMOL/L (ref 96–112)
CO2 SERPL-SCNC: 20 MMOL/L (ref 20–33)
CREAT SERPL-MCNC: 1.06 MG/DL (ref 0.5–1.4)
D DIMER PPP IA.FEU-MCNC: 1.23 UG/ML (FEU) (ref 0–0.5)
EKG IMPRESSION: NORMAL
EOSINOPHIL # BLD AUTO: 0.25 K/UL (ref 0–0.51)
EOSINOPHIL NFR BLD: 2.7 % (ref 0–6.9)
ERYTHROCYTE [DISTWIDTH] IN BLOOD BY AUTOMATED COUNT: 44.1 FL (ref 35.9–50)
GFR SERPLBLD CREATININE-BSD FMLA CKD-EPI: 74 ML/MIN/1.73 M 2
GLOBULIN SER CALC-MCNC: 2.3 G/DL (ref 1.9–3.5)
GLUCOSE SERPL-MCNC: 145 MG/DL (ref 65–99)
HCT VFR BLD AUTO: 41.1 % (ref 42–52)
HGB BLD-MCNC: 14 G/DL (ref 14–18)
IMM GRANULOCYTES # BLD AUTO: 0.02 K/UL (ref 0–0.11)
IMM GRANULOCYTES NFR BLD AUTO: 0.2 % (ref 0–0.9)
LYMPHOCYTES # BLD AUTO: 2.46 K/UL (ref 1–4.8)
LYMPHOCYTES NFR BLD: 26.4 % (ref 22–41)
MCH RBC QN AUTO: 31.3 PG (ref 27–33)
MCHC RBC AUTO-ENTMCNC: 34.1 G/DL (ref 33.7–35.3)
MCV RBC AUTO: 91.9 FL (ref 81.4–97.8)
MONOCYTES # BLD AUTO: 0.68 K/UL (ref 0–0.85)
MONOCYTES NFR BLD AUTO: 7.3 % (ref 0–13.4)
NEUTROPHILS # BLD AUTO: 5.86 K/UL (ref 1.82–7.42)
NEUTROPHILS NFR BLD: 62.8 % (ref 44–72)
NRBC # BLD AUTO: 0 K/UL
NRBC BLD-RTO: 0 /100 WBC
NT-PROBNP SERPL IA-MCNC: 130 PG/ML (ref 0–125)
PLATELET # BLD AUTO: 190 K/UL (ref 164–446)
PMV BLD AUTO: 11 FL (ref 9–12.9)
POTASSIUM SERPL-SCNC: 4.1 MMOL/L (ref 3.6–5.5)
PROT SERPL-MCNC: 6.5 G/DL (ref 6–8.2)
RBC # BLD AUTO: 4.47 M/UL (ref 4.7–6.1)
SODIUM SERPL-SCNC: 141 MMOL/L (ref 135–145)
TROPONIN T SERPL-MCNC: 12 NG/L (ref 6–19)
WBC # BLD AUTO: 9.3 K/UL (ref 4.8–10.8)

## 2022-07-04 PROCEDURE — 85379 FIBRIN DEGRADATION QUANT: CPT

## 2022-07-04 PROCEDURE — A9270 NON-COVERED ITEM OR SERVICE: HCPCS | Performed by: STUDENT IN AN ORGANIZED HEALTH CARE EDUCATION/TRAINING PROGRAM

## 2022-07-04 PROCEDURE — G0378 HOSPITAL OBSERVATION PER HR: HCPCS

## 2022-07-04 PROCEDURE — 99285 EMERGENCY DEPT VISIT HI MDM: CPT

## 2022-07-04 PROCEDURE — 700117 HCHG RX CONTRAST REV CODE 255: Performed by: EMERGENCY MEDICINE

## 2022-07-04 PROCEDURE — 71045 X-RAY EXAM CHEST 1 VIEW: CPT

## 2022-07-04 PROCEDURE — 99220 PR INITIAL OBSERVATION CARE,LEVL III: CPT | Performed by: STUDENT IN AN ORGANIZED HEALTH CARE EDUCATION/TRAINING PROGRAM

## 2022-07-04 PROCEDURE — 700102 HCHG RX REV CODE 250 W/ 637 OVERRIDE(OP): Performed by: STUDENT IN AN ORGANIZED HEALTH CARE EDUCATION/TRAINING PROGRAM

## 2022-07-04 PROCEDURE — 36415 COLL VENOUS BLD VENIPUNCTURE: CPT

## 2022-07-04 PROCEDURE — 84484 ASSAY OF TROPONIN QUANT: CPT

## 2022-07-04 PROCEDURE — 82962 GLUCOSE BLOOD TEST: CPT

## 2022-07-04 PROCEDURE — 80053 COMPREHEN METABOLIC PANEL: CPT

## 2022-07-04 PROCEDURE — 71275 CT ANGIOGRAPHY CHEST: CPT | Mod: ME

## 2022-07-04 PROCEDURE — 83880 ASSAY OF NATRIURETIC PEPTIDE: CPT

## 2022-07-04 PROCEDURE — 93005 ELECTROCARDIOGRAM TRACING: CPT

## 2022-07-04 PROCEDURE — 85025 COMPLETE CBC W/AUTO DIFF WBC: CPT

## 2022-07-04 PROCEDURE — 93005 ELECTROCARDIOGRAM TRACING: CPT | Performed by: EMERGENCY MEDICINE

## 2022-07-04 RX ORDER — AMOXICILLIN 250 MG
2 CAPSULE ORAL 2 TIMES DAILY
Status: DISCONTINUED | OUTPATIENT
Start: 2022-07-04 | End: 2022-07-05 | Stop reason: HOSPADM

## 2022-07-04 RX ORDER — ASPIRIN 300 MG/1
300 SUPPOSITORY RECTAL DAILY
Status: DISCONTINUED | OUTPATIENT
Start: 2022-07-05 | End: 2022-07-04

## 2022-07-04 RX ORDER — ASPIRIN 81 MG/1
324 TABLET, CHEWABLE ORAL DAILY
Status: DISCONTINUED | OUTPATIENT
Start: 2022-07-05 | End: 2022-07-04

## 2022-07-04 RX ORDER — MONTELUKAST SODIUM 10 MG/1
10 TABLET ORAL DAILY
Status: DISCONTINUED | OUTPATIENT
Start: 2022-07-05 | End: 2022-07-05 | Stop reason: HOSPADM

## 2022-07-04 RX ORDER — ATORVASTATIN CALCIUM 20 MG/1
20 TABLET, FILM COATED ORAL NIGHTLY
Status: DISCONTINUED | OUTPATIENT
Start: 2022-07-05 | End: 2022-07-05 | Stop reason: HOSPADM

## 2022-07-04 RX ORDER — BISACODYL 10 MG
10 SUPPOSITORY, RECTAL RECTAL
Status: DISCONTINUED | OUTPATIENT
Start: 2022-07-04 | End: 2022-07-05 | Stop reason: HOSPADM

## 2022-07-04 RX ORDER — ASPIRIN 325 MG
325 TABLET ORAL DAILY
Status: DISCONTINUED | OUTPATIENT
Start: 2022-07-05 | End: 2022-07-05 | Stop reason: HOSPADM

## 2022-07-04 RX ORDER — ONDANSETRON 4 MG/1
4 TABLET, ORALLY DISINTEGRATING ORAL EVERY 4 HOURS PRN
Status: DISCONTINUED | OUTPATIENT
Start: 2022-07-04 | End: 2022-07-05 | Stop reason: HOSPADM

## 2022-07-04 RX ORDER — ONDANSETRON 2 MG/ML
4 INJECTION INTRAMUSCULAR; INTRAVENOUS EVERY 4 HOURS PRN
Status: DISCONTINUED | OUTPATIENT
Start: 2022-07-04 | End: 2022-07-05 | Stop reason: HOSPADM

## 2022-07-04 RX ORDER — ZOLPIDEM TARTRATE 5 MG/1
5 TABLET ORAL ONCE
Status: COMPLETED | OUTPATIENT
Start: 2022-07-04 | End: 2022-07-04

## 2022-07-04 RX ORDER — FAMOTIDINE 20 MG/1
20 TABLET, FILM COATED ORAL DAILY
Status: DISCONTINUED | OUTPATIENT
Start: 2022-07-05 | End: 2022-07-05 | Stop reason: HOSPADM

## 2022-07-04 RX ORDER — LABETALOL HYDROCHLORIDE 5 MG/ML
10 INJECTION, SOLUTION INTRAVENOUS EVERY 4 HOURS PRN
Status: DISCONTINUED | OUTPATIENT
Start: 2022-07-04 | End: 2022-07-05 | Stop reason: HOSPADM

## 2022-07-04 RX ORDER — DEXTROSE MONOHYDRATE 25 G/50ML
25 INJECTION, SOLUTION INTRAVENOUS
Status: DISCONTINUED | OUTPATIENT
Start: 2022-07-04 | End: 2022-07-05

## 2022-07-04 RX ORDER — POLYETHYLENE GLYCOL 3350 17 G/17G
1 POWDER, FOR SOLUTION ORAL
Status: DISCONTINUED | OUTPATIENT
Start: 2022-07-04 | End: 2022-07-05 | Stop reason: HOSPADM

## 2022-07-04 RX ORDER — METOPROLOL SUCCINATE 25 MG/1
12.5 TABLET, EXTENDED RELEASE ORAL EVERY EVENING
Status: DISCONTINUED | OUTPATIENT
Start: 2022-07-05 | End: 2022-07-05 | Stop reason: HOSPADM

## 2022-07-04 RX ADMIN — RIVAROXABAN 10 MG: 10 TABLET, FILM COATED ORAL at 23:34

## 2022-07-04 RX ADMIN — ZOLPIDEM TARTRATE 5 MG: 5 TABLET ORAL at 23:34

## 2022-07-04 RX ADMIN — IOHEXOL 100 ML: 350 INJECTION, SOLUTION INTRAVENOUS at 23:24

## 2022-07-04 ASSESSMENT — HEART SCORE
ECG: NON-SPECIFIC REPOLARIZATION DISTURBANCE
TROPONIN: 1-3 TIMES NORMAL LIMIT
RISK FACTORS: 1-2 RISK FACTORS
HEART SCORE: 6
HISTORY: MODERATELY SUSPICIOUS
AGE: 65+

## 2022-07-04 ASSESSMENT — FIBROSIS 4 INDEX: FIB4 SCORE: 2.46

## 2022-07-04 ASSESSMENT — ENCOUNTER SYMPTOMS
SHORTNESS OF BREATH: 0
VOMITING: 0
COUGH: 0
FEVER: 0
BLURRED VISION: 0
NAUSEA: 0
BRUISES/BLEEDS EASILY: 0
DIZZINESS: 0
MYALGIAS: 0

## 2022-07-04 ASSESSMENT — PAIN DESCRIPTION - PAIN TYPE: TYPE: ACUTE PAIN

## 2022-07-05 ENCOUNTER — APPOINTMENT (OUTPATIENT)
Dept: RADIOLOGY | Facility: MEDICAL CENTER | Age: 72
End: 2022-07-05
Attending: STUDENT IN AN ORGANIZED HEALTH CARE EDUCATION/TRAINING PROGRAM
Payer: MEDICARE

## 2022-07-05 ENCOUNTER — APPOINTMENT (OUTPATIENT)
Dept: CARDIOLOGY | Facility: MEDICAL CENTER | Age: 72
End: 2022-07-05
Attending: STUDENT IN AN ORGANIZED HEALTH CARE EDUCATION/TRAINING PROGRAM
Payer: MEDICARE

## 2022-07-05 VITALS
DIASTOLIC BLOOD PRESSURE: 74 MMHG | SYSTOLIC BLOOD PRESSURE: 131 MMHG | OXYGEN SATURATION: 95 % | BODY MASS INDEX: 30.15 KG/M2 | HEIGHT: 75 IN | TEMPERATURE: 97.6 F | WEIGHT: 242.51 LBS | RESPIRATION RATE: 18 BRPM | HEART RATE: 57 BPM

## 2022-07-05 PROBLEM — R07.89 CHEST PAIN, ATYPICAL: Status: RESOLVED | Noted: 2022-07-04 | Resolved: 2022-07-05

## 2022-07-05 LAB
ALBUMIN SERPL BCP-MCNC: 3.8 G/DL (ref 3.2–4.9)
ALBUMIN/GLOB SERPL: 1.9 G/DL
ALP SERPL-CCNC: 67 U/L (ref 30–99)
ALT SERPL-CCNC: 20 U/L (ref 2–50)
ANION GAP SERPL CALC-SCNC: 9 MMOL/L (ref 7–16)
AST SERPL-CCNC: 21 U/L (ref 12–45)
BILIRUB SERPL-MCNC: 0.3 MG/DL (ref 0.1–1.5)
BUN SERPL-MCNC: 17 MG/DL (ref 8–22)
CALCIUM SERPL-MCNC: 8.7 MG/DL (ref 8.4–10.2)
CHLORIDE SERPL-SCNC: 108 MMOL/L (ref 96–112)
CHOLEST SERPL-MCNC: 113 MG/DL (ref 100–199)
CO2 SERPL-SCNC: 24 MMOL/L (ref 20–33)
CREAT SERPL-MCNC: 1.06 MG/DL (ref 0.5–1.4)
EKG IMPRESSION: NORMAL
GFR SERPLBLD CREATININE-BSD FMLA CKD-EPI: 74 ML/MIN/1.73 M 2
GLOBULIN SER CALC-MCNC: 2 G/DL (ref 1.9–3.5)
GLUCOSE BLD STRIP.AUTO-MCNC: 105 MG/DL (ref 65–99)
GLUCOSE BLD STRIP.AUTO-MCNC: 110 MG/DL (ref 65–99)
GLUCOSE BLD STRIP.AUTO-MCNC: 117 MG/DL (ref 65–99)
GLUCOSE BLD STRIP.AUTO-MCNC: 191 MG/DL (ref 65–99)
GLUCOSE SERPL-MCNC: 114 MG/DL (ref 65–99)
HDLC SERPL-MCNC: 27 MG/DL
LDLC SERPL CALC-MCNC: 66 MG/DL
LV EJECT FRACT  99904: 55
LV EJECT FRACT MOD 2C 99903: 59.42
LV EJECT FRACT MOD 4C 99902: 38.34
LV EJECT FRACT MOD BP 99901: 48.76
POTASSIUM SERPL-SCNC: 4 MMOL/L (ref 3.6–5.5)
PROT SERPL-MCNC: 5.8 G/DL (ref 6–8.2)
SODIUM SERPL-SCNC: 141 MMOL/L (ref 135–145)
TRIGL SERPL-MCNC: 99 MG/DL (ref 0–149)
TROPONIN T SERPL-MCNC: 11 NG/L (ref 6–19)
TROPONIN T SERPL-MCNC: 16 NG/L (ref 6–19)

## 2022-07-05 PROCEDURE — G0378 HOSPITAL OBSERVATION PER HR: HCPCS

## 2022-07-05 PROCEDURE — A9502 TC99M TETROFOSMIN: HCPCS

## 2022-07-05 PROCEDURE — 93010 ELECTROCARDIOGRAM REPORT: CPT | Performed by: INTERNAL MEDICINE

## 2022-07-05 PROCEDURE — 700102 HCHG RX REV CODE 250 W/ 637 OVERRIDE(OP): Performed by: HOSPITALIST

## 2022-07-05 PROCEDURE — 700111 HCHG RX REV CODE 636 W/ 250 OVERRIDE (IP)

## 2022-07-05 PROCEDURE — 700102 HCHG RX REV CODE 250 W/ 637 OVERRIDE(OP): Performed by: STUDENT IN AN ORGANIZED HEALTH CARE EDUCATION/TRAINING PROGRAM

## 2022-07-05 PROCEDURE — 93306 TTE W/DOPPLER COMPLETE: CPT | Mod: 26 | Performed by: INTERNAL MEDICINE

## 2022-07-05 PROCEDURE — 96372 THER/PROPH/DIAG INJ SC/IM: CPT

## 2022-07-05 PROCEDURE — 99217 PR OBSERVATION CARE DISCHARGE: CPT | Performed by: HOSPITALIST

## 2022-07-05 PROCEDURE — 93018 CV STRESS TEST I&R ONLY: CPT | Performed by: INTERNAL MEDICINE

## 2022-07-05 PROCEDURE — 78452 HT MUSCLE IMAGE SPECT MULT: CPT | Mod: 26 | Performed by: INTERNAL MEDICINE

## 2022-07-05 PROCEDURE — 80061 LIPID PANEL: CPT

## 2022-07-05 PROCEDURE — 93005 ELECTROCARDIOGRAM TRACING: CPT | Performed by: HOSPITALIST

## 2022-07-05 PROCEDURE — 84484 ASSAY OF TROPONIN QUANT: CPT | Mod: 91

## 2022-07-05 PROCEDURE — A9270 NON-COVERED ITEM OR SERVICE: HCPCS | Performed by: STUDENT IN AN ORGANIZED HEALTH CARE EDUCATION/TRAINING PROGRAM

## 2022-07-05 PROCEDURE — 80053 COMPREHEN METABOLIC PANEL: CPT

## 2022-07-05 PROCEDURE — 82962 GLUCOSE BLOOD TEST: CPT | Mod: 91

## 2022-07-05 PROCEDURE — 93971 EXTREMITY STUDY: CPT | Mod: RT

## 2022-07-05 PROCEDURE — 93306 TTE W/DOPPLER COMPLETE: CPT

## 2022-07-05 RX ORDER — DICLOFENAC SODIUM 75 MG/1
TABLET, DELAYED RELEASE ORAL
Qty: 180 TABLET | Refills: 1 | Status: SHIPPED | OUTPATIENT
Start: 2022-07-05 | End: 2022-07-20

## 2022-07-05 RX ORDER — ZOLPIDEM TARTRATE 10 MG/1
10 TABLET ORAL
COMMUNITY
End: 2022-07-06 | Stop reason: SDUPTHER

## 2022-07-05 RX ORDER — OXYBUTYNIN CHLORIDE 10 MG/1
10 TABLET, EXTENDED RELEASE ORAL
COMMUNITY
Start: 2022-05-23

## 2022-07-05 RX ORDER — NAPROXEN SODIUM 220 MG
440 TABLET ORAL 2 TIMES DAILY PRN
COMMUNITY
End: 2022-07-20

## 2022-07-05 RX ORDER — REGADENOSON 0.08 MG/ML
INJECTION, SOLUTION INTRAVENOUS
Status: COMPLETED
Start: 2022-07-05 | End: 2022-07-05

## 2022-07-05 RX ADMIN — RIVAROXABAN 10 MG: 10 TABLET, FILM COATED ORAL at 17:13

## 2022-07-05 RX ADMIN — REGADENOSON 0.4 MG: 0.08 INJECTION, SOLUTION INTRAVENOUS at 12:48

## 2022-07-05 RX ADMIN — INSULIN HUMAN 1 UNITS: 100 INJECTION, SOLUTION PARENTERAL at 17:24

## 2022-07-05 ASSESSMENT — COGNITIVE AND FUNCTIONAL STATUS - GENERAL
SUGGESTED CMS G CODE MODIFIER DAILY ACTIVITY: CH
SUGGESTED CMS G CODE MODIFIER MOBILITY: CH
DAILY ACTIVITIY SCORE: 24
MOBILITY SCORE: 24

## 2022-07-05 ASSESSMENT — LIFESTYLE VARIABLES
TOTAL SCORE: 0
AVERAGE NUMBER OF DAYS PER WEEK YOU HAVE A DRINK CONTAINING ALCOHOL: 0
HOW MANY TIMES IN THE PAST YEAR HAVE YOU HAD 5 OR MORE DRINKS IN A DAY: 0
EVER FELT BAD OR GUILTY ABOUT YOUR DRINKING: NO
EVER HAD A DRINK FIRST THING IN THE MORNING TO STEADY YOUR NERVES TO GET RID OF A HANGOVER: NO
CONSUMPTION TOTAL: NEGATIVE
HAVE PEOPLE ANNOYED YOU BY CRITICIZING YOUR DRINKING: NO
ALCOHOL_USE: YES
HAVE YOU EVER FELT YOU SHOULD CUT DOWN ON YOUR DRINKING: NO
ON A TYPICAL DAY WHEN YOU DRINK ALCOHOL HOW MANY DRINKS DO YOU HAVE: 1

## 2022-07-05 ASSESSMENT — PATIENT HEALTH QUESTIONNAIRE - PHQ9
1. LITTLE INTEREST OR PLEASURE IN DOING THINGS: NOT AT ALL
2. FEELING DOWN, DEPRESSED, IRRITABLE, OR HOPELESS: NOT AT ALL
SUM OF ALL RESPONSES TO PHQ9 QUESTIONS 1 AND 2: 0

## 2022-07-05 ASSESSMENT — PAIN DESCRIPTION - PAIN TYPE
TYPE: ACUTE PAIN
TYPE: ACUTE PAIN

## 2022-07-05 NOTE — PROGRESS NOTES
Received pt from [facility], pt resting in bed, no concerns or complaints at this time. Assuming pt care, all safety measures in place at this time. Verbalized POC.

## 2022-07-05 NOTE — PROGRESS NOTES
Med rec updated and complete, per pt   Allergies reviewed, per pt   Pt reports that he has not had to take his MONTELUKAST 10MG in over 7 months or longer.

## 2022-07-05 NOTE — CARE PLAN
The patient is Watcher - Medium risk of patient condition declining or worsening    Shift Goals  Clinical Goals: Monitor chest pain  Patient Goals: find out what's causing chest pain    Progress made toward(s) clinical / shift goals:  rest    Patient is not progressing towards the following goals:      Problem: Pain - Standard  Goal: Alleviation of pain or a reduction in pain to the patient’s comfort goal  Outcome: Progressing  Note: Verbalized to pt the importance of utilizing the pain scale of 0/10 to accurately treat pain accurately.      Problem: Knowledge Deficit - Standard  Goal: Patient and family/care givers will demonstrate understanding of plan of care, disease process/condition, diagnostic tests and medications  Outcome: Progressing  Note: Explained to pt the importance with chest pain to not be taken for granted it is always muscle and investigation of route cause is always warranted.

## 2022-07-05 NOTE — ED TRIAGE NOTES
"Chief Complaint   Patient presents with   • Chest Pain     Chest pain started this am.  Denies any shortness of breath/cough/n/v/dizziness.  Nothing relieves the pain.       BP (!) 145/81   Pulse 64   Temp 35.9 °C (96.7 °F) (Temporal)   Resp 18   Ht 1.905 m (6' 3\")   Wt 110 kg (242 lb 8.1 oz)   SpO2 95%   BMI 30.31 kg/m²     Covid vaccinated x4.  "

## 2022-07-05 NOTE — CARE PLAN
The patient is Watcher - Medium risk of patient condition declining or worsening    Shift Goals  Clinical Goals: Monitor chest pain  Patient Goals: find out what's causing chest pain    Progress made toward(s) clinical / shift goals:    Problem: Pain - Standard  Goal: Alleviation of pain or a reduction in pain to the patient’s comfort goal  Outcome: Progressing  Note: Chest discomfort still present with deep breaths/movement     Problem: Knowledge Deficit - Standard  Goal: Patient and family/care givers will demonstrate understanding of plan of care, disease process/condition, diagnostic tests and medications  Outcome: Progressing     Problem: Discharge Barriers/Planning  Goal: Patient's continuum of care needs are met  Outcome: Progressing  Flowsheets (Taken 7/5/2022 3319)  Continuum of Care Needs: Assessed for discharge barriers       Patient is not progressing towards the following goals:

## 2022-07-05 NOTE — DISCHARGE INSTRUCTIONS
Discharge Instructions    Discharged to home by car with relative. Discharged via wheelchair, hospital escort: Yes.  Special equipment needed: Not Applicable    Be sure to schedule a follow-up appointment with your primary care doctor or any specialists as instructed.     Discharge Plan:   Diet Plan: Discussed  Activity Level: Discussed  Confirmed Follow up Appointment: Appointment Scheduled  Confirmed Symptoms Management: Discussed  Medication Reconciliation Updated: Yes    I understand that a diet low in cholesterol, fat, and sodium is recommended for good health. Unless I have been given specific instructions below for another diet, I accept this instruction as my diet prescription.   Other diet: Cardiac    Special Instructions: None    -Is this patient being discharged with medication to prevent blood clots?  No    Is patient discharged on Warfarin / Coumadin?   No           Discharge Instructions per Sarah Nance M.D.  Follow-up with  primary care provider within 5 days.  DIAGNOSIS: Chest pain  Return to ER if you develop any of the following symptoms palpitations, chest pain or shortness of breath

## 2022-07-05 NOTE — ED PROVIDER NOTES
"ED Provider Note    Scribed for Twila Ascencio M.D. by Marlo Reece. 7/4/2022  9:04 PM    Primary care provider: Lynette Payne M.D.  Means of arrival: Walk-in  History obtained from: Patient  History limited by: None    CHIEF COMPLAINT  Chief Complaint   Patient presents with   • Chest Pain     Chest pain started this am.  Denies any shortness of breath/cough/n/v/dizziness.  Nothing relieves the pain.       HPI  Abiel Vaughn is a 71 y.o. male who presents to the Emergency Department for acute, mild chest pain onset 9:30 AM this morning. Per patient, he woke up this morning when he felt sudden chest pain. He says he does push ups every day and is unsure if it is related to exercise. He describes the pain as a \"pressure.\" Denies swelling, nausea, sweating, abdominal pain, diarrhea, vomiting, cough, or cold symptoms. He has leg swelling bilaterally but states that this is normal. He says he normally takes Prilosec and famotidine but says his pain does not feel like acid reflex, No alleviating or exacerbating factors reported. He has a history of a 4 cm aortic aneurysm which he is medicated for as well as heart murmur. He has a surgical history of appendectomy but denies cholecystectomy. His father had a history of cardiovascular disease.    REVIEW OF SYSTEMS  CARDIAC: chest pain   PULMONARY: no cough  GI: no vomiting, diarrhea, or abdominal pain   Musculoskeletal: chronic leg swelling  Endocrine: no sweating    See history of present illness. All other systems are negative. C.    PAST MEDICAL HISTORY   has a past medical history of Dyslipidemia, GERD (gastroesophageal reflux disease), and Insomnia.    SURGICAL HISTORY   has a past surgical history that includes appendectomy; tonsillectomy; lumbar medial branch blocks (Right, 8/24/2020); lumbar medial branch blocks (Right, 10/12/2020); neuro dest facet l/s w/ig sngl (Right, 11/23/2020); fluoroscopic guidance needle placement (7/28/2021); percut implnt " neuroelect,epidural (7/28/2021); drain/inject large joint/bursa (Right, 10/27/2021); and fluoroscopic guidance needle placement (Right, 10/27/2021).    SOCIAL HISTORY  Social History     Tobacco Use   • Smoking status: Never Smoker   • Smokeless tobacco: Never Used   Vaping Use   • Vaping Use: Never used   Substance Use Topics   • Alcohol use: Yes     Alcohol/week: 0.6 oz     Types: 1 Glasses of wine per week   • Drug use: No      Social History     Substance and Sexual Activity   Drug Use No       FAMILY HISTORY  Family History   Problem Relation Age of Onset   • Cancer Mother 83        endometrial   • Diabetes Mother    • Heart Disease Father    • No Known Problems Maternal Grandmother    • No Known Problems Maternal Grandfather    • No Known Problems Paternal Grandmother    • No Known Problems Paternal Grandfather    • Hyperlipidemia Neg Hx        CURRENT MEDICATIONS  Home Medications     Reviewed by Ekta Galloway R.N. (Registered Nurse) on 07/04/22 at 2045  Med List Status: Partial   Medication Last Dose Status   aspirin EC (ECOTRIN) 81 MG Tablet Delayed Response 7/4/2022 Active   atorvastatin (LIPITOR) 20 MG Tab 7/4/2022 Active   CALCIUM PO  Active   Cholecalciferol (VITAMIN D3) 2000 UNIT Cap 7/4/2022 Active   diclofenac DR (VOLTAREN) 75 MG Tablet Delayed Response  Active   famotidine (PEPCID) 40 MG Tab  Active   gabapentin (NEURONTIN) 100 MG Cap 7/4/2022 Active   metFORMIN ER (GLUCOPHAGE XR) 750 MG TABLET SR 24 HR 7/4/2022 Active   metoprolol SR (TOPROL XL) 25 MG TABLET SR 24 HR 7/4/2022 Active   montelukast (SINGULAIR) 10 MG Tab  Active   Multiple Vitamins-Minerals (MULTIVITAMIN ADULT) Tab  Active   pantoprazole (PROTONIX) 40 MG Tablet Delayed Response 7/4/2022 Active   rosuvastatin (CRESTOR) 20 MG Tab 7/4/2022 Active   TURMERIC PO  Active   valACYclovir (VALTREX) 500 MG Tab 7/4/2022 Active   zolpidem (AMBIEN) 10 MG Tab 7/3/2022 Active                ALLERGIES  Allergies   Allergen Reactions   • Seasonal  "Itching and Runny Nose       PHYSICAL EXAM  VITAL SIGNS: BP (!) 145/81   Pulse 64   Temp 35.9 °C (96.7 °F) (Temporal)   Resp 18   Ht 1.905 m (6' 3\")   Wt 110 kg (242 lb 8.1 oz)   SpO2 95%   BMI 30.31 kg/m²     Constitutional: Well developed, Well nourished, No acute distress, Non-toxic appearance.   HEENT: Normocephalic, Atraumatic,  external ears normal, pharynx pink,  Mucous  Membranes moist, No rhinorrhea or mucosal edema  Eyes: PERRL, EOMI, Conjunctiva normal, No discharge.   Neck: Normal range of motion, No tenderness, Supple, No stridor.   Lymphatic: No lymphadenopathy    Cardiovascular: Regular Rate and Rhythm, No murmurs,  rubs, or gallops.   Thorax & Lungs: Lungs clear to auscultation bilaterally, No respiratory distress, No wheezes, rhales or rhonchi, No chest wall tenderness.   Abdomen: Bowel sounds normal, Soft, non tender, non distended,  No pulsatile masses., no rebound guarding or peritoneal signs.   Skin: Warm, Dry, No erythema, No rash,   Back:  No CVA tenderness,  No spinal tenderness, bony crepitance, step offs, or instability.   Neurologic: Alert & oriented clear speech no focal deficits  Extremities: Equal, intact distal pulses, Bilateral 2+ edema of lower extremities which he states is chronic  No tenderness.   Musculoskeletal: Good range of motion in all major joints. No tenderness to palpation or major deformities noted.     DIAGNOSTIC STUDIES / PROCEDURES    LABS  Results for orders placed or performed during the hospital encounter of 07/04/22   CBC with Differential   Result Value Ref Range    WBC 9.3 4.8 - 10.8 K/uL    RBC 4.47 (L) 4.70 - 6.10 M/uL    Hemoglobin 14.0 14.0 - 18.0 g/dL    Hematocrit 41.1 (L) 42.0 - 52.0 %    MCV 91.9 81.4 - 97.8 fL    MCH 31.3 27.0 - 33.0 pg    MCHC 34.1 33.7 - 35.3 g/dL    RDW 44.1 35.9 - 50.0 fL    Platelet Count 190 164 - 446 K/uL    MPV 11.0 9.0 - 12.9 fL    Neutrophils-Polys 62.80 44.00 - 72.00 %    Lymphocytes 26.40 22.00 - 41.00 %    Monocytes " 7.30 0.00 - 13.40 %    Eosinophils 2.70 0.00 - 6.90 %    Basophils 0.60 0.00 - 1.80 %    Immature Granulocytes 0.20 0.00 - 0.90 %    Nucleated RBC 0.00 /100 WBC    Neutrophils (Absolute) 5.86 1.82 - 7.42 K/uL    Lymphs (Absolute) 2.46 1.00 - 4.80 K/uL    Monos (Absolute) 0.68 0.00 - 0.85 K/uL    Eos (Absolute) 0.25 0.00 - 0.51 K/uL    Baso (Absolute) 0.06 0.00 - 0.12 K/uL    Immature Granulocytes (abs) 0.02 0.00 - 0.11 K/uL    NRBC (Absolute) 0.00 K/uL   Complete Metabolic Panel (CMP)   Result Value Ref Range    Sodium 141 135 - 145 mmol/L    Potassium 4.1 3.6 - 5.5 mmol/L    Chloride 107 96 - 112 mmol/L    Co2 20 20 - 33 mmol/L    Anion Gap 14.0 7.0 - 16.0    Glucose 145 (H) 65 - 99 mg/dL    Bun 20 8 - 22 mg/dL    Creatinine 1.06 0.50 - 1.40 mg/dL    Calcium 9.0 8.4 - 10.2 mg/dL    AST(SGOT) 26 12 - 45 U/L    ALT(SGPT) 22 2 - 50 U/L    Alkaline Phosphatase 75 30 - 99 U/L    Total Bilirubin 0.4 0.1 - 1.5 mg/dL    Albumin 4.2 3.2 - 4.9 g/dL    Total Protein 6.5 6.0 - 8.2 g/dL    Globulin 2.3 1.9 - 3.5 g/dL    A-G Ratio 1.8 g/dL   Troponin   Result Value Ref Range    Troponin T 12 6 - 19 ng/L   D-DIMER   Result Value Ref Range    D-Dimer Screen 1.23 (H) 0.00 - 0.50 ug/mL (FEU)   ESTIMATED GFR   Result Value Ref Range    GFR (CKD-EPI) 74 >60 mL/min/1.73 m 2   EKG   Result Value Ref Range    Report       [facility] Emergency Dept.    Test Date:  2022  Pt Name:    LUCÍA HIRSCHTHAL             Department: [facility]  MRN:        [id number]                      Room:  Gender:     Male                         Technician: ABEBA  :        [id number]                   Requested By:ER TRIAGE PROTOCOL  Order #:    [id number]                    Reading MD: JOHN SULTANA MD    Measurements  Intervals                                Axis  Rate:       64                           P:          37  AR:         228                          QRS:        1  QRSD:       94                           T:          29  QT:          380  QTc:        392    Interpretive Statements  SINUS RHYTHM  FIRST DEGREE AV BLOCK  Compared to ECG 12/01/2021 15:41:04  No significant changes  Electronically Signed On 7-4-2022 21:00:53 PDT by JOHN SULTANA MD       All labs reviewed by me.    EKG  12 Lead EKG interpreted by me as shown above.    RADIOLOGY  DX-CHEST-PORTABLE (1 VIEW)   Final Result      1.  No acute cardiopulmonary process is seen.   2.  Atherosclerotic plaque.      EC-ECHOCARDIOGRAM COMPLETE W/ CONT    (Results Pending)   NM-CARDIAC STRESS TEST    (Results Pending)   US-EXTREMITY VENOUS LOWER UNILAT RIGHT    (Results Pending)     The radiologist's interpretation of all radiological studies have been reviewed by me.    COURSE & MEDICAL DECISION MAKING  Nursing notes, VS, PMSFHx reviewed in chart.    9:04 PM - Patient seen and examined at bedside. Discussed plan of care, including labs and radiology. Patient agrees to plan of care. Ordered DX-Chest, D-Dimer, CBC w/ Diff, CMP, Troponin, and EKG to evaluate his symptoms. The differential diagnoses include but are not limited to: Cardiac Ischemia vs. Costochondritis vs. Pneumonia vs. PE    9:29 PM - Paged Hospitalist    9:32 PM - Patient was reevaluated at bedside. Discussed my plan to consult with the hospitalist for admission for further evaluation. Patient agrees to the plan of care.    9:40 PM - I discussed the patient's case and the above findings with Dr. Anderson (Hospitalist) who will assess the patient for hospitalization.    Heart Score is: 5     DISPOSITION:  Patient will be hospitalized by Dr. Anderson in guarded condition.    FINAL IMPRESSION  1. Chest pain, unspecified type       I, Marlo Reece (Danie), am scribing for, and in the presence of, John Sultana M.D..    Electronically signed by: Marlo Reece (Rmibkeara), 7/4/2022    IJohn M.D. personally performed the services described in this documentation, as scribed by Marlo Reece in my presence, and it is both accurate and  complete.    The note accurately reflects work and decisions made by me.  Twila Ascencio M.D.  7/4/2022  9:58 PM

## 2022-07-05 NOTE — ASSESSMENT & PLAN NOTE
Chest pain, heart score 5  Pain could be also from GERD, costochondritis    -Admit to telemetry  -Follow serial troponin/CK-MB/EKG  -Transthoracic echocardiograph to identify regional wall motion abnormalities and assess LV function  -Stress test  -Aspirin 325 mg, metoprolol, Lipitor  -Check lipid panel

## 2022-07-05 NOTE — H&P
Hospital Medicine History & Physical Note    Date of Service  7/4/2022    Primary Care Physician  Lynette Payne M.D.        Code Status  Full Code    Chief Complaint  Chief Complaint   Patient presents with   • Chest Pain     Chest pain started this am.  Denies any shortness of breath/cough/n/v/dizziness.  Nothing relieves the pain.         History of Presenting Illness  Abiel Vaughn is a 71 y.o. male with past medical history of diabetes mellitus, aortic aneurysm, dyslipidemia, GERD insomnia who presented 7/4/2022 with chest pain,pressure like, non radiating   that started this morning. Denies exertional chest pain, orthopnea , shortness of breathe.  Stress test was done 10 years ago.     Denies fever , cough ,  N/V, palpation, .No change in bowel and bladder habit , no change in weight . Denies abdominal pain , no melena . No rash /ulcer. Denies weakness/numbness,visual changes ,headache ,syncope.     On arrival to ED patient vitals remained stable.  Labs noted with negative troponin, and D-dimer.  EKG noted with first degree AV block otherwise unremarkable.    Heart score of 5.    ED physician requested for admission for chest pain to rule out ACS requiring stress test      I discussed the plan of care with patient.    Review of Systems  Review of Systems   Constitutional: Negative for fever.   HENT: Negative for hearing loss.    Eyes: Negative for blurred vision.   Respiratory: Negative for cough and shortness of breath.    Cardiovascular: Positive for chest pain.   Gastrointestinal: Negative for nausea and vomiting.   Genitourinary: Negative for dysuria.   Musculoskeletal: Negative for myalgias.   Skin: Negative for rash.   Neurological: Negative for dizziness.   Endo/Heme/Allergies: Does not bruise/bleed easily.       Past Medical History   has a past medical history of Dyslipidemia, GERD (gastroesophageal reflux disease), and Insomnia.    Surgical History   has a past surgical history that  includes appendectomy; tonsillectomy; lumbar medial branch blocks (Right, 8/24/2020); lumbar medial branch blocks (Right, 10/12/2020); neuro dest facet l/s w/ig sngl (Right, 11/23/2020); pr fluoroscopic guidance needle placement (7/28/2021); pr percut implnt neuroelect,epidural (7/28/2021); pr drain/inject large joint/bursa (Right, 10/27/2021); and pr fluoroscopic guidance needle placement (Right, 10/27/2021).     Family History  family history includes Cancer (age of onset: 83) in his mother; Diabetes in his mother; Heart Disease in his father; No Known Problems in his maternal grandfather, maternal grandmother, paternal grandfather, and paternal grandmother.   Family history reviewed with patient. There is no family history that is pertinent to the chief complaint.     Social History   reports that he has never smoked. He has never used smokeless tobacco. He reports current alcohol use of about 0.6 oz of alcohol per week. He reports that he does not use drugs.    Allergies  Allergies   Allergen Reactions   • Seasonal Itching and Runny Nose       Medications  Prior to Admission Medications   Prescriptions Last Dose Informant Patient Reported? Taking?   CALCIUM PO   Yes No   Cholecalciferol (VITAMIN D3) 2000 UNIT Cap 7/4/2022 at Unknown time  Yes No   Sig: Take  by mouth 2 Times a Day.   Multiple Vitamins-Minerals (MULTIVITAMIN ADULT) Tab   Yes No   Sig: Take  by mouth.   TURMERIC PO   Yes No   Sig: Take  by mouth.   aspirin EC (ECOTRIN) 81 MG Tablet Delayed Response 7/4/2022 at 1100  Yes No   Sig: Take 81 mg by mouth every day.   atorvastatin (LIPITOR) 20 MG Tab 7/4/2022 at Unknown time  Yes No   Sig: Take 20 mg by mouth every evening.   diclofenac DR (VOLTAREN) 75 MG Tablet Delayed Response   No No   Sig: TAKE 1 TABLET TWICE A DAY.   famotidine (PEPCID) 40 MG Tab   Yes No   gabapentin (NEURONTIN) 100 MG Cap 7/4/2022 at Unknown time  No No   Sig: TAKE 1 CAPSULE 3 TIMES A   DAY   metFORMIN ER (GLUCOPHAGE XR) 750 MG  TABLET SR 24 HR 7/4/2022 at Unknown time  No No   Sig: TAKE 1 TABLET DAILY   metoprolol SR (TOPROL XL) 25 MG TABLET SR 24 HR 7/4/2022 at Unknown time  No No   Sig: Take 0.5 Tablets by mouth every evening.   montelukast (SINGULAIR) 10 MG Tab   No No   Sig: Take 1 tablet by mouth every day.   pantoprazole (PROTONIX) 40 MG Tablet Delayed Response 7/4/2022 at Unknown time  No No   Sig: TAKE 1 TABLET DAILY DUE TO SEVERE GASTROESOPHAGEAL    REFLUX DISEASE   rosuvastatin (CRESTOR) 20 MG Tab 7/4/2022 at Unknown time  No No   Sig: Take 1 Tablet by mouth every day.   valACYclovir (VALTREX) 500 MG Tab 7/4/2022 at Unknown time  No No   Sig: TAKE 1 TABLET BID   zolpidem (AMBIEN) 10 MG Tab 7/3/2022 at Unknown time  No No   Sig: TAKE 1 TABLET AT BEDTIME ASNEEDED FOR SLEEP FOR UP TO 90 DAYS      Facility-Administered Medications: None       Physical Exam  Temp:  [35.9 °C (96.7 °F)] 35.9 °C (96.7 °F)  Pulse:  [64] 64  Resp:  [18] 18  BP: (145)/(81) 145/81  SpO2:  [95 %] 95 %  Blood Pressure : (!) 145/81   Temperature: 35.9 °C (96.7 °F)   Pulse: 64   Respiration: 18   Pulse Oximetry: 95 %       Physical Exam  Constitutional:       General: He is not in acute distress.  HENT:      Head: Normocephalic and atraumatic.      Right Ear: Tympanic membrane normal.      Left Ear: Tympanic membrane normal.      Nose: Nose normal.      Mouth/Throat:      Mouth: Mucous membranes are moist.   Eyes:      Extraocular Movements: Extraocular movements intact.      Pupils: Pupils are equal, round, and reactive to light.   Cardiovascular:      Rate and Rhythm: Normal rate and regular rhythm.      Pulses: Normal pulses.   Pulmonary:      Effort: Pulmonary effort is normal. No respiratory distress.   Abdominal:      General: Abdomen is flat. There is no distension.   Musculoskeletal:      Cervical back: Normal range of motion.      Right lower leg: Edema present.      Left lower leg: Edema present.      Comments: Left lower  Chest tenderness,  Right  lower leg edema >left leg   Skin:     General: Skin is warm.   Neurological:      General: No focal deficit present.      Mental Status: He is alert and oriented to person, place, and time. Mental status is at baseline.   Psychiatric:         Mood and Affect: Mood normal.         Laboratory:  Recent Labs     07/04/22  2100   WBC 9.3   RBC 4.47*   HEMOGLOBIN 14.0   HEMATOCRIT 41.1*   MCV 91.9   MCH 31.3   MCHC 34.1   RDW 44.1   PLATELETCT 190   MPV 11.0     Recent Labs     07/04/22  2100   SODIUM 141   POTASSIUM 4.1   CHLORIDE 107   CO2 20   GLUCOSE 145*   BUN 20   CREATININE 1.06   CALCIUM 9.0     Recent Labs     07/04/22  2100   ALTSGPT 22   ASTSGOT 26   ALKPHOSPHAT 75   TBILIRUBIN 0.4   GLUCOSE 145*         No results for input(s): NTPROBNP in the last 72 hours.      Recent Labs     07/04/22  2100   TROPONINT 12       Imaging:  DX-CHEST-PORTABLE (1 VIEW)    (Results Pending)   EC-ECHOCARDIOGRAM COMPLETE W/ CONT    (Results Pending)   NM-CARDIAC STRESS TEST    (Results Pending)       X-Ray:  I have personally reviewed the images and compared with prior images.  EKG:  I have personally reviewed the images and compared with prior images.    Assessment/Plan:  Justification for Admission Status  I anticipate this patient is appropriate for observation status at this time because Chest pain requiring telemetry monitoring, stress test to rule out ACS    * Chest pain, atypical- (present on admission)  Assessment & Plan  Chest pain, heart score 5  Pain could be also from GERD, costochondritis    -Admit to telemetry  -Follow serial troponin/CK-MB/EKG  -Transthoracic echocardiograph to identify regional wall motion abnormalities and assess LV function  -Stress test  -Aspirin 325 mg, metoprolol, Lipitor  -Check lipid panel      Edema of both lower legs  Assessment & Plan  Right lower leg edema >left leg.  Check BNP   Get ECHO   Right lower extremities US    Ascending aortic aneurysm (HCC)- (present on admission)  Assessment &  Plan  Following up with cardiology as outpatient       VTE prophylaxis: SCDs/TEDs and Xarelto 10 mg daily as prophylaxis

## 2022-07-05 NOTE — DISCHARGE SUMMARY
Discharge Summary    CHIEF COMPLAINT ON ADMISSION  Chief Complaint   Patient presents with   • Chest Pain     Chest pain started this am.  Denies any shortness of breath/cough/n/v/dizziness.  Nothing relieves the pain.       Reason for Admission  Chest pain     Admission Date  7/4/2022    CODE STATUS  Full Code    HPI & HOSPITAL COURSE  This is a 71 y.o. male with a past medial history of diabetes, aortic aneurysm, hyperlipedemai admitted July 4th with chest pain.  Troponins were not elevated. Echo showed a normal right and left ventricular size and function.  The left ventricular ejection fraction is visually estimated to be 55%. Mild concentric left ventricular hypertrophy. Aortic valve sclerosis without significant stenosis.  Moderate aortic insufficiency. Normal right atrial pressure. The ascending aorta is dilated with a diameter of 4.2 cm. Chest pain resolved. Stress test showed no evidence of significant jeopardized viable myocardium or prior myocardial infarction. Therefore, he is discharged in good and stable condition to home with close outpatient follow-up.    Discharge Date  7/5/2022     DISCHARGE DIAGNOSES  Principal Problem (Resolved):    Chest pain, atypical POA: Yes  Active Problems:    Ascending aortic aneurysm (HCC) POA: Yes    Edema of both lower legs POA: Yes    FOLLOW UP  Future Appointments   Date Time Provider Department Center   7/5/2022 10:45 AM [facility] MARISSA MCKENZIE Borrego   7/6/2022 11:20 AM Lynette Payne M.D. [facility]   7/14/2022  3:00 PM Erika Hobbs M.D. [facility]   7/28/2022  3:00 PM Erika Hobbs M.D. [facility]     MEDICATIONS ON DISCHARGE     Medication List      CHANGE how you take these medications      Instructions   gabapentin 100 MG Caps  What changed: when to take this  Commonly known as: NEURONTIN   TAKE 1 CAPSULE 3 TIMES A   DAY     pantoprazole 40 MG Tbec  What changed: See the new instructions.  Commonly known as: PROTONIX   TAKE 1 TABLET  DAILY DUE TO SEVERE GASTROESOPHAGEAL    REFLUX DISEASE     valACYclovir 500 MG Tabs  What changed:   · how much to take  · how to take this  · when to take this  Commonly known as: VALTREX   TAKE 1 TABLET BID        CONTINUE taking these medications      Instructions   Aleve 220 MG tablet  Generic drug: naproxen   Take 440 mg by mouth 2 times a day as needed. Indications: Pain  Dose: 440 mg     aspirin EC 81 MG Tbec  Commonly known as: ECOTRIN   Take 81 mg by mouth every day.  Dose: 81 mg     CALCIUM PO   Take 1 Tablet by mouth every day.  Dose: 1 Tablet     diclofenac DR 75 MG Tbec  Commonly known as: Voltaren   TAKE 1 TABLET TWICE A DAY     famotidine 40 MG Tabs  Commonly known as: PEPCID   Take 40 mg by mouth as needed. Indications: Heartburn  Dose: 40 mg     metFORMIN  MG Tb24  Commonly known as: GLUCOPHAGE XR   TAKE 1 TABLET DAILY     metoprolol SR 25 MG Tb24  Commonly known as: TOPROL XL   Take 0.5 Tablets by mouth every evening.  Dose: 12.5 mg     Multivitamin Adult Tabs   Take 1 Tablet by mouth every day.  Dose: 1 Tablet     oxybutynin SR 10 MG CR tablet  Commonly known as: DITROPAN-XL   Take 10 mg by mouth every day.  Dose: 10 mg     rosuvastatin 20 MG Tabs  Commonly known as: CRESTOR   Take 1 Tablet by mouth every day.  Dose: 20 mg     VITAMIN D3 PO   Take 1 Capsule by mouth every day.  Dose: 1 Capsule     zolpidem 10 MG Tabs  Commonly known as: AMBIEN   Take 10 mg by mouth at bedtime.  Dose: 10 mg          Allergies  Allergies   Allergen Reactions   • Seasonal Itching and Runny Nose     DIET  Orders Placed This Encounter   Procedures   • Diet Order Diet: Cardiac     Standing Status:   Standing     Number of Occurrences:   1     Order Specific Question:   Diet:     Answer:   Cardiac [6]     LABORATORY  Lab Results   Component Value Date    SODIUM 141 07/05/2022    POTASSIUM 4.0 07/05/2022    CHLORIDE 108 07/05/2022    CO2 24 07/05/2022    GLUCOSE 114 (H) 07/05/2022    BUN 17 07/05/2022    CREATININE  1.06 07/05/2022      Lab Results   Component Value Date    WBC 9.3 07/04/2022    HEMOGLOBIN 14.0 07/04/2022    HEMATOCRIT 41.1 (L) 07/04/2022    PLATELETCT 190 07/04/2022      Total time of the discharge process exceeds 36 minutes.

## 2022-07-05 NOTE — PROGRESS NOTES
12-hour chart check complete.    Monitor Summary  Rhythm: SR-SB  Rate: 43-60's  Ectopy: rPVC  Measurements: 0.24/0.08/0.40

## 2022-07-05 NOTE — PROGRESS NOTES
4 Eyes Skin Assessment Completed by CRYSTAL Torres and CRYSTAL Weiss.    Head WDL  Ears WDL  Nose WDL  Mouth WDL  Neck WDL  Breast/Chest WDL  Shoulder Blades WDL  Spine WDL  (R) Arm/Elbow/Hand WDL  (L) Arm/Elbow/Hand WDL  Abdomen WDL  Groin WDL  Scrotum/Coccyx/Buttocks WDL  (R) Leg Swelling  (L) Leg Swelling  (R) Heel/Foot/Toe WDL  (L) Heel/Foot/Toe WDL          Devices In Places Tele Box      Interventions In Place Q2 Turns    Possible Skin Injury No    Pictures Uploaded Into Epic N/A  Wound Consult Placed N/A  RN Wound Prevention Protocol Ordered No

## 2022-07-06 ENCOUNTER — PATIENT OUTREACH (OUTPATIENT)
Dept: HEALTH INFORMATION MANAGEMENT | Facility: OTHER | Age: 72
End: 2022-07-06

## 2022-07-06 ENCOUNTER — TELEMEDICINE (OUTPATIENT)
Dept: MEDICAL GROUP | Age: 72
End: 2022-07-06
Payer: MEDICARE

## 2022-07-06 VITALS — RESPIRATION RATE: 16 BRPM | WEIGHT: 242 LBS | BODY MASS INDEX: 30.09 KG/M2 | HEIGHT: 75 IN

## 2022-07-06 DIAGNOSIS — D69.6 THROMBOCYTOPENIA (HCC): ICD-10-CM

## 2022-07-06 DIAGNOSIS — I35.9 AORTIC VALVE DISORDER: ICD-10-CM

## 2022-07-06 DIAGNOSIS — F51.01 PRIMARY INSOMNIA: ICD-10-CM

## 2022-07-06 DIAGNOSIS — E11.9 CONTROLLED TYPE 2 DIABETES MELLITUS WITHOUT COMPLICATION, WITHOUT LONG-TERM CURRENT USE OF INSULIN (HCC): ICD-10-CM

## 2022-07-06 DIAGNOSIS — I71.21 ASCENDING AORTIC ANEURYSM (HCC): ICD-10-CM

## 2022-07-06 DIAGNOSIS — E78.5 DYSLIPIDEMIA: ICD-10-CM

## 2022-07-06 PROBLEM — Z09 HOSPITAL DISCHARGE FOLLOW-UP: Status: ACTIVE | Noted: 2022-07-06

## 2022-07-06 PROCEDURE — 99214 OFFICE O/P EST MOD 30 MIN: CPT | Mod: 95 | Performed by: INTERNAL MEDICINE

## 2022-07-06 RX ORDER — ZOLPIDEM TARTRATE 10 MG/1
10 TABLET ORAL
Qty: 30 TABLET | Refills: 2 | Status: SHIPPED | OUTPATIENT
Start: 2022-07-06 | End: 2022-07-11

## 2022-07-06 SDOH — ECONOMIC STABILITY: FOOD INSECURITY: WITHIN THE PAST 12 MONTHS, THE FOOD YOU BOUGHT JUST DIDN'T LAST AND YOU DIDN'T HAVE MONEY TO GET MORE.: NEVER TRUE

## 2022-07-06 SDOH — ECONOMIC STABILITY: TRANSPORTATION INSECURITY
IN THE PAST 12 MONTHS, HAS LACK OF TRANSPORTATION KEPT YOU FROM MEETINGS, WORK, OR FROM GETTING THINGS NEEDED FOR DAILY LIVING?: NO

## 2022-07-06 SDOH — ECONOMIC STABILITY: TRANSPORTATION INSECURITY
IN THE PAST 12 MONTHS, HAS THE LACK OF TRANSPORTATION KEPT YOU FROM MEDICAL APPOINTMENTS OR FROM GETTING MEDICATIONS?: NO

## 2022-07-06 SDOH — ECONOMIC STABILITY: FOOD INSECURITY: WITHIN THE PAST 12 MONTHS, YOU WORRIED THAT YOUR FOOD WOULD RUN OUT BEFORE YOU GOT MONEY TO BUY MORE.: NEVER TRUE

## 2022-07-06 SDOH — ECONOMIC STABILITY: INCOME INSECURITY: HOW HARD IS IT FOR YOU TO PAY FOR THE VERY BASICS LIKE FOOD, HOUSING, MEDICAL CARE, AND HEATING?: NOT HARD AT ALL

## 2022-07-06 ASSESSMENT — FIBROSIS 4 INDEX: FIB4 SCORE: 1.8

## 2022-07-06 NOTE — PROGRESS NOTES
7/6/22- CLINT Means contacted pt via TC post d/c to introduce CCM services. Completed SDOH screening and outpatient assessment. Pt had follow up with PCP today. Pt mentions good support from family and friends. No medical equipment used at home. Pt is confident in ability to manage care post d/c. No issues keeping appointments or financial barriers to care. Completed AVS review/ medication/ questions. Pt denies need for resources such as food, transportation or housing. CCM contact info left with pt. Encouraged pt to contact if needed.     Community Health Worker Intake  • Social determinates of health intake completed.   • Identified barriers to none.  • Contact information provided to Abiel Vaughn. Yes   • Has PCP appointment scheduled for 7/6/22  • Scheduled Food Delivery/Home Visit/Outpatient Visit: No   • Accepted/Declined Med's-To-Beds. N/a  • Inpatient/Outpatient assessment completed. Outpatient   • Did the patient receive medications post discharge: Yes    Plan: Additional 30 day follow up call.

## 2022-07-06 NOTE — PROGRESS NOTES
Telemedicine Visit: Established Patient     This Remote Face to Face encounter was conducted via Zoom. Given the importance of social distancing and other strategies recommended to reduce the risk of COVID-19 transmission, I am providing medical care to this patient via audio/video visit in place of an in person visit at the request of the patient. Verbal consent to telehealth, risks, benefits, and consequences were discussed. Patient retains the right to withdraw at any time. All existing confidentiality protections apply. The patient has access to all transmitted medical information. No dissemination of any patient images or information to other entities without further written consent.    CHIEF COMPLAINT     Insomnia, DM    HPI  Abiel Vaughn is a 71 y.o. male who presents today for the following      Insomnia  The patient has a trouble to go and stay asleep.   Used Ambien, 10 mg at bedtime.   Discussed sleep hygiene:   - Go to bed and wake up at consistent times whether work/school day or not.   - Keep room dark, quiet, and comfortable.   - Don't have naps.  - Increase exposure to sunlight during awake times and avoid bright lights before going to sleep.   - Avoid stimulant or caffeine use more than 4 hours after wake time.   - Avoid meal or exercise 2-3 hours prior to going to bed.    DM2, controlled  Internval course  HBA1C 6.8 (H) 2022 12:07 PM   HBA1C 6.2 (H) 2021 11:57 AM   HBA1C 6.2 (H) 2021 12:15 PM     Onset/  Diabetes education: ordered     Medications:   · Metformin: 750 mg daily  · ACE/ARB: no  · Statin: atorvstatin  · ASA: yes  Compliant with medications: yes  Checking feet daily/wear soft socks/shoes: advised     Diabetes ABCDE TARGETS  · Blood Pressure, goal < 140/90: yes  · Cholesterol-Lipid Panel: Controlled  · Dysalbuminuria: Pending     Diet: Regular  Exercise: Insufficient  BMI: 29     DM complications:  · Peripheral neuropathy: No numbness or tingling in  feet.  · Retinopathy:   Last eye exam: 2019. No retinopathy.   · Nephropathy:   No  · CVS: No CAD.  · GI: No gastropathy.     FH of DM: mother, ended up with insulin     Blood pressure/Aortic valve disorder  Controlled, no medications.  He has been evaluated/followed up by cardiology.     Echocardiography:   7/5/2022  Normal right and left ventricular size and function.   The left ventricular ejection fraction is visually estimated to be 55%.  Mild concentric left ventricular hypertrophy.  Aortic valve sclerosis without significant stenosis.  Moderate aortic insufficiency.  Normal right atrial pressure.   Unable to estimate pulmonary artery pressure due to an inadequate   tricuspid regurgitant jet.  The ascending aorta is dilated with a diameter of 4.2 cm     Compared to the images of the prior study 1/11/22: The aortic   insufficiency has increased from mild to moderate.     1/9/2019  Left ventricular ejection fraction is visually estimated to be 60%.  Mild concentric left ventricular hypertrophy.  Normal inferior vena cava size and inspiratory collapse.  Mild central aortic insufficiency.  Ascending aorta is dilated with a diameter of  4.3 cm.     Dyslipidemia  Statin: Atorvastatin, 60 mg daily, taking as prescribed.   - No muscle weakness, cramps, nausea,abdominal discomfort.   Diet / exercise / BMI: as above.   FH: neg     Thrombocytopenia  CBC showed thrombocytopenia.  Patient has not have easy bleeding or bruising.    Reviewed PMH, PSH, FH, SH, ALL, HCM/IMM, no changes  Reviewed MEDS, no changes    Patient Active Problem List    Diagnosis Date Noted   • Hospital discharge follow-up 07/06/2022   • Edema of both lower legs 07/04/2022   • Arthritis of right knee 05/05/2022   • Ascending aortic aneurysm (HCC) 04/19/2022   • Chronic pain of right knee 04/19/2022   • Anemia 08/17/2020   • Health care maintenance 08/17/2020   • Medicare annual wellness visit, subsequent 08/17/2020   • Thrombocytopenia (HCC)  08/17/2020   • Leg swelling 08/17/2020   • Controlled type 2 diabetes mellitus without complication, without long-term current use of insulin ([facility]) 08/26/2019   • Seasonal allergies 08/26/2019   • HSV infection 08/26/2019   • Aortic valve disorder 10/29/2018   • Gastroesophageal reflux disease 10/03/2018   • Primary insomnia 10/03/2018   • Dyslipidemia 10/03/2018   • Primary osteoarthritis involving multiple joints 10/03/2018   • Obesity (BMI 30.0-34.9) 10/03/2018   • Decreased hearing, right 10/03/2018     CURRENT MEDICATIONS  Current Outpatient Medications   Medication Sig Dispense Refill   • zolpidem (AMBIEN) 10 MG Tab Take 1 Tablet by mouth at bedtime for 90 days. 30 Tablet 2   • diclofenac DR (VOLTAREN) 75 MG Tablet Delayed Response TAKE 1 TABLET TWICE A DAY (Patient taking differently: Take 75 mg by mouth 2 times a day.) 180 Tablet 1   • naproxen (ALEVE) 220 MG tablet Take 440 mg by mouth 2 times a day as needed. Indications: Pain     • oxybutynin SR (DITROPAN-XL) 10 MG CR tablet Take 10 mg by mouth every day.     • rosuvastatin (CRESTOR) 20 MG Tab Take 1 Tablet by mouth every day. 90 Tablet 4   • gabapentin (NEURONTIN) 100 MG Cap TAKE 1 CAPSULE 3 TIMES A   DAY (Patient taking differently: Take 100 mg by mouth 3 times a day.) 270 Capsule 1   • pantoprazole (PROTONIX) 40 MG Tablet Delayed Response TAKE 1 TABLET DAILY DUE TO SEVERE GASTROESOPHAGEAL    REFLUX DISEASE (Patient taking differently: Take 40 mg by mouth every day.) 90 Tablet 3   • metFORMIN ER (GLUCOPHAGE XR) 750 MG TABLET SR 24 HR TAKE 1 TABLET DAILY (Patient taking differently: Take 750 mg by mouth every day.) 90 Tablet 3   • metoprolol SR (TOPROL XL) 25 MG TABLET SR 24 HR Take 0.5 Tablets by mouth every evening. 45 Tablet 3   • famotidine (PEPCID) 40 MG Tab Take 40 mg by mouth as needed. Indications: Heartburn     • aspirin EC (ECOTRIN) 81 MG Tablet Delayed Response Take 81 mg by mouth every day.     • valACYclovir (VALTREX) 500 MG Tab TAKE 1  TABLET BID (Patient taking differently: Take 500 mg by mouth every day. TAKE 1 TABLET BID) 180 tablet 4   • CALCIUM PO Take 1 Tablet by mouth every day.     • Cholecalciferol (VITAMIN D3 PO) Take 1 Capsule by mouth every day.     • Multiple Vitamins-Minerals (MULTIVITAMIN ADULT) Tab Take 1 Tablet by mouth every day.       No current facility-administered medications for this visit.     ALLERGIES  Allergies: Seasonal  PAST MEDICAL HISTORY  Past Medical History:   Diagnosis Date   • Dyslipidemia    • GERD (gastroesophageal reflux disease)    • Insomnia      SURGICAL HISTORY  He  has a past surgical history that includes appendectomy; tonsillectomy; lumbar medial branch blocks (Right, 2020); lumbar medial branch blocks (Right, 10/12/2020); neuro dest facet l/s w/ig sngl (Right, 2020); pr fluoroscopic guidance needle placement (2021); pr percut implnt neuroelect,epidural (2021); pr drain/inject large joint/bursa (Right, 10/27/2021); and pr fluoroscopic guidance needle placement (Right, 10/27/2021).  SOCIAL HISTORY  Social History     Tobacco Use   • Smoking status: Never Smoker   • Smokeless tobacco: Never Used   Vaping Use   • Vaping Use: Never used   Substance Use Topics   • Alcohol use: Yes     Alcohol/week: 0.6 oz     Types: 1 Glasses of wine per week   • Drug use: No     Social History     Social History Narrative   • Not on file     FAMILY HISTORY  Family History   Problem Relation Age of Onset   • Cancer Mother 83        endometrial   • Diabetes Mother    • Heart Disease Father    • No Known Problems Maternal Grandmother    • No Known Problems Maternal Grandfather    • No Known Problems Paternal Grandmother    • No Known Problems Paternal Grandfather    • Hyperlipidemia Neg Hx      Family Status   Relation Name Status   • Mo     • Fa     • MGMo     • MGFa     • PGMo     • PGFa     • Neg Hx  (Not Specified)       ROS   Constitutional: Negative  "for fever, chills, fatigue.  HENT: Negative for congestion, sore throat.  Eyes: Negative for vision problems.   Respiratory: Negative for cough, shortness of breath.  Cardiovascular: Negative for chest pain, palpitations.   Gastrointestinal: Negative for heartburn, nausea, abdominal pain.   Genitourinary: Negative for dysuria.  Musculoskeletal: Negative for significant myalgia, back and joint pain.   Skin: Negative for rash.   Neuro: Negative for dizziness, weakness and headaches.   Endo/Heme/Allergies: Does not bruise/bleed easily.   Psychiatric/Behavioral: Negative for depression.    PHYSICAL EXAM   VS obtained by patient.  Respiration 16   Height 1.905 m (6' 3\")   Weight 110 kg (242 lb)   Body Mass Index 30.25 kg/m²   General:  NAD, well appearing  HEENT:   NC/AT, PERRLA, EOMI.  Cardiovascular: unlabored breathing, no peripheral cyanosis or swelling.  Lungs:   no respiratory distress.  Abdomen: non- distended.  Extremities:  No LE swelling.  Skin:  Warm, dry.  No erythema. No rash.   Neurologic: Alert & oriented x 3. CN II-XII grossly intact. No focal deficits.  Psychiatric:  Affect normal, mood normal, judgment normal.    Labs     Labs are reviewed and discussed with a patient  Lab Results   Component Value Date/Time    CHOLSTRLTOT 113 07/05/2022 05:31 AM    LDL 66 07/05/2022 05:31 AM    HDL 27 (A) 07/05/2022 05:31 AM    TRIGLYCERIDE 99 07/05/2022 05:31 AM       Lab Results   Component Value Date/Time    SODIUM 141 07/05/2022 05:31 AM    POTASSIUM 4.0 07/05/2022 05:31 AM    CHLORIDE 108 07/05/2022 05:31 AM    CO2 24 07/05/2022 05:31 AM    GLUCOSE 114 (H) 07/05/2022 05:31 AM    BUN 17 07/05/2022 05:31 AM    CREATININE 1.06 07/05/2022 05:31 AM     Lab Results   Component Value Date/Time    ALKPHOSPHAT 67 07/05/2022 05:31 AM    ASTSGOT 21 07/05/2022 05:31 AM    ALTSGPT 20 07/05/2022 05:31 AM    TBILIRUBIN 0.3 07/05/2022 05:31 AM      Lab Results   Component Value Date/Time    HBA1C 6.8 (H) 07/01/2022 12:07 PM    " HBA1C 6.2 (H) 12/29/2021 11:57 AM    HBA1C 6.2 (H) 07/16/2021 12:15 PM     No results found for: TSH  No results found for: FREET4    Lab Results   Component Value Date/Time    WBC 9.3 07/04/2022 09:00 PM    RBC 4.47 (L) 07/04/2022 09:00 PM    HEMOGLOBIN 14.0 07/04/2022 09:00 PM    HEMATOCRIT 41.1 (L) 07/04/2022 09:00 PM    MCV 91.9 07/04/2022 09:00 PM    MCH 31.3 07/04/2022 09:00 PM    MCHC 34.1 07/04/2022 09:00 PM    MPV 11.0 07/04/2022 09:00 PM    NEUTSPOLYS 62.80 07/04/2022 09:00 PM    LYMPHOCYTES 26.40 07/04/2022 09:00 PM    MONOCYTES 7.30 07/04/2022 09:00 PM    EOSINOPHILS 2.70 07/04/2022 09:00 PM    BASOPHILS 0.60 07/04/2022 09:00 PM      Imaging     Reviewed and discussed    Assessment and Plan     Abiel Vaughn is a 71 y.o. male    1. Primary insomnia  - Controlled, continue with current management.  - zolpidem (AMBIEN) 10 MG Tab; Take 1 Tablet by mouth at bedtime for 90 days.  Dispense: 30 Tablet; Refill: 2    Obtained and reviewed patient utilization report from [facility] pharmacy database on 7/6/2022 1:20 PM  prior to writing prescription for controlled substance II, III or IV per [place] bill . Based on assessment of the report, the prescription is medically necessary.     2. Controlled type 2 diabetes mellitus without complication, without long-term current use of insulin (HCC)  - Controlled, continue with current management.  - HEMOGLOBIN A1C; Future  - MICROALBUMIN CREAT RATIO URINE; Future  - Comp Metabolic Panel; Future    3. Ascending aortic aneurysm (HCC)  4. Aortic valve disorder  Reviewed echocardiography.  7/5/22:  NM stress test negative    5. Dyslipidemia  - Controlled, continue with current management.  - Lipid Profile; Future  - Comp Metabolic Panel; Future    6. Thrombocytopenia (HCC)   Borderline, f/u labs  - CBC WITH DIFFERENTIAL; Future    Followup: in 3 months, ambien refill    All questions are answered.    Please note that this dictation was created using voice  recognition software, and that there might be errors of elisabeth and possibly content.

## 2022-07-08 ENCOUNTER — PATIENT OUTREACH (OUTPATIENT)
Dept: HEALTH INFORMATION MANAGEMENT | Facility: OTHER | Age: 72
End: 2022-07-08
Payer: MEDICARE

## 2022-07-08 NOTE — PROGRESS NOTES
7/8/22-  30 day follow up outreach to pt. Pt previously mentions no needs or issues with managing care. Pt was very pleased with service he received. No additional follow up needs. Will close out as all needs met.

## 2022-07-10 DIAGNOSIS — F51.01 PRIMARY INSOMNIA: ICD-10-CM

## 2022-07-11 RX ORDER — ZOLPIDEM TARTRATE 10 MG/1
TABLET ORAL
Qty: 90 TABLET | Refills: 0 | Status: SHIPPED | OUTPATIENT
Start: 2022-07-11 | End: 2022-10-09

## 2022-07-19 PROBLEM — Z98.890 PONV (POSTOPERATIVE NAUSEA AND VOMITING): Status: ACTIVE | Noted: 2022-07-19

## 2022-07-19 PROBLEM — R11.2 PONV (POSTOPERATIVE NAUSEA AND VOMITING): Status: ACTIVE | Noted: 2022-07-19

## 2022-07-27 ENCOUNTER — HOSPITAL ENCOUNTER (EMERGENCY)
Facility: MEDICAL CENTER | Age: 72
End: 2022-07-27
Payer: MEDICARE

## 2022-07-27 VITALS
SYSTOLIC BLOOD PRESSURE: 129 MMHG | HEIGHT: 75 IN | OXYGEN SATURATION: 97 % | DIASTOLIC BLOOD PRESSURE: 60 MMHG | HEART RATE: 74 BPM | TEMPERATURE: 98 F | RESPIRATION RATE: 18 BRPM | WEIGHT: 243.83 LBS | BODY MASS INDEX: 30.32 KG/M2

## 2022-07-27 PROCEDURE — 302449 STATCHG TRIAGE ONLY (STATISTIC)

## 2022-07-27 ASSESSMENT — FIBROSIS 4 INDEX: FIB4 SCORE: 1.83

## 2022-10-10 SDOH — ECONOMIC STABILITY: INCOME INSECURITY: HOW HARD IS IT FOR YOU TO PAY FOR THE VERY BASICS LIKE FOOD, HOUSING, MEDICAL CARE, AND HEATING?: NOT HARD AT ALL

## 2022-10-10 SDOH — HEALTH STABILITY: PHYSICAL HEALTH: ON AVERAGE, HOW MANY DAYS PER WEEK DO YOU ENGAGE IN MODERATE TO STRENUOUS EXERCISE (LIKE A BRISK WALK)?: 3 DAYS

## 2022-10-10 SDOH — ECONOMIC STABILITY: FOOD INSECURITY: WITHIN THE PAST 12 MONTHS, THE FOOD YOU BOUGHT JUST DIDN'T LAST AND YOU DIDN'T HAVE MONEY TO GET MORE.: NEVER TRUE

## 2022-10-10 SDOH — ECONOMIC STABILITY: HOUSING INSECURITY
IN THE LAST 12 MONTHS, WAS THERE A TIME WHEN YOU DID NOT HAVE A STEADY PLACE TO SLEEP OR SLEPT IN A SHELTER (INCLUDING NOW)?: NO

## 2022-10-10 SDOH — ECONOMIC STABILITY: FOOD INSECURITY: WITHIN THE PAST 12 MONTHS, YOU WORRIED THAT YOUR FOOD WOULD RUN OUT BEFORE YOU GOT MONEY TO BUY MORE.: NEVER TRUE

## 2022-10-10 SDOH — ECONOMIC STABILITY: INCOME INSECURITY: IN THE LAST 12 MONTHS, WAS THERE A TIME WHEN YOU WERE NOT ABLE TO PAY THE MORTGAGE OR RENT ON TIME?: NO

## 2022-10-10 SDOH — HEALTH STABILITY: PHYSICAL HEALTH: ON AVERAGE, HOW MANY MINUTES DO YOU ENGAGE IN EXERCISE AT THIS LEVEL?: 40 MIN

## 2022-10-10 SDOH — ECONOMIC STABILITY: TRANSPORTATION INSECURITY
IN THE PAST 12 MONTHS, HAS LACK OF RELIABLE TRANSPORTATION KEPT YOU FROM MEDICAL APPOINTMENTS, MEETINGS, WORK OR FROM GETTING THINGS NEEDED FOR DAILY LIVING?: NO

## 2022-10-10 SDOH — HEALTH STABILITY: MENTAL HEALTH
STRESS IS WHEN SOMEONE FEELS TENSE, NERVOUS, ANXIOUS, OR CAN'T SLEEP AT NIGHT BECAUSE THEIR MIND IS TROUBLED. HOW STRESSED ARE YOU?: VERY MUCH

## 2022-10-10 SDOH — ECONOMIC STABILITY: HOUSING INSECURITY: IN THE LAST 12 MONTHS, HOW MANY PLACES HAVE YOU LIVED?: 1

## 2022-10-10 ASSESSMENT — SOCIAL DETERMINANTS OF HEALTH (SDOH)
HOW OFTEN DO YOU ATTEND CHURCH OR RELIGIOUS SERVICES?: NEVER
HOW OFTEN DO YOU GET TOGETHER WITH FRIENDS OR RELATIVES?: ONCE A WEEK
DO YOU BELONG TO ANY CLUBS OR ORGANIZATIONS SUCH AS CHURCH GROUPS UNIONS, FRATERNAL OR ATHLETIC GROUPS, OR SCHOOL GROUPS?: NO
HOW MANY DRINKS CONTAINING ALCOHOL DO YOU HAVE ON A TYPICAL DAY WHEN YOU ARE DRINKING: 1 OR 2
HOW OFTEN DO YOU GET TOGETHER WITH FRIENDS OR RELATIVES?: ONCE A WEEK
HOW OFTEN DO YOU ATTEND CHURCH OR RELIGIOUS SERVICES?: NEVER
HOW OFTEN DO YOU ATTENT MEETINGS OF THE CLUB OR ORGANIZATION YOU BELONG TO?: NEVER
HOW OFTEN DO YOU HAVE A DRINK CONTAINING ALCOHOL: 4 OR MORE TIMES A WEEK
DO YOU BELONG TO ANY CLUBS OR ORGANIZATIONS SUCH AS CHURCH GROUPS UNIONS, FRATERNAL OR ATHLETIC GROUPS, OR SCHOOL GROUPS?: NO
HOW OFTEN DO YOU ATTENT MEETINGS OF THE CLUB OR ORGANIZATION YOU BELONG TO?: NEVER
HOW OFTEN DO YOU HAVE SIX OR MORE DRINKS ON ONE OCCASION: NEVER
IN A TYPICAL WEEK, HOW MANY TIMES DO YOU TALK ON THE PHONE WITH FAMILY, FRIENDS, OR NEIGHBORS?: ONCE A WEEK
HOW HARD IS IT FOR YOU TO PAY FOR THE VERY BASICS LIKE FOOD, HOUSING, MEDICAL CARE, AND HEATING?: NOT HARD AT ALL
ARE YOU MARRIED, WIDOWED, DIVORCED, SEPARATED, NEVER MARRIED, OR LIVING WITH A PARTNER?: LIVING WITH PARTNER
IN A TYPICAL WEEK, HOW MANY TIMES DO YOU TALK ON THE PHONE WITH FAMILY, FRIENDS, OR NEIGHBORS?: ONCE A WEEK
ARE YOU MARRIED, WIDOWED, DIVORCED, SEPARATED, NEVER MARRIED, OR LIVING WITH A PARTNER?: LIVING WITH PARTNER
WITHIN THE PAST 12 MONTHS, YOU WORRIED THAT YOUR FOOD WOULD RUN OUT BEFORE YOU GOT THE MONEY TO BUY MORE: NEVER TRUE

## 2022-10-10 ASSESSMENT — LIFESTYLE VARIABLES
HOW MANY STANDARD DRINKS CONTAINING ALCOHOL DO YOU HAVE ON A TYPICAL DAY: 1 OR 2
AUDIT-C TOTAL SCORE: 4
HOW OFTEN DO YOU HAVE A DRINK CONTAINING ALCOHOL: 4 OR MORE TIMES A WEEK
HOW OFTEN DO YOU HAVE SIX OR MORE DRINKS ON ONE OCCASION: NEVER
SKIP TO QUESTIONS 9-10: 1

## 2022-10-12 ENCOUNTER — OFFICE VISIT (OUTPATIENT)
Dept: MEDICAL GROUP | Facility: MEDICAL CENTER | Age: 72
End: 2022-10-12
Payer: MEDICARE

## 2022-10-12 VITALS
DIASTOLIC BLOOD PRESSURE: 44 MMHG | SYSTOLIC BLOOD PRESSURE: 116 MMHG | HEART RATE: 65 BPM | WEIGHT: 225 LBS | HEIGHT: 73 IN | TEMPERATURE: 97.9 F | BODY MASS INDEX: 29.82 KG/M2 | OXYGEN SATURATION: 96 %

## 2022-10-12 DIAGNOSIS — L03.90 CHRONIC CELLULITIS: ICD-10-CM

## 2022-10-12 DIAGNOSIS — K21.9 GASTROESOPHAGEAL REFLUX DISEASE WITHOUT ESOPHAGITIS: ICD-10-CM

## 2022-10-12 DIAGNOSIS — R60.0 LEG EDEMA, RIGHT: ICD-10-CM

## 2022-10-12 DIAGNOSIS — F51.01 PRIMARY INSOMNIA: ICD-10-CM

## 2022-10-12 PROBLEM — R11.2 PONV (POSTOPERATIVE NAUSEA AND VOMITING): Status: RESOLVED | Noted: 2022-07-19 | Resolved: 2022-10-12

## 2022-10-12 PROBLEM — Z00.00 HEALTH CARE MAINTENANCE: Status: RESOLVED | Noted: 2020-08-17 | Resolved: 2022-10-12

## 2022-10-12 PROBLEM — Z09 HOSPITAL DISCHARGE FOLLOW-UP: Status: RESOLVED | Noted: 2022-07-06 | Resolved: 2022-10-12

## 2022-10-12 PROBLEM — Z98.890 PONV (POSTOPERATIVE NAUSEA AND VOMITING): Status: RESOLVED | Noted: 2022-07-19 | Resolved: 2022-10-12

## 2022-10-12 PROCEDURE — 99214 OFFICE O/P EST MOD 30 MIN: CPT | Performed by: PHYSICIAN ASSISTANT

## 2022-10-12 RX ORDER — ZOLPIDEM TARTRATE 10 MG/1
10 TABLET ORAL NIGHTLY PRN
Qty: 90 TABLET | Refills: 0 | Status: SHIPPED | OUTPATIENT
Start: 2022-10-12 | End: 2023-01-11 | Stop reason: SDUPTHER

## 2022-10-12 RX ORDER — DICLOFENAC SODIUM 75 MG/1
TABLET, DELAYED RELEASE ORAL
COMMUNITY
Start: 2022-07-09 | End: 2022-11-28 | Stop reason: SDUPTHER

## 2022-10-12 RX ORDER — PANTOPRAZOLE SODIUM 40 MG/1
40 TABLET, DELAYED RELEASE ORAL DAILY
Qty: 90 TABLET | Refills: 2 | Status: SHIPPED | OUTPATIENT
Start: 2022-10-12 | End: 2023-06-10 | Stop reason: SDUPTHER

## 2022-10-12 RX ORDER — FAMOTIDINE 40 MG/1
40 TABLET, FILM COATED ORAL 2 TIMES DAILY
Qty: 180 TABLET | Refills: 2 | Status: SHIPPED | OUTPATIENT
Start: 2022-10-12 | End: 2023-06-10 | Stop reason: SDUPTHER

## 2022-10-12 ASSESSMENT — FIBROSIS 4 INDEX: FIB4 SCORE: 1.83

## 2022-10-12 NOTE — PROGRESS NOTES
Subjective:   Abiel Vaughn is a 72 y.o. male here today for GERD, insomnia, right lower leg edema with repeated infections.  Plus to establish care.    Gastroesophageal reflux disease  This is a pleasant 72-year-old male here today to establish care.  Came recommended by patient of mine.  Prior patient of Dr. Sepulveda.  He has a history of reflux.  Seen in the past by Dr. Reece.  Notified me that Dr. Reece moved to [place].  History of reflux.  Britt's esophagus.  Takes pantoprazole daily.  Also takes famotidine twice daily.  Still has breakthrough symptoms.    Primary insomnia  Chronic condition.  Takes Ambien 10 mg nightly.  Has been taking the medication for several years.  No side effects.  Requesting a refill.  Allows him to sleep for at least 5 hours.    Leg edema, right  Complains of chronic lower right leg swelling with multiple infections over the past year after his total knee replacement performed by Dr. Hobbs.  Has been evaluated in the past here as well as in [facility].  Was hospitalized at 1 point.  Still dealing with lower extremity swelling and occasional infections.  Has been placed on multiple rounds of antibiotics.       Current medicines (including changes today)  Current Outpatient Medications   Medication Sig Dispense Refill    pantoprazole (PROTONIX) 40 MG Tablet Delayed Response Take 1 Tablet by mouth every day. Take 1/2 hour prior to same meal daily. 90 Tablet 2    famotidine (PEPCID) 40 MG Tab Take 1 Tablet by mouth 2 times a day. 180 Tablet 2    zolpidem (AMBIEN) 10 MG Tab Take 1 Tablet by mouth at bedtime as needed for Sleep for up to 90 days. 90 Tablet 0    fluorouracil (EFUDEX) 5 % cream fluorouracil 5 % topical cream      gabapentin (NEURONTIN) 100 MG Cap 100 mg.      pantoprazole (PROTONIX) 40 MG Tablet Delayed Response 40 mg.      valACYclovir HCl (VALTREX PO) 500 mg.      Rosuvastatin Calcium 20 MG CAPSULE SPRINKLE 20 mg.      METFORMIN HCL  mg.      Aspirin 81 MG Cap  "81 mg.      oxybutynin SR (DITROPAN-XL) 10 MG CR tablet Take 10 mg by mouth every day.      rosuvastatin (CRESTOR) 20 MG Tab Take 1 Tablet by mouth every day. 90 Tablet 4    gabapentin (NEURONTIN) 100 MG Cap TAKE 1 CAPSULE 3 TIMES A   DAY (Patient taking differently: Take 100 mg by mouth 3 times a day.) 270 Capsule 1    metFORMIN ER (GLUCOPHAGE XR) 750 MG TABLET SR 24 HR TAKE 1 TABLET DAILY (Patient taking differently: Take 750 mg by mouth every day.) 90 Tablet 3    metoprolol SR (TOPROL XL) 25 MG TABLET SR 24 HR Take 0.5 Tablets by mouth every evening. 45 Tablet 3    valACYclovir (VALTREX) 500 MG Tab TAKE 1 TABLET BID (Patient taking differently: Take 500 mg by mouth every day. TAKE 1 TABLET BID) 180 tablet 4    CALCIUM PO Take 1 Tablet by mouth every day.      Cholecalciferol (VITAMIN D3 PO) Take 1 Capsule by mouth every day.       No current facility-administered medications for this visit.     He  has a past medical history of Dyslipidemia, GERD (gastroesophageal reflux disease), and Insomnia.    Social History and Family History were reviewed and updated.    ROS   No chest pain, no shortness of breath, no abdominal pain and all other systems were reviewed and are negative.       Objective:     /44 (BP Location: Left arm, Patient Position: Sitting, BP Cuff Size: Adult)   Pulse 65   Temp 36.6 °C (97.9 °F) (Temporal)   Ht 1.854 m (6' 1\")   Wt 102 kg (225 lb)   SpO2 96%  Body mass index is 29.69 kg/m².   Physical Exam:  Constitutional: Alert, no distress.  Skin: Warm, dry, good turgor, no rashes in visible areas.  Eye: Equal, round and reactive, conjunctiva clear, lids normal.  ENMT: Lips without lesions, good dentition, oropharynx clear.  Neck: Trachea midline, no masses.   Lymph: No cervical or supraclavicular lymphadenopathy  Respiratory: Unlabored respiratory effort, lungs appear clear.  Psych: Alert and oriented x3, normal affect and mood.        Assessment and Plan:   The following treatment plan " was discussed    1. Gastroesophageal reflux disease without esophagitis  Chronic condition.  Uncontrolled.  Advised to take Protonix half hour prior to same meal daily.  Avoid food 3 hours prior to bedtime.  Renewed Pepcid at 40 mg twice daily.  - pantoprazole (PROTONIX) 40 MG Tablet Delayed Response; Take 1 Tablet by mouth every day. Take 1/2 hour prior to same meal daily.  Dispense: 90 Tablet; Refill: 2  - famotidine (PEPCID) 40 MG Tab; Take 1 Tablet by mouth 2 times a day.  Dispense: 180 Tablet; Refill: 2    2. Primary insomnia  Chronic condition.  Stable.   reviewed.  Medication appropriate.  Renewed Ambien at 10 mg.  Sent over a 90-day supply through his mail order.  - zolpidem (AMBIEN) 10 MG Tab; Take 1 Tablet by mouth at bedtime as needed for Sleep for up to 90 days.  Dispense: 90 Tablet; Refill: 0    3. Leg edema, right  Chronic condition status post knee replacement.  Multiple cellulitic infections.  Referred to ID per patient's request.  Symptoms likely worsened by lymphedema.  - Referral to Infectious Disease    4. Chronic cellulitis  Chronic condition status post total knee replacement.  Stable today.  Continue to wear compression stockings.  Referred to ID per patient's request.  - Referral to Infectious Disease         Followup: Return in about 3 months (around 1/12/2023), or if symptoms worsen or fail to improve.    Please note that this dictation was created using voice recognition software. I have made every reasonable attempt to correct obvious errors, but I expect that there are errors of grammar and possibly content that I did not discover before finalizing the note.

## 2022-10-12 NOTE — ASSESSMENT & PLAN NOTE
Chronic condition.  Takes Ambien 10 mg nightly.  Has been taking the medication for several years.  No side effects.  Requesting a refill.  Allows him to sleep for at least 5 hours.

## 2022-10-12 NOTE — ASSESSMENT & PLAN NOTE
Complains of chronic lower right leg swelling with multiple infections over the past year after his total knee replacement performed by Dr. Hobbs.  Has been evaluated in the past here as well as in [facility].  Was hospitalized at 1 point.  Still dealing with lower extremity swelling and occasional infections.  Has been placed on multiple rounds of antibiotics.   ONETOUCH ULTRA BLUE In Vitro Strip and VICTOZA 18 MG/3ML Subcutaneous Solution Pen-injector    Last OV relevant to medication: 1-9-2020    Last refill date:   One touch--n/a  Victoza--1-9-2020- # 9 ml with 0 refills     When pt was asked to return for OV:

## 2022-10-12 NOTE — ASSESSMENT & PLAN NOTE
This is a pleasant 72-year-old male here today to establish care.  Came recommended by patient of mine.  Prior patient of Dr. Sepulveda.  He has a history of reflux.  Seen in the past by Dr. Reece.  Notified me that Dr. Reece moved to [place].  History of reflux.  Britt's esophagus.  Takes pantoprazole daily.  Also takes famotidine twice daily.  Still has breakthrough symptoms.

## 2022-10-18 RX ORDER — GABAPENTIN 100 MG/1
100 CAPSULE ORAL 3 TIMES DAILY
Qty: 270 CAPSULE | Refills: 1 | Status: SHIPPED | OUTPATIENT
Start: 2022-10-18 | End: 2023-01-16

## 2022-11-04 ENCOUNTER — HOSPITAL ENCOUNTER (OUTPATIENT)
Dept: LAB | Facility: MEDICAL CENTER | Age: 72
End: 2022-11-04
Attending: INTERNAL MEDICINE
Payer: MEDICARE

## 2022-11-04 LAB
ALBUMIN SERPL BCP-MCNC: 4.2 G/DL (ref 3.2–4.9)
ALBUMIN/GLOB SERPL: 1.8 G/DL
ALP SERPL-CCNC: 79 U/L (ref 30–99)
ALT SERPL-CCNC: 22 U/L (ref 2–50)
ANION GAP SERPL CALC-SCNC: 11 MMOL/L (ref 7–16)
AST SERPL-CCNC: 24 U/L (ref 12–45)
BASOPHILS # BLD AUTO: 1 % (ref 0–1.8)
BASOPHILS # BLD: 0.07 K/UL (ref 0–0.12)
BILIRUB SERPL-MCNC: 0.3 MG/DL (ref 0.1–1.5)
BUN SERPL-MCNC: 20 MG/DL (ref 8–22)
CALCIUM SERPL-MCNC: 9.2 MG/DL (ref 8.4–10.2)
CHLORIDE SERPL-SCNC: 106 MMOL/L (ref 96–112)
CO2 SERPL-SCNC: 22 MMOL/L (ref 20–33)
CREAT SERPL-MCNC: 0.84 MG/DL (ref 0.5–1.4)
CRP SERPL HS-MCNC: <0.3 MG/DL (ref 0–0.75)
EOSINOPHIL # BLD AUTO: 0.3 K/UL (ref 0–0.51)
EOSINOPHIL NFR BLD: 4.4 % (ref 0–6.9)
ERYTHROCYTE [DISTWIDTH] IN BLOOD BY AUTOMATED COUNT: 45.2 FL (ref 35.9–50)
ERYTHROCYTE [SEDIMENTATION RATE] IN BLOOD BY WESTERGREN METHOD: 2 MM/HOUR (ref 0–20)
FASTING STATUS PATIENT QL REPORTED: NORMAL
GFR SERPLBLD CREATININE-BSD FMLA CKD-EPI: 91 ML/MIN/1.73 M 2
GLOBULIN SER CALC-MCNC: 2.3 G/DL (ref 1.9–3.5)
GLUCOSE SERPL-MCNC: 107 MG/DL (ref 65–99)
HCT VFR BLD AUTO: 37.9 % (ref 42–52)
HGB BLD-MCNC: 12.2 G/DL (ref 14–18)
IMM GRANULOCYTES # BLD AUTO: 0.01 K/UL (ref 0–0.11)
IMM GRANULOCYTES NFR BLD AUTO: 0.1 % (ref 0–0.9)
LYMPHOCYTES # BLD AUTO: 2.89 K/UL (ref 1–4.8)
LYMPHOCYTES NFR BLD: 42.1 % (ref 22–41)
MCH RBC QN AUTO: 29.8 PG (ref 27–33)
MCHC RBC AUTO-ENTMCNC: 32.2 G/DL (ref 33.7–35.3)
MCV RBC AUTO: 92.4 FL (ref 81.4–97.8)
MONOCYTES # BLD AUTO: 0.61 K/UL (ref 0–0.85)
MONOCYTES NFR BLD AUTO: 8.9 % (ref 0–13.4)
NEUTROPHILS # BLD AUTO: 2.98 K/UL (ref 1.82–7.42)
NEUTROPHILS NFR BLD: 43.5 % (ref 44–72)
NRBC # BLD AUTO: 0 K/UL
NRBC BLD-RTO: 0 /100 WBC
PLATELET # BLD AUTO: 197 K/UL (ref 164–446)
PMV BLD AUTO: 11.2 FL (ref 9–12.9)
POTASSIUM SERPL-SCNC: 4 MMOL/L (ref 3.6–5.5)
PROT SERPL-MCNC: 6.5 G/DL (ref 6–8.2)
RBC # BLD AUTO: 4.1 M/UL (ref 4.7–6.1)
SODIUM SERPL-SCNC: 139 MMOL/L (ref 135–145)
WBC # BLD AUTO: 6.9 K/UL (ref 4.8–10.8)

## 2022-11-04 PROCEDURE — 80053 COMPREHEN METABOLIC PANEL: CPT

## 2022-11-04 PROCEDURE — 85025 COMPLETE CBC W/AUTO DIFF WBC: CPT

## 2022-11-04 PROCEDURE — 85652 RBC SED RATE AUTOMATED: CPT

## 2022-11-04 PROCEDURE — 86140 C-REACTIVE PROTEIN: CPT

## 2022-11-04 PROCEDURE — 36415 COLL VENOUS BLD VENIPUNCTURE: CPT

## 2022-11-10 ENCOUNTER — PATIENT MESSAGE (OUTPATIENT)
Dept: HEALTH INFORMATION MANAGEMENT | Facility: OTHER | Age: 72
End: 2022-11-10

## 2022-11-28 DIAGNOSIS — M15.9 PRIMARY OSTEOARTHRITIS INVOLVING MULTIPLE JOINTS: ICD-10-CM

## 2022-12-23 DIAGNOSIS — R00.2 PALPITATIONS: ICD-10-CM

## 2022-12-23 RX ORDER — METOPROLOL SUCCINATE 25 MG/1
25 TABLET, EXTENDED RELEASE ORAL DAILY
Qty: 45 TABLET | Refills: 0 | Status: SHIPPED | OUTPATIENT
Start: 2022-12-23 | End: 2023-02-03

## 2022-12-23 NOTE — TELEPHONE ENCOUNTER
Is the patient due for a refill? Yes    Was the patient seen the past year? Yes    Date of last office visit: 12/1/21    Does the patient have an upcoming appointment?  No   If yes, When?     Provider to refill:AK    Does the patients insurance require a 100 day supply?  No

## 2023-01-11 ENCOUNTER — OFFICE VISIT (OUTPATIENT)
Dept: MEDICAL GROUP | Facility: MEDICAL CENTER | Age: 73
End: 2023-01-11
Payer: MEDICARE

## 2023-01-11 VITALS
TEMPERATURE: 97.5 F | BODY MASS INDEX: 30.09 KG/M2 | SYSTOLIC BLOOD PRESSURE: 110 MMHG | HEIGHT: 73 IN | DIASTOLIC BLOOD PRESSURE: 42 MMHG | OXYGEN SATURATION: 96 % | WEIGHT: 227 LBS | HEART RATE: 65 BPM

## 2023-01-11 DIAGNOSIS — E78.5 DYSLIPIDEMIA: ICD-10-CM

## 2023-01-11 DIAGNOSIS — D69.6 THROMBOCYTOPENIA (HCC): ICD-10-CM

## 2023-01-11 DIAGNOSIS — F51.01 PRIMARY INSOMNIA: ICD-10-CM

## 2023-01-11 DIAGNOSIS — E11.9 CONTROLLED TYPE 2 DIABETES MELLITUS WITHOUT COMPLICATION, WITHOUT LONG-TERM CURRENT USE OF INSULIN (HCC): ICD-10-CM

## 2023-01-11 PROCEDURE — 99214 OFFICE O/P EST MOD 30 MIN: CPT | Performed by: PHYSICIAN ASSISTANT

## 2023-01-11 RX ORDER — ZOLPIDEM TARTRATE 10 MG/1
10 TABLET ORAL NIGHTLY PRN
Qty: 90 TABLET | Refills: 0 | Status: SHIPPED | OUTPATIENT
Start: 2023-01-11 | End: 2023-04-12 | Stop reason: SDUPTHER

## 2023-01-11 ASSESSMENT — FIBROSIS 4 INDEX: FIB4 SCORE: 1.92

## 2023-01-11 ASSESSMENT — PATIENT HEALTH QUESTIONNAIRE - PHQ9: CLINICAL INTERPRETATION OF PHQ2 SCORE: 0

## 2023-01-11 NOTE — ASSESSMENT & PLAN NOTE
Chronic condition.  Takes 10 mg of Ambien nightly.  Has been taking Ambien for 20 years.  Medications very effective.  Denies any side effects.

## 2023-01-11 NOTE — ASSESSMENT & PLAN NOTE
This is a pleasant 72-year-old male here today to follow-up on his health.  He is due for labs with regards to his controlled diabetes.  Does not need a renewal today of his medication.  Takes metformin daily.

## 2023-01-11 NOTE — PROGRESS NOTES
Subjective:   Abiel Vaughn is a 72 y.o. male here today for diabetes, dyslipidemia and insomnia    Controlled type 2 diabetes mellitus without complication, without long-term current use of insulin (HCC)  This is a pleasant 72-year-old male here today to follow-up on his health.  He is due for labs with regards to his controlled diabetes.  Does not need a renewal today of his medication.  Takes metformin daily.    Dyslipidemia  Chronic condition.  Is due for a cholesterol profile.  On Crestor daily.    Primary insomnia  Chronic condition.  Takes 10 mg of Ambien nightly.  Has been taking Ambien for 20 years.  Medications very effective.  Denies any side effects.       Current medicines (including changes today)  Current Outpatient Medications   Medication Sig Dispense Refill    zolpidem (AMBIEN) 10 MG Tab Take 1 Tablet by mouth at bedtime as needed for Sleep for up to 90 days. 90 Tablet 0    metoprolol SR (TOPROL XL) 25 MG TABLET SR 24 HR Take 1 Tablet by mouth every day. *Must schedule follow up appointment for further refills.* Please call [phone], thank you! 45 Tablet 0    diclofenac DR (VOLTAREN) 50 MG Tablet Delayed Response Take 1 Tablet by mouth 2 times a day. 180 Tablet 1    gabapentin (NEURONTIN) 100 MG Cap Take 1 Capsule by mouth 3 times a day for 90 days. 270 Capsule 1    pantoprazole (PROTONIX) 40 MG Tablet Delayed Response Take 1 Tablet by mouth every day. Take 1/2 hour prior to same meal daily. 90 Tablet 2    famotidine (PEPCID) 40 MG Tab Take 1 Tablet by mouth 2 times a day. 180 Tablet 2    fluorouracil (EFUDEX) 5 % cream fluorouracil 5 % topical cream      pantoprazole (PROTONIX) 40 MG Tablet Delayed Response 40 mg.      valACYclovir HCl (VALTREX PO) 500 mg.      METFORMIN HCL  mg.      Aspirin 81 MG Cap 81 mg.      oxybutynin SR (DITROPAN-XL) 10 MG CR tablet Take 10 mg by mouth every day.      rosuvastatin (CRESTOR) 20 MG Tab Take 1 Tablet by mouth every day. 90 Tablet 4     "gabapentin (NEURONTIN) 100 MG Cap TAKE 1 CAPSULE 3 TIMES A   DAY (Patient taking differently: Take 100 mg by mouth 3 times a day.) 270 Capsule 1    metFORMIN ER (GLUCOPHAGE XR) 750 MG TABLET SR 24 HR TAKE 1 TABLET DAILY (Patient taking differently: Take 750 mg by mouth every day.) 90 Tablet 3    valACYclovir (VALTREX) 500 MG Tab TAKE 1 TABLET BID (Patient taking differently: Take 500 mg by mouth every day. TAKE 1 TABLET BID) 180 tablet 4    CALCIUM PO Take 1 Tablet by mouth every day.      Cholecalciferol (VITAMIN D3 PO) Take 1 Capsule by mouth every day.       No current facility-administered medications for this visit.     He  has a past medical history of Dyslipidemia, GERD (gastroesophageal reflux disease), and Insomnia.    Social History and Family History were reviewed and updated.    ROS   No chest pain, no shortness of breath, no abdominal pain and all other systems were reviewed and are negative.       Objective:     /42 (BP Location: Left arm, Patient Position: Sitting, BP Cuff Size: Adult)   Pulse 65   Temp 36.4 °C (97.5 °F) (Temporal)   Ht 1.854 m (6' 1\")   Wt 103 kg (227 lb)   SpO2 96%  Body mass index is 29.95 kg/m².   Physical Exam:  Constitutional: Alert, no distress.  Skin: Warm, dry, good turgor, no rashes in visible areas.  Eye: Equal, round and reactive, conjunctiva clear, lids normal.  ENMT: Lips without lesions, good dentition, oropharynx clear.  Neck: Trachea midline, no masses.   Lymph: No cervical or supraclavicular lymphadenopathy  Respiratory: Unlabored respiratory effort, lungs appear clear.  Psych: Alert and oriented x3, normal affect and mood.        Assessment and Plan:   The following treatment plan was discussed    1. Controlled type 2 diabetes mellitus without complication, without long-term current use of insulin (HCC)  Chronic condition.  Status unknown.  Continue diabetic medications as directed.  Ordered labs.  Fast 8 hours.  He will make an appointment with his eye " specialist.  He is not sure who that was.  - HEMOGLOBIN A1C; Future  - MICROALBUMIN CREAT RATIO URINE; Future    2. Primary insomnia  Chronic condition.  Stable.   reviewed.  Renewed Ambien at 10 mg daily.  Sent over 90 tablet supply to [facility].  - zolpidem (AMBIEN) 10 MG Tab; Take 1 Tablet by mouth at bedtime as needed for Sleep for up to 90 days.  Dispense: 90 Tablet; Refill: 0    3. Thrombocytopenia (HCC)  Chronic condition.  Will check CBC.  Symptoms have been nonexistent.  Stable disease process regarding labs.  - CBC WITH DIFFERENTIAL; Future    4. Dyslipidemia  Chronic condition.  Added cholesterol profile.  Fast 8 hours.  - Lipid Profile; Future         Followup: Return in about 3 months (around 4/11/2023), or if symptoms worsen or fail to improve.    Please note that this dictation was created using voice recognition software. I have made every reasonable attempt to correct obvious errors, but I expect that there are errors of grammar and possibly content that I did not discover before finalizing the note.

## 2023-01-30 DIAGNOSIS — R00.2 PALPITATIONS: ICD-10-CM

## 2023-02-03 RX ORDER — METOPROLOL SUCCINATE 25 MG/1
TABLET, EXTENDED RELEASE ORAL
Qty: 90 TABLET | Refills: 0 | Status: SHIPPED | OUTPATIENT
Start: 2023-02-03 | End: 2023-05-01

## 2023-02-03 NOTE — TELEPHONE ENCOUNTER
Is the patient due for a refill? Yes    Was the patient seen the past year? No    Date of last office visit: 12/01/21    Does the patient have an upcoming appointment?  Yes   If yes, When? 2/14/23    Provider to refill:AK    Does the patients insurance require a 100 day supply?  No

## 2023-02-14 ENCOUNTER — OFFICE VISIT (OUTPATIENT)
Dept: CARDIOLOGY | Facility: MEDICAL CENTER | Age: 73
End: 2023-02-14
Payer: MEDICARE

## 2023-02-14 VITALS
SYSTOLIC BLOOD PRESSURE: 134 MMHG | HEART RATE: 72 BPM | BODY MASS INDEX: 30.09 KG/M2 | WEIGHT: 227 LBS | RESPIRATION RATE: 16 BRPM | DIASTOLIC BLOOD PRESSURE: 60 MMHG | OXYGEN SATURATION: 98 % | HEIGHT: 73 IN

## 2023-02-14 DIAGNOSIS — I35.9 AORTIC VALVE DISORDER: ICD-10-CM

## 2023-02-14 DIAGNOSIS — E11.9 CONTROLLED TYPE 2 DIABETES MELLITUS WITHOUT COMPLICATION, WITHOUT LONG-TERM CURRENT USE OF INSULIN (HCC): ICD-10-CM

## 2023-02-14 DIAGNOSIS — I77.810 AORTIC ROOT DILATATION (HCC): ICD-10-CM

## 2023-02-14 PROBLEM — K21.00 GASTRO-ESOPHAGEAL REFLUX DISEASE WITH ESOPHAGITIS: Status: ACTIVE | Noted: 2023-02-14

## 2023-02-14 PROCEDURE — 99214 OFFICE O/P EST MOD 30 MIN: CPT | Performed by: INTERNAL MEDICINE

## 2023-02-14 ASSESSMENT — FIBROSIS 4 INDEX: FIB4 SCORE: 1.92

## 2023-03-29 DIAGNOSIS — E11.9 CONTROLLED TYPE 2 DIABETES MELLITUS WITHOUT COMPLICATION, WITHOUT LONG-TERM CURRENT USE OF INSULIN (HCC): ICD-10-CM

## 2023-03-29 RX ORDER — METFORMIN HYDROCHLORIDE 750 MG/1
750 TABLET, EXTENDED RELEASE ORAL DAILY
Qty: 90 TABLET | Refills: 3 | Status: SHIPPED | OUTPATIENT
Start: 2023-03-29 | End: 2023-04-12

## 2023-04-06 ENCOUNTER — HOSPITAL ENCOUNTER (OUTPATIENT)
Dept: LAB | Facility: MEDICAL CENTER | Age: 73
End: 2023-04-06
Attending: PHYSICIAN ASSISTANT
Payer: MEDICARE

## 2023-04-06 DIAGNOSIS — E11.9 CONTROLLED TYPE 2 DIABETES MELLITUS WITHOUT COMPLICATION, WITHOUT LONG-TERM CURRENT USE OF INSULIN (HCC): ICD-10-CM

## 2023-04-06 DIAGNOSIS — E78.5 DYSLIPIDEMIA: ICD-10-CM

## 2023-04-06 DIAGNOSIS — D69.6 THROMBOCYTOPENIA (HCC): ICD-10-CM

## 2023-04-06 LAB
BASOPHILS # BLD AUTO: 0.9 % (ref 0–1.8)
BASOPHILS # BLD: 0.06 K/UL (ref 0–0.12)
CHOLEST SERPL-MCNC: 143 MG/DL (ref 100–199)
EOSINOPHIL # BLD AUTO: 0.31 K/UL (ref 0–0.51)
EOSINOPHIL NFR BLD: 4.8 % (ref 0–6.9)
ERYTHROCYTE [DISTWIDTH] IN BLOOD BY AUTOMATED COUNT: 45.2 FL (ref 35.9–50)
EST. AVERAGE GLUCOSE BLD GHB EST-MCNC: 148 MG/DL
HBA1C MFR BLD: 6.8 % (ref 4–5.6)
HCT VFR BLD AUTO: 40.8 % (ref 42–52)
HDLC SERPL-MCNC: 35 MG/DL
HGB BLD-MCNC: 13.5 G/DL (ref 14–18)
IMM GRANULOCYTES # BLD AUTO: 0.01 K/UL (ref 0–0.11)
IMM GRANULOCYTES NFR BLD AUTO: 0.2 % (ref 0–0.9)
LDLC SERPL CALC-MCNC: 83 MG/DL
LYMPHOCYTES # BLD AUTO: 2.35 K/UL (ref 1–4.8)
LYMPHOCYTES NFR BLD: 36.5 % (ref 22–41)
MCH RBC QN AUTO: 30 PG (ref 27–33)
MCHC RBC AUTO-ENTMCNC: 33.1 G/DL (ref 33.7–35.3)
MCV RBC AUTO: 90.7 FL (ref 81.4–97.8)
MONOCYTES # BLD AUTO: 0.58 K/UL (ref 0–0.85)
MONOCYTES NFR BLD AUTO: 9 % (ref 0–13.4)
NEUTROPHILS # BLD AUTO: 3.12 K/UL (ref 1.82–7.42)
NEUTROPHILS NFR BLD: 48.6 % (ref 44–72)
NRBC # BLD AUTO: 0 K/UL
NRBC BLD-RTO: 0 /100 WBC
PLATELET # BLD AUTO: 196 K/UL (ref 164–446)
PMV BLD AUTO: 10.6 FL (ref 9–12.9)
RBC # BLD AUTO: 4.5 M/UL (ref 4.7–6.1)
TRIGL SERPL-MCNC: 124 MG/DL (ref 0–149)
WBC # BLD AUTO: 6.4 K/UL (ref 4.8–10.8)

## 2023-04-06 PROCEDURE — 83036 HEMOGLOBIN GLYCOSYLATED A1C: CPT | Mod: GA

## 2023-04-06 PROCEDURE — 85025 COMPLETE CBC W/AUTO DIFF WBC: CPT

## 2023-04-06 PROCEDURE — 80061 LIPID PANEL: CPT

## 2023-04-06 PROCEDURE — 82570 ASSAY OF URINE CREATININE: CPT

## 2023-04-06 PROCEDURE — 82043 UR ALBUMIN QUANTITATIVE: CPT

## 2023-04-06 PROCEDURE — 36415 COLL VENOUS BLD VENIPUNCTURE: CPT

## 2023-04-07 LAB
CREAT UR-MCNC: 127.88 MG/DL
MICROALBUMIN UR-MCNC: <1.2 MG/DL
MICROALBUMIN/CREAT UR: NORMAL MG/G (ref 0–30)

## 2023-04-12 ENCOUNTER — OFFICE VISIT (OUTPATIENT)
Dept: MEDICAL GROUP | Facility: MEDICAL CENTER | Age: 73
End: 2023-04-12
Payer: MEDICARE

## 2023-04-12 ENCOUNTER — HOSPITAL ENCOUNTER (OUTPATIENT)
Facility: MEDICAL CENTER | Age: 73
End: 2023-04-12
Attending: PHYSICIAN ASSISTANT
Payer: MEDICARE

## 2023-04-12 VITALS
TEMPERATURE: 97.4 F | OXYGEN SATURATION: 94 % | DIASTOLIC BLOOD PRESSURE: 50 MMHG | HEART RATE: 75 BPM | HEIGHT: 73 IN | RESPIRATION RATE: 16 BRPM | SYSTOLIC BLOOD PRESSURE: 102 MMHG | BODY MASS INDEX: 32.13 KG/M2 | WEIGHT: 242.4 LBS

## 2023-04-12 DIAGNOSIS — R60.0 LEG EDEMA, RIGHT: ICD-10-CM

## 2023-04-12 DIAGNOSIS — F51.01 PRIMARY INSOMNIA: ICD-10-CM

## 2023-04-12 DIAGNOSIS — L03.90 CHRONIC CELLULITIS: ICD-10-CM

## 2023-04-12 DIAGNOSIS — B00.9 HSV INFECTION: ICD-10-CM

## 2023-04-12 DIAGNOSIS — E11.9 CONTROLLED TYPE 2 DIABETES MELLITUS WITHOUT COMPLICATION, WITHOUT LONG-TERM CURRENT USE OF INSULIN (HCC): ICD-10-CM

## 2023-04-12 DIAGNOSIS — Z51.81 ENCOUNTER FOR MEDICATION MONITORING: ICD-10-CM

## 2023-04-12 PROBLEM — D64.9 ANEMIA: Status: RESOLVED | Noted: 2020-08-17 | Resolved: 2023-04-12

## 2023-04-12 PROBLEM — M79.89 LEG SWELLING: Status: RESOLVED | Noted: 2020-08-17 | Resolved: 2023-04-12

## 2023-04-12 PROBLEM — K21.00 GASTRO-ESOPHAGEAL REFLUX DISEASE WITH ESOPHAGITIS: Status: RESOLVED | Noted: 2023-02-14 | Resolved: 2023-04-12

## 2023-04-12 PROBLEM — R93.1 AGATSTON CORONARY ARTERY CALCIUM SCORE GREATER THAN 400: Status: ACTIVE | Noted: 2023-04-12

## 2023-04-12 PROCEDURE — 99214 OFFICE O/P EST MOD 30 MIN: CPT | Performed by: PHYSICIAN ASSISTANT

## 2023-04-12 PROCEDURE — G0481 DRUG TEST DEF 8-14 CLASSES: HCPCS

## 2023-04-12 RX ORDER — ZOLPIDEM TARTRATE 10 MG/1
10 TABLET ORAL NIGHTLY PRN
Qty: 90 TABLET | Refills: 0 | Status: SHIPPED | OUTPATIENT
Start: 2023-04-12 | End: 2023-07-14 | Stop reason: SDUPTHER

## 2023-04-12 RX ORDER — VALACYCLOVIR HYDROCHLORIDE 500 MG/1
500 TABLET, FILM COATED ORAL 2 TIMES DAILY
Qty: 180 TABLET | Refills: 3 | Status: SHIPPED | OUTPATIENT
Start: 2023-04-12 | End: 2023-07-11

## 2023-04-12 ASSESSMENT — FIBROSIS 4 INDEX: FIB4 SCORE: 1.93

## 2023-04-12 NOTE — ASSESSMENT & PLAN NOTE
This is a pleasant 72-year-old male here today to follow-up on his health.  Recent A1c stable at 6.8.  He would like to have it lowered.  He is currently on 750 mg ER tablet of metformin daily.  Has an appointment soon with ophthalmology for retinal screening.

## 2023-04-12 NOTE — ASSESSMENT & PLAN NOTE
Chronic condition.  Takes Ambien nightly.  Needs a renewal.  Also is due for urine drug screen as well as controlled subs agreement signing.

## 2023-04-12 NOTE — PROGRESS NOTES
Subjective:   Abiel Vaughn is a 72 y.o. male here today for diabetes and insomnia.    Controlled type 2 diabetes mellitus without complication, without long-term current use of insulin (HCC)  This is a pleasant 72-year-old male here today to follow-up on his health.  Recent A1c stable at 6.8.  He would like to have it lowered.  He is currently on 750 mg ER tablet of metformin daily.  Has an appointment soon with ophthalmology for retinal screening.    Primary insomnia  Chronic condition.  Takes Ambien nightly.  Needs a renewal.  Also is due for urine drug screen as well as controlled subs agreement signing.    Leg edema, right  Chronic condition of lower right leg swelling with sores and at one point had chronic cellulitis and was hospitalized.  Went to wound care.  Uneventful.  Was unable to continue care with ID.  Still dealing with the swelling and the intermittent sores.  Unable to wear pressure stockings because it appears to exacerbate the sores.       Current medicines (including changes today)  Current Outpatient Medications   Medication Sig Dispense Refill    metformin (GLUCOPHAGE) 1000 MG tablet Take 1 Tablet by mouth 2 times a day with meals for 90 days. 180 Tablet 1    valACYclovir (VALTREX) 500 MG Tab Take 1 Tablet by mouth 2 times a day for 90 days. 180 Tablet 3    zolpidem (AMBIEN) 10 MG Tab Take 1 Tablet by mouth at bedtime as needed for Sleep for up to 90 days. 90 Tablet 0    metoprolol SR (TOPROL XL) 25 MG TABLET SR 24 HR TAKE 1 TABLET DAILY 90 Tablet 0    diclofenac DR (VOLTAREN) 50 MG Tablet Delayed Response Take 1 Tablet by mouth 2 times a day. 180 Tablet 1    pantoprazole (PROTONIX) 40 MG Tablet Delayed Response Take 1 Tablet by mouth every day. Take 1/2 hour prior to same meal daily. 90 Tablet 2    famotidine (PEPCID) 40 MG Tab Take 1 Tablet by mouth 2 times a day. 180 Tablet 2    fluorouracil (EFUDEX) 5 % cream fluorouracil 5 % topical cream      Aspirin 81 MG Cap 81 mg.       "oxybutynin SR (DITROPAN-XL) 10 MG CR tablet Take 10 mg by mouth every day.      rosuvastatin (CRESTOR) 20 MG Tab Take 1 Tablet by mouth every day. 90 Tablet 4    gabapentin (NEURONTIN) 100 MG Cap TAKE 1 CAPSULE 3 TIMES A   DAY (Patient taking differently: Take 100 mg by mouth 3 times a day.) 270 Capsule 1    valACYclovir (VALTREX) 500 MG Tab TAKE 1 TABLET BID (Patient taking differently: Take 500 mg by mouth every day. TAKE 1 TABLET BID) 180 tablet 4    CALCIUM PO Take 1 Tablet by mouth every day.      Cholecalciferol (VITAMIN D3 PO) Take 1 Capsule by mouth every day.       No current facility-administered medications for this visit.     He  has a past medical history of Dyslipidemia, GERD (gastroesophageal reflux disease), and Insomnia.    Social History and Family History were reviewed and updated.    ROS   No chest pain, no shortness of breath, no abdominal pain and all other systems were reviewed and are negative.       Objective:     /50 (BP Location: Right arm, Patient Position: Sitting, BP Cuff Size: Adult)   Pulse 75   Temp 36.3 °C (97.4 °F) (Temporal)   Resp 16   Ht 1.854 m (6' 1\")   Wt 110 kg (242 lb 6.4 oz)   SpO2 94%  Body mass index is 31.98 kg/m².   Physical Exam:  Constitutional: Alert, no distress.  Skin: Warm, dry, good turgor, no rashes in visible areas.  Eye: Equal, round and reactive, conjunctiva clear, lids normal.  ENMT: Lips without lesions, good dentition, oropharynx clear.  Neck: Trachea midline, no masses.   Lymph: No cervical or supraclavicular lymphadenopathy  Respiratory: Unlabored respiratory effort, lungs appear clear.  CV: Lower right leg with swelling around the ankle and above the ankle.  Thinning noted of the skin.  Healed skin noted.  Psych: Alert and oriented x3, normal affect and mood.        Assessment and Plan:   The following treatment plan was discussed    1. Controlled type 2 diabetes mellitus without complication, without long-term current use of insulin " (HCC)  Chronic condition.  We will increase metformin to 1000 mg twice daily.  Take with food.  Discussed side effects.  Follow-up with A1c in 3 months.  Nonfasting.  - metformin (GLUCOPHAGE) 1000 MG tablet; Take 1 Tablet by mouth 2 times a day with meals for 90 days.  Dispense: 180 Tablet; Refill: 1  - HEMOGLOBIN A1C; Future    2. Primary insomnia  Chronic condition.  Stable.   reviewed.  Renewed Ambien at 10 mg nightly.  Sent over 90 tablet supply to [facility].  Urine drug screen was performed today.  Controlled subs agreement was signed.  - zolpidem (AMBIEN) 10 MG Tab; Take 1 Tablet by mouth at bedtime as needed for Sleep for up to 90 days.  Dispense: 90 Tablet; Refill: 0    3. Encounter for medication monitoring  Chronic use of Ambien.  Pain management screening collected today.  Agreement was signed.  - PAIN MANAGEMENT SCRN, UR; Future  - Controlled Substance Treatment Agreement    4. Leg edema, right  Chronic condition.  Unknown cause.  Referred to vascular surgery for evaluation and treatment.  - Referral to Vascular Surgery    5. HSV infection  Chronic condition with recent flare.  Renewed valacyclovir at 500 mg twice daily for suppression.  - valACYclovir (VALTREX) 500 MG Tab; Take 1 Tablet by mouth 2 times a day for 90 days.  Dispense: 180 Tablet; Refill: 3      Followup: Return in about 3 months (around 7/12/2023), or if symptoms worsen or fail to improve.    Please note that this dictation was created using voice recognition software. I have made every reasonable attempt to correct obvious errors, but I expect that there are errors of grammar and possibly content that I did not discover before finalizing the note.

## 2023-04-12 NOTE — ASSESSMENT & PLAN NOTE
Chronic condition of lower right leg swelling with sores and at one point had chronic cellulitis and was hospitalized.  Went to wound care.  Uneventful.  Was unable to continue care with ID.  Still dealing with the swelling and the intermittent sores.  Unable to wear pressure stockings because it appears to exacerbate the sores.

## 2023-04-13 DIAGNOSIS — M79.604 PAIN IN BOTH LOWER EXTREMITIES: ICD-10-CM

## 2023-04-13 DIAGNOSIS — M79.605 PAIN IN BOTH LOWER EXTREMITIES: ICD-10-CM

## 2023-04-14 RX ORDER — GABAPENTIN 100 MG/1
CAPSULE ORAL
Qty: 270 CAPSULE | Refills: 1 | Status: SHIPPED | OUTPATIENT
Start: 2023-04-14 | End: 2023-10-11

## 2023-04-17 ENCOUNTER — TELEPHONE (OUTPATIENT)
Dept: MEDICAL GROUP | Facility: MEDICAL CENTER | Age: 73
End: 2023-04-17
Payer: MEDICARE

## 2023-04-17 LAB
1OH-MIDAZOLAM UR QL SCN: NOT DETECTED
6MAM UR QL: NOT DETECTED
7AMINOCLONAZEPAM UR QL: NOT DETECTED
A-OH ALPRAZ UR QL: NOT DETECTED
ALPRAZ UR QL: NOT DETECTED
AMPHET UR QL SCN: NOT DETECTED
ANNOTATION COMMENT IMP: NORMAL
ANNOTATION COMMENT IMP: NORMAL
BARBITURATES UR QL: NOT DETECTED
BUPRENORPHINE UR QL: NOT DETECTED
BZE UR QL: NOT DETECTED
CARBOXYTHC UR QL: NOT DETECTED
CARISOPRODOL UR QL: NOT DETECTED
CLONAZEPAM UR QL: NOT DETECTED
CODEINE UR QL: NOT DETECTED
DIAZEPAM UR QL: NOT DETECTED
ETHYL GLUCURONIDE UR QL: NOT DETECTED
FENTANYL UR QL: NOT DETECTED
GABAPENTIN UR QL: PRESENT
HYDROCODONE UR QL: NOT DETECTED
HYDROMORPHONE UR QL: NOT DETECTED
LORAZEPAM UR QL: NOT DETECTED
MDA UR QL: NOT DETECTED
MDEA UR QL: NOT DETECTED
MDMA UR QL: NOT DETECTED
MEPERIDINE UR QL: NOT DETECTED
METHADONE UR QL: NOT DETECTED
METHAMPHET UR QL: NOT DETECTED
MIDAZOLAM UR QL SCN: NOT DETECTED
MORPHINE UR QL: NOT DETECTED
NALOXONE UR QL SCN: NOT DETECTED
NORBUPRENORPHINE UR QL CFM: NOT DETECTED
NORDIAZEPAM UR QL: NOT DETECTED
NORFENTANYL UR QL: NOT DETECTED
NORHYDROCODONE UR QL CFM: NOT DETECTED
NOROXYCODONE UR QL CFM: NOT DETECTED
NOROXYMORPH CO100 Q0458: NOT DETECTED
OXAZEPAM UR QL: NOT DETECTED
OXYCODONE UR QL: NOT DETECTED
OXYMORPHONE UR QL: NOT DETECTED
PATHOLOGY STUDY: NORMAL
PCP UR QL: NOT DETECTED
PHENTERMINE UR QL: NOT DETECTED
PPAA UR QL: NOT DETECTED
PREGABALIN UR QL SCN: NOT DETECTED
SERVICE CMNT-IMP: NORMAL
TAPENADOL OSULF CO200 Q0473: NOT DETECTED
TAPENTADOL UR QL SCN: NOT DETECTED
TEMAZEPAM UR QL: NOT DETECTED
TRAMADOL UR QL: NOT DETECTED
ZOLPIDEM PHENYL-4-CARB UR QL SCN: PRESENT
ZOLPIDEM UR QL: NOT DETECTED

## 2023-04-18 ENCOUNTER — TELEPHONE (OUTPATIENT)
Dept: HEALTH INFORMATION MANAGEMENT | Facility: OTHER | Age: 73
End: 2023-04-18

## 2023-04-18 NOTE — TELEPHONE ENCOUNTER
DOCUMENTATION OF PAR STATUS:    1. Name of Medication & Dose: Ambien 10MG     2. Name of Prescription Coverage Company & phone #: [email or web address], Key # [id number].    3. Date Prior Auth Submitted: 4/17/23    4. What information was given to obtain insurance decision? Chart notes    5. Prior Auth Status? Pending    6. Patient Notified: N\A

## 2023-04-21 RX ORDER — ROSUVASTATIN CALCIUM 20 MG/1
20 TABLET, COATED ORAL DAILY
Qty: 100 TABLET | Refills: 4 | Status: SHIPPED | OUTPATIENT
Start: 2023-04-21

## 2023-04-21 NOTE — TELEPHONE ENCOUNTER
Received request via: Pharmacy    Was the patient seen in the last year in this department? Yes    Does the patient have an active prescription (recently filled or refills available) for medication(s) requested? No  4/12/2023  Does the patient have [employer]P Plus and need 100 day supply (blood pressure, diabetes and cholesterol meds only)? Yes, quantity updated to 100 days

## 2023-04-30 DIAGNOSIS — R00.2 PALPITATIONS: ICD-10-CM

## 2023-05-01 RX ORDER — METOPROLOL SUCCINATE 25 MG/1
TABLET, EXTENDED RELEASE ORAL
Qty: 90 TABLET | Refills: 3 | Status: SHIPPED | OUTPATIENT
Start: 2023-05-01

## 2023-05-01 NOTE — TELEPHONE ENCOUNTER
Is the patient due for a refill? Yes    Was the patient seen the past year? Yes    Date of last office visit: 2/14/2023    Does the patient have an upcoming appointment?  No   If yes, When?     Provider to refill:AK    Does the patients insurance require a 100 day supply?  No

## 2023-05-15 NOTE — TELEPHONE ENCOUNTER
Addended by: EVAN SHETTY on: 5/15/2023 10:52 AM     Modules accepted: Level of Service     Spoke with patient to confirm Stimulator trail on 7/28/21. The procedure is at [address] [place], [place]. The number for the  to check in is [phone]. One of the Nurses from the Rehab [facility] will call 48-72 hours before the procedure, If you don't speak with them they will cancel your appointment.     Antiinflammatories should not be taken 5 days before the procedure.     The patient needs a  for the procedure and the  must stay on the rehab hospital premises the entire duration of the procedure.

## 2023-06-10 DIAGNOSIS — M15.9 PRIMARY OSTEOARTHRITIS INVOLVING MULTIPLE JOINTS: ICD-10-CM

## 2023-06-10 DIAGNOSIS — K21.9 GASTROESOPHAGEAL REFLUX DISEASE WITHOUT ESOPHAGITIS: ICD-10-CM

## 2023-06-12 RX ORDER — FAMOTIDINE 40 MG/1
40 TABLET, FILM COATED ORAL 2 TIMES DAILY
Qty: 180 TABLET | Refills: 2 | Status: SHIPPED | OUTPATIENT
Start: 2023-06-12

## 2023-06-12 RX ORDER — PANTOPRAZOLE SODIUM 40 MG/1
40 TABLET, DELAYED RELEASE ORAL DAILY
Qty: 90 TABLET | Refills: 2 | Status: SHIPPED | OUTPATIENT
Start: 2023-06-12 | End: 2023-10-11 | Stop reason: SDUPTHER

## 2023-07-06 ENCOUNTER — HOSPITAL ENCOUNTER (OUTPATIENT)
Dept: CARDIOLOGY | Facility: MEDICAL CENTER | Age: 73
End: 2023-07-06
Attending: INTERNAL MEDICINE
Payer: MEDICARE

## 2023-07-06 DIAGNOSIS — I35.9 AORTIC VALVE DISORDER: ICD-10-CM

## 2023-07-06 PROCEDURE — 93306 TTE W/DOPPLER COMPLETE: CPT

## 2023-07-07 LAB
LV EJECT FRACT  99904: 65
LV EJECT FRACT MOD 2C 99903: 62.75
LV EJECT FRACT MOD 4C 99902: 68.36
LV EJECT FRACT MOD BP 99901: 65.15

## 2023-07-07 PROCEDURE — 93306 TTE W/DOPPLER COMPLETE: CPT | Mod: 26 | Performed by: INTERNAL MEDICINE

## 2023-07-08 ENCOUNTER — HOSPITAL ENCOUNTER (OUTPATIENT)
Dept: LAB | Facility: MEDICAL CENTER | Age: 73
End: 2023-07-08
Attending: PHYSICIAN ASSISTANT
Payer: MEDICARE

## 2023-07-08 DIAGNOSIS — E11.9 CONTROLLED TYPE 2 DIABETES MELLITUS WITHOUT COMPLICATION, WITHOUT LONG-TERM CURRENT USE OF INSULIN (HCC): ICD-10-CM

## 2023-07-08 LAB
EST. AVERAGE GLUCOSE BLD GHB EST-MCNC: 134 MG/DL
HBA1C MFR BLD: 6.3 % (ref 4–5.6)

## 2023-07-08 PROCEDURE — 36415 COLL VENOUS BLD VENIPUNCTURE: CPT | Mod: GA

## 2023-07-08 PROCEDURE — 83036 HEMOGLOBIN GLYCOSYLATED A1C: CPT | Mod: GA

## 2023-07-12 ENCOUNTER — APPOINTMENT (OUTPATIENT)
Dept: MEDICAL GROUP | Facility: MEDICAL CENTER | Age: 73
End: 2023-07-12
Payer: MEDICARE

## 2023-07-14 ENCOUNTER — OFFICE VISIT (OUTPATIENT)
Dept: MEDICAL GROUP | Facility: MEDICAL CENTER | Age: 73
End: 2023-07-14
Payer: MEDICARE

## 2023-07-14 VITALS
TEMPERATURE: 97.6 F | SYSTOLIC BLOOD PRESSURE: 110 MMHG | WEIGHT: 234.4 LBS | HEIGHT: 73 IN | OXYGEN SATURATION: 98 % | DIASTOLIC BLOOD PRESSURE: 40 MMHG | BODY MASS INDEX: 31.07 KG/M2 | HEART RATE: 60 BPM

## 2023-07-14 DIAGNOSIS — I35.9 AORTIC VALVE DISORDER: ICD-10-CM

## 2023-07-14 DIAGNOSIS — E78.5 DYSLIPIDEMIA: ICD-10-CM

## 2023-07-14 DIAGNOSIS — E11.9 CONTROLLED TYPE 2 DIABETES MELLITUS WITHOUT COMPLICATION, WITHOUT LONG-TERM CURRENT USE OF INSULIN (HCC): ICD-10-CM

## 2023-07-14 DIAGNOSIS — H93.11 TINNITUS OF RIGHT EAR: ICD-10-CM

## 2023-07-14 DIAGNOSIS — K21.9 GASTROESOPHAGEAL REFLUX DISEASE WITHOUT ESOPHAGITIS: ICD-10-CM

## 2023-07-14 DIAGNOSIS — I71.21 ANEURYSM OF ASCENDING AORTA WITHOUT RUPTURE (HCC): ICD-10-CM

## 2023-07-14 DIAGNOSIS — F51.01 PRIMARY INSOMNIA: ICD-10-CM

## 2023-07-14 DIAGNOSIS — B00.9 HSV INFECTION: ICD-10-CM

## 2023-07-14 DIAGNOSIS — E66.9 OBESITY (BMI 30.0-34.9): ICD-10-CM

## 2023-07-14 DIAGNOSIS — M15.9 PRIMARY OSTEOARTHRITIS INVOLVING MULTIPLE JOINTS: ICD-10-CM

## 2023-07-14 DIAGNOSIS — J30.2 SEASONAL ALLERGIES: ICD-10-CM

## 2023-07-14 DIAGNOSIS — Z00.00 MEDICARE ANNUAL WELLNESS VISIT, SUBSEQUENT: ICD-10-CM

## 2023-07-14 DIAGNOSIS — H91.91 DECREASED HEARING, RIGHT: ICD-10-CM

## 2023-07-14 PROBLEM — R60.0 LEG EDEMA, RIGHT: Status: RESOLVED | Noted: 2022-07-04 | Resolved: 2023-07-14

## 2023-07-14 PROBLEM — G89.29 CHRONIC PAIN OF RIGHT KNEE: Status: RESOLVED | Noted: 2022-04-19 | Resolved: 2023-07-14

## 2023-07-14 PROBLEM — I77.810 AORTIC ROOT DILATATION (HCC): Status: RESOLVED | Noted: 2023-02-14 | Resolved: 2023-07-14

## 2023-07-14 PROBLEM — M25.561 CHRONIC PAIN OF RIGHT KNEE: Status: RESOLVED | Noted: 2022-04-19 | Resolved: 2023-07-14

## 2023-07-14 PROBLEM — M17.11 ARTHRITIS OF RIGHT KNEE: Status: RESOLVED | Noted: 2022-05-05 | Resolved: 2023-07-14

## 2023-07-14 PROBLEM — L03.90 CHRONIC CELLULITIS: Status: RESOLVED | Noted: 2022-10-12 | Resolved: 2023-07-14

## 2023-07-14 PROBLEM — D69.6 THROMBOCYTOPENIA (HCC): Status: RESOLVED | Noted: 2020-08-17 | Resolved: 2023-07-14

## 2023-07-14 PROCEDURE — 3074F SYST BP LT 130 MM HG: CPT | Performed by: PHYSICIAN ASSISTANT

## 2023-07-14 PROCEDURE — G0439 PPPS, SUBSEQ VISIT: HCPCS | Performed by: PHYSICIAN ASSISTANT

## 2023-07-14 PROCEDURE — 3078F DIAST BP <80 MM HG: CPT | Performed by: PHYSICIAN ASSISTANT

## 2023-07-14 RX ORDER — ZOLPIDEM TARTRATE 10 MG/1
10 TABLET ORAL NIGHTLY PRN
Qty: 90 TABLET | Refills: 0 | Status: SHIPPED | OUTPATIENT
Start: 2023-07-14 | End: 2023-10-11 | Stop reason: SDUPTHER

## 2023-07-14 ASSESSMENT — ACTIVITIES OF DAILY LIVING (ADL): BATHING_REQUIRES_ASSISTANCE: 0

## 2023-07-14 ASSESSMENT — PATIENT HEALTH QUESTIONNAIRE - PHQ9: CLINICAL INTERPRETATION OF PHQ2 SCORE: 0

## 2023-07-14 ASSESSMENT — FIBROSIS 4 INDEX: FIB4 SCORE: 1.93

## 2023-07-14 ASSESSMENT — ENCOUNTER SYMPTOMS: GENERAL WELL-BEING: EXCELLENT

## 2023-07-14 NOTE — PROGRESS NOTES
Chief Complaint   Patient presents with    Medicare Annual Wellness       HPI:  This is a pleasant 72-year-old male here today for an annual wellness.  History of reflux but well controlled on medication.  History of insomnia.  Takes Ambien nightly.  Needs a renewal.  History of dyslipidemia.  Takes Crestor daily.  Last cholesterol profile in good control.  History of decreased hearing and tinnitus in the right ear.  Those symptoms are stable.  Diabetes is well controlled.  Handling metformin 1000 mg twice a day.  History of HSV but on Valtrex daily.  History of an ascending aortic aneurysm.  Considered stable on imaging.  Does have osteoarthritis and takes Voltaren daily.    Patient Active Problem List    Diagnosis Date Noted    Tinnitus of right ear 07/14/2023    Agatston coronary artery calcium score greater than 400 04/12/2023    Ascending aortic aneurysm (HCC) 04/19/2022    Medicare annual wellness visit, subsequent 08/17/2020    Controlled type 2 diabetes mellitus without complication, without long-term current use of insulin (HCC) 08/26/2019    Seasonal allergies 08/26/2019    HSV infection 08/26/2019    Gastroesophageal reflux disease 10/03/2018    Primary insomnia 10/03/2018    Dyslipidemia 10/03/2018    Primary osteoarthritis involving multiple joints 10/03/2018    Obesity (BMI 30.0-34.9) 10/03/2018    Decreased hearing, right 10/03/2018       Current Outpatient Medications   Medication Sig Dispense Refill    metformin (GLUCOPHAGE) 1000 MG tablet Take 1 Tablet by mouth 2 times a day with meals for 180 days. 180 Tablet 1    zolpidem (AMBIEN) 10 MG Tab Take 1 Tablet by mouth at bedtime as needed for Sleep for up to 90 days. 90 Tablet 0    pantoprazole (PROTONIX) 40 MG Tablet Delayed Response Take 1 Tablet by mouth every day. Take 1/2 hour prior to same meal daily. 90 Tablet 2    famotidine (PEPCID) 40 MG Tab Take 1 Tablet by mouth 2 times a day. 180 Tablet 2    diclofenac DR (VOLTAREN) 50 MG Tablet Delayed  Response Take 1 Tablet by mouth 2 times a day. 180 Tablet 1    metoprolol SR (TOPROL XL) 25 MG TABLET SR 24 HR TAKE 1 TABLET DAILY 90 Tablet 3    rosuvastatin (CRESTOR) 20 MG Tab Take 1 Tablet by mouth every day. 100 Tablet 4    gabapentin (NEURONTIN) 100 MG Cap TAKE 1 CAPSULE 3 TIMES A    Capsule 1    fluorouracil (EFUDEX) 5 % cream fluorouracil 5 % topical cream      Aspirin 81 MG Cap 81 mg.      oxybutynin SR (DITROPAN-XL) 10 MG CR tablet Take 10 mg by mouth every day.      valACYclovir (VALTREX) 500 MG Tab TAKE 1 TABLET BID (Patient taking differently: Take 500 mg by mouth every day. TAKE 1 TABLET BID) 180 tablet 4    CALCIUM PO Take 1 Tablet by mouth every day.      Cholecalciferol (VITAMIN D3 PO) Take 1 Capsule by mouth every day.       No current facility-administered medications for this visit.          Current supplements as per medication list.     Allergies: Meloxicam and Seasonal    Current social contact/activities: Watching movies, don't get out much, internet, exercising    He  reports that he has never smoked. He has never used smokeless tobacco. He reports current alcohol use of about 0.6 oz of alcohol per week. He reports that he does not use drugs.  Counseling given: Not Answered      ROS:    Gait: Uses no assistive device  Ostomy: No  Other tubes: No  Amputations: No  Chronic oxygen use: No  Last eye exam: 2023  Wears hearing aids: No   : Denies any urinary leakage during the last 6 months    Screening:    Depression Screening  Little interest or pleasure in doing things?  0 - not at all  Feeling down, depressed , or hopeless? 0 - not at all  Trouble falling or staying asleep, or sleeping too much?     Feeling tired or having little energy?     Poor appetite or overeating?     Feeling bad about yourself - or that you are a failure or have let yourself or your family down?    Trouble concentrating on things, such as reading the newspaper or watching television?    Moving or speaking so  slowly that other people could have noticed.  Or the opposite - being so fidgety or restless that you have been moving around a lot more than usual?     Thoughts that you would be better off dead, or of hurting yourself?     Patient Health Questionnaire Score:      If depressive symptoms identified deferred to follow up visit unless specifically addressed in assessment and plan.    Interpretation of PHQ-9 Total Score   Score Severity   1-4 No Depression   5-9 Mild Depression   10-14 Moderate Depression   15-19 Moderately Severe Depression   20-27 Severe Depression    Screening for Cognitive Impairment  Three Minute Recall (Banana, Sunrise, Chair) 3/3    Mo clock face with all 12 numbers and set the hands to show 20 past 8.  Yes    Cognitive concerns identified deferred for follow up unless specifically addressed in assessment and plan.    Fall Risk Assessment  Has the patient had two or more falls in the last year or any fall with injury in the last year?  No    Safety Assessment  Throw rugs on floor.  No  Handrails on all stairs.  No  Good lighting in all hallways.  Yes  Difficulty hearing.  Yes  Patient counseled about all safety risks that were identified.    Functional Assessment ADLs  Are there any barriers preventing you from cooking for yourself or meeting nutritional needs?  No.    Are there any barriers preventing you from driving safely or obtaining transportation?  No.    Are there any barriers preventing you from using a telephone or calling for help?  No    Are there any barriers preventing you from shopping?  No.    Are there any barriers preventing you from taking care of your own finances?  No    Are there any barriers preventing you from managing your medications?  No    Are there any barriers preventing you from showering, bathing or dressing yourself? No    Are you currently engaging in any exercise or physical activity?  Yes. Push ups and weight lifting daily   What is your perception of your  health? Excellent    Advance Care Planning  Do you have an Advance Directive, Living Will, Durable Power of , or POLST? Yes    Living Will Durable Power of    is on file      Health Maintenance Summary            Overdue - DIABETES MONOFILAMENT / LE EXAM (Yearly) Overdue since 10/11/2022      10/11/2021  Done    08/17/2020  Done    08/26/2019  Done    08/26/2019  Done              Postponed - COVID-19 Vaccine (6 - Pfizer series) Postponed until 7/14/2024 09/26/2022  Imm Admin: PFIZER BIVALENT BOOSTER SARS-COV-2 VACCINE (12+)    04/08/2022  Imm Admin: COVID-19 Vaccine, unspecified - HISTORICAL DATA    10/20/2021  Imm Admin: PFIZER PURPLE CAP SARS-COV-2 VACCINATION (12+)    03/13/2021  Imm Admin: PFIZER PURPLE CAP SARS-COV-2 VACCINATION (12+)    02/17/2021  Imm Admin: PFIZER PURPLE CAP SARS-COV-2 VACCINATION (12+)              IMM INFLUENZA (1) Next due on 9/1/2023 09/26/2022  Imm Admin: Influenza Vaccine Adult HD    10/18/2021  Imm Admin: Influenza Vaccine Adult HD    06/01/2021  Imm Admin: Influenza, Unspecified - HISTORICAL DATA    10/15/2020  Imm Admin: Influenza Vaccine Quad Inj (Pf)    10/14/2020  Imm Admin: Influenza Vaccine Quad Inj (Pf)    Only the first 5 history entries have been loaded, but more history exists.              SERUM CREATININE (Yearly) Next due on 11/4/2023 11/04/2022  Comp Metabolic Panel    07/05/2022  Comp Metabolic Panel    07/04/2022  Complete Metabolic Panel (CMP)    07/01/2022  Comp Metabolic Panel    02/10/2022  CREATININE    Only the first 5 history entries have been loaded, but more history exists.              A1C SCREENING (Every 6 Months) Next due on 1/8/2024 07/08/2023  HEMOGLOBIN A1C    04/06/2023  HEMOGLOBIN A1C    07/01/2022  HEMOGLOBIN A1C    12/29/2021  HEMOGLOBIN A1C    07/16/2021  HEMOGLOBIN A1C    Only the first 5 history entries have been loaded, but more history exists.              FASTING LIPID PROFILE (Yearly) Next due on  4/6/2024 04/06/2023  Lipid Profile    07/05/2022  Lipid Profile    07/01/2022  Lipid Profile    12/29/2021  Lipid Profile    07/16/2021  Lipid Profile    Only the first 5 history entries have been loaded, but more history exists.              URINE ACR / MICROALBUMIN (Yearly) Next due on 4/6/2024 04/06/2023  MICROALBUMIN CREAT RATIO URINE    07/16/2021  MICROALBUMIN CREAT RATIO URINE    04/16/2021  MICROALBUMIN CREAT RATIO URINE    08/11/2020  MICROALBUMIN CREAT RATIO URINE    04/20/2020  MICROALBUMIN CREAT RATIO URINE    Only the first 5 history entries have been loaded, but more history exists.              RETINAL SCREENING (Yearly) Next due on 5/16/2024 05/16/2023  RETINAL SCREENING RESULTS    08/31/2021  Done    08/31/2021  REFERRAL FOR RETINAL SCREENING EXAM              Annual Wellness Visit (Every 366 Days) Next due on 7/14/2024 07/14/2023  Visit Dx: Medicare annual wellness visit, subsequent    08/17/2020  Done    08/17/2020  Subsequent Annual Wellness Visit - Includes PPPS ()    08/17/2020  Visit Dx: Medicare annual wellness visit, subsequent    08/17/2020  Prob Dx: Medicare annual wellness visit, subsequent    Only the first 5 history entries have been loaded, but more history exists.              COLORECTAL CANCER SCREENING (COLONOSCOPY - Every 5 Years) Next due on 6/17/2026 06/17/2021  REFERRAL TO GI FOR COLONOSCOPY    05/05/2020  OCCULT BLOOD FECES IMMUNOASSAY    12/17/2019  OCCULT BLOOD FECES IMMUNOASSAY    03/09/2016  REFERRAL TO GI FOR COLONOSCOPY              IMM DTaP/Tdap/Td Vaccine (3 - Td or Tdap) Next due on 9/18/2029 09/18/2019  Imm Admin: Tdap Vaccine    11/26/2018  Imm Admin: Tdap Vaccine              HEPATITIS C SCREENING  Completed      02/18/2019  Hepatitis C Antibody component of HEP C VIRUS ANTIBODY              IMM PNEUMOCOCCAL VACCINE: 65+ Years (Series Information) Completed      08/17/2020  Imm Admin: Pneumococcal polysaccharide vaccine (PPSV-23)     09/18/2019  Imm Admin: Pneumococcal Conjugate Vaccine (Prevnar/PCV-13)    09/21/2011  Imm Admin: Pneumococcal polysaccharide vaccine (PPSV-23)              IMM ZOSTER VACCINES (Series Information) Completed      05/19/2021  Imm Admin: Zoster Vaccine Recombinant (RZV) (SHINGRIX)    03/24/2021  Imm Admin: Zoster Vaccine Recombinant (RZV) (SHINGRIX)    06/20/2018  Imm Admin: Zoster Vaccine Recombinant (RZV) (SHINGRIX)              IMM HEP B VACCINE (Series Information) Completed      10/18/2021  Imm Admin: Hepatitis B Vaccine (Adol/Adult)    06/25/2021  Imm Admin: Hepatitis B Vaccine (Adol/Adult)    04/29/2021  Imm Admin: Hepatitis B Vaccine (Adol/Adult)              HPV Vaccines (Series Information) Aged Out      No completion history exists for this topic.              IMM MENINGOCOCCAL ACWY VACCINE (Series Information) Aged Out      No completion history exists for this topic.                    Patient Care Team:  Ez Connelly P.A.-C. as PCP - General (Family Medicine)  Julito Dacosta M.D. (Phys Med and Rehab)  Edith Lane M.D. as Attending Team Physician (Neurosurgery)      Social History     Tobacco Use    Smoking status: Never    Smokeless tobacco: Never   Vaping Use    Vaping Use: Never used   Substance Use Topics    Alcohol use: Yes     Alcohol/week: 0.6 oz     Types: 1 Glasses of wine per week     Comment: rare    Drug use: No     Family History   Problem Relation Age of Onset    Cancer Mother 83        endometrial    Diabetes Mother     Heart Disease Father     No Known Problems Maternal Grandmother     No Known Problems Maternal Grandfather     No Known Problems Paternal Grandmother     No Known Problems Paternal Grandfather     Hyperlipidemia Neg Hx      He  has a past medical history of Dyslipidemia, GERD (gastroesophageal reflux disease), and Insomnia.   Past Surgical History:   Procedure Laterality Date    PB TOTAL KNEE ARTHROPLASTY Right 7/20/2022    Procedure: RIGHT TOTAL KNEE  "ARTHROPLASTY;  Surgeon: Erika Hobbs M.D.;  Location: [facility];  Service: Orthopedics    HI DRAIN/INJECT LARGE JOINT/BURSA Right 10/27/2021    Procedure: Diagnostic and therapeutic fluoroscopically guided intra-articular RIGHT hip injection;  Surgeon: Julito Dacosta M.D.;  [facility];  Service: Pain Management    HI FLUOROSCOPIC GUIDANCE NEEDLE PLACEMENT Right 10/27/2021    Procedure: .;  Surgeon: Julito Dacosta M.D.;  Location[facility];  Service: Pain Management    HI FLUOROSCOPIC GUIDANCE NEEDLE PLACEMENT  7/28/2021    Procedure: Spinal cord stimulation trial;  Surgeon: Julito Dacosta M.D.;  Location[facility];  Service: Pain Management    HI PERCUT IMPLNT NEUROELECT,EPIDURAL  7/28/2021    Procedure: .;  Surgeon: Julito Dacosta M.D.;  Location[facility];  Service: Pain Management    NEURO DEST FACET L/S W/IG SNGL Right 11/23/2020    Procedure: DESTRUCTION, NERVE, FACET JOINT, LUMBOSACRAL, USING NEUROLYTIC AGENT, WITH FLUOROSCOPIC GUIDANCE;  Surgeon: Julito Dacosta M.D.;  Location: [facility];  Service: Pain Management    LUMBAR MEDIAL BRANCH BLOCKS Right 10/12/2020    Procedure: BLOCK, NERVE, SPINAL, LUMBAR, POSTERIOR RAMUS, MEDIAL BRANCH;  Surgeon: Julito Dacosta M.D.;  Location: [facility];  Service: Pain Management    LUMBAR MEDIAL BRANCH BLOCKS Right 8/24/2020    Procedure: BLOCK, NERVE, SPINAL, LUMBAR, POSTERIOR RAMUS, MEDIAL BRANCH;  Surgeon: Julito Dacosta M.D.;  Location: [facility];  Service: Pain Management    APPENDECTOMY      TONSILLECTOMY         Exam:   /40 (BP Location: Right arm, Patient Position: Sitting, BP Cuff Size: Adult)   Pulse 60   Temp 36.4 °C (97.6 °F) (Temporal)   Ht 1.854 m (6' 1\")   Wt 106 kg (234 lb 6.4 oz)   SpO2 98%  Body mass index is 30.93 kg/m².    Hearing fair.    Dentition " good  Alert, oriented in no acute distress.  Eye contact is good, speech goal directed, affect calm    Assessment and Plan. The following treatment and monitoring plan is recommended:    1. Controlled type 2 diabetes mellitus without complication, without long-term current use of insulin (HCC)  - metformin (GLUCOPHAGE) 1000 MG tablet; Take 1 Tablet by mouth 2 times a day with meals for 180 days.  Dispense: 180 Tablet; Refill: 1    2. Medicare annual wellness visit, subsequent    3. Gastroesophageal reflux disease without esophagitis    4. Primary insomnia  - zolpidem (AMBIEN) 10 MG Tab; Take 1 Tablet by mouth at bedtime as needed for Sleep for up to 90 days.  Dispense: 90 Tablet; Refill: 0    5. Dyslipidemia    6. Primary osteoarthritis involving multiple joints    7. Obesity (BMI 30.0-34.9)    8. Decreased hearing, right    9. Aortic valve disorder    10. Seasonal allergies    11. HSV infection    12. Thrombocytopenia (HCC)    13. Aneurysm of ascending aorta without rupture (HCC)    14. Tinnitus of right ear      Services suggested: No services needed at this time  Health Care Screening: Age-appropriate preventive services recommended by USPTF and ACIP covered by Medicare were discussed today. Services ordered if indicated and agreed upon by the patient.  Referrals offered: Community-based lifestyle interventions to reduce health risks and promote self-management and wellness, fall prevention, nutrition, physical activity, tobacco-use cessation, weight loss, and mental health services as per orders if indicated.    Discussion today about general wellness and lifestyle habits:    Prevent falls and reduce trip hazards; Cautioned about securing or removing rugs.  Have a working fire alarm and carbon monoxide detector;   Engage in regular physical activity and social activities     Follow-up: Return in about 3 months (around 10/14/2023), or if symptoms worsen or fail to improve.

## 2023-07-14 NOTE — ASSESSMENT & PLAN NOTE
This is a pleasant 72-year-old male here today for an annual wellness.  History of reflux but well controlled on medication.  History of insomnia.  Takes Ambien nightly.  Needs a renewal.  History of dyslipidemia.  Takes Crestor daily.  Last cholesterol profile in good control.  History of decreased hearing and tinnitus in the right ear.  Those symptoms are stable.  Diabetes is well controlled.  Handling metformin 1000 mg twice a day.  History of HSV but on Valtrex daily.  History of an ascending aortic aneurysm.  Considered stable on imaging.  Does have osteoarthritis and takes Voltaren daily.

## 2023-10-11 ENCOUNTER — OFFICE VISIT (OUTPATIENT)
Dept: MEDICAL GROUP | Facility: MEDICAL CENTER | Age: 73
End: 2023-10-11
Payer: MEDICARE

## 2023-10-11 ENCOUNTER — TELEPHONE (OUTPATIENT)
Dept: MEDICAL GROUP | Facility: MEDICAL CENTER | Age: 73
End: 2023-10-11

## 2023-10-11 VITALS
SYSTOLIC BLOOD PRESSURE: 104 MMHG | DIASTOLIC BLOOD PRESSURE: 40 MMHG | HEART RATE: 66 BPM | OXYGEN SATURATION: 96 % | TEMPERATURE: 96.6 F | WEIGHT: 223.8 LBS | BODY MASS INDEX: 29.66 KG/M2 | HEIGHT: 73 IN

## 2023-10-11 DIAGNOSIS — M79.605 PAIN IN BOTH LOWER EXTREMITIES: ICD-10-CM

## 2023-10-11 DIAGNOSIS — M15.9 PRIMARY OSTEOARTHRITIS INVOLVING MULTIPLE JOINTS: ICD-10-CM

## 2023-10-11 DIAGNOSIS — N52.2 DRUG-INDUCED ERECTILE DYSFUNCTION: ICD-10-CM

## 2023-10-11 DIAGNOSIS — Z12.5 SCREENING PSA (PROSTATE SPECIFIC ANTIGEN): ICD-10-CM

## 2023-10-11 DIAGNOSIS — F51.01 PRIMARY INSOMNIA: ICD-10-CM

## 2023-10-11 DIAGNOSIS — M79.604 PAIN IN BOTH LOWER EXTREMITIES: ICD-10-CM

## 2023-10-11 DIAGNOSIS — K21.9 GASTROESOPHAGEAL REFLUX DISEASE WITHOUT ESOPHAGITIS: ICD-10-CM

## 2023-10-11 DIAGNOSIS — E11.9 CONTROLLED TYPE 2 DIABETES MELLITUS WITHOUT COMPLICATION, WITHOUT LONG-TERM CURRENT USE OF INSULIN (HCC): ICD-10-CM

## 2023-10-11 DIAGNOSIS — Z23 NEED FOR IMMUNIZATION AGAINST INFLUENZA: ICD-10-CM

## 2023-10-11 PROCEDURE — 3078F DIAST BP <80 MM HG: CPT | Performed by: PHYSICIAN ASSISTANT

## 2023-10-11 PROCEDURE — 3074F SYST BP LT 130 MM HG: CPT | Performed by: PHYSICIAN ASSISTANT

## 2023-10-11 PROCEDURE — 90662 IIV NO PRSV INCREASED AG IM: CPT | Performed by: PHYSICIAN ASSISTANT

## 2023-10-11 PROCEDURE — G0008 ADMIN INFLUENZA VIRUS VAC: HCPCS | Performed by: PHYSICIAN ASSISTANT

## 2023-10-11 PROCEDURE — 99214 OFFICE O/P EST MOD 30 MIN: CPT | Mod: 25 | Performed by: PHYSICIAN ASSISTANT

## 2023-10-11 RX ORDER — GABAPENTIN 100 MG/1
CAPSULE ORAL
Qty: 270 CAPSULE | Refills: 1 | Status: SHIPPED | OUTPATIENT
Start: 2023-10-11 | End: 2023-10-11 | Stop reason: SDUPTHER

## 2023-10-11 RX ORDER — GABAPENTIN 100 MG/1
100 CAPSULE ORAL 3 TIMES DAILY
Qty: 270 CAPSULE | Refills: 3 | Status: SHIPPED | OUTPATIENT
Start: 2023-10-11 | End: 2024-10-05

## 2023-10-11 RX ORDER — PANTOPRAZOLE SODIUM 40 MG/1
40 TABLET, DELAYED RELEASE ORAL DAILY
Qty: 90 TABLET | Refills: 3 | Status: SHIPPED | OUTPATIENT
Start: 2023-10-11 | End: 2024-10-05

## 2023-10-11 RX ORDER — ZOLPIDEM TARTRATE 10 MG/1
10 TABLET ORAL NIGHTLY PRN
Qty: 90 TABLET | Refills: 1 | Status: SHIPPED | OUTPATIENT
Start: 2023-10-11 | End: 2024-01-10 | Stop reason: SDUPTHER

## 2023-10-11 RX ORDER — SILDENAFIL 25 MG/1
25 TABLET, FILM COATED ORAL
Qty: 30 TABLET | Refills: 3 | Status: SHIPPED | OUTPATIENT
Start: 2023-10-11 | End: 2023-11-10

## 2023-10-11 ASSESSMENT — FIBROSIS 4 INDEX: FIB4 SCORE: 1.96

## 2023-10-11 NOTE — TELEPHONE ENCOUNTER
DOCUMENTATION OF PAR STATUS:    1. Name of Medication & Dose: Pantoprazole Sodium 40MG dr tablets     2. Name of Prescription Coverage Company & phone #: [email or web address]    3. Date Prior Auth Submitted: 10/11/23    4. What information was given to obtain insurance decision? Clinical office notes    5. Prior Auth Status? Pending    6. Patient Notified: yes

## 2023-10-11 NOTE — ASSESSMENT & PLAN NOTE
This is a pleasant 73-year-old male here today to follow-up on his health.  Recent labs were good.  A1c at 6.0.  He is on metformin thousand twice a day.  He is doing well with weight loss.  BMI currently at 29+.  He is due for renewals of medications.  States that pantoprazole needs prior authorization.  He wonders if his blood pressure is too low.  He takes metoprolol 25 mg daily.  It is an extended release tablet.  He denies any symptoms such as weakness.  Does complain of ED.  Would like to try some medication.  He is due today for renewal of his Ambien.  Has taken it for many years.  Medication provides him 5 hours of sleep.

## 2023-10-11 NOTE — PROGRESS NOTES
Subjective:   Abiel Vaughn is a 73 y.o. male here today for diabetes and insomnia.    Controlled type 2 diabetes mellitus without complication, without long-term current use of insulin ([facility])  This is a pleasant 73-year-old male here today to follow-up on his health.  Recent labs were good.  A1c at 6.0.  He is on metformin thousand twice a day.  He is doing well with weight loss.  BMI currently at 29+.  He is due for renewals of medications.  States that pantoprazole needs prior authorization.  He wonders if his blood pressure is too low.  He takes metoprolol 25 mg daily.  It is an extended release tablet.  He denies any symptoms such as weakness.  Does complain of ED.  Would like to try some medication.  He is due today for renewal of his Ambien.  Has taken it for many years.  Medication provides him 5 hours of sleep.       Current medicines (including changes today)  Current Outpatient Medications   Medication Sig Dispense Refill    metformin (GLUCOPHAGE) 1000 MG tablet Take 1 Tablet by mouth 2 times a day with meals for 360 days. 180 Tablet 3    pantoprazole (PROTONIX) 40 MG Tablet Delayed Response Take 1 Tablet by mouth every day for 360 days. Take 1/2 hour prior to same meal daily. 90 Tablet 3    gabapentin (NEURONTIN) 100 MG Cap Take 1 Capsule by mouth 3 times a day for 360 days. 270 Capsule 3    diclofenac DR (VOLTAREN) 50 MG Tablet Delayed Response Take 1 Tablet by mouth 2 times a day for 360 days. 180 Tablet 3    zolpidem (AMBIEN) 10 MG Tab Take 1 Tablet by mouth at bedtime as needed for Sleep for up to 90 days. 90 Tablet 1    sildenafil citrate (VIAGRA) 25 MG Tab Take 1 Tablet by mouth 1 time a day as needed for Erectile Dysfunction for up to 30 days. 1 hour prior to activity. 30 Tablet 3    famotidine (PEPCID) 40 MG Tab Take 1 Tablet by mouth 2 times a day. 180 Tablet 2    metoprolol SR (TOPROL XL) 25 MG TABLET SR 24 HR TAKE 1 TABLET DAILY 90 Tablet 3    rosuvastatin (CRESTOR) 20 MG Tab Take 1  "Tablet by mouth every day. 100 Tablet 4    fluorouracil (EFUDEX) 5 % cream fluorouracil 5 % topical cream      Aspirin 81 MG Cap 81 mg.      oxybutynin SR (DITROPAN-XL) 10 MG CR tablet Take 10 mg by mouth every day.      valACYclovir (VALTREX) 500 MG Tab TAKE 1 TABLET BID (Patient taking differently: Take 500 mg by mouth every day. TAKE 1 TABLET BID) 180 tablet 4    CALCIUM PO Take 1 Tablet by mouth every day.      Cholecalciferol (VITAMIN D3 PO) Take 1 Capsule by mouth every day.       No current facility-administered medications for this visit.     He  has a past medical history of Dyslipidemia, GERD (gastroesophageal reflux disease), and Insomnia.    Social History and Family History were reviewed and updated.    ROS   No chest pain, no shortness of breath, no abdominal pain and all other systems were reviewed and are negative.       Objective:     /40 (BP Location: Right arm, Patient Position: Sitting, BP Cuff Size: Adult)   Pulse 66   Temp 35.9 °C (96.6 °F) (Temporal)   Ht 1.854 m (6' 1\")   Wt 102 kg (223 lb 12.8 oz)   SpO2 96%  Body mass index is 29.53 kg/m².   Physical Exam:  Constitutional: Alert, no distress.  Skin: Warm, dry, good turgor, no rashes in visible areas.  Eye: Equal, round and reactive, conjunctiva clear, lids normal.  ENMT: Lips without lesions, good dentition, oropharynx clear.  Neck: Trachea midline, no masses.   Respiratory: Unlabored respiratory effort.  Psych: Alert and oriented x3, normal affect and mood.        Assessment and Plan:   The following treatment plan was discussed    1. Controlled type 2 diabetes mellitus without complication, without long-term current use of insulin (HCC)  Chronic condition.  Stable.  Ordered A1c to be performed prior to next appointment.  Also order other labs.  Continue metformin as directed.  - HEMOGLOBIN A1C; Future  - Comp Metabolic Panel; Future  - Lipid Profile; Future  - CBC WITH DIFFERENTIAL; Future  - metformin (GLUCOPHAGE) 1000 MG " tablet; Take 1 Tablet by mouth 2 times a day with meals for 360 days.  Dispense: 180 Tablet; Refill: 3    2. Gastroesophageal reflux disease without esophagitis  Chronic condition.  Stable.  Renewed pantoprazole.  Continue with sitting.  - pantoprazole (PROTONIX) 40 MG Tablet Delayed Response; Take 1 Tablet by mouth every day for 360 days. Take 1/2 hour prior to same meal daily.  Dispense: 90 Tablet; Refill: 3    3. Pain in both lower extremities  Chronic condition.  Stable.  Renewed Neurontin.  - gabapentin (NEURONTIN) 100 MG Cap; Take 1 Capsule by mouth 3 times a day for 360 days.  Dispense: 270 Capsule; Refill: 3    4. Primary osteoarthritis involving multiple joints  Chronic condition.  Stable.  Renew diclofenac.  Ordered kidney function for follow-up appointment.  Fast 8 hours.  - diclofenac DR (VOLTAREN) 50 MG Tablet Delayed Response; Take 1 Tablet by mouth 2 times a day for 360 days.  Dispense: 180 Tablet; Refill: 3    5. Primary insomnia  Chronic condition.  Stable.   reviewed.  Medication appropriate.  Prescribed Ambien with a 90-day supply with 1 refill.  - zolpidem (AMBIEN) 10 MG Tab; Take 1 Tablet by mouth at bedtime as needed for Sleep for up to 90 days.  Dispense: 90 Tablet; Refill: 1    6. Drug-induced erectile dysfunction  New condition noted in chart but chronic.  Discussed sildenafil.  Discussed side effects.  May take up to 100 mg.  Unsure about cost sent to [facility].  May try local grocery or if too expensive.  - sildenafil citrate (VIAGRA) 25 MG Tab; Take 1 Tablet by mouth 1 time a day as needed for Erectile Dysfunction for up to 30 days. 1 hour prior to activity.  Dispense: 30 Tablet; Refill: 3    7. Screening PSA (prostate specific antigen)  PSA ordered.  Screening.  - PROSTATE SPECIFIC AG SCREENING; Future    8. Need for immunization against influenza  Administer without complaints.  - INFLUENZA VACCINE, HIGH DOSE (65+ ONLY)         Followup: Return in about 3 months (around  1/11/2024), or if symptoms worsen or fail to improve.    Please note that this dictation was created using voice recognition software. I have made every reasonable attempt to correct obvious errors, but I expect that there are errors of grammar and possibly content that I did not discover before finalizing the note.

## 2023-11-09 DIAGNOSIS — M15.9 PRIMARY OSTEOARTHRITIS INVOLVING MULTIPLE JOINTS: ICD-10-CM

## 2023-11-29 ENCOUNTER — PATIENT MESSAGE (OUTPATIENT)
Dept: HEALTH INFORMATION MANAGEMENT | Facility: OTHER | Age: 73
End: 2023-11-29

## 2024-01-03 ENCOUNTER — HOSPITAL ENCOUNTER (OUTPATIENT)
Dept: LAB | Facility: MEDICAL CENTER | Age: 74
End: 2024-01-03
Attending: PHYSICIAN ASSISTANT
Payer: MEDICARE

## 2024-01-03 DIAGNOSIS — Z12.5 SCREENING PSA (PROSTATE SPECIFIC ANTIGEN): ICD-10-CM

## 2024-01-03 DIAGNOSIS — E11.9 CONTROLLED TYPE 2 DIABETES MELLITUS WITHOUT COMPLICATION, WITHOUT LONG-TERM CURRENT USE OF INSULIN (HCC): ICD-10-CM

## 2024-01-03 LAB
ALBUMIN SERPL BCP-MCNC: 4.3 G/DL (ref 3.2–4.9)
ALBUMIN/GLOB SERPL: 1.8 G/DL
ALP SERPL-CCNC: 72 U/L (ref 30–99)
ALT SERPL-CCNC: 22 U/L (ref 2–50)
ANION GAP SERPL CALC-SCNC: 13 MMOL/L (ref 7–16)
AST SERPL-CCNC: 27 U/L (ref 12–45)
BASOPHILS # BLD AUTO: 0.7 % (ref 0–1.8)
BASOPHILS # BLD: 0.05 K/UL (ref 0–0.12)
BILIRUB SERPL-MCNC: 0.4 MG/DL (ref 0.1–1.5)
BUN SERPL-MCNC: 19 MG/DL (ref 8–22)
CALCIUM ALBUM COR SERPL-MCNC: 9.3 MG/DL (ref 8.5–10.5)
CALCIUM SERPL-MCNC: 9.5 MG/DL (ref 8.4–10.2)
CHLORIDE SERPL-SCNC: 104 MMOL/L (ref 96–112)
CHOLEST SERPL-MCNC: 141 MG/DL (ref 100–199)
CO2 SERPL-SCNC: 24 MMOL/L (ref 20–33)
CREAT SERPL-MCNC: 1.09 MG/DL (ref 0.5–1.4)
EOSINOPHIL # BLD AUTO: 0.26 K/UL (ref 0–0.51)
EOSINOPHIL NFR BLD: 3.7 % (ref 0–6.9)
ERYTHROCYTE [DISTWIDTH] IN BLOOD BY AUTOMATED COUNT: 43.8 FL (ref 35.9–50)
EST. AVERAGE GLUCOSE BLD GHB EST-MCNC: 143 MG/DL
FASTING STATUS PATIENT QL REPORTED: NORMAL
GFR SERPLBLD CREATININE-BSD FMLA CKD-EPI: 71 ML/MIN/1.73 M 2
GLOBULIN SER CALC-MCNC: 2.4 G/DL (ref 1.9–3.5)
GLUCOSE SERPL-MCNC: 120 MG/DL (ref 65–99)
HBA1C MFR BLD: 6.6 % (ref 4–5.6)
HCT VFR BLD AUTO: 40.3 % (ref 42–52)
HDLC SERPL-MCNC: 40 MG/DL
HGB BLD-MCNC: 13.1 G/DL (ref 14–18)
IMM GRANULOCYTES # BLD AUTO: 0.01 K/UL (ref 0–0.11)
IMM GRANULOCYTES NFR BLD AUTO: 0.1 % (ref 0–0.9)
LDLC SERPL CALC-MCNC: 77 MG/DL
LYMPHOCYTES # BLD AUTO: 3.09 K/UL (ref 1–4.8)
LYMPHOCYTES NFR BLD: 43.9 % (ref 22–41)
MCH RBC QN AUTO: 30.8 PG (ref 27–33)
MCHC RBC AUTO-ENTMCNC: 32.5 G/DL (ref 32.3–36.5)
MCV RBC AUTO: 94.8 FL (ref 81.4–97.8)
MONOCYTES # BLD AUTO: 0.59 K/UL (ref 0–0.85)
MONOCYTES NFR BLD AUTO: 8.4 % (ref 0–13.4)
NEUTROPHILS # BLD AUTO: 3.04 K/UL (ref 1.82–7.42)
NEUTROPHILS NFR BLD: 43.2 % (ref 44–72)
NRBC # BLD AUTO: 0 K/UL
NRBC BLD-RTO: 0 /100 WBC (ref 0–0.2)
PLATELET # BLD AUTO: 184 K/UL (ref 164–446)
PMV BLD AUTO: 11.4 FL (ref 9–12.9)
POTASSIUM SERPL-SCNC: 4.5 MMOL/L (ref 3.6–5.5)
PROT SERPL-MCNC: 6.7 G/DL (ref 6–8.2)
PSA SERPL-MCNC: 2.55 NG/ML (ref 0–4)
RBC # BLD AUTO: 4.25 M/UL (ref 4.7–6.1)
SODIUM SERPL-SCNC: 141 MMOL/L (ref 135–145)
TRIGL SERPL-MCNC: 118 MG/DL (ref 0–149)
WBC # BLD AUTO: 7 K/UL (ref 4.8–10.8)

## 2024-01-03 PROCEDURE — 83036 HEMOGLOBIN GLYCOSYLATED A1C: CPT | Mod: GA

## 2024-01-03 PROCEDURE — 85025 COMPLETE CBC W/AUTO DIFF WBC: CPT

## 2024-01-03 PROCEDURE — 80053 COMPREHEN METABOLIC PANEL: CPT

## 2024-01-03 PROCEDURE — 84153 ASSAY OF PSA TOTAL: CPT | Mod: GA

## 2024-01-03 PROCEDURE — 80061 LIPID PANEL: CPT

## 2024-01-03 PROCEDURE — 36415 COLL VENOUS BLD VENIPUNCTURE: CPT | Mod: GA

## 2024-01-10 ENCOUNTER — OFFICE VISIT (OUTPATIENT)
Dept: MEDICAL GROUP | Facility: MEDICAL CENTER | Age: 74
End: 2024-01-10
Payer: MEDICARE

## 2024-01-10 VITALS
HEART RATE: 77 BPM | TEMPERATURE: 96.6 F | OXYGEN SATURATION: 95 % | HEIGHT: 73 IN | RESPIRATION RATE: 16 BRPM | SYSTOLIC BLOOD PRESSURE: 122 MMHG | DIASTOLIC BLOOD PRESSURE: 50 MMHG | WEIGHT: 230 LBS | BODY MASS INDEX: 30.48 KG/M2

## 2024-01-10 DIAGNOSIS — F51.01 PRIMARY INSOMNIA: ICD-10-CM

## 2024-01-10 DIAGNOSIS — E11.9 CONTROLLED TYPE 2 DIABETES MELLITUS WITHOUT COMPLICATION, WITHOUT LONG-TERM CURRENT USE OF INSULIN (HCC): ICD-10-CM

## 2024-01-10 DIAGNOSIS — I71.21 ANEURYSM OF ASCENDING AORTA WITHOUT RUPTURE (HCC): ICD-10-CM

## 2024-01-10 PROCEDURE — 99214 OFFICE O/P EST MOD 30 MIN: CPT | Performed by: PHYSICIAN ASSISTANT

## 2024-01-10 PROCEDURE — 3078F DIAST BP <80 MM HG: CPT | Performed by: PHYSICIAN ASSISTANT

## 2024-01-10 PROCEDURE — 3074F SYST BP LT 130 MM HG: CPT | Performed by: PHYSICIAN ASSISTANT

## 2024-01-10 RX ORDER — ZOLPIDEM TARTRATE 10 MG/1
10 TABLET ORAL DAILY
Qty: 90 EACH | Refills: 0 | Status: SHIPPED | OUTPATIENT
Start: 2024-01-10 | End: 2024-04-09

## 2024-01-10 RX ORDER — SILDENAFIL 25 MG/1
25 TABLET, FILM COATED ORAL PRN
COMMUNITY

## 2024-01-10 RX ORDER — TAMSULOSIN HYDROCHLORIDE 0.4 MG/1
0.4 CAPSULE ORAL DAILY
COMMUNITY
Start: 2023-11-23

## 2024-01-10 ASSESSMENT — PATIENT HEALTH QUESTIONNAIRE - PHQ9: CLINICAL INTERPRETATION OF PHQ2 SCORE: 0

## 2024-01-10 ASSESSMENT — FIBROSIS 4 INDEX: FIB4 SCORE: 2.35

## 2024-01-10 NOTE — ASSESSMENT & PLAN NOTE
This is a pleasant 73-year-old male here today with his wife, Ting.  Recent A1c at 6.6.  Up 3/10 of a point.  Taking his metformin.  He does eat ice cream.  Unable to exercise routinely because of back pain and leg pain.  I do lab markers were good.  Cholesterol was well-controlled.

## 2024-01-10 NOTE — PROGRESS NOTES
Subjective:   Abiel Vaughn is a 73 y.o. male here today for diabetes and insomnia.    Controlled type 2 diabetes mellitus without complication, without long-term current use of insulin (HCC)  This is a pleasant 73-year-old male here today with his wife, Ting.  Recent A1c at 6.6.  Up 3/10 of a point.  Taking his metformin.  He does eat ice cream.  Unable to exercise routinely because of back pain and leg pain.  I do lab markers were good.  Cholesterol was well-controlled.       Current medicines (including changes today)  Current Outpatient Medications   Medication Sig Dispense Refill    tamsulosin (FLOMAX) 0.4 MG capsule Take 0.4 mg by mouth every day.      zolpidem (AMBIEN) 10 MG Tab Take 1 Tablet by mouth every day for 90 days. 90 Each 0    sildenafil citrate (VIAGRA) 25 MG Tab Take 25 mg by mouth as needed for Erectile Dysfunction.      diclofenac DR (VOLTAREN) 50 MG Tablet Delayed Response TAKE 1 TABLET TWICE A  Tablet 1    metformin (GLUCOPHAGE) 1000 MG tablet Take 1 Tablet by mouth 2 times a day with meals for 360 days. 180 Tablet 3    pantoprazole (PROTONIX) 40 MG Tablet Delayed Response Take 1 Tablet by mouth every day for 360 days. Take 1/2 hour prior to same meal daily. 90 Tablet 3    gabapentin (NEURONTIN) 100 MG Cap Take 1 Capsule by mouth 3 times a day for 360 days. 270 Capsule 3    famotidine (PEPCID) 40 MG Tab Take 1 Tablet by mouth 2 times a day. 180 Tablet 2    metoprolol SR (TOPROL XL) 25 MG TABLET SR 24 HR TAKE 1 TABLET DAILY 90 Tablet 3    rosuvastatin (CRESTOR) 20 MG Tab Take 1 Tablet by mouth every day. 100 Tablet 4    fluorouracil (EFUDEX) 5 % cream fluorouracil 5 % topical cream      Aspirin 81 MG Cap 81 mg.      oxybutynin SR (DITROPAN-XL) 10 MG CR tablet Take 10 mg by mouth every day.      valACYclovir (VALTREX) 500 MG Tab TAKE 1 TABLET BID (Patient taking differently: Take 500 mg by mouth every day. TAKE 1 TABLET BID) 180 tablet 4    CALCIUM PO Take 1 Tablet by mouth  "every day.      Cholecalciferol (VITAMIN D3 PO) Take 1 Capsule by mouth every day.       No current facility-administered medications for this visit.     He  has a past medical history of Dyslipidemia, GERD (gastroesophageal reflux disease), and Insomnia.    Social History and Family History were reviewed and updated.    ROS   No chest pain, no shortness of breath, no abdominal pain and all other systems were reviewed and are negative.       Objective:     /50 (BP Location: Left arm, Patient Position: Sitting, BP Cuff Size: Adult)   Pulse 77   Temp 35.9 °C (96.6 °F) (Temporal)   Resp 16   Ht 1.854 m (6' 1\")   Wt 104 kg (230 lb)   SpO2 95%  Body mass index is 30.34 kg/m².   Physical Exam:  Constitutional: Alert, no distress.  Skin: Warm, dry, good turgor, no rashes in visible areas.  Eye: Equal, round and reactive, conjunctiva clear, lids normal.  ENMT: Lips without lesions, good dentition, oropharynx clear.  Neck: Trachea midline, no masses.   Lymph: No cervical or supraclavicular lymphadenopathy  Respiratory: Unlabored respiratory effort, lungs clear to auscultation, no wheezes, no ronchi.  Cardiovascular: Normal S1, S2, no murmur, no edema.  Abdomen: Soft, non-tender, no masses.  Psych: Alert and oriented x3, normal affect and mood.        Assessment and Plan:   The following treatment plan was discussed    1. Controlled type 2 diabetes mellitus without complication, without long-term current use of insulin (HCC)  Chronic condition.  Stable.  Will check A1c in 3 months.  Continue metformin.  Watch carbs.  - HEMOGLOBIN A1C; Future    2. Aneurysm of ascending aorta without rupture (HCC)  Chronic condition.  Likely stable.  Make an appointment with cardiology and continue metoprolol.    3. Primary insomnia  Chronic condition.  Stable.   reviewed.  Medication appropriate.  Renewed Ambien at 10 mg.  Provided a 90-day supply.  Urine drug screen and agreement are up-to-date.  - zolpidem (AMBIEN) 10 MG Tab; " Take 1 Tablet by mouth every day for 90 days.  Dispense: 90 Each; Refill: 0         Followup: Return in about 3 months (around 4/10/2024), or if symptoms worsen or fail to improve.    Please note that this dictation was created using voice recognition software. I have made every reasonable attempt to correct obvious errors, but I expect that there are errors of grammar and possibly content that I did not discover before finalizing the note.

## 2024-03-04 RX ORDER — FESOTERODINE FUMARATE 8 MG/1
TABLET, EXTENDED RELEASE ORAL
COMMUNITY
Start: 2024-02-10

## 2024-03-31 DIAGNOSIS — K21.9 GASTROESOPHAGEAL REFLUX DISEASE WITHOUT ESOPHAGITIS: ICD-10-CM

## 2024-04-01 RX ORDER — FAMOTIDINE 40 MG/1
40 TABLET, FILM COATED ORAL 2 TIMES DAILY
Qty: 180 TABLET | Refills: 2 | Status: SHIPPED | OUTPATIENT
Start: 2024-04-01

## 2024-04-01 RX ORDER — ROSUVASTATIN CALCIUM 20 MG/1
20 TABLET, COATED ORAL DAILY
Qty: 90 TABLET | Refills: 3 | Status: SHIPPED | OUTPATIENT
Start: 2024-04-01

## 2024-04-01 NOTE — TELEPHONE ENCOUNTER
Received request via: Pharmacy    Was the patient seen in the last year in this department? Yes    Does the patient have an active prescription (recently filled or refills available) for medication(s) requested? No    Pharmacy Name: [facility]    Does the patient have [facility] Plus and need 100 day supply (blood pressure, diabetes and cholesterol meds only)? Medication is not for cholesterol, blood pressure or diabetes

## 2024-04-05 ENCOUNTER — HOSPITAL ENCOUNTER (OUTPATIENT)
Dept: LAB | Facility: MEDICAL CENTER | Age: 74
End: 2024-04-05
Attending: PHYSICIAN ASSISTANT
Payer: MEDICARE

## 2024-04-05 DIAGNOSIS — E11.9 CONTROLLED TYPE 2 DIABETES MELLITUS WITHOUT COMPLICATION, WITHOUT LONG-TERM CURRENT USE OF INSULIN (HCC): ICD-10-CM

## 2024-04-05 LAB
EST. AVERAGE GLUCOSE BLD GHB EST-MCNC: 140 MG/DL
HBA1C MFR BLD: 6.5 % (ref 4–5.6)

## 2024-04-05 PROCEDURE — 83036 HEMOGLOBIN GLYCOSYLATED A1C: CPT | Mod: GA

## 2024-04-05 PROCEDURE — 36415 COLL VENOUS BLD VENIPUNCTURE: CPT | Mod: GA

## 2024-04-09 ENCOUNTER — OFFICE VISIT (OUTPATIENT)
Dept: MEDICAL GROUP | Facility: MEDICAL CENTER | Age: 74
End: 2024-04-09
Payer: MEDICARE

## 2024-04-09 ENCOUNTER — RESEARCH ENCOUNTER (OUTPATIENT)
Dept: LAB | Facility: MEDICAL CENTER | Age: 74
End: 2024-04-09

## 2024-04-09 ENCOUNTER — HOSPITAL ENCOUNTER (OUTPATIENT)
Facility: MEDICAL CENTER | Age: 74
End: 2024-04-09
Attending: PHYSICIAN ASSISTANT
Payer: MEDICARE

## 2024-04-09 VITALS
BODY MASS INDEX: 31.86 KG/M2 | TEMPERATURE: 96.9 F | HEIGHT: 73 IN | HEART RATE: 72 BPM | RESPIRATION RATE: 16 BRPM | DIASTOLIC BLOOD PRESSURE: 48 MMHG | SYSTOLIC BLOOD PRESSURE: 112 MMHG | WEIGHT: 240.4 LBS | OXYGEN SATURATION: 95 %

## 2024-04-09 DIAGNOSIS — E11.9 CONTROLLED TYPE 2 DIABETES MELLITUS WITHOUT COMPLICATION, WITHOUT LONG-TERM CURRENT USE OF INSULIN (HCC): ICD-10-CM

## 2024-04-09 DIAGNOSIS — Z79.899 ENCOUNTER FOR MEDICATION MANAGEMENT: ICD-10-CM

## 2024-04-09 DIAGNOSIS — Z13.79 GENETIC SCREENING: ICD-10-CM

## 2024-04-09 DIAGNOSIS — H93.11 TINNITUS OF RIGHT EAR: ICD-10-CM

## 2024-04-09 DIAGNOSIS — F51.01 PRIMARY INSOMNIA: ICD-10-CM

## 2024-04-09 PROCEDURE — 80307 DRUG TEST PRSMV CHEM ANLYZR: CPT

## 2024-04-09 PROCEDURE — 82043 UR ALBUMIN QUANTITATIVE: CPT

## 2024-04-09 PROCEDURE — 3078F DIAST BP <80 MM HG: CPT | Performed by: PHYSICIAN ASSISTANT

## 2024-04-09 PROCEDURE — 82570 ASSAY OF URINE CREATININE: CPT | Mod: XU

## 2024-04-09 PROCEDURE — 3074F SYST BP LT 130 MM HG: CPT | Performed by: PHYSICIAN ASSISTANT

## 2024-04-09 PROCEDURE — 99214 OFFICE O/P EST MOD 30 MIN: CPT | Performed by: PHYSICIAN ASSISTANT

## 2024-04-09 RX ORDER — ZOLPIDEM TARTRATE 10 MG/1
10 TABLET ORAL DAILY
Qty: 90 EACH | Refills: 0 | Status: SHIPPED | OUTPATIENT
Start: 2024-04-09 | End: 2024-04-22

## 2024-04-09 ASSESSMENT — FIBROSIS 4 INDEX: FIB4 SCORE: 2.35

## 2024-04-09 NOTE — PROGRESS NOTES
Subjective:   Abiel Vaughn is a 73 y.o. male here today for diabetes and insomnia.    Controlled type 2 diabetes mellitus without complication, without long-term current use of insulin (HCC)  This is a pleasant 73-year-old male here today to follow-up on his health.  Recent A1c at 6.5.  He is on metformin twice daily at 1000 mg.  Doing relatively well.  Needs to have a microalbumin test performed today.  He is due for renewal of Ambien in a few months.  Today he needs a urine drug screen.  Has a chronic history of tinnitus and hearing loss the right side.  Has heard about the new procedure to help for tinnitus.       Current medicines (including changes today)  Current Outpatient Medications   Medication Sig Dispense Refill    zolpidem (AMBIEN) 10 MG Tab Take 1 Tablet by mouth every day for 90 days. 90 Each 0    rosuvastatin (CRESTOR) 20 MG Tab TAKE 1 TABLET DAILY 90 Tablet 3    famotidine (PEPCID) 40 MG Tab TAKE 1 TABLET TWICE A  Tablet 2    Fesoterodine Fumarate 8 MG TABLET SR 24 HR       tamsulosin (FLOMAX) 0.4 MG capsule Take 0.4 mg by mouth every day.      sildenafil citrate (VIAGRA) 25 MG Tab Take 25 mg by mouth as needed for Erectile Dysfunction.      diclofenac DR (VOLTAREN) 50 MG Tablet Delayed Response TAKE 1 TABLET TWICE A  Tablet 1    metformin (GLUCOPHAGE) 1000 MG tablet Take 1 Tablet by mouth 2 times a day with meals for 360 days. 180 Tablet 3    pantoprazole (PROTONIX) 40 MG Tablet Delayed Response Take 1 Tablet by mouth every day for 360 days. Take 1/2 hour prior to same meal daily. 90 Tablet 3    gabapentin (NEURONTIN) 100 MG Cap Take 1 Capsule by mouth 3 times a day for 360 days. 270 Capsule 3    metoprolol SR (TOPROL XL) 25 MG TABLET SR 24 HR TAKE 1 TABLET DAILY 90 Tablet 3    fluorouracil (EFUDEX) 5 % cream fluorouracil 5 % topical cream      Aspirin 81 MG Cap 81 mg.      oxybutynin SR (DITROPAN-XL) 10 MG CR tablet Take 10 mg by mouth every day.      valACYclovir (VALTREX)  "500 MG Tab TAKE 1 TABLET BID (Patient taking differently: Take 500 mg by mouth every day. TAKE 1 TABLET BID) 180 tablet 4    CALCIUM PO Take 1 Tablet by mouth every day.      Cholecalciferol (VITAMIN D3 PO) Take 1 Capsule by mouth every day.       No current facility-administered medications for this visit.     He  has a past medical history of Dyslipidemia, GERD (gastroesophageal reflux disease), and Insomnia.    Social History and Family History were reviewed and updated.    ROS   No chest pain, no shortness of breath, no abdominal pain and all other systems were reviewed and are negative.       Objective:     /48 (BP Location: Right arm, Patient Position: Lying right side, BP Cuff Size: Adult)   Pulse 72   Temp 36.1 °C (96.9 °F) (Temporal)   Resp 16   Ht 1.854 m (6' 1\")   Wt 109 kg (240 lb 6.4 oz)   SpO2 95%  Body mass index is 31.72 kg/m².   Physical Exam:  Constitutional: Alert, no distress.  Skin: Warm, dry, good turgor, no rashes in visible areas.  Eye: Equal, round and reactive, conjunctiva clear, lids normal.  ENMT: Lips without lesions, good dentition, oropharynx clear.  Neck: Trachea midline, no masses.   Psych: Alert and oriented x3, normal affect and mood.        Assessment and Plan:   The following treatment plan was discussed    1. Controlled type 2 diabetes mellitus without complication, without long-term current use of insulin (HCC)  Chronic condition.  Stable.  Continue metformin as directed.  Ordered labs to be done prior to next appointment.  Fast 8 hours.  - MICROALBUMIN CREAT RATIO URINE; Future  - HEMOGLOBIN A1C; Future  - CBC WITH DIFFERENTIAL; Future  - Comp Metabolic Panel; Future  - TSH WITH REFLEX TO FT4; Future  - Lipid Profile; Future    2. Primary insomnia  Chronic condition.  Stable.  Urine drug screen collected today.  Renewed Ambien as directed.  I will likely need to redo the prescription as it is not due today.  - zolpidem (AMBIEN) 10 MG Tab; Take 1 Tablet by mouth " every day for 90 days.  Dispense: 90 Each; Refill: 0    3. Encounter for medication management  Chronic use of Ambien secondary to insomnia.  Urine drug screen collected today.  - URINE DRUG SCREEN W/CONF (SEND OUT); Future    4. Tinnitus of right ear  Chronic condition.  Also has associated hearing loss.  Advised that I was not aware of any known treatment for tinnitus.  If he has a outside group help with resolving tinnitus and needs a referral to contact me through [employer].    5. Genetic screening  He has a brochure for genetic testing through [email or web address].  Did a referral.  The pamphlet states that with a referral he can be seen same day.  He will contact the genetics office for consultation.  Family history of colon cancer with his mother.  - Referral to Genetic Research Studies         Followup: Return in about 3 months (around 7/9/2024), or if symptoms worsen or fail to improve.    Please note that this dictation was created using voice recognition software. I have made every reasonable attempt to correct obvious errors, but I expect that there are errors of grammar and possibly content that I did not discover before finalizing the note.

## 2024-04-09 NOTE — ASSESSMENT & PLAN NOTE
This is a pleasant 73-year-old male here today to follow-up on his health.  Recent A1c at 6.5.  He is on metformin twice daily at 1000 mg.  Doing relatively well.  Needs to have a microalbumin test performed today.  He is due for renewal of Ambien in a few months.  Today he needs a urine drug screen.  Has a chronic history of tinnitus and hearing loss the right side.  Has heard about the new procedure to help for tinnitus.

## 2024-04-09 NOTE — RESEARCH NOTE
Patient has been referred by ZANDRA Jackson. Sent initial referral follow-up message with instructions to locate and sign consent form(s). The following consent form(s) have been pushed to the patient's MyChart: BO

## 2024-04-10 ENCOUNTER — RESEARCH ENCOUNTER (OUTPATIENT)
Dept: LAB | Facility: MEDICAL CENTER | Age: 74
End: 2024-04-10
Payer: MEDICARE

## 2024-04-10 DIAGNOSIS — Z00.6 RESEARCH STUDY PATIENT: ICD-10-CM

## 2024-04-10 LAB
CREAT UR-MCNC: 175.31 MG/DL
MICROALBUMIN UR-MCNC: <1.2 MG/DL
MICROALBUMIN/CREAT UR: NORMAL MG/G (ref 0–30)

## 2024-04-11 ENCOUNTER — HOSPITAL ENCOUNTER (OUTPATIENT)
Dept: LAB | Facility: MEDICAL CENTER | Age: 74
End: 2024-04-11
Attending: PHYSICIAN ASSISTANT
Payer: MEDICARE

## 2024-04-11 DIAGNOSIS — Z00.6 RESEARCH STUDY PATIENT: ICD-10-CM

## 2024-04-11 LAB
AMPHET CTO UR CFM-MCNC: NEGATIVE NG/ML
BARBITURATES CTO UR CFM-MCNC: NEGATIVE NG/ML
BENZODIAZ CTO UR CFM-MCNC: NEGATIVE NG/ML
CANNABINOIDS CTO UR CFM-MCNC: NEGATIVE NG/ML
COCAINE CTO UR CFM-MCNC: NEGATIVE NG/ML
CREAT UR-MCNC: 166.4 MG/DL (ref 20–400)
DRUG COMMENT 753798: NORMAL
METHADONE CTO UR CFM-MCNC: NEGATIVE NG/ML
OPIATES CTO UR CFM-MCNC: NEGATIVE NG/ML
PCP CTO UR CFM-MCNC: NEGATIVE NG/ML
PROPOXYPH CTO UR CFM-MCNC: NEGATIVE NG/ML

## 2024-04-16 DIAGNOSIS — R00.2 PALPITATIONS: ICD-10-CM

## 2024-04-18 RX ORDER — METOPROLOL SUCCINATE 25 MG/1
TABLET, EXTENDED RELEASE ORAL
Qty: 90 TABLET | Refills: 0 | Status: SHIPPED | OUTPATIENT
Start: 2024-04-18

## 2024-04-18 NOTE — TELEPHONE ENCOUNTER
Is the patient due for a refill? Yes    Was the patient seen the past year? No    Date of last office visit: 2/14/23     Does the patient have an upcoming appointment?  Yes   If yes, When? 7/25/24    Provider to refill:AK    Does the patients insurance require a 100 day supply?  No

## 2024-04-22 DIAGNOSIS — F51.01 PRIMARY INSOMNIA: ICD-10-CM

## 2024-04-22 RX ORDER — ZOLPIDEM TARTRATE 10 MG/1
10 TABLET ORAL DAILY
Qty: 90 TABLET | Refills: 0 | Status: SHIPPED | OUTPATIENT
Start: 2024-04-22 | End: 2024-07-21

## 2024-04-24 LAB
APOB+LDLR+PCSK9 GENE MUT ANL BLD/T: NOT DETECTED
BRCA1+BRCA2 DEL+DUP + FULL MUT ANL BLD/T: NOT DETECTED
MLH1+MSH2+MSH6+PMS2 GN DEL+DUP+FUL M: NOT DETECTED

## 2024-05-11 NOTE — PREPROCEDURE INSTRUCTIONS
PAT call made 8/27/2020 at approx 10:35, spoke with pt after confirming 2 pt identifiers. Health hx, medications, allergies  reviewed with pt, as well as pre-procedure/sedation instructions. Respiratory screen completed. Pt denies being sick/symptomatic (fever, cough, SOB, diarrhea) w/in last 72 hrs, or having close contact w/ sick individuals in the past 2 wks. Pt education to notify his MD should he become symptomatic prior to procedure. Pt verbalizes understanding. Pt told to bring a form fitting mask and the he will be wearing it entire stay. Informed pt it is recommended pt self isolate for 72 hrs or from time of this call until procedure, also that we request the  to remain on site until pt is discharged. Pt verbalizes understanding.    Stroke Education Note    Patient: Viridiana Hopkins Date: 2024   : 1947 Attending: Bhavik Henry DO       Patient admitted with cute Right occipital/cortical IPH (spontaneous vs traumatic?), Intervention none  Patient's uncontrollable risk factors areAge over 55 years (05/10/24 1128) . Patient's controllable risk factors  High blood pressure;High cholesterol;Physical inactivity (05/10/24 1128).    Stroke teaching deferred at this time. Unable to teach patient d/t patient sleeping soundly and unable to arouse, and no co-learner present.  Will return at a later time to attempt education.    Jeannette Patterson RN  Stroke Nurse Navigator

## 2024-07-08 ENCOUNTER — HOSPITAL ENCOUNTER (OUTPATIENT)
Dept: LAB | Facility: MEDICAL CENTER | Age: 74
End: 2024-07-08
Attending: PHYSICIAN ASSISTANT
Payer: MEDICARE

## 2024-07-08 DIAGNOSIS — E11.9 CONTROLLED TYPE 2 DIABETES MELLITUS WITHOUT COMPLICATION, WITHOUT LONG-TERM CURRENT USE OF INSULIN (HCC): ICD-10-CM

## 2024-07-08 LAB
ALBUMIN SERPL BCP-MCNC: 4.1 G/DL (ref 3.2–4.9)
ALBUMIN/GLOB SERPL: 1.7 G/DL
ALP SERPL-CCNC: 63 U/L (ref 30–99)
ALT SERPL-CCNC: 20 U/L (ref 2–50)
ANION GAP SERPL CALC-SCNC: 14 MMOL/L (ref 7–16)
AST SERPL-CCNC: 26 U/L (ref 12–45)
BASOPHILS # BLD AUTO: 0.3 % (ref 0–1.8)
BASOPHILS # BLD: 0.02 K/UL (ref 0–0.12)
BILIRUB SERPL-MCNC: 0.6 MG/DL (ref 0.1–1.5)
BUN SERPL-MCNC: 20 MG/DL (ref 8–22)
CALCIUM ALBUM COR SERPL-MCNC: 9.2 MG/DL (ref 8.5–10.5)
CALCIUM SERPL-MCNC: 9.3 MG/DL (ref 8.4–10.2)
CHLORIDE SERPL-SCNC: 104 MMOL/L (ref 96–112)
CHOLEST SERPL-MCNC: 115 MG/DL (ref 100–199)
CO2 SERPL-SCNC: 22 MMOL/L (ref 20–33)
CREAT SERPL-MCNC: 1.07 MG/DL (ref 0.5–1.4)
EOSINOPHIL # BLD AUTO: 0.3 K/UL (ref 0–0.51)
EOSINOPHIL NFR BLD: 4.1 % (ref 0–6.9)
ERYTHROCYTE [DISTWIDTH] IN BLOOD BY AUTOMATED COUNT: 45.1 FL (ref 35.9–50)
EST. AVERAGE GLUCOSE BLD GHB EST-MCNC: 143 MG/DL
FASTING STATUS PATIENT QL REPORTED: NORMAL
GFR SERPLBLD CREATININE-BSD FMLA CKD-EPI: 72 ML/MIN/1.73 M 2
GLOBULIN SER CALC-MCNC: 2.4 G/DL (ref 1.9–3.5)
GLUCOSE SERPL-MCNC: 122 MG/DL (ref 65–99)
HBA1C MFR BLD: 6.6 % (ref 4–5.6)
HCT VFR BLD AUTO: 38.7 % (ref 42–52)
HDLC SERPL-MCNC: 32 MG/DL
HGB BLD-MCNC: 13.1 G/DL (ref 14–18)
IMM GRANULOCYTES # BLD AUTO: 0.01 K/UL (ref 0–0.11)
IMM GRANULOCYTES NFR BLD AUTO: 0.1 % (ref 0–0.9)
LDLC SERPL CALC-MCNC: 59 MG/DL
LYMPHOCYTES # BLD AUTO: 2.55 K/UL (ref 1–4.8)
LYMPHOCYTES NFR BLD: 34.5 % (ref 22–41)
MCH RBC QN AUTO: 31 PG (ref 27–33)
MCHC RBC AUTO-ENTMCNC: 33.9 G/DL (ref 32.3–36.5)
MCV RBC AUTO: 91.5 FL (ref 81.4–97.8)
MONOCYTES # BLD AUTO: 0.56 K/UL (ref 0–0.85)
MONOCYTES NFR BLD AUTO: 7.6 % (ref 0–13.4)
NEUTROPHILS # BLD AUTO: 3.96 K/UL (ref 1.82–7.42)
NEUTROPHILS NFR BLD: 53.4 % (ref 44–72)
NRBC # BLD AUTO: 0 K/UL
NRBC BLD-RTO: 0 /100 WBC (ref 0–0.2)
PLATELET # BLD AUTO: 163 K/UL (ref 164–446)
PMV BLD AUTO: 11 FL (ref 9–12.9)
POTASSIUM SERPL-SCNC: 4.1 MMOL/L (ref 3.6–5.5)
PROT SERPL-MCNC: 6.5 G/DL (ref 6–8.2)
RBC # BLD AUTO: 4.23 M/UL (ref 4.7–6.1)
SODIUM SERPL-SCNC: 140 MMOL/L (ref 135–145)
TRIGL SERPL-MCNC: 120 MG/DL (ref 0–149)
TSH SERPL DL<=0.005 MIU/L-ACNC: 3.29 UIU/ML (ref 0.38–5.33)
WBC # BLD AUTO: 7.4 K/UL (ref 4.8–10.8)

## 2024-07-08 PROCEDURE — 84443 ASSAY THYROID STIM HORMONE: CPT

## 2024-07-08 PROCEDURE — 80053 COMPREHEN METABOLIC PANEL: CPT

## 2024-07-08 PROCEDURE — 85025 COMPLETE CBC W/AUTO DIFF WBC: CPT

## 2024-07-08 PROCEDURE — 80061 LIPID PANEL: CPT

## 2024-07-08 PROCEDURE — 83036 HEMOGLOBIN GLYCOSYLATED A1C: CPT

## 2024-07-08 PROCEDURE — 36415 COLL VENOUS BLD VENIPUNCTURE: CPT

## 2024-07-10 ENCOUNTER — OFFICE VISIT (OUTPATIENT)
Dept: MEDICAL GROUP | Facility: MEDICAL CENTER | Age: 74
End: 2024-07-10
Payer: MEDICARE

## 2024-07-10 VITALS
TEMPERATURE: 96.9 F | SYSTOLIC BLOOD PRESSURE: 106 MMHG | HEIGHT: 73 IN | RESPIRATION RATE: 16 BRPM | BODY MASS INDEX: 31.17 KG/M2 | DIASTOLIC BLOOD PRESSURE: 46 MMHG | OXYGEN SATURATION: 96 % | WEIGHT: 235.2 LBS | HEART RATE: 69 BPM

## 2024-07-10 DIAGNOSIS — G89.29 CHRONIC RIGHT SHOULDER PAIN: ICD-10-CM

## 2024-07-10 DIAGNOSIS — E11.9 CONTROLLED TYPE 2 DIABETES MELLITUS WITHOUT COMPLICATION, WITHOUT LONG-TERM CURRENT USE OF INSULIN (HCC): ICD-10-CM

## 2024-07-10 DIAGNOSIS — E66.9 OBESITY (BMI 30.0-34.9): ICD-10-CM

## 2024-07-10 DIAGNOSIS — M25.511 CHRONIC RIGHT SHOULDER PAIN: ICD-10-CM

## 2024-07-10 DIAGNOSIS — F51.01 PRIMARY INSOMNIA: ICD-10-CM

## 2024-07-10 PROCEDURE — 3078F DIAST BP <80 MM HG: CPT | Performed by: PHYSICIAN ASSISTANT

## 2024-07-10 PROCEDURE — 99214 OFFICE O/P EST MOD 30 MIN: CPT | Performed by: PHYSICIAN ASSISTANT

## 2024-07-10 PROCEDURE — 3074F SYST BP LT 130 MM HG: CPT | Performed by: PHYSICIAN ASSISTANT

## 2024-07-10 RX ORDER — ZOLPIDEM TARTRATE 10 MG/1
10 TABLET ORAL DAILY
Qty: 90 TABLET | Refills: 0 | Status: SHIPPED | OUTPATIENT
Start: 2024-09-05 | End: 2024-12-04

## 2024-07-10 ASSESSMENT — FIBROSIS 4 INDEX: FIB4 SCORE: 2.68

## 2024-07-16 DIAGNOSIS — R00.2 PALPITATIONS: ICD-10-CM

## 2024-07-16 RX ORDER — METOPROLOL SUCCINATE 25 MG/1
TABLET, EXTENDED RELEASE ORAL
Qty: 10 TABLET | Refills: 0 | Status: SHIPPED | OUTPATIENT
Start: 2024-07-16 | End: 2024-07-25 | Stop reason: SDUPTHER

## 2024-07-25 ENCOUNTER — OFFICE VISIT (OUTPATIENT)
Dept: CARDIOLOGY | Facility: MEDICAL CENTER | Age: 74
End: 2024-07-25
Attending: INTERNAL MEDICINE
Payer: MEDICARE

## 2024-07-25 VITALS
HEART RATE: 64 BPM | RESPIRATION RATE: 16 BRPM | OXYGEN SATURATION: 96 % | HEIGHT: 73 IN | WEIGHT: 234 LBS | BODY MASS INDEX: 31.01 KG/M2 | DIASTOLIC BLOOD PRESSURE: 60 MMHG | SYSTOLIC BLOOD PRESSURE: 114 MMHG

## 2024-07-25 DIAGNOSIS — R00.2 PALPITATIONS: ICD-10-CM

## 2024-07-25 PROCEDURE — 3078F DIAST BP <80 MM HG: CPT | Performed by: INTERNAL MEDICINE

## 2024-07-25 PROCEDURE — 99213 OFFICE O/P EST LOW 20 MIN: CPT | Performed by: INTERNAL MEDICINE

## 2024-07-25 PROCEDURE — G2211 COMPLEX E/M VISIT ADD ON: HCPCS | Performed by: INTERNAL MEDICINE

## 2024-07-25 PROCEDURE — 99214 OFFICE O/P EST MOD 30 MIN: CPT | Performed by: INTERNAL MEDICINE

## 2024-07-25 PROCEDURE — 3074F SYST BP LT 130 MM HG: CPT | Performed by: INTERNAL MEDICINE

## 2024-07-25 RX ORDER — METOPROLOL SUCCINATE 25 MG/1
25 TABLET, EXTENDED RELEASE ORAL DAILY
Qty: 90 TABLET | Refills: 3 | Status: SHIPPED | OUTPATIENT
Start: 2024-07-25

## 2024-07-25 ASSESSMENT — FIBROSIS 4 INDEX: FIB4 SCORE: 2.71

## 2024-08-22 ENCOUNTER — ANCILLARY PROCEDURE (OUTPATIENT)
Dept: CARDIOLOGY | Facility: MEDICAL CENTER | Age: 74
End: 2024-08-22
Attending: INTERNAL MEDICINE
Payer: MEDICARE

## 2024-08-22 DIAGNOSIS — M15.9 PRIMARY OSTEOARTHRITIS INVOLVING MULTIPLE JOINTS: ICD-10-CM

## 2024-08-22 DIAGNOSIS — R00.2 PALPITATIONS: ICD-10-CM

## 2024-08-22 PROCEDURE — 93306 TTE W/DOPPLER COMPLETE: CPT

## 2024-08-22 NOTE — TELEPHONE ENCOUNTER
Received request via: Pharmacy    Was the patient seen in the last year in this department? Yes    Does the patient have an active prescription (recently filled or refills available) for medication(s) requested? No    [facility]    Does the patient have correction Plus and need 100-day supply? (This applies to ALL medications) Patient does not have SCP

## 2024-08-23 LAB
LV EJECT FRACT  99904: 55
LV EJECT FRACT MOD 2C 99903: 68.16
LV EJECT FRACT MOD 4C 99902: 55.1
LV EJECT FRACT MOD BP 99901: 61.88

## 2024-08-23 PROCEDURE — 93306 TTE W/DOPPLER COMPLETE: CPT | Mod: 26 | Performed by: INTERNAL MEDICINE

## 2024-08-27 ENCOUNTER — PATIENT MESSAGE (OUTPATIENT)
Dept: CARDIOLOGY | Facility: MEDICAL CENTER | Age: 74
End: 2024-08-27
Payer: MEDICARE

## 2024-08-27 DIAGNOSIS — I77.810 AORTIC ROOT DILATATION (HCC): ICD-10-CM

## 2024-08-28 ENCOUNTER — TELEPHONE (OUTPATIENT)
Dept: CARDIOLOGY | Facility: MEDICAL CENTER | Age: 74
End: 2024-08-28
Payer: MEDICARE

## 2024-08-28 NOTE — PATIENT COMMUNICATION
TONG HowardRGEGE  You33 minutes ago (2:57 PM)     ERROR: just cath, not LAINA. TONG SpearsRGEGE  You33 minutes ago (2:56 PM)     See AK response. Recommend cath and LAINA. I can see him tomorrow in one of my blocked appts to see him if he would like to discuss these tests. ADRIANO Carter M.D.  TONG HowardRGEGE46 minutes ago (2:44 PM)     We can start with cath and root angiogram     NICHOLAS Howard.  You; Leroy Carter M.D.1 hour ago (1:40 PM)     Ak, patient has some progression of his AI, now mod-sev.    He has some generalized fatigue with activity. Aneurysm noted but remains stable in sizing at 4.4-4.6 cm.    He is wondering if his symptoms are valve related?    Do you want a LAINA or cath?    Nabila     Phone number called: [phone]     Call outcome: Patient was unavailable at the time of call. Spoke with Ting and let her know SC has availability tomorrow morning if patient has questions about the recommendations above, and she will let him know so he can call me back. Will await patient phone call.

## 2024-08-28 NOTE — PATIENT COMMUNICATION
Received return phone call from patient. He states he is fine with scheduling the catheterization without having an office visit first.    To SC: Okay to order LHC w/poss? I didn't see an order specific to root angiogram so wasn't sure if it was a separate order. Please advise, thank you!

## 2024-08-28 NOTE — PATIENT COMMUNICATION
To SC: Patient responded back to result note on echo. Please see message and advise any recommendations. Thank you!

## 2024-08-28 NOTE — TELEPHONE ENCOUNTER
Caller: Jim Vaughn    Topic/issue: Returning Loraine's Call     Callback Number: [phone]    Thank You   Addie KARIMI

## 2024-08-29 ENCOUNTER — TELEPHONE (OUTPATIENT)
Dept: CARDIOLOGY | Facility: MEDICAL CENTER | Age: 74
End: 2024-08-29
Payer: MEDICARE

## 2024-08-29 NOTE — PATIENT COMMUNICATION
NICHOLAS Howard.  You7 hours ago (8:37 AM)     It would be just CL-left heart cath and type in comments aortic root shot. SC     Procedure order entered into Epic.     To Rick, cath ordered. Thank you!

## 2024-08-29 NOTE — TELEPHONE ENCOUNTER
Schedule [facility] w/poss with . Emailed Dr. Higgins to Loraine to have .

## 2024-08-30 NOTE — TELEPHONE ENCOUNTER
Patient is scheduled on 9-11-24 for a [facility] w/aortic root with . Patient was told to hold Viagra 72hrs prior, hold Metformin day of and 48hrs after. Patient to check in at 11:00 for a 1:00 procedure. Updated H&P to be done on admit by NP. Pre admit to call patient.

## 2024-09-04 ENCOUNTER — APPOINTMENT (OUTPATIENT)
Dept: ADMISSIONS | Facility: MEDICAL CENTER | Age: 74
End: 2024-09-04
Attending: INTERNAL MEDICINE
Payer: MEDICARE

## 2024-09-06 ENCOUNTER — PRE-ADMISSION TESTING (OUTPATIENT)
Dept: ADMISSIONS | Facility: MEDICAL CENTER | Age: 74
End: 2024-09-06
Attending: INTERNAL MEDICINE
Payer: MEDICARE

## 2024-09-06 ASSESSMENT — LIFESTYLE VARIABLES
HOW OFTEN DO YOU HAVE SIX OR MORE DRINKS ON ONE OCCASION: NEVER
HOW OFTEN DO YOU HAVE A DRINK CONTAINING ALCOHOL: MONTHLY OR LESS
AUDIT-C TOTAL SCORE: 1
SKIP TO QUESTIONS 9-10: 1
HOW MANY STANDARD DRINKS CONTAINING ALCOHOL DO YOU HAVE ON A TYPICAL DAY: 1 OR 2

## 2024-09-11 ENCOUNTER — HOSPITAL ENCOUNTER (OUTPATIENT)
Facility: MEDICAL CENTER | Age: 74
End: 2024-09-11
Attending: INTERNAL MEDICINE | Admitting: INTERNAL MEDICINE
Payer: MEDICARE

## 2024-09-11 ENCOUNTER — APPOINTMENT (OUTPATIENT)
Dept: CARDIOLOGY | Facility: MEDICAL CENTER | Age: 74
End: 2024-09-11
Attending: INTERNAL MEDICINE
Payer: MEDICARE

## 2024-09-11 VITALS
HEART RATE: 58 BPM | SYSTOLIC BLOOD PRESSURE: 116 MMHG | BODY MASS INDEX: 28.48 KG/M2 | DIASTOLIC BLOOD PRESSURE: 52 MMHG | WEIGHT: 229.06 LBS | HEIGHT: 75 IN | RESPIRATION RATE: 14 BRPM | OXYGEN SATURATION: 98 % | TEMPERATURE: 35.1 F

## 2024-09-11 DIAGNOSIS — I77.810 AORTIC ROOT DILATATION (HCC): ICD-10-CM

## 2024-09-11 LAB
ALBUMIN SERPL BCP-MCNC: 4 G/DL (ref 3.2–4.9)
ALBUMIN/GLOB SERPL: 1.7 G/DL
ALP SERPL-CCNC: 60 U/L (ref 30–99)
ALT SERPL-CCNC: 29 U/L (ref 2–50)
ANION GAP SERPL CALC-SCNC: 11 MMOL/L (ref 7–16)
APTT PPP: 27.6 SEC (ref 24.7–36)
AST SERPL-CCNC: 36 U/L (ref 12–45)
BILIRUB SERPL-MCNC: 0.4 MG/DL (ref 0.1–1.5)
BUN SERPL-MCNC: 19 MG/DL (ref 8–22)
CALCIUM ALBUM COR SERPL-MCNC: 9.3 MG/DL (ref 8.5–10.5)
CALCIUM SERPL-MCNC: 9.3 MG/DL (ref 8.5–10.5)
CHLORIDE SERPL-SCNC: 107 MMOL/L (ref 96–112)
CO2 SERPL-SCNC: 22 MMOL/L (ref 20–33)
CREAT SERPL-MCNC: 1.01 MG/DL (ref 0.5–1.4)
ERYTHROCYTE [DISTWIDTH] IN BLOOD BY AUTOMATED COUNT: 45.7 FL (ref 35.9–50)
GFR SERPLBLD CREATININE-BSD FMLA CKD-EPI: 77 ML/MIN/1.73 M 2
GLOBULIN SER CALC-MCNC: 2.4 G/DL (ref 1.9–3.5)
GLUCOSE SERPL-MCNC: 104 MG/DL (ref 65–99)
HCT VFR BLD AUTO: 39.2 % (ref 42–52)
HGB BLD-MCNC: 12.8 G/DL (ref 14–18)
INR PPP: 0.96 (ref 0.87–1.13)
MCH RBC QN AUTO: 30.6 PG (ref 27–33)
MCHC RBC AUTO-ENTMCNC: 32.7 G/DL (ref 32.3–36.5)
MCV RBC AUTO: 93.8 FL (ref 81.4–97.8)
PLATELET # BLD AUTO: 158 K/UL (ref 164–446)
PMV BLD AUTO: 11.7 FL (ref 9–12.9)
POTASSIUM SERPL-SCNC: 4.2 MMOL/L (ref 3.6–5.5)
PROT SERPL-MCNC: 6.4 G/DL (ref 6–8.2)
PROTHROMBIN TIME: 12.9 SEC (ref 12–14.6)
RBC # BLD AUTO: 4.18 M/UL (ref 4.7–6.1)
SODIUM SERPL-SCNC: 140 MMOL/L (ref 135–145)
WBC # BLD AUTO: 7.2 K/UL (ref 4.8–10.8)

## 2024-09-11 PROCEDURE — 93458 L HRT ARTERY/VENTRICLE ANGIO: CPT | Mod: 26,LM | Performed by: INTERNAL MEDICINE

## 2024-09-11 PROCEDURE — 160036 HCHG PACU - EA ADDL 30 MINS PHASE I

## 2024-09-11 PROCEDURE — 85730 THROMBOPLASTIN TIME PARTIAL: CPT

## 2024-09-11 PROCEDURE — 99152 MOD SED SAME PHYS/QHP 5/>YRS: CPT

## 2024-09-11 PROCEDURE — 700117 HCHG RX CONTRAST REV CODE 255: Performed by: INTERNAL MEDICINE

## 2024-09-11 PROCEDURE — 93567 NJX CAR CTH SPRVLV AORTGRPHY: CPT | Performed by: INTERNAL MEDICINE

## 2024-09-11 PROCEDURE — 85610 PROTHROMBIN TIME: CPT

## 2024-09-11 PROCEDURE — 99152 MOD SED SAME PHYS/QHP 5/>YRS: CPT | Performed by: INTERNAL MEDICINE

## 2024-09-11 PROCEDURE — 700101 HCHG RX REV CODE 250

## 2024-09-11 PROCEDURE — 700111 HCHG RX REV CODE 636 W/ 250 OVERRIDE (IP)

## 2024-09-11 PROCEDURE — 85027 COMPLETE CBC AUTOMATED: CPT

## 2024-09-11 PROCEDURE — 160002 HCHG RECOVERY MINUTES (STAT)

## 2024-09-11 PROCEDURE — 160035 HCHG PACU - 1ST 60 MINS PHASE I

## 2024-09-11 PROCEDURE — 80053 COMPREHEN METABOLIC PANEL: CPT

## 2024-09-11 PROCEDURE — 160046 HCHG PACU - 1ST 60 MINS PHASE II

## 2024-09-11 PROCEDURE — 160047 HCHG PACU  - EA ADDL 30 MINS PHASE II

## 2024-09-11 RX ORDER — MIDAZOLAM HYDROCHLORIDE 1 MG/ML
INJECTION INTRAMUSCULAR; INTRAVENOUS
Status: COMPLETED
Start: 2024-09-11 | End: 2024-09-11

## 2024-09-11 RX ORDER — VERAPAMIL HYDROCHLORIDE 2.5 MG/ML
INJECTION, SOLUTION INTRAVENOUS
Status: COMPLETED
Start: 2024-09-11 | End: 2024-09-11

## 2024-09-11 RX ORDER — LIDOCAINE HYDROCHLORIDE 20 MG/ML
INJECTION, SOLUTION INFILTRATION; PERINEURAL
Status: COMPLETED
Start: 2024-09-11 | End: 2024-09-11

## 2024-09-11 RX ORDER — HEPARIN SODIUM 1000 [USP'U]/ML
INJECTION, SOLUTION INTRAVENOUS; SUBCUTANEOUS
Status: COMPLETED
Start: 2024-09-11 | End: 2024-09-11

## 2024-09-11 RX ORDER — HEPARIN SODIUM 200 [USP'U]/100ML
INJECTION, SOLUTION INTRAVENOUS
Status: COMPLETED
Start: 2024-09-11 | End: 2024-09-11

## 2024-09-11 RX ADMIN — NITROGLYCERIN 10 ML: 20 INJECTION INTRAVENOUS at 13:58

## 2024-09-11 RX ADMIN — HEPARIN SODIUM 2000 UNITS: 200 INJECTION, SOLUTION INTRAVENOUS at 13:59

## 2024-09-11 RX ADMIN — FENTANYL CITRATE 50 MCG: 50 INJECTION, SOLUTION INTRAMUSCULAR; INTRAVENOUS at 14:30

## 2024-09-11 RX ADMIN — IOHEXOL 34 ML: 350 INJECTION, SOLUTION INTRAVENOUS at 14:32

## 2024-09-11 RX ADMIN — LIDOCAINE HYDROCHLORIDE: 20 INJECTION, SOLUTION INFILTRATION; PERINEURAL at 13:58

## 2024-09-11 RX ADMIN — VERAPAMIL HYDROCHLORIDE 2.5 MG: 2.5 INJECTION, SOLUTION INTRAVENOUS at 13:58

## 2024-09-11 RX ADMIN — HEPARIN SODIUM: 1000 INJECTION, SOLUTION INTRAVENOUS; SUBCUTANEOUS at 13:59

## 2024-09-11 RX ADMIN — MIDAZOLAM HYDROCHLORIDE 2 MG: 1 INJECTION, SOLUTION INTRAMUSCULAR; INTRAVENOUS at 14:22

## 2024-09-11 ASSESSMENT — ENCOUNTER SYMPTOMS
DIZZINESS: 0
SHORTNESS OF BREATH: 0
ORTHOPNEA: 0
LOSS OF CONSCIOUSNESS: 0
PALPITATIONS: 0

## 2024-09-11 ASSESSMENT — FIBROSIS 4 INDEX: FIB4 SCORE: 2.71

## 2024-09-11 NOTE — PROGRESS NOTES
1437 Patient arrived to PACU. Report received from RN. Attached to monitors. Verified parameters and alarms. Right radial TR band is clean, dry, intact, and soft. TR band has 13 cc remaining per report. Right radial pulse 2+. Patient denies pain, numbness, and tingling.     1450 Patient tolerating PO fluid and snacks.    1454 Update provided to wife. All questions answered. Belongings returned to patient.    1541 2 mL removed from TR band. No oozing, bruising, or firmness observed. Site is CDI. Right radial pulse 2+.  Patient denies pain, numbness, and tingling. Patient resting comfortably, no complaints.     1605 3 mL removed from TR band. No oozing, bruising, or firmness observed. Site is CDI. Right radial pulse 2+ Patient denies pain, numbness, and tingling.     1620 Patient to restroom. No complaints.     1630 3 mL removed from TR band. No oozing, bruising, or firmness observed. Site is CDI. Right radial pulse 2+ Patient denies pain, numbness, and tingling.     1650 3 mL removed from TR band. TR band removed. No oozing, bruising, or firmness observed. Site is CDI. Right radial pulse 2+.  Patient denies pain, numbness, and tingling.     1705 Discharge instructions provided to pt and relative. Discussed follow up appointments, diet, medications and activity. Pt states understanding. IV was removed. Copy of discharge provided to the patient. A signed copy of discharge is in patient's chart. All personal belongings are in patients possession. All questions have been answered.     1716 Patient is being wheeled off floor.

## 2024-09-11 NOTE — DISCHARGE INSTRUCTIONS
POST ANGIOGRAM  General Care Instructions  Maintain a bandage over the incision site for 24 hours.  It's normal to find a small bruise or dime-sized lump at the insertion site. This should disappear within a few weeks.  Do not apply lotions or powders to the site.  Do not immerse the catheter insertion site in water (bathtub/swimming) for five days. It is ok to shower 24 hours after the procedure.  You may resume your normal diet immediately; on the day of your procedure, drink 6-10 glasses of water to help flush the contrast liquid out of your system.  If the doctor inserted the catheter in your arm:  For 3 days, DO NOT lift anything heavier than 10 pounds (approximately a gallon of milk). DO NOT do any heavy pushing, pulling, or twisting.    Medications  If your current medications need to be changed, you will be provided with an updated list of your medications prior to discharge.  If you take warfarin (Coumadin), resume taking your usual dose the evening after the procedure.  DO NOT STOP taking prescribed blood thinning (anti-platelet) medications unless instructed by your cardiologist.  These medications include:  Aspirin, Clopidogrel (Plavix), Ticagrelor (Brilinta), or Prasugrel (Effient)   If you take one of the following anticoagulants, RESUME 24 HOURS after your procedure:  Apixiban (Eliquis), Rivaroxaban (Xarelto), Dabigatran (Pradaxa), Edoxaban (Savaysa)  If you take metformin (Glucophage), RESUME 48 HOURS after your procedure.    When to call your healthcare provider  Call your cardiologist right away at [phone] if you have any of the following:   Problems/Concerns taking any of your prescribed heart medicines.   The insertion site has increasing pain, swelling, redness, bleeding, or drainage.   Your arm or leg below where the insertion site changes color, is cool, or is numb.   You have chest pain or shortness of breath that does not go away with rest.   Your pulse feels irregular -- very slow  (less than 60 beats/minute) or very fast (over 100 beats/minute).   You have dizziness, fainting, or you are very tired.   You are coughing up blood or yellow or green mucus.   You have chills or a fever over 101°F (38.3°C).    If there is bleeding at the catheter insertion site, apply pressure for 10 minutes.  If bleeding persists, call 911, and continue to hold pressure until advanced medical support arrives.        Exercising Safely After Percutaneous Coronary Intervention (PCI)  After percutaneous coronary intervention (PCI), which involves angioplasty and often stenting, it's important to focus on your heart health. Exercise can help strengthen your heart. It can also help you feel good and improve your overall health. Talk with your health care provider or cardiac rehab team member about good options for you.  Start slowly. Work up to more vigorous exercise as you get stronger. Aim for at least 150 minutes of exercise each week.  Include aerobic activities. These make the heart beat faster. They work the heart and lungs, and improve the body's ability to use oxygen. Good choices include walking, swimming, and biking .  Always follow your doctor's recommendation for exercise.   You have been referred to cardiac rehabilitation, which is important for your recovery.  You may contact [facility] at [phone] to learn more and schedule a visit.        Lifestyle Management After Percutaneous Coronary Intervention (PCI)  Percutaneous coronary intervention (PCI)  involves angioplasty and often stenting. This procedure can open arteries and relieve symptoms. But, it doesn't cure coronary artery disease. New blockages can still form. You need to take steps to prevent this by managing risk factors. Doing so will help make your heart and arteries healthier. Your doctor may prescribe cardiac rehabilitation to help with this lifelong process.  Understanding risk factors  Some risk factors for  coronary artery disease can be controlled. These include smoking, high blood pressure, cholesterol, diabetes, and obesity. They can be managed with medication, diet, and exercise. Support and counseling can also play a role. The effort will pay off! Managing risk factors can help you be more active, feel better, and reduce the risk of heart attack.    If you smoke, quit!  If your doctor has been urging you to quit smoking, it's for good reasons. Smoking damages your heart, blood vessels, and lungs. The good news is that quitting can halt or even reverse the damage of smoking. To quit now:  Get medical help. Ask your doctor for advice on stop-smoking programs. Also ask about medications or nicotine replacement therapy products that may help you quit smoking.  Get support. Join a support group. Ask for help from your family and friends.  Don't give up. It often takes several tries to succeed in quitting smoking.  Avoid secondhand smoke. Ask family and friends not to smoke around you.

## 2024-09-11 NOTE — PROCEDURES
Cardiac Catheterization report    9/11/2024  2:36 PM      Indication for procedure: Aortic regurgitation    Procedures performed:   Coronary arteriograms  Left heart catheterization and Aortic root arteriogram     Final impression:  Nonobstructive coronary artery disease  Overall moderate aortic regurgitation    Recommendations:  Guideline directed medical therapy   Cardiovascular Risk factor modification    Findings:  1.  Left main coronary artery:  Normal.  2.  Left anterior descending artery: Mild 30% stenosis in midportion  3.  Left circumflex coronary artery:  Normal. Gives one marginal branches, which have no significant disease   4.  Right coronary artery:  Normal.  This is a right dominant system.  5.  Left ventricular end diastolic pressure:  13 mmHg.  No signficant gradient across the aortic valve.  6.  Aortic root angiogram showed ectatic ascending aorta measures 41 mm, 2+ aortic regurgitation      EBL: <10 CC    Specimens: None    Procedure details:  The risks and benefits of cardiac catheterization and possible intervention were explained to the patient including death, heart attack, stroke, and emergency surgery.  The patient verbalized understanding and wished to proceed.  The patient was brought to the cardiac catheterization laboratory in the fasting state and prepped and draped in the usual sterile fashion.  Timeout was performed.  The right wrist was locally anesthetized with lidocaine and the right radial artery was cannulated with 5/6-Beninese equipment and standard radial cocktail was given.  Coronary angiography was performed using JR 4 and JL 3.5  diagnostic catheters in the usual fashion, results mentioned below.  JR 4 catheter was used to cross the aortic valve to perform  left heart catheterization.  Pigtail catheter was used to perform aortic root angiogram.    Once all the views were obtained, all wires and catheters were removed from the patient without difficulty.  A Vasc-Band was placed  over the right radial artery and the radial artery sheath was removed without difficulty.      Complications:  None    Contrast: 34 cc    Sedation time:  I supervised moderate sedation over a trained independent nursing staff,  Sedation Start time:  14:14   Sedation Stop time: 14:29      IRINA Shrestha  [facility]
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
## 2024-09-21 DIAGNOSIS — M79.605 PAIN IN BOTH LOWER EXTREMITIES: ICD-10-CM

## 2024-09-21 DIAGNOSIS — M79.604 PAIN IN BOTH LOWER EXTREMITIES: ICD-10-CM

## 2024-09-23 RX ORDER — GABAPENTIN 100 MG/1
100 CAPSULE ORAL 3 TIMES DAILY
Qty: 270 CAPSULE | Refills: 1 | Status: SHIPPED | OUTPATIENT
Start: 2024-09-23

## 2024-10-04 ENCOUNTER — OFFICE VISIT (OUTPATIENT)
Dept: MEDICAL GROUP | Facility: MEDICAL CENTER | Age: 74
End: 2024-10-04
Payer: MEDICARE

## 2024-10-04 VITALS
DIASTOLIC BLOOD PRESSURE: 52 MMHG | WEIGHT: 230.6 LBS | HEART RATE: 74 BPM | TEMPERATURE: 96.9 F | HEIGHT: 75 IN | SYSTOLIC BLOOD PRESSURE: 130 MMHG | OXYGEN SATURATION: 96 % | BODY MASS INDEX: 28.67 KG/M2

## 2024-10-04 DIAGNOSIS — D22.9 ATYPICAL NEVUS: ICD-10-CM

## 2024-10-04 DIAGNOSIS — F51.01 PRIMARY INSOMNIA: ICD-10-CM

## 2024-10-04 DIAGNOSIS — J30.2 SEASONAL ALLERGIES: ICD-10-CM

## 2024-10-04 PROCEDURE — 99214 OFFICE O/P EST MOD 30 MIN: CPT | Performed by: PHYSICIAN ASSISTANT

## 2024-10-04 PROCEDURE — 3078F DIAST BP <80 MM HG: CPT | Performed by: PHYSICIAN ASSISTANT

## 2024-10-04 PROCEDURE — 3075F SYST BP GE 130 - 139MM HG: CPT | Performed by: PHYSICIAN ASSISTANT

## 2024-10-04 RX ORDER — FLUTICASONE PROPIONATE 50 MCG
1 SPRAY, SUSPENSION (ML) NASAL DAILY
Qty: 16 G | Refills: 3 | Status: SHIPPED | OUTPATIENT
Start: 2024-10-04 | End: 2024-11-03

## 2024-10-04 RX ORDER — ZOLPIDEM TARTRATE 10 MG/1
10 TABLET ORAL DAILY
Qty: 90 TABLET | Refills: 0 | Status: SHIPPED | OUTPATIENT
Start: 2024-10-04 | End: 2025-01-02

## 2024-10-04 ASSESSMENT — FIBROSIS 4 INDEX: FIB4 SCORE: 3.22

## 2024-10-25 DIAGNOSIS — K21.9 GASTROESOPHAGEAL REFLUX DISEASE WITHOUT ESOPHAGITIS: ICD-10-CM

## 2024-10-25 DIAGNOSIS — B00.9 HSV INFECTION: ICD-10-CM

## 2024-10-27 RX ORDER — VALACYCLOVIR HYDROCHLORIDE 500 MG/1
TABLET, FILM COATED ORAL
Qty: 180 TABLET | Refills: 3 | Status: SHIPPED | OUTPATIENT
Start: 2024-10-27

## 2024-10-27 RX ORDER — FAMOTIDINE 40 MG/1
40 TABLET, FILM COATED ORAL 2 TIMES DAILY
Qty: 180 TABLET | Refills: 3 | Status: SHIPPED | OUTPATIENT
Start: 2024-10-27

## 2024-10-29 ENCOUNTER — OFFICE VISIT (OUTPATIENT)
Dept: CARDIOLOGY | Facility: MEDICAL CENTER | Age: 74
End: 2024-10-29
Payer: MEDICARE

## 2024-10-29 VITALS
RESPIRATION RATE: 18 BRPM | HEART RATE: 72 BPM | HEIGHT: 75 IN | BODY MASS INDEX: 29.34 KG/M2 | OXYGEN SATURATION: 96 % | WEIGHT: 236 LBS | DIASTOLIC BLOOD PRESSURE: 50 MMHG | SYSTOLIC BLOOD PRESSURE: 104 MMHG

## 2024-10-29 DIAGNOSIS — I35.1 NONRHEUMATIC AORTIC VALVE INSUFFICIENCY: ICD-10-CM

## 2024-10-29 DIAGNOSIS — E78.5 DYSLIPIDEMIA: ICD-10-CM

## 2024-10-29 DIAGNOSIS — R05.1 ACUTE COUGH: ICD-10-CM

## 2024-10-29 DIAGNOSIS — I71.21 ANEURYSM OF ASCENDING AORTA WITHOUT RUPTURE (HCC): ICD-10-CM

## 2024-10-29 DIAGNOSIS — I25.10 CORONARY ARTERY DISEASE INVOLVING NATIVE CORONARY ARTERY OF NATIVE HEART WITHOUT ANGINA PECTORIS: ICD-10-CM

## 2024-10-29 PROCEDURE — 99213 OFFICE O/P EST LOW 20 MIN: CPT

## 2024-10-29 ASSESSMENT — ENCOUNTER SYMPTOMS
ORTHOPNEA: 0
SHORTNESS OF BREATH: 0
EYES NEGATIVE: 1
PND: 0
BACK PAIN: 1
GASTROINTESTINAL NEGATIVE: 1
NEUROLOGICAL NEGATIVE: 1
PALPITATIONS: 0
NERVOUS/ANXIOUS: 0
DEPRESSION: 0

## 2024-10-29 ASSESSMENT — FIBROSIS 4 INDEX: FIB4 SCORE: 3.22

## 2024-11-01 ENCOUNTER — HOSPITAL ENCOUNTER (OUTPATIENT)
Dept: RADIOLOGY | Facility: MEDICAL CENTER | Age: 74
End: 2024-11-01
Payer: MEDICARE

## 2024-11-01 DIAGNOSIS — R05.1 ACUTE COUGH: ICD-10-CM

## 2024-11-05 PROBLEM — R91.8 MULTIPLE PULMONARY NODULES: Status: ACTIVE | Noted: 2024-11-05

## 2024-11-13 DIAGNOSIS — K21.9 GASTROESOPHAGEAL REFLUX DISEASE WITHOUT ESOPHAGITIS: ICD-10-CM

## 2024-11-13 RX ORDER — PANTOPRAZOLE SODIUM 40 MG/1
TABLET, DELAYED RELEASE ORAL
Qty: 90 TABLET | Refills: 3 | Status: SHIPPED | OUTPATIENT
Start: 2024-11-13

## 2024-11-13 NOTE — TELEPHONE ENCOUNTER
Received request via: Pharmacy    Was the patient seen in the last year in this department? Yes    Does the patient have an active prescription (recently filled or refills available) for medication(s) requested? No    [facility]     Does the patient have [facility] and need 100-day supply? (This applies to ALL medications) Patient does not have SCP

## 2024-11-25 ENCOUNTER — TELEPHONE (OUTPATIENT)
Dept: MEDICAL GROUP | Facility: MEDICAL CENTER | Age: 74
End: 2024-11-25
Payer: MEDICARE

## 2024-11-25 NOTE — TELEPHONE ENCOUNTER
DOCUMENTATION OF PAR STATUS:    1. Name of Medication & Dose: Pantoprazole Sodium 40MG dr tablets     2. Name of Prescription Coverage Company & phone #: [email or web address]    3. Date Prior Auth Submitted: 11/25/24    4. What information was given to obtain insurance decision? Clinical office notes    5. Prior Auth Status? Pending    6. Patient Notified: no

## 2024-12-28 SDOH — HEALTH STABILITY: PHYSICAL HEALTH: ON AVERAGE, HOW MANY MINUTES DO YOU ENGAGE IN EXERCISE AT THIS LEVEL?: 10 MIN

## 2024-12-28 SDOH — ECONOMIC STABILITY: FOOD INSECURITY: WITHIN THE PAST 12 MONTHS, THE FOOD YOU BOUGHT JUST DIDN'T LAST AND YOU DIDN'T HAVE MONEY TO GET MORE.: NEVER TRUE

## 2024-12-28 SDOH — ECONOMIC STABILITY: INCOME INSECURITY: HOW HARD IS IT FOR YOU TO PAY FOR THE VERY BASICS LIKE FOOD, HOUSING, MEDICAL CARE, AND HEATING?: NOT HARD AT ALL

## 2024-12-28 SDOH — ECONOMIC STABILITY: INCOME INSECURITY: IN THE LAST 12 MONTHS, WAS THERE A TIME WHEN YOU WERE NOT ABLE TO PAY THE MORTGAGE OR RENT ON TIME?: NO

## 2024-12-28 SDOH — HEALTH STABILITY: PHYSICAL HEALTH: ON AVERAGE, HOW MANY DAYS PER WEEK DO YOU ENGAGE IN MODERATE TO STRENUOUS EXERCISE (LIKE A BRISK WALK)?: 7 DAYS

## 2024-12-28 SDOH — ECONOMIC STABILITY: FOOD INSECURITY: WITHIN THE PAST 12 MONTHS, YOU WORRIED THAT YOUR FOOD WOULD RUN OUT BEFORE YOU GOT MONEY TO BUY MORE.: NEVER TRUE

## 2024-12-28 SDOH — HEALTH STABILITY: MENTAL HEALTH
STRESS IS WHEN SOMEONE FEELS TENSE, NERVOUS, ANXIOUS, OR CAN'T SLEEP AT NIGHT BECAUSE THEIR MIND IS TROUBLED. HOW STRESSED ARE YOU?: TO SOME EXTENT

## 2024-12-28 ASSESSMENT — SOCIAL DETERMINANTS OF HEALTH (SDOH)
HOW OFTEN DO YOU ATTENT MEETINGS OF THE CLUB OR ORGANIZATION YOU BELONG TO?: NEVER
HOW OFTEN DO YOU ATTENT MEETINGS OF THE CLUB OR ORGANIZATION YOU BELONG TO?: NEVER
HOW OFTEN DO YOU HAVE A DRINK CONTAINING ALCOHOL: MONTHLY OR LESS
IN THE PAST 12 MONTHS, HAS THE ELECTRIC, GAS, OIL, OR WATER COMPANY THREATENED TO SHUT OFF SERVICE IN YOUR HOME?: NO
IN A TYPICAL WEEK, HOW MANY TIMES DO YOU TALK ON THE PHONE WITH FAMILY, FRIENDS, OR NEIGHBORS?: TWICE A WEEK
HOW MANY DRINKS CONTAINING ALCOHOL DO YOU HAVE ON A TYPICAL DAY WHEN YOU ARE DRINKING: 1 OR 2
WITHIN THE PAST 12 MONTHS, YOU WORRIED THAT YOUR FOOD WOULD RUN OUT BEFORE YOU GOT THE MONEY TO BUY MORE: NEVER TRUE
IN A TYPICAL WEEK, HOW MANY TIMES DO YOU TALK ON THE PHONE WITH FAMILY, FRIENDS, OR NEIGHBORS?: TWICE A WEEK
HOW OFTEN DO YOU GET TOGETHER WITH FRIENDS OR RELATIVES?: TWICE A WEEK
HOW HARD IS IT FOR YOU TO PAY FOR THE VERY BASICS LIKE FOOD, HOUSING, MEDICAL CARE, AND HEATING?: NOT HARD AT ALL
HOW OFTEN DO YOU ATTEND CHURCH OR RELIGIOUS SERVICES?: NEVER
HOW OFTEN DO YOU ATTEND CHURCH OR RELIGIOUS SERVICES?: NEVER
HOW OFTEN DO YOU HAVE SIX OR MORE DRINKS ON ONE OCCASION: NEVER
DO YOU BELONG TO ANY CLUBS OR ORGANIZATIONS SUCH AS CHURCH GROUPS UNIONS, FRATERNAL OR ATHLETIC GROUPS, OR SCHOOL GROUPS?: NO
DO YOU BELONG TO ANY CLUBS OR ORGANIZATIONS SUCH AS CHURCH GROUPS UNIONS, FRATERNAL OR ATHLETIC GROUPS, OR SCHOOL GROUPS?: NO
HOW OFTEN DO YOU GET TOGETHER WITH FRIENDS OR RELATIVES?: TWICE A WEEK

## 2024-12-28 ASSESSMENT — LIFESTYLE VARIABLES
SKIP TO QUESTIONS 9-10: 1
HOW OFTEN DO YOU HAVE SIX OR MORE DRINKS ON ONE OCCASION: NEVER
HOW OFTEN DO YOU HAVE A DRINK CONTAINING ALCOHOL: MONTHLY OR LESS
HOW MANY STANDARD DRINKS CONTAINING ALCOHOL DO YOU HAVE ON A TYPICAL DAY: 1 OR 2
AUDIT-C TOTAL SCORE: 1

## 2024-12-31 ENCOUNTER — OFFICE VISIT (OUTPATIENT)
Dept: MEDICAL GROUP | Facility: MEDICAL CENTER | Age: 74
End: 2024-12-31
Payer: MEDICARE

## 2024-12-31 VITALS
SYSTOLIC BLOOD PRESSURE: 120 MMHG | DIASTOLIC BLOOD PRESSURE: 50 MMHG | BODY MASS INDEX: 29.27 KG/M2 | HEART RATE: 75 BPM | WEIGHT: 235.4 LBS | HEIGHT: 75 IN | OXYGEN SATURATION: 100 % | TEMPERATURE: 97.2 F

## 2024-12-31 DIAGNOSIS — R04.0 NASAL BLEEDING: ICD-10-CM

## 2024-12-31 DIAGNOSIS — Z00.00 MEDICARE ANNUAL WELLNESS VISIT, SUBSEQUENT: ICD-10-CM

## 2024-12-31 DIAGNOSIS — G89.29 CHRONIC RIGHT SHOULDER PAIN: ICD-10-CM

## 2024-12-31 DIAGNOSIS — Z12.5 ENCOUNTER FOR SCREENING PROSTATE SPECIFIC ANTIGEN (PSA) MEASUREMENT: ICD-10-CM

## 2024-12-31 DIAGNOSIS — M25.511 CHRONIC RIGHT SHOULDER PAIN: ICD-10-CM

## 2024-12-31 DIAGNOSIS — I25.10 CORONARY ARTERY DISEASE INVOLVING NATIVE CORONARY ARTERY OF NATIVE HEART WITHOUT ANGINA PECTORIS: ICD-10-CM

## 2024-12-31 DIAGNOSIS — I35.1 NONRHEUMATIC AORTIC VALVE INSUFFICIENCY: ICD-10-CM

## 2024-12-31 DIAGNOSIS — B00.9 HSV INFECTION: ICD-10-CM

## 2024-12-31 DIAGNOSIS — I71.21 ANEURYSM OF ASCENDING AORTA WITHOUT RUPTURE (HCC): ICD-10-CM

## 2024-12-31 DIAGNOSIS — M79.604 PAIN IN BOTH LOWER EXTREMITIES: ICD-10-CM

## 2024-12-31 DIAGNOSIS — N32.81 OVERACTIVE BLADDER: ICD-10-CM

## 2024-12-31 DIAGNOSIS — F51.01 PRIMARY INSOMNIA: ICD-10-CM

## 2024-12-31 DIAGNOSIS — H93.11 TINNITUS OF RIGHT EAR: ICD-10-CM

## 2024-12-31 DIAGNOSIS — E78.5 DYSLIPIDEMIA: ICD-10-CM

## 2024-12-31 DIAGNOSIS — R00.2 PALPITATIONS: ICD-10-CM

## 2024-12-31 DIAGNOSIS — N52.2 DRUG-INDUCED ERECTILE DYSFUNCTION: ICD-10-CM

## 2024-12-31 DIAGNOSIS — J30.2 SEASONAL ALLERGIES: ICD-10-CM

## 2024-12-31 DIAGNOSIS — H91.91 DECREASED HEARING, RIGHT: ICD-10-CM

## 2024-12-31 DIAGNOSIS — M79.605 PAIN IN BOTH LOWER EXTREMITIES: ICD-10-CM

## 2024-12-31 DIAGNOSIS — J34.89 INTERNAL NASAL LESION: ICD-10-CM

## 2024-12-31 DIAGNOSIS — K21.9 GASTROESOPHAGEAL REFLUX DISEASE, UNSPECIFIED WHETHER ESOPHAGITIS PRESENT: ICD-10-CM

## 2024-12-31 DIAGNOSIS — R91.8 MULTIPLE PULMONARY NODULES: ICD-10-CM

## 2024-12-31 DIAGNOSIS — M15.0 PRIMARY OSTEOARTHRITIS INVOLVING MULTIPLE JOINTS: ICD-10-CM

## 2024-12-31 DIAGNOSIS — E11.9 CONTROLLED TYPE 2 DIABETES MELLITUS WITHOUT COMPLICATION, WITHOUT LONG-TERM CURRENT USE OF INSULIN (HCC): ICD-10-CM

## 2024-12-31 PROBLEM — E66.811 OBESITY (BMI 30.0-34.9): Status: RESOLVED | Noted: 2018-10-03 | Resolved: 2024-12-31

## 2024-12-31 PROBLEM — D22.9 ATYPICAL NEVUS: Status: RESOLVED | Noted: 2024-10-04 | Resolved: 2024-12-31

## 2024-12-31 RX ORDER — MONTELUKAST SODIUM 10 MG/1
10 TABLET ORAL DAILY
Qty: 90 TABLET | Refills: 2 | Status: SHIPPED | OUTPATIENT
Start: 2024-12-31 | End: 2025-03-31

## 2024-12-31 ASSESSMENT — PATIENT HEALTH QUESTIONNAIRE - PHQ9: CLINICAL INTERPRETATION OF PHQ2 SCORE: 0

## 2024-12-31 ASSESSMENT — FIBROSIS 4 INDEX: FIB4 SCORE: 3.22

## 2024-12-31 ASSESSMENT — ENCOUNTER SYMPTOMS: GENERAL WELL-BEING: EXCELLENT

## 2024-12-31 ASSESSMENT — ACTIVITIES OF DAILY LIVING (ADL): BATHING_REQUIRES_ASSISTANCE: 0

## 2024-12-31 NOTE — PROGRESS NOTES
Chief Complaint   Patient presents with    Medicare Annual Wellness       HPI:  History of Present Illness  Abiel Vaughn is a 74 y.o. here for Medicare Annual Wellness Visit..    He has been managing his reflux symptoms with pantoprazole and famotidine, which have been effective in preventing dysphagia. He experiences intermittent chest pain, initially suspected to be cardiac in origin, but now believed to be due to spasms. The pain episodes are brief, lasting only a few minutes, but cause significant discomfort. He has previously consulted a gastroenterologist regarding this issue.    He has a diagnosis of herpes and is currently on a regimen of Valtrex twice daily, which has successfully prevented any outbreaks.    He is on gabapentin for lower extremity pain, but reports no relief. He also takes diclofenac twice daily for inflammation. He experiences back and leg pain after prolonged standing or walking, necessitating frequent rest periods. He suspects these symptoms may be related to his spine. He underwent shoulder replacement surgery approximately 25 years ago.    He has a history of intermittent arm numbness and tingling, which had resolved but has recently recurred over the past month. These symptoms are positional and not constant. He is considering discontinuing gabapentin and diclofenac due to potential side effects.    He is on Ambien for insomnia, which allows him to achieve 5 to 6 hours of sleep per night.    He is on metoprolol for palpitations and blood pressure control. He has a history of aortic aneurysm, which has increased in size from 4.3 to 4.4. He reports increased fatigue, which he attributes to aging. He has been advised that surgical intervention may be necessary for his aortic aneurysm and heart murmur.    He is on Flomax and Toviaz for overactive bladder. He reports urgency and occasional hematuria, but no recent episodes of blood in urine. He sees a urologist every 6 months to a  year.    He is on Crestor for cholesterol management. He has aortic valve insufficiency and coronary artery disease, both managed by Dr. Mays.    He has decreased hearing and tinnitus in his right ear, but normal hearing in his left ear.    He has a history of multiple pulmonary nodules, identified on echocardiogram, and is due for a repeat scan in 3 months.    He has seasonal allergies and is requesting a refill of his montelukast 10 mg prescription. He reports sneezing, runny nose, congestion, and labored breathing during allergy flare-ups.    He has a small lesion in his left nostril, which bleeds when he cleans his nose. He has been advised to consult an ENT specialist.        Patient Active Problem List    Diagnosis Date Noted    Pain in both lower extremities 12/31/2024    Palpitations 12/31/2024    Overactive bladder 12/31/2024    Nasal bleeding 12/31/2024    Internal nasal lesion 12/31/2024    Multiple pulmonary nodules 11/05/2024    Nonrheumatic aortic valve insufficiency 10/29/2024    Chronic right shoulder pain 07/10/2024    Encounter for medication management 04/09/2024    Drug-induced erectile dysfunction 10/11/2023    Tinnitus of right ear 07/14/2023    Coronary artery disease involving native coronary artery of native heart without angina pectoris 04/12/2023    Ascending aortic aneurysm (HCC) 04/19/2022    Medicare annual wellness visit, subsequent 08/17/2020    Controlled type 2 diabetes mellitus without complication, without long-term current use of insulin (HCC) 08/26/2019    Seasonal allergies 08/26/2019    HSV infection 08/26/2019    Gastroesophageal reflux disease 10/03/2018    Primary insomnia 10/03/2018    Dyslipidemia 10/03/2018    Primary osteoarthritis involving multiple joints 10/03/2018    Decreased hearing, right 10/03/2018       Current Outpatient Medications   Medication Sig Dispense Refill    montelukast (SINGULAIR) 10 MG Tab Take 1 Tablet by mouth every day for 90 days. 90 Tablet 2     pantoprazole (PROTONIX) 40 MG Tablet Delayed Response TAKE 1 TABLET DAILY 1/2    HOUR PRIOR TO SAME MEAL    DAILY 90 Tablet 3    valACYclovir (VALTREX) 500 MG Tab TAKE 1 TABLET TWICE A  Tablet 3    famotidine (PEPCID) 40 MG Tab TAKE 1 TABLET TWICE A  Tablet 3    zolpidem (AMBIEN) 10 MG Tab Take 1 Tablet by mouth every day for 90 days. 90 Tablet 0    gabapentin (NEURONTIN) 100 MG Cap TAKE 1 CAPSULE 3 TIMES A    Capsule 1    Cyanocobalamin (VITAMIN B12 PO) Take 5,000 mcg by mouth every day.      Multiple Vitamin (MULTIVITAMIN PO) Take 1 Tablet by mouth every day.      diclofenac DR (VOLTAREN) 50 MG Tablet Delayed Response TAKE 1 TABLET TWICE A  Tablet 1    metoprolol SR (TOPROL XL) 25 MG TABLET SR 24 HR Take 1 Tablet by mouth every day. 90 Tablet 3    rosuvastatin (CRESTOR) 20 MG Tab TAKE 1 TABLET DAILY 90 Tablet 3    Fesoterodine Fumarate 8 MG TABLET SR 24 HR Take 8 mg by mouth every day at 6 PM.      tamsulosin (FLOMAX) 0.4 MG capsule Take 0.4 mg by mouth every day.      fluorouracil (EFUDEX) 5 % cream Apply 1 Application topically as needed (Pre-cancerous lesions).      Aspirin 81 MG Cap 81 mg.      CALCIUM PO Take 1 Tablet by mouth every day.      Cholecalciferol (VITAMIN D3 PO) Take 100 mcg by mouth every day.       No current facility-administered medications for this visit.          Current supplements as per medication list.     Allergies: Meloxicam and Seasonal    Current social contact/activities: Spending time with wife and neighbors.     He  reports that he has never smoked. He has never used smokeless tobacco. He reports that he does not currently use alcohol after a past usage of about 0.6 oz of alcohol per week. He reports that he does not currently use drugs.  Counseling given: Not Answered  Tobacco comments: Never used.      ROS:    Gait: Uses no assistive device  Ostomy: No  Other tubes: No  Amputations: No  Chronic oxygen use: No  Last eye exam: 2024  Wears hearing  aids: No   : Reports urinary leakage during the last 6 months that has not interfered at all with their daily activities or sleep.    Screening:  Depression Screening  Little interest or pleasure in doing things?  0 - not at all  Feeling down, depressed , or hopeless? 0 - not at all  Patient Health Questionnaire Score: 0     If depressive symptoms identified deferred to follow up visit unless specifically addressed in assessment and plan.    Interpretation of PHQ-9 Total Score   Score Severity   1-4 No Depression   5-9 Mild Depression   10-14 Moderate Depression   15-19 Moderately Severe Depression   20-27 Severe Depression    Screening for Cognitive Impairment  Do you or any of your friends or family members have any concern about your memory? No  Three Minute Recall (Leader, Season, Table) 3/3    Mo clock face with all 12 numbers and set the hands to show 10 minutes after 11.  Yes    Cognitive concerns identified deferred for follow up unless specifically addressed in assessment and plan.    Fall Risk Assessment  Has the patient had two or more falls in the last year or any fall with injury in the last year?  No    Safety Assessment  Do you always wear your seatbelt?  Yes  Any changes to home needed to function safely? No  Difficulty hearing.  Yes  Patient counseled about all safety risks that were identified.    Functional Assessment ADLs  Are there any barriers preventing you from cooking for yourself or meeting nutritional needs?  No.    Are there any barriers preventing you from driving safely or obtaining transportation?  No.    Are there any barriers preventing you from using a telephone or calling for help?  No    Are there any barriers preventing you from shopping?  No.    Are there any barriers preventing you from taking care of your own finances?  No    Are there any barriers preventing you from managing your medications?  No    Are there any barriers preventing you from showering, bathing or  dressing yourself? No    Are there any barriers preventing you from doing housework or laundry? No  Are there any barriers preventing you from using the toilet?No  Are you currently engaging in any exercise or physical activity?  Yes. Workouts daily.    Self-Assessment of Health  What is your perception of your health? Excellent    Do you sleep more than six hours a night? No    In the past 7 days, how much did pain keep you from doing your normal work? None    Do you spend quality time with family or friends (virtually or in person)? Yes    Do you usually eat a heart healthy diet that constists of a variety of fruits, vegetables, whole grains and fiber? Yes    Do you eat foods high in fat and/or Fast Food more than three times per week?      How concerned are you that your medical conditions are not being well managed? Not at all    Are you worried that in the next 2 months, you may not have stable housing that you own, rent, or stay in as part of a household? No      Advance Care Planning  Do you have an Advance Directive, Living Will, Durable Power of , or POLST? Yes  Advance Directive Living Will     is on file      Health Maintenance Summary            Ordered - A1c Screening (Every 6 Months) Ordered on 12/31/2024 07/08/2024  HEMOGLOBIN A1C    04/05/2024  HEMOGLOBIN A1C    01/03/2024  HEMOGLOBIN A1C    07/08/2023  HEMOGLOBIN A1C    04/06/2023  HEMOGLOBIN A1C    Only the first 5 history entries have been loaded, but more history exists.              Postponed - Diabetes: Monofilament / LE Exam (Yearly) Postponed until 4/8/2025      10/11/2021  Done    08/17/2020  Done    08/26/2019  Done    08/26/2019  Done              Postponed - COVID-19 Vaccine (7 - 2024-25 season) Postponed until 12/31/2025 08/16/2024  Imm Admin: Comirnaty (Covid-19 Vaccine, Mrna, [id number] Formula)    09/26/2022  Imm Admin: PFIZER BIVALENT SARS-COV-2 VACCINE (12+)    04/08/2022  Imm Admin: COVID-19 Vaccine, unspecified -  HISTORICAL DATA    10/20/2021  Imm Admin: PFIZER PURPLE CAP SARS-COV-2 VACCINATION (12+)    03/13/2021  Imm Admin: PFIZER PURPLE CAP SARS-COV-2 VACCINATION (12+)    Only the first 5 history entries have been loaded, but more history exists.              Ordered - Diabetes: Urine Protein Screening (Yearly) Ordered on 12/31/2024 04/09/2024  MICROALBUMIN CREAT RATIO URINE    04/06/2023  MICROALBUMIN CREAT RATIO URINE    07/16/2021  MICROALBUMIN CREAT RATIO URINE    04/16/2021  MICROALBUMIN CREAT RATIO URINE    08/11/2020  MICROALBUMIN CREAT RATIO URINE    Only the first 5 history entries have been loaded, but more history exists.              Ordered - Fasting Lipid Profile (Yearly) Ordered on 12/31/2024 07/08/2024  Lipid Profile    01/03/2024  Lipid Profile    04/06/2023  Lipid Profile    07/05/2022  Lipid Profile    07/01/2022  Lipid Profile    Only the first 5 history entries have been loaded, but more history exists.              Ordered - SERUM CREATININE (Yearly) Ordered on 12/31/2024 09/11/2024  COMP METABOLIC PANEL    07/08/2024  Comp Metabolic Panel    01/03/2024  Comp Metabolic Panel    11/04/2022  Comp Metabolic Panel    07/05/2022  Comp Metabolic Panel    Only the first 5 history entries have been loaded, but more history exists.              Diabetes: Retinopathy Screening (Yearly) Next due on 12/11/2025 12/11/2024  RETINAL SCREENING RESULTS    11/21/2023  RETINAL SCREENING RESULTS    05/16/2023  RETINAL SCREENING RESULTS    08/31/2021  Done    08/31/2021  REFERRAL FOR RETINAL SCREENING EXAM    Only the first 5 history entries have been loaded, but more history exists.              Annual Wellness Visit (Yearly) Next due on 12/31/2025 12/31/2024  Visit Dx: Medicare annual wellness visit, subsequent    07/14/2023  Visit Dx: Medicare annual wellness visit, subsequent    08/17/2020  Done    08/17/2020  Subsequent Annual Wellness Visit - Includes PPPS ()    08/17/2020  Visit Dx:  Medicare annual wellness visit, subsequent    Only the first 5 history entries have been loaded, but more history exists.              IMM DTaP/Tdap/Td Vaccine (3 - Td or Tdap) Next due on 9/18/2029 09/18/2019  Imm Admin: Tdap Vaccine    11/26/2018  Imm Admin: Tdap Vaccine              Hepatitis C Screening  Completed      02/18/2019  Hepatitis C Antibody component of HEP C VIRUS ANTIBODY              Pneumococcal Vaccine: 65+ Years (Series Information) Completed      08/17/2020  Imm Admin: Pneumococcal polysaccharide vaccine (PPSV-23)    09/18/2019  Imm Admin: Pneumococcal Conjugate Vaccine (Prevnar/PCV-13)    09/21/2011  Imm Admin: Pneumococcal polysaccharide vaccine (PPSV-23)              Zoster (Shingles) Vaccines (Series Information) Completed      05/19/2021  Imm Admin: Zoster Vaccine Recombinant (RZV) (SHINGRIX)    03/24/2021  Imm Admin: Zoster Vaccine Recombinant (RZV) (SHINGRIX)    06/20/2018  Imm Admin: Zoster Vaccine Recombinant (RZV) (SHINGRIX)              Hepatitis B Vaccine (Hep B) (Series Information) Completed      10/18/2021  Imm Admin: Hepatitis B Vaccine (Adol/Adult)    06/25/2021  Imm Admin: Hepatitis B Vaccine (Adol/Adult)    04/29/2021  Imm Admin: Hepatitis B Vaccine (Adol/Adult)              Influenza Vaccine (Series Information) Completed      08/16/2024  Imm Admin: Influenza high-dose trivalent (PF)    10/11/2023  Imm Admin: Influenza Vaccine Adult HD    09/26/2022  Imm Admin: Influenza Vaccine Adult HD    10/18/2021  Imm Admin: Influenza Vaccine Adult HD    06/01/2021  Imm Admin: Influenza, unspecified formulation    Only the first 5 history entries have been loaded, but more history exists.              Hepatitis A Vaccine (Hep A) (Series Information) Aged Out      No completion history exists for this topic.              HPV Vaccines (Series Information) Aged Out      No completion history exists for this topic.              Polio Vaccine (Inactivated Polio) (Series Information)  Aged Out      No completion history exists for this topic.              Meningococcal Immunization (Series Information) Aged Out      No completion history exists for this topic.              Discontinued - Colorectal Cancer Screening  Discontinued        Frequency changed to Never automatically (Topic No Longer Applies)    06/17/2021  REFERRAL TO GI FOR COLONOSCOPY    05/05/2020  OCCULT BLOOD FECES IMMUNOASSAY    12/17/2019  OCCULT BLOOD FECES IMMUNOASSAY    03/09/2016  REFERRAL TO GI FOR COLONOSCOPY    Only the first 5 history entries have been loaded, but more history exists.                    Patient Care Team:  zE Connelly P.A.-C. as PCP - General (Family Medicine)  Julito Dacosta M.D. (Phys Med and Rehab)  Edith Lane M.D. as Attending Team Physician (Neurosurgery)        Social History     Tobacco Use    Smoking status: Never    Smokeless tobacco: Never    Tobacco comments:     Never used.   Vaping Use    Vaping status: Never Used   Substance Use Topics    Alcohol use: Not Currently     Alcohol/week: 0.6 oz     Types: 1 Cans of beer per week     Comment: I do consume an occasional 12 ounce beer.    Drug use: Not Currently     Comment: Around 1975 did Marijuana. Nothing current.     Family History   Problem Relation Age of Onset    Cancer Mother 83        Death at age 83 due to endometrial cancer.    Diabetes Mother         Type II began during late age 40's.    Heart Disease Father         Death at age 89 due to end stage heart disease.    No Known Problems Maternal Grandmother     No Known Problems Maternal Grandfather     No Known Problems Paternal Grandmother     No Known Problems Paternal Grandfather     Hyperlipidemia Neg Hx      He  has a past medical history of Anesthesia (1985), Cataract (2016), Diabetes (HCC) (2019), Dyslipidemia, GERD (gastroesophageal reflux disease), Heart burn (2013), Heart murmur (1964), Heart valve disease (1964), High cholesterol (2002), Insomnia, Pain (2008), and  PONV (postoperative nausea and vomiting) (1985).    He has no past medical history of Acute nasopharyngitis, Anginal syndrome (HCC), Arrhythmia, Arthritis, Asthma, Blood clotting disorder (HCC), Bowel habit changes, Breath shortness, Bronchitis, Cancer (HCC), Carcinoma in situ of respiratory system, Congestive heart failure (HCC), Continuous ambulatory peritoneal dialysis status (HCC), Coughing blood, Dental disorder, Dialysis patient (HCC), Disorder of thyroid, Emphysema of lung (HCC), Glaucoma, Gynecological disorder, Hemorrhagic disorder (HCC), Hepatitis A, Hepatitis B, Hepatitis C, Hiatus hernia syndrome, Hypertension, Indigestion, Infectious disease, Jaundice, Myocardial infarct (HCC), Pacemaker, Pneumonia, Pregnant, Psychiatric problem, Renal disorder, Rheumatic fever, Seizure (HCC), Sleep apnea, Snoring, Stroke (HCC), Tuberculosis, Urinary bladder disorder, or Urinary incontinence.   Past Surgical History:   Procedure Laterality Date    PB TOTAL KNEE ARTHROPLASTY Right 07/20/2022    Procedure: RIGHT TOTAL KNEE ARTHROPLASTY;  Surgeon: Erika Hobbs M.D.;  Location: [facility];  Service: Orthopedics    MD DRAIN/INJECT LARGE JOINT/BURSA Right 10/27/2021    Procedure: Diagnostic and therapeutic fluoroscopically guided intra-articular RIGHT hip injection;  Surgeon: Julito Dacosta M.D.;  [facility];  Service: Pain Management    MD FLUOROSCOPIC GUIDANCE NEEDLE PLACEMENT Right 10/27/2021    Procedure: .;  Surgeon: Julito Dacosta M.D.;  [facility];  Service: Pain Management    MD FLUOROSCOPIC GUIDANCE NEEDLE PLACEMENT  07/28/2021    Procedure: Spinal cord stimulation trial;  Surgeon: Julito Dacosta M.D.;  [facility];  Service: Pain Management    MD PERCUT IMPLNT NEUROELECT,EPIDURAL  07/28/2021    Procedure: .;  Surgeon: Julito Dacosta M.D.;  Location[facility];  Service: Pain  "Management    NEURO DEST FACET L/S W/IG SNGL Right 11/23/2020    Procedure: DESTRUCTION, NERVE, FACET JOINT, LUMBOSACRAL, USING NEUROLYTIC AGENT, WITH FLUOROSCOPIC GUIDANCE;  Surgeon: Julito Dacosta M.D.;  Location[facility];  Service: Pain Management    LUMBAR MEDIAL BRANCH BLOCKS Right 10/12/2020    Procedure: BLOCK, NERVE, SPINAL, LUMBAR, POSTERIOR RAMUS, MEDIAL BRANCH;  Surgeon: Julito Dacosta M.D.;  [facility];  Service: Pain Management    LUMBAR MEDIAL BRANCH BLOCKS Right 08/24/2020    Procedure: BLOCK, NERVE, SPINAL, LUMBAR, POSTERIOR RAMUS, MEDIAL BRANCH;  Surgeon: Julito Dacosta M.D.;  Location[facility];  Service: Pain Management    SHOULDER ARTHROPLASTY TOTAL Right 01/2018    APPENDECTOMY      NO PERTINENT PAST SURGICAL HISTORY  2004, 2005 & 2008    Lumbar Discectomies    OTHER  1955 & 2000's    Tonsilectomy & discectomies    OTHER Right     Shoulder procedure to help alleviate pressure on nerves to help with numbness around 1999.    OTHER NEUROLOGICAL SURG  2010    Lumbar Spinal Stenosis    OTHER ORTHOPEDIC SURGERY  1965 & 1985    Medial meniscus both knees    TONSILLECTOMY         Exam:   /50 (BP Location: Right arm, Patient Position: Sitting, BP Cuff Size: Adult)   Pulse 75   Temp 36.2 °C (97.2 °F) (Temporal)   Ht 1.905 m (6' 3\")   Wt 107 kg (235 lb 6.4 oz)   SpO2 100%  Body mass index is 29.42 kg/m².    Hearing fair.    Dentition good  Alert, oriented in no acute distress.  Eye contact is good, speech goal directed, affect calm    Assessment and Plan. The following treatment and monitoring plan is recommended:    1. Medicare annual wellness visit, subsequent    2. Encounter for screening prostate specific antigen (PSA) measurement  - PROSTATE SPECIFIC AG SCREENING; Future    3. Controlled type 2 diabetes mellitus without complication, without long-term current use of insulin (HCC)  - Lipid Profile; Future  - Comp " Metabolic Panel; Future  - CBC WITHOUT DIFFERENTIAL; Future  - HEMOGLOBIN A1C; Future  - MICROALBUMIN CREAT RATIO URINE; Future    4. Gastroesophageal reflux disease, unspecified whether esophagitis present    5. HSV infection    6. Primary osteoarthritis involving multiple joints    7. Pain in both lower extremities    8. Chronic right shoulder pain    9. Aneurysm of ascending aorta without rupture (HCC)    10. Palpitations    11. Overactive bladder    12. Dyslipidemia    13. Nonrheumatic aortic valve insufficiency    14. Coronary artery disease involving native coronary artery of native heart without angina pectoris    15. Drug-induced erectile dysfunction    16. Decreased hearing, right    17. Tinnitus of right ear    18. Multiple pulmonary nodules  - CT-CHEST (THORAX) W/O; Future    19. Primary insomnia    20. Seasonal allergies  - montelukast (SINGULAIR) 10 MG Tab; Take 1 Tablet by mouth every day for 90 days.  Dispense: 90 Tablet; Refill: 2    21. Nasal bleeding  - Referral to ENT    22. Internal nasal lesion  - Referral to ENT    Assessment & Plan  1. Gastroesophageal Reflux Disease (GERD).  He is currently taking pantoprazole and famotidine, which have been effective in managing his symptoms. He reports occasional pain that could be due to spasms. No changes to his current medication regimen are necessary at this time.    2. Herpes Simplex Virus (HSV) infection.  He is on Valtrex twice a day and has had no episodes. He should continue with the current dosage.    3. Lower extremity pain.  He is taking gabapentin, which has not been effective. He was advised to gradually reduce the gabapentin dosage over a 2-week period, starting with a reduction to 2 tablets daily, followed by a further reduction to 1 tablet daily before complete discontinuation. He can discontinue diclofenac at any time without the need for a tapering process.    4. Insomnia.  He is taking Ambien and reports getting 5 to 6 hours of sleep. No  changes to his current medication regimen are necessary at this time.    5. Aortic aneurysm.  His aortic aneurysm has increased from 4.3 to 4.4. He was advised to continue with the metoprolol regimen as prescribed by his cardiologist.    6. Benign prostatic hyperplasia (BPH).  He is taking tamsulosin for his overactive bladder. No changes to his current medication regimen are necessary at this time.    7. Hyperlipidemia.  He is taking rosuvastatin for his cholesterol. No changes to his current medication regimen are necessary at this time.    8. Pulmonary nodules.  A CT scan without contrast will be ordered to monitor the pulmonary nodules. Based on the results, further action will be determined.    9. Seasonal allergies.  A prescription for montelukast 10 mg will be sent to [facility].    10. Nasal lesion.  He was advised to apply Vaseline or KY jelly to the nasal lesion. A referral to an ENT specialist will be made for further evaluation of the nasal lesion and associated bleeding. If he does not receive a response within 5 business days, he should contact the referral department.    11. Diabetes Mellitus.  He is due for labs next month, including an A1c test. He was advised to fast for 8 hours before the tests and to complete them after January 3rd.    Follow-up  The patient will follow up in 3 months.      Services suggested: No services needed at this time  Health Care Screening: Age-appropriate preventive services recommended by USPTF and ACIP covered by Medicare were discussed today. Services ordered if indicated and agreed upon by the patient.  Referrals offered: Community-based lifestyle interventions to reduce health risks and promote self-management and wellness, fall prevention, nutrition, physical activity, tobacco-use cessation, weight loss, and mental health services as per orders if indicated.    Discussion today about general wellness and lifestyle habits:    Prevent falls and reduce trip hazards;  Cautioned about securing or removing rugs.  Have a working fire alarm and carbon monoxide detector;   Engage in regular physical activity and social activities     Follow-up: Return in about 3 months (around 3/31/2025), or if symptoms worsen or fail to improve.

## 2024-12-31 NOTE — LETTER
[employer]  Ez Connelly P.A.-C.  [address]  [place] [address]  Fax: [phone]   Authorization for Release/Disclosure of   Protected Health Information   Name: ABIEL PINTO : 1950 SSN: [id number]   Address: [address] [place] [address]  [place] [address] Phone:    [phone] (home)    I authorize the entity listed below to release/disclose the PHI below to:   [employer]/Ez Connelly P.A.-C. and Ez Connelly P.A.-C.   Provider or Entity Name:  [facility]   Address   City, State, Zip   Phone:      Fax:     Reason for request: continuity of care   Information to be released: Want office visit regularly please.   [  ] LAST COLONOSCOPY,  including any PATH REPORT and follow-up  [  ] LAST FIT/COLOGUARD RESULT [  ] LAST DEXA  [  ] LAST MAMMOGRAM  [  ] LAST PAP  [  ] LAST LABS [  ] RETINA EXAM REPORT  [  ] IMMUNIZATION RECORDS  [ X ] Release all info      [  ] Check here and initial the line next to each item to release ALL health information INCLUDING  _____ Care and treatment for drug and / or alcohol abuse  _____ HIV testing, infection status, or AIDS  _____ Genetic Testing    DATES OF SERVICE OR TIME PERIOD TO BE DISCLOSED: _____________  I understand and acknowledge that:  * This Authorization may be revoked at any time by you in writing, except if your health information has already been used or disclosed.  * Your health information that will be used or disclosed as a result of you signing this authorization could be re-disclosed by the recipient. If this occurs, your re-disclosed health information may no longer be protected by State or Federal laws.  * You may refuse to sign this Authorization. Your refusal will not affect your ability to obtain treatment.  * This Authorization becomes effective upon signing and will  on (date) __________.      If no date is indicated, this Authorization will  one (1) year from the signature date.    Name: Abiel Cuevas  Roderick  Signature: Date:   12/31/2024     PLEASE FAX REQUESTED RECORDS BACK TO: [phone]

## 2025-01-02 DIAGNOSIS — K21.9 GASTROESOPHAGEAL REFLUX DISEASE WITHOUT ESOPHAGITIS: ICD-10-CM

## 2025-01-03 ENCOUNTER — TELEPHONE (OUTPATIENT)
Dept: MEDICAL GROUP | Facility: MEDICAL CENTER | Age: 75
End: 2025-01-03
Payer: MEDICARE

## 2025-01-03 RX ORDER — PANTOPRAZOLE SODIUM 40 MG/1
40 TABLET, DELAYED RELEASE ORAL DAILY
Qty: 90 TABLET | Refills: 3 | Status: SHIPPED | OUTPATIENT
Start: 2025-01-03 | End: 2025-12-29

## 2025-01-03 NOTE — TELEPHONE ENCOUNTER
DOCUMENTATION OF PAR STATUS:    1. Name of Medication & Dose: Pantoprazole Sodium 40MG dr tablets     2. Name of Prescription Coverage Company & phone #: covermymeds    3. Date Prior Auth Submitted: 1/3/25    4. What information was given to obtain insurance decision? Clinical office notes    5. Prior Auth Status? Pending    6. Patient Notified: yes

## 2025-01-06 DIAGNOSIS — E11.9 CONTROLLED TYPE 2 DIABETES MELLITUS WITHOUT COMPLICATION, WITHOUT LONG-TERM CURRENT USE OF INSULIN (HCC): ICD-10-CM

## 2025-01-06 NOTE — TELEPHONE ENCOUNTER
Received request via: Pharmacy    Was the patient seen in the last year in this department? Yes    Does the patient have an active prescription (recently filled or refills available) for medication(s) requested? No    Pharmacy Name: [facility]    Does the patient have [facility] and need 100-day supply? (This applies to ALL medications) Patient does not have SCP

## 2025-01-09 ENCOUNTER — APPOINTMENT (OUTPATIENT)
Dept: RADIOLOGY | Facility: MEDICAL CENTER | Age: 75
End: 2025-01-09
Attending: PHYSICIAN ASSISTANT
Payer: MEDICARE

## 2025-01-09 ENCOUNTER — TELEPHONE (OUTPATIENT)
Dept: MEDICAL GROUP | Facility: MEDICAL CENTER | Age: 75
End: 2025-01-09
Payer: MEDICARE

## 2025-01-09 NOTE — TELEPHONE ENCOUNTER
VOICEMAIL  1. Caller Name: Mindy ([facility])      Call Back Number: [phone] ext [phone]    2. Message: mindy called and stated that this pts CT was not approved at [facility]. She said to call [phone] option 1 and the reference number is [id number] to try and get that CT approved. She said to call her with any questions.     3. Patient approves office to leave a detailed voicemail/MyChart message: N\A

## 2025-01-17 ENCOUNTER — APPOINTMENT (OUTPATIENT)
Dept: RADIOLOGY | Facility: MEDICAL CENTER | Age: 75
End: 2025-01-17
Attending: PHYSICIAN ASSISTANT
Payer: MEDICARE

## 2025-02-03 ENCOUNTER — APPOINTMENT (OUTPATIENT)
Dept: RADIOLOGY | Facility: MEDICAL CENTER | Age: 75
End: 2025-02-03
Attending: PHYSICIAN ASSISTANT
Payer: MEDICARE

## 2025-02-05 ENCOUNTER — HOSPITAL ENCOUNTER (OUTPATIENT)
Dept: RADIOLOGY | Facility: MEDICAL CENTER | Age: 75
End: 2025-02-05
Attending: PHYSICIAN ASSISTANT
Payer: MEDICARE

## 2025-02-05 DIAGNOSIS — R91.8 MULTIPLE PULMONARY NODULES: ICD-10-CM

## 2025-02-05 PROCEDURE — 71250 CT THORAX DX C-: CPT

## 2025-02-06 DIAGNOSIS — F51.01 PRIMARY INSOMNIA: ICD-10-CM

## 2025-02-06 RX ORDER — ZOLPIDEM TARTRATE 10 MG/1
10 TABLET ORAL DAILY
Qty: 90 TABLET | Refills: 0 | Status: SHIPPED | OUTPATIENT
Start: 2025-02-06 | End: 2025-05-07

## 2025-02-10 DIAGNOSIS — M15.0 PRIMARY OSTEOARTHRITIS INVOLVING MULTIPLE JOINTS: ICD-10-CM

## 2025-03-14 DIAGNOSIS — M79.604 PAIN IN BOTH LOWER EXTREMITIES: ICD-10-CM

## 2025-03-14 DIAGNOSIS — M79.605 PAIN IN BOTH LOWER EXTREMITIES: ICD-10-CM

## 2025-03-16 RX ORDER — GABAPENTIN 100 MG/1
100 CAPSULE ORAL 3 TIMES DAILY
Qty: 270 CAPSULE | Refills: 3 | Status: SHIPPED | OUTPATIENT
Start: 2025-03-16

## 2025-03-16 RX ORDER — ROSUVASTATIN CALCIUM 20 MG/1
20 TABLET, COATED ORAL DAILY
Qty: 90 TABLET | Refills: 3 | Status: SHIPPED | OUTPATIENT
Start: 2025-03-16

## 2025-03-27 ENCOUNTER — HOSPITAL ENCOUNTER (OUTPATIENT)
Facility: MEDICAL CENTER | Age: 75
End: 2025-03-27
Attending: PHYSICIAN ASSISTANT
Payer: MEDICARE

## 2025-03-27 ENCOUNTER — RESULTS FOLLOW-UP (OUTPATIENT)
Dept: MEDICAL GROUP | Facility: MEDICAL CENTER | Age: 75
End: 2025-03-27
Payer: MEDICARE

## 2025-03-27 DIAGNOSIS — Z12.5 ENCOUNTER FOR SCREENING PROSTATE SPECIFIC ANTIGEN (PSA) MEASUREMENT: ICD-10-CM

## 2025-03-27 DIAGNOSIS — E11.9 CONTROLLED TYPE 2 DIABETES MELLITUS WITHOUT COMPLICATION, WITHOUT LONG-TERM CURRENT USE OF INSULIN (HCC): ICD-10-CM

## 2025-03-27 LAB
ALBUMIN SERPL BCP-MCNC: 4.1 G/DL (ref 3.2–4.9)
ALBUMIN/GLOB SERPL: 1.7 G/DL
ALP SERPL-CCNC: 71 U/L (ref 30–99)
ALT SERPL-CCNC: 25 U/L (ref 2–50)
ANION GAP SERPL CALC-SCNC: 11 MMOL/L (ref 7–16)
AST SERPL-CCNC: 33 U/L (ref 12–45)
BILIRUB SERPL-MCNC: 0.5 MG/DL (ref 0.1–1.5)
BUN SERPL-MCNC: 21 MG/DL (ref 8–22)
CALCIUM ALBUM COR SERPL-MCNC: 9.2 MG/DL (ref 8.5–10.5)
CALCIUM SERPL-MCNC: 9.3 MG/DL (ref 8.5–10.5)
CHLORIDE SERPL-SCNC: 106 MMOL/L (ref 96–112)
CHOLEST SERPL-MCNC: 139 MG/DL (ref 100–199)
CO2 SERPL-SCNC: 23 MMOL/L (ref 20–33)
CREAT SERPL-MCNC: 1.03 MG/DL (ref 0.5–1.4)
CREAT UR-MCNC: 125 MG/DL
ERYTHROCYTE [DISTWIDTH] IN BLOOD BY AUTOMATED COUNT: 45.1 FL (ref 35.9–50)
EST. AVERAGE GLUCOSE BLD GHB EST-MCNC: 146 MG/DL
FASTING STATUS PATIENT QL REPORTED: NORMAL
GFR SERPLBLD CREATININE-BSD FMLA CKD-EPI: 75 ML/MIN/1.73 M 2
GLOBULIN SER CALC-MCNC: 2.4 G/DL (ref 1.9–3.5)
GLUCOSE SERPL-MCNC: 119 MG/DL (ref 65–99)
HBA1C MFR BLD: 6.7 % (ref 4–5.6)
HCT VFR BLD AUTO: 38.8 % (ref 42–52)
HDLC SERPL-MCNC: 34 MG/DL
HGB BLD-MCNC: 12.9 G/DL (ref 14–18)
LDLC SERPL CALC-MCNC: 79 MG/DL
MCH RBC QN AUTO: 30.6 PG (ref 27–33)
MCHC RBC AUTO-ENTMCNC: 33.2 G/DL (ref 32.3–36.5)
MCV RBC AUTO: 91.9 FL (ref 81.4–97.8)
MICROALBUMIN UR-MCNC: <1.2 MG/DL
MICROALBUMIN/CREAT UR: NORMAL MG/G (ref 0–30)
PLATELET # BLD AUTO: 182 K/UL (ref 164–446)
PMV BLD AUTO: 11.4 FL (ref 9–12.9)
POTASSIUM SERPL-SCNC: 4 MMOL/L (ref 3.6–5.5)
PROT SERPL-MCNC: 6.5 G/DL (ref 6–8.2)
PSA SERPL-MCNC: 2.99 NG/ML (ref 0–4)
RBC # BLD AUTO: 4.22 M/UL (ref 4.7–6.1)
SODIUM SERPL-SCNC: 140 MMOL/L (ref 135–145)
TRIGL SERPL-MCNC: 128 MG/DL (ref 0–149)
WBC # BLD AUTO: 6.9 K/UL (ref 4.8–10.8)

## 2025-03-27 PROCEDURE — 36415 COLL VENOUS BLD VENIPUNCTURE: CPT | Mod: GA

## 2025-03-27 PROCEDURE — 80061 LIPID PANEL: CPT

## 2025-03-27 PROCEDURE — 82043 UR ALBUMIN QUANTITATIVE: CPT

## 2025-03-27 PROCEDURE — 84153 ASSAY OF PSA TOTAL: CPT | Mod: GA

## 2025-03-27 PROCEDURE — 82570 ASSAY OF URINE CREATININE: CPT

## 2025-03-27 PROCEDURE — 80053 COMPREHEN METABOLIC PANEL: CPT

## 2025-03-27 PROCEDURE — 83036 HEMOGLOBIN GLYCOSYLATED A1C: CPT | Mod: GA

## 2025-03-27 PROCEDURE — 85027 COMPLETE CBC AUTOMATED: CPT

## 2025-03-31 ENCOUNTER — OFFICE VISIT (OUTPATIENT)
Dept: MEDICAL GROUP | Facility: MEDICAL CENTER | Age: 75
End: 2025-03-31
Payer: MEDICARE

## 2025-03-31 VITALS
DIASTOLIC BLOOD PRESSURE: 78 MMHG | HEIGHT: 75 IN | BODY MASS INDEX: 30.84 KG/M2 | TEMPERATURE: 97.3 F | OXYGEN SATURATION: 98 % | HEART RATE: 74 BPM | WEIGHT: 248 LBS | SYSTOLIC BLOOD PRESSURE: 128 MMHG

## 2025-03-31 DIAGNOSIS — G89.29 CHRONIC BILATERAL LOW BACK PAIN WITHOUT SCIATICA: ICD-10-CM

## 2025-03-31 DIAGNOSIS — M54.50 CHRONIC BILATERAL LOW BACK PAIN WITHOUT SCIATICA: ICD-10-CM

## 2025-03-31 DIAGNOSIS — F51.01 PRIMARY INSOMNIA: ICD-10-CM

## 2025-03-31 DIAGNOSIS — E11.9 CONTROLLED TYPE 2 DIABETES MELLITUS WITHOUT COMPLICATION, WITHOUT LONG-TERM CURRENT USE OF INSULIN (HCC): ICD-10-CM

## 2025-03-31 DIAGNOSIS — M77.12 LATERAL EPICONDYLITIS OF LEFT ELBOW: ICD-10-CM

## 2025-03-31 DIAGNOSIS — M15.0 PRIMARY OSTEOARTHRITIS INVOLVING MULTIPLE JOINTS: ICD-10-CM

## 2025-03-31 PROBLEM — J34.89 INTERNAL NASAL LESION: Status: RESOLVED | Noted: 2024-12-31 | Resolved: 2025-03-31

## 2025-03-31 PROBLEM — R04.0 NASAL BLEEDING: Status: RESOLVED | Noted: 2024-12-31 | Resolved: 2025-03-31

## 2025-03-31 RX ORDER — TIRZEPATIDE 2.5 MG/.5ML
2.5 INJECTION, SOLUTION SUBCUTANEOUS
Qty: 2 ML | Refills: 1 | Status: SHIPPED | OUTPATIENT
Start: 2025-03-31 | End: 2025-04-28

## 2025-03-31 ASSESSMENT — FIBROSIS 4 INDEX: FIB4 SCORE: 2.76

## 2025-03-31 NOTE — PROGRESS NOTES
Subjective:     History of Present Illness  The patient presents for evaluation of elbow pain, diabetes mellitus, and back and leg stiffness.    He has been experiencing persistent elbow pain for the past 2 to 3 weeks, which prompted him to seek medical attention at [facility]. An x-ray was performed, and he was diagnosed with tennis elbow by the PA. He was advised to undergo physical therapy. He reports using a surgical rubber for movement and engaging in daily push-ups. Despite these activities, he continues to experience mild soreness, although it is not as severe as initially reported.    He is currently on metformin 1000 mg twice daily for diabetes management. He reports that his appetite decreased when the metformin dosage was increased, but it has since returned to normal.    He has discontinued his baby aspirin regimen due to the presence of blood spotting, which he initially attributed to oral bleeding but now suspects may be nasal in origin. He has been on this medication for several years without any adverse effects, including gastrointestinal upset, except for occasional GERD symptoms. He stopped taking the baby aspirin approximately 2 weeks ago.    He reports experiencing stiffness in his legs upon exiting his vehicle, which causes him to lean forward and struggle to maintain an upright posture while walking. He also notes a tendency to lean on shopping carts for support during grocery store visits. He has undergone multiple surgeries in recent years and is considering applying for a parking pass to facilitate closer parking.    Supplemental Information  He is on Ambien and has a 90-day supply. He is on metoprolol prescribed by Dr. Pandya. He takes montelukast for seasonal allergies. He had a colonoscopy and endoscopy in the past and no polyps were found, but fundic cysts were found in his stomach, which were removed.      Current medicines (including changes today)  Current Outpatient Medications    Medication Sig Dispense Refill    Tirzepatide (MOUNJARO) 2.5 MG/0.5ML Solution Auto-injector Inject 2.5 mg under the skin every 7 days for 28 days. 2 mL 1    gabapentin (NEURONTIN) 100 MG Cap TAKE 1 CAPSULE 3 TIMES A    Capsule 3    rosuvastatin (CRESTOR) 20 MG Tab TAKE 1 TABLET DAILY 90 Tablet 3    diclofenac DR (VOLTAREN) 50 MG Tablet Delayed Response TAKE 1 TABLET TWICE A  Tablet 1    zolpidem (AMBIEN) 10 MG Tab Take 1 Tablet by mouth every day for 90 days. 90 Tablet 0    metformin (GLUCOPHAGE) 1000 MG tablet Take 1 Tablet by mouth 2 times a day with meals for 360 days. 180 Tablet 3    pantoprazole (PROTONIX) 40 MG Tablet Delayed Response Take 1 Tablet by mouth every day for 360 days. 90 Tablet 3    montelukast (SINGULAIR) 10 MG Tab Take 1 Tablet by mouth every day for 90 days. 90 Tablet 2    valACYclovir (VALTREX) 500 MG Tab TAKE 1 TABLET TWICE A  Tablet 3    famotidine (PEPCID) 40 MG Tab TAKE 1 TABLET TWICE A  Tablet 3    Cyanocobalamin (VITAMIN B12 PO) Take 5,000 mcg by mouth every day.      Multiple Vitamin (MULTIVITAMIN PO) Take 1 Tablet by mouth every day.      metoprolol SR (TOPROL XL) 25 MG TABLET SR 24 HR Take 1 Tablet by mouth every day. 90 Tablet 3    Fesoterodine Fumarate 8 MG TABLET SR 24 HR Take 8 mg by mouth every day at 6 PM.      tamsulosin (FLOMAX) 0.4 MG capsule Take 0.4 mg by mouth every day.      fluorouracil (EFUDEX) 5 % cream Apply 1 Application topically as needed (Pre-cancerous lesions).      CALCIUM PO Take 1 Tablet by mouth every day.      Cholecalciferol (VITAMIN D3 PO) Take 100 mcg by mouth every day.       No current facility-administered medications for this visit.     He  has a past medical history of Anesthesia (1985), Cataract (2016), Diabetes (HCC) (2019), Dyslipidemia, GERD (gastroesophageal reflux disease), Heart burn (2013), Heart murmur (1964), Heart valve disease (1964), High cholesterol (2002), Insomnia, Pain (2008), and PONV (postoperative  "nausea and vomiting) (1985).    He has no past medical history of Acute nasopharyngitis, Anginal syndrome (HCC), Arrhythmia, Arthritis, Asthma, Blood clotting disorder (HCC), Bowel habit changes, Breath shortness, Bronchitis, Cancer (HCC), Carcinoma in situ of respiratory system, Congestive heart failure (HCC), Continuous ambulatory peritoneal dialysis status (HCC), Coughing blood, Dental disorder, Dialysis patient (HCC), Disorder of thyroid, Emphysema of lung (HCC), Glaucoma, Gynecological disorder, Hemorrhagic disorder (HCC), Hepatitis A, Hepatitis B, Hepatitis C, Hiatus hernia syndrome, Hypertension, Indigestion, Infectious disease, Jaundice, Myocardial infarct (HCC), Pacemaker, Pneumonia, Pregnant, Psychiatric problem, Renal disorder, Rheumatic fever, Seizure (HCC), Sleep apnea, Snoring, Stroke (HCC), Tuberculosis, Urinary bladder disorder, or Urinary incontinence.    ROS   No chest pain, no shortness of breath, no abdominal pain  Positive ROS as per HPI.  All other systems reviewed and are negative.     Objective:     /78   Pulse 74   Temp 36.3 °C (97.3 °F)   Ht 1.905 m (6' 3\")   Wt 112 kg (248 lb)   SpO2 98%  Body mass index is 31 kg/m².   Physical Exam    Constitutional: Alert, no distress.  Skin: Warm, dry, good turgor, no rashes in visible areas.  Eye: Equal, round and reactive, conjunctiva clear, lids normal.  ENMT: Lips without lesions, good dentition, oropharynx clear.  Neck: Trachea midline, no masses, no thyromegaly.   Psych: Alert and oriented x3, normal affect and mood.      Results  Laboratory Studies  PSA level is normal. Microalbumin level is normal. LDL cholesterol is 79. Complete blood count is normal. Liver and kidney function are normal. A1c is stable at 6.7.    Imaging  CT chest shows no deposits of calcium in the heart.        Assessment and Plan:   The following treatment plan was discussed    Assessment & Plan  1. Tennis elbow.  Acute lateral epicondylitis on the left side.  He " was advised to apply ice to the affected area and avoid activities that exacerbate the pain. Physical therapy exercises were recommended to strengthen the elbow. If there is no improvement, he should consider returning for a steroid injection.    2. Diabetes mellitus.  Chronic condition.  Would like to see A1c better controlled.  His A1c levels have been increasing, indicating a need for additional medication. A prescription for Mounjaro injections was provided, to be administered once weekly. He was informed about potential side effects such as nausea or stomach issues. He will start with the lowest dose to minimize side effects and increase the dose if tolerated. He was instructed to report his progress after 3 weeks via MyCheckt. If he experiences severe side effects, he should discontinue use and inform the provider.    3. Back and leg stiffness.  He was advised to maintain a straight posture while walking and to exercise caution when walking on inclines.    4. Medication management.  His baby aspirin has been discontinued due to bleeding issues. His Ambien was renewed earlier this month. He will inform the provider via MyCheckt if he needs a refill for any other medications.    Follow-up  The patient will follow up in 3 months.    PROCEDURE  The patient has undergone multiple surgeries in recent years.      ORDERS:  1. Lateral epicondylitis of left elbow      2. Primary osteoarthritis involving multiple joints      3. Chronic bilateral low back pain without sciatica      4. Primary insomnia      5. Controlled type 2 diabetes mellitus without complication, without long-term current use of insulin (HCC)    - Tirzepatide (MOUNJARO) 2.5 MG/0.5ML Solution Auto-injector; Inject 2.5 mg under the skin every 7 days for 28 days.  Dispense: 2 mL; Refill: 1        Please note that this dictation was created using voice recognition software. I have made every reasonable attempt to correct obvious errors, but I expect that  there are errors of grammar and possibly content that I did not discover before finalizing the note.      Attestation      Verbal consent was acquired by the patient to use [employer] ambient listening note generation during this visit Yes

## 2025-04-04 ENCOUNTER — TELEPHONE (OUTPATIENT)
Dept: MEDICAL GROUP | Facility: MEDICAL CENTER | Age: 75
End: 2025-04-04
Payer: MEDICARE

## 2025-04-04 NOTE — TELEPHONE ENCOUNTER
DOCUMENTATION OF PAR STATUS:    1. Name of Medication & Dose: Mounjaro 2.5MG/0.5ML auto-injectors     2. Name of Prescription Coverage Company & phone #: covermymeds    3. Date Prior Auth Submitted: 4/4/25    4. What information was given to obtain insurance decision? Clinical office notes    5. Prior Auth Status? Pending    6. Patient Notified: yes

## 2025-05-18 DIAGNOSIS — F51.01 PRIMARY INSOMNIA: ICD-10-CM

## 2025-05-19 RX ORDER — ZOLPIDEM TARTRATE 10 MG/1
10 TABLET ORAL DAILY
Qty: 90 TABLET | Refills: 0 | Status: SHIPPED | OUTPATIENT
Start: 2025-05-19 | End: 2025-08-17

## 2025-06-23 ENCOUNTER — HOSPITAL ENCOUNTER (OUTPATIENT)
Facility: MEDICAL CENTER | Age: 75
End: 2025-06-23
Attending: PHYSICIAN ASSISTANT
Payer: MEDICARE

## 2025-06-23 ENCOUNTER — OFFICE VISIT (OUTPATIENT)
Dept: MEDICAL GROUP | Facility: MEDICAL CENTER | Age: 75
End: 2025-06-23
Payer: MEDICARE

## 2025-06-23 VITALS
SYSTOLIC BLOOD PRESSURE: 102 MMHG | DIASTOLIC BLOOD PRESSURE: 50 MMHG | TEMPERATURE: 97.1 F | HEART RATE: 74 BPM | WEIGHT: 237.6 LBS | HEIGHT: 72 IN | BODY MASS INDEX: 32.18 KG/M2 | OXYGEN SATURATION: 94 %

## 2025-06-23 DIAGNOSIS — E11.9 CONTROLLED TYPE 2 DIABETES MELLITUS WITHOUT COMPLICATION, WITHOUT LONG-TERM CURRENT USE OF INSULIN (HCC): ICD-10-CM

## 2025-06-23 DIAGNOSIS — I73.9 PVD (PERIPHERAL VASCULAR DISEASE) (HCC): ICD-10-CM

## 2025-06-23 DIAGNOSIS — F51.01 PRIMARY INSOMNIA: ICD-10-CM

## 2025-06-23 DIAGNOSIS — Z79.899 ENCOUNTER FOR MEDICATION MANAGEMENT: Primary | ICD-10-CM

## 2025-06-23 DIAGNOSIS — R29.3 BAD POSTURE: ICD-10-CM

## 2025-06-23 DIAGNOSIS — N32.81 OVERACTIVE BLADDER: ICD-10-CM

## 2025-06-23 DIAGNOSIS — Z79.899 ENCOUNTER FOR MEDICATION MANAGEMENT: ICD-10-CM

## 2025-06-23 DIAGNOSIS — I71.21 ANEURYSM OF ASCENDING AORTA WITHOUT RUPTURE (HCC): ICD-10-CM

## 2025-06-23 PROBLEM — N40.1 LOWER URINARY TRACT SYMPTOMS DUE TO BENIGN PROSTATIC HYPERPLASIA: Status: ACTIVE | Noted: 2025-04-13

## 2025-06-23 PROCEDURE — G2211 COMPLEX E/M VISIT ADD ON: HCPCS | Performed by: PHYSICIAN ASSISTANT

## 2025-06-23 PROCEDURE — 80307 DRUG TEST PRSMV CHEM ANLYZR: CPT

## 2025-06-23 PROCEDURE — 3074F SYST BP LT 130 MM HG: CPT | Performed by: PHYSICIAN ASSISTANT

## 2025-06-23 PROCEDURE — 3078F DIAST BP <80 MM HG: CPT | Performed by: PHYSICIAN ASSISTANT

## 2025-06-23 PROCEDURE — 99214 OFFICE O/P EST MOD 30 MIN: CPT | Performed by: PHYSICIAN ASSISTANT

## 2025-06-23 ASSESSMENT — PATIENT HEALTH QUESTIONNAIRE - PHQ9: CLINICAL INTERPRETATION OF PHQ2 SCORE: 0

## 2025-06-23 ASSESSMENT — FIBROSIS 4 INDEX: FIB4 SCORE: 2.76

## 2025-06-23 NOTE — PROGRESS NOTES
Subjective:     History of Present Illness  The patient presents for evaluation of left leg swelling, sleep issues, overactive bladder, and poor posture. He is accompanied by his wife.    He initially suspected a knee injury due to prolonged positioning but later realized the issue was not localized to the knee. He reports significant stiffness in his left leg, which subsequently swelled, resembling a cellulitis infection. He sought medical attention at [facility], where he underwent a comprehensive workup similar to one conducted on 11/01/2024. An ultrasound was performed to rule out arterial blockage, and the results were normal. He was prescribed doxycycline and triamcinolone cream by his dermatologist's PA. The condition has persisted for approximately 2 months. He has a history of partial knee replacement on the right side and meniscus surgery during his high school years. Approximately 12 years ago, he underwent ultrasonic destruction of the great saphenous vein in both legs. He was previously referred to a vascular specialist but did not follow up as advised.    He reports difficulty sleeping, often waking up after only 5 hours of sleep. He also experiences daytime fatigue, necessitating short naps of 10 to 20 minutes. He is currently on Ambien.    He is currently on medication for overactive bladder, which has reduced his nighttime bathroom visits. However, he experiences urgency upon the first signal to urinate, leading to accidents if he does not respond immediately. He is taking mirabegron.    He also reports dry mouth, which he manages with mouthwash and water. He has been experiencing forward leaning posture, which he attributes to his body wanting to curve. He has previously engaged in physical therapy following surgery 1 to 3 years ago.    PAST SURGICAL HISTORY:  - Partial knee replacement on the right side  - Meniscus surgery during high school years  - Ultrasonic destruction of the great  "saphenous vein in both legs approximately 12 years ago      Current medicines (including changes today)  Current Medications[1]  He  has a past medical history of Anesthesia (1985), Cataract (2016), Diabetes (HCC) (2019), Dyslipidemia, GERD (gastroesophageal reflux disease), Heart burn (2013), Heart murmur (1964), Heart valve disease (1964), High cholesterol (2002), Insomnia, Pain (2008), and PONV (postoperative nausea and vomiting) (1985).    He has no past medical history of Acute nasopharyngitis, Anginal syndrome (HCC), Arrhythmia, Arthritis, Asthma, Blood clotting disorder (HCC), Bowel habit changes, Breath shortness, Bronchitis, Cancer ([facility]), Carcinoma in situ of respiratory system, Congestive heart failure (HCC), Continuous ambulatory peritoneal dialysis status ([facility]), Coughing blood, Dental disorder, Dialysis patient ([facility]), Disorder of thyroid, Emphysema of lung (HCC), Glaucoma, Gynecological disorder, Hemorrhagic disorder ([facility]), Hepatitis A, Hepatitis B, Hepatitis C, Hiatus hernia syndrome, Hypertension, Indigestion, Infectious disease, Jaundice, Myocardial infarct ([facility]), Pacemaker, Pneumonia, Pregnant, Psychiatric problem, Renal disorder, Rheumatic fever, Seizure (HCC), Sleep apnea, Snoring, Stroke ([facility]), Tuberculosis, Urinary bladder disorder, or Urinary incontinence.    ROS   No chest pain, no shortness of breath, no abdominal pain  Positive ROS as per HPI.  All other systems reviewed and are negative.     Objective:     /50 (BP Location: Left arm, Patient Position: Sitting, BP Cuff Size: Adult)   Pulse 74   Temp 36.2 °C (97.1 °F) (Temporal)   Ht 1.84 m (6' 0.44\")   Wt 108 kg (237 lb 9.6 oz)   SpO2 94%  Body mass index is 31.83 kg/m².   Physical Exam  General: Obese with BMI of 32.  Extremities: Swelling and redness noted in the left leg.  Musculoskeletal: Poor posture with tendency to lean forward.  Skin: Redness and signs of infection on the left leg.  Constitutional: Alert, no " distress.  Skin: Warm, dry, good turgor, no rashes in visible areas.  Eye: Equal, round and reactive, conjunctiva clear, lids normal.  ENMT: Lips without lesions, good dentition, oropharynx clear.  Neck: Trachea midline, no masses, no thyromegaly.   Psych: Alert and oriented x3, normal affect and mood.      Results  Labs   - A1c: Holding steady    Imaging   - Ultrasound of the left leg: No blockage of an artery        Assessment and Plan:   The following treatment plan was discussed    Assessment & Plan  1. Peripheral vascular disease.  - The condition appears to be chronic, with poor vascular flow contributing to the symptoms.  - Ultrasound results were reported as normal, but the official report is not available.  - Referral to Dr. Davies at [facility] for further evaluation and management. If there is no response within 5 days, contact the office.  - Venous duplex and arterial duplex tests to be done.    2. Sleep issues.  - Currently on Ambien and reports doing well with it.  - No renewal needed as it was renewed last month.  - Advised to limit daytime naps to a single 45-minute session to avoid grogginess.    3. Overactive bladder.  - Currently taking mirabegron and reports some improvement in symptoms.  - Experiences dry mouth as a side effect, managed with mouthwash and water.  - Advised to respond promptly to the first signal to avoid accidents.    4. Poor posture.  - Advised to consider using a back brace available on [employer] to maintain an upright posture.  - Encouraged to perform wall exercises at home to improve posture.  - Discussed the importance of maintaining proper posture and potential benefits of physical therapy.      ORDERS:  1. PVD (peripheral vascular disease) ([facility])    - Referral to Vascular Surgery    2. Controlled type 2 diabetes mellitus without complication, without long-term current use of insulin (HCC)      3. Aneurysm of ascending aorta without rupture ([facility])      4. Overactive  bladder      5. Primary insomnia      6. Bad posture      7. Encounter for medication management (Primary)    - URINE DRUG SCREEN W/CONF (SEND OUT); Future    () Today's E/M visit is associated with medical care services that serve as the continuing focal point for all needed health care services and/or with medical care services that are part of ongoing care related to a patient's single, serious condition or a complex condition: This includes furnishing services to patients on an ongoing basis that result in care that is personalized to the patient. The services result in a comprehensive, longitudinal, and continuous relationship with the patient and involve delivery of team-based care that is accessible, coordinated with other practitioners and providers, and integrated with the broader health care landscape.      Please note that this dictation was created using voice recognition software. I have made every reasonable attempt to correct obvious errors, but I expect that there are errors of grammar and possibly content that I did not discover before finalizing the note.      Attestation      Verbal consent was acquired by the patient to use [employer] ambient listening note generation during this visit Yes                  [1]   Current Outpatient Medications   Medication Sig Dispense Refill    MIRABEGRON ER PO Take  by mouth.      zolpidem (AMBIEN) 10 MG Tab Take 1 Tablet by mouth every day for 90 days. 90 Tablet 0    gabapentin (NEURONTIN) 100 MG Cap TAKE 1 CAPSULE 3 TIMES A    Capsule 3    rosuvastatin (CRESTOR) 20 MG Tab TAKE 1 TABLET DAILY 90 Tablet 3    diclofenac DR (VOLTAREN) 50 MG Tablet Delayed Response TAKE 1 TABLET TWICE A  Tablet 1    metformin (GLUCOPHAGE) 1000 MG tablet Take 1 Tablet by mouth 2 times a day with meals for 360 days. 180 Tablet 3    pantoprazole (PROTONIX) 40 MG Tablet Delayed Response Take 1 Tablet by mouth every day for 360 days. 90 Tablet 3    valACYclovir  (VALTREX) 500 MG Tab TAKE 1 TABLET TWICE A  Tablet 3    famotidine (PEPCID) 40 MG Tab TAKE 1 TABLET TWICE A  Tablet 3    Cyanocobalamin (VITAMIN B12 PO) Take 5,000 mcg by mouth every day.      Multiple Vitamin (MULTIVITAMIN PO) Take 1 Tablet by mouth every day.      metoprolol SR (TOPROL XL) 25 MG TABLET SR 24 HR Take 1 Tablet by mouth every day. 90 Tablet 3    Fesoterodine Fumarate 8 MG TABLET SR 24 HR Take 8 mg by mouth every day at 6 PM.      tamsulosin (FLOMAX) 0.4 MG capsule Take 0.4 mg by mouth every day.      fluorouracil (EFUDEX) 5 % cream Apply 1 Application topically as needed (Pre-cancerous lesions).      CALCIUM PO Take 1 Tablet by mouth every day.      Cholecalciferol (VITAMIN D3 PO) Take 100 mcg by mouth every day.       No current facility-administered medications for this visit.

## 2025-06-24 NOTE — Clinical Note
REFERRAL APPROVAL NOTICE         Sent on June 24, 2025                   Abiel Vaughn  [address]  [place] [address]                   Dear Mr. Vaughn,    After a careful review of the medical information and benefit coverage, [facility] has processed your referral. See below for additional details.    If applicable, you must be actively enrolled with your insurance for coverage of the authorized service. If you have any questions regarding your coverage, please contact your insurance directly.    REFERRAL INFORMATION   Referral #:  [id number]  Referred-To Provider    Referred-By Provider:  Vascular Surgery    Ez Connelly P.A.-C.   [facility]      [address]  [address]  [facility] [address]  [phone] [address]  [place] [address]  [phone]    Referral Start Date:  06/23/2025  Referral End Date:   06/24/2026             SCHEDULING  If you do not already have an appointment, please call [phone] to make an appointment.     MORE INFORMATION  If you do not already have a [employer] account, sign up at: [email or web address]  You can access your medical information, make appointments, see lab results, billing information, and more.  If you have questions regarding this referral, please contact  the [facility] Referrals department at:             [phone]. Monday - Friday 8:00AM - 5:00PM.     Sincerely,    [facility]

## 2025-06-25 LAB
AMPHET CTO UR CFM-MCNC: NEGATIVE NG/ML
BARBITURATES CTO UR CFM-MCNC: NEGATIVE NG/ML
BENZODIAZ CTO UR CFM-MCNC: NEGATIVE NG/ML
CANNABINOIDS CTO UR CFM-MCNC: NEGATIVE NG/ML
COCAINE CTO UR CFM-MCNC: NEGATIVE NG/ML
CREAT UR-MCNC: 159.2 MG/DL (ref 20–400)
DRUG COMMENT 753798: NORMAL
METHADONE CTO UR CFM-MCNC: NEGATIVE NG/ML
OPIATES CTO UR CFM-MCNC: NEGATIVE NG/ML
PCP CTO UR CFM-MCNC: NEGATIVE NG/ML
PROPOXYPH CTO UR CFM-MCNC: NEGATIVE NG/ML

## 2025-07-13 DIAGNOSIS — R00.2 PALPITATIONS: ICD-10-CM

## 2025-07-13 DIAGNOSIS — I25.10 CORONARY ARTERY DISEASE INVOLVING NATIVE CORONARY ARTERY OF NATIVE HEART WITHOUT ANGINA PECTORIS: Primary | ICD-10-CM

## 2025-07-14 RX ORDER — METOPROLOL SUCCINATE 25 MG/1
25 TABLET, EXTENDED RELEASE ORAL DAILY
Qty: 90 TABLET | Refills: 1 | Status: SHIPPED | OUTPATIENT
Start: 2025-07-14

## 2025-07-14 NOTE — TELEPHONE ENCOUNTER
Last OV with LH on 10/29/24. No calls/ER visits for symptomatic bradycardia.       Schedulers: Please contact pt to schedule with LH for [facility] approximately around 8/29/25 per last LH OV note. Thank you!

## 2025-07-14 NOTE — TELEPHONE ENCOUNTER
Is the patient due for a refill? Yes    Was the patient seen the last 15 months? Yes    Date of last office visit: 10/29/2024    Does the patient have an upcoming appointment?  No      Provider to refill:LH    Does the patient have [facility] and need 100-day supply? (This applies to ALL medications) Patient does not have SCP

## 2025-07-30 DIAGNOSIS — M15.0 PRIMARY OSTEOARTHRITIS INVOLVING MULTIPLE JOINTS: ICD-10-CM

## 2025-08-06 ENCOUNTER — HOSPITAL ENCOUNTER (OUTPATIENT)
Dept: CARDIOLOGY | Facility: MEDICAL CENTER | Age: 75
End: 2025-08-06
Payer: MEDICARE

## 2025-08-06 DIAGNOSIS — I35.1 NONRHEUMATIC AORTIC VALVE INSUFFICIENCY: ICD-10-CM

## 2025-08-06 LAB
LV EJECT FRACT  99904: 70
LV EJECT FRACT MOD 2C 99903: 66.92
LV EJECT FRACT MOD 4C 99902: 58.05
LV EJECT FRACT MOD BP 99901: 62.23

## 2025-08-06 PROCEDURE — 93306 TTE W/DOPPLER COMPLETE: CPT

## 2025-08-06 PROCEDURE — 93306 TTE W/DOPPLER COMPLETE: CPT | Mod: 26 | Performed by: INTERNAL MEDICINE

## 2025-08-07 ENCOUNTER — RESULTS FOLLOW-UP (OUTPATIENT)
Dept: CARDIOLOGY | Facility: MEDICAL CENTER | Age: 75
End: 2025-08-07
Payer: MEDICARE

## 2025-08-09 DIAGNOSIS — F51.01 PRIMARY INSOMNIA: ICD-10-CM

## 2025-08-12 RX ORDER — ZOLPIDEM TARTRATE 10 MG/1
10 TABLET ORAL DAILY
Qty: 90 TABLET | Refills: 0 | Status: SHIPPED | OUTPATIENT
Start: 2025-08-12 | End: 2025-11-10